# Patient Record
Sex: MALE | Race: WHITE | NOT HISPANIC OR LATINO | URBAN - METROPOLITAN AREA
[De-identification: names, ages, dates, MRNs, and addresses within clinical notes are randomized per-mention and may not be internally consistent; named-entity substitution may affect disease eponyms.]

---

## 2017-05-22 ENCOUNTER — OUTPATIENT (OUTPATIENT)
Dept: OUTPATIENT SERVICES | Facility: HOSPITAL | Age: 77
LOS: 1 days | Discharge: HOME | End: 2017-05-22

## 2017-05-22 ENCOUNTER — APPOINTMENT (OUTPATIENT)
Dept: HEMATOLOGY ONCOLOGY | Facility: CLINIC | Age: 77
End: 2017-05-22

## 2017-05-22 VITALS
RESPIRATION RATE: 14 BRPM | SYSTOLIC BLOOD PRESSURE: 187 MMHG | DIASTOLIC BLOOD PRESSURE: 78 MMHG | HEIGHT: 64 IN | BODY MASS INDEX: 29.19 KG/M2 | WEIGHT: 171 LBS | HEART RATE: 57 BPM | TEMPERATURE: 97.3 F

## 2017-05-28 LAB
ALBUMIN SERPL-MCNC: 4 G/DL
ALBUMIN/GLOB SERPL: 1.29
ALP SERPL-CCNC: 74 IU/L
ALT SERPL-CCNC: 27 IU/L
ANION GAP SERPL CALC-SCNC: 11 MEQ/L
AST SERPL-CCNC: 28 IU/L
BASOPHILS # BLD: 0.02 TH/MM3
BASOPHILS NFR BLD: 0.3 %
BILIRUB SERPL-MCNC: 0.9 MG/DL
BUN SERPL-MCNC: 11 MG/DL
BUN/CREAT SERPL: 10.4 %
CALCIUM SERPL-MCNC: 9.3 MG/DL
CHLORIDE SERPL-SCNC: 102 MEQ/L
CO2 SERPL-SCNC: 26 MEQ/L
CREAT SERPL-MCNC: 1.06 MG/DL
EOSINOPHIL # BLD: 0.09 TH/MM3
EOSINOPHIL NFR BLD: 1.3 %
ERYTHROCYTE [DISTWIDTH] IN BLOOD BY AUTOMATED COUNT: 13.1 %
GFR SERPL CREATININE-BSD FRML MDRD: 68
GLUCOSE SERPL-MCNC: 83 MG/DL
GRANULOCYTES # BLD: 5.05 TH/MM3
GRANULOCYTES NFR BLD: 70.5 %
HCT VFR BLD AUTO: 42.1 %
HGB BLD-MCNC: 14.4 G/DL
IMM GRANULOCYTES # BLD: 0.01 TH/MM3
IMM GRANULOCYTES NFR BLD: 0.1 %
LACTATE DEHYDROGENASE (NORTH): 214 IU/L
LYMPHOCYTES # BLD: 1.25 TH/MM3
LYMPHOCYTES NFR BLD: 17.5 %
MCH RBC QN AUTO: 31 PG
MCHC RBC AUTO-ENTMCNC: 34.2 G/DL
MCV RBC AUTO: 90.5 FL
MONOCYTES # BLD: 0.74 TH/MM3
MONOCYTES NFR BLD: 10.3 %
PLATELET # BLD: 140 TH/MM3
PMV BLD AUTO: 11.1 FL
POTASSIUM SERPL-SCNC: 4.6 MMOL/L
PROT SERPL-MCNC: 7.1 G/DL
RBC # BLD AUTO: 4.65 MIL/MM3
SODIUM SERPL-SCNC: 139 MEQ/L
WBC # BLD: 7.16 TH/MM3

## 2017-06-28 DIAGNOSIS — C82.10 FOLLICULAR LYMPHOMA GRADE II, UNSPECIFIED SITE: ICD-10-CM

## 2017-11-27 ENCOUNTER — OUTPATIENT (OUTPATIENT)
Dept: OUTPATIENT SERVICES | Facility: HOSPITAL | Age: 77
LOS: 1 days | Discharge: HOME | End: 2017-11-27

## 2017-11-27 ENCOUNTER — APPOINTMENT (OUTPATIENT)
Dept: HEMATOLOGY ONCOLOGY | Facility: CLINIC | Age: 77
End: 2017-11-27

## 2017-11-27 VITALS
DIASTOLIC BLOOD PRESSURE: 74 MMHG | BODY MASS INDEX: 29.37 KG/M2 | SYSTOLIC BLOOD PRESSURE: 126 MMHG | RESPIRATION RATE: 14 BRPM | TEMPERATURE: 97.5 F | HEIGHT: 64 IN | WEIGHT: 172 LBS | HEART RATE: 81 BPM

## 2017-11-27 LAB
BASOPHILS # BLD: 0.03 TH/MM3
BASOPHILS NFR BLD: 0.4 %
EOSINOPHIL # BLD: 0.03 TH/MM3
EOSINOPHIL NFR BLD: 0.4 %
ERYTHROCYTE [DISTWIDTH] IN BLOOD BY AUTOMATED COUNT: 13.3 %
GRANULOCYTES # BLD: 5.05 TH/MM3
GRANULOCYTES NFR BLD: 72.3 %
HCT VFR BLD AUTO: 43 %
HGB BLD-MCNC: 14.5 G/DL
IMM GRANULOCYTES # BLD: 0.05 TH/MM3
IMM GRANULOCYTES NFR BLD: 0.7 %
LYMPHOCYTES # BLD: 1.05 TH/MM3
LYMPHOCYTES NFR BLD: 15 %
MCH RBC QN AUTO: 30.5 PG
MCHC RBC AUTO-ENTMCNC: 33.7 G/DL
MCV RBC AUTO: 90.3 FL
MONOCYTES # BLD: 0.78 TH/MM3
MONOCYTES NFR BLD: 11.2 %
PLATELET # BLD: 133 TH/MM3
PMV BLD AUTO: 11.5 FL
RBC # BLD AUTO: 4.76 MIL/MM3
WBC # BLD: 6.99 TH/MM3

## 2017-11-28 DIAGNOSIS — R10.9 UNSPECIFIED ABDOMINAL PAIN: ICD-10-CM

## 2017-11-28 DIAGNOSIS — C82.10 FOLLICULAR LYMPHOMA GRADE II, UNSPECIFIED SITE: ICD-10-CM

## 2017-11-29 ENCOUNTER — OUTPATIENT (OUTPATIENT)
Dept: OUTPATIENT SERVICES | Facility: HOSPITAL | Age: 77
LOS: 1 days | Discharge: HOME | End: 2017-11-29

## 2017-11-29 DIAGNOSIS — R05 COUGH: ICD-10-CM

## 2017-12-11 ENCOUNTER — OUTPATIENT (OUTPATIENT)
Dept: OUTPATIENT SERVICES | Facility: HOSPITAL | Age: 77
LOS: 1 days | Discharge: HOME | End: 2017-12-11

## 2017-12-11 DIAGNOSIS — R10.31 RIGHT LOWER QUADRANT PAIN: ICD-10-CM

## 2017-12-31 LAB
ALBUMIN SERPL-MCNC: 3.9 G/DL
ALBUMIN/GLOB SERPL: 1.34
ALP SERPL-CCNC: 68 IU/L
ALT SERPL-CCNC: 29 IU/L
ANION GAP SERPL CALC-SCNC: 7 MEQ/L
AST SERPL-CCNC: 27 IU/L
BILIRUB SERPL-MCNC: 1.2 MG/DL
BUN SERPL-MCNC: 15 MG/DL
BUN/CREAT SERPL: 14.6 %
CALCIUM SERPL-MCNC: 9.4 MG/DL
CHLORIDE SERPL-SCNC: 104 MEQ/L
CO2 SERPL-SCNC: 26 MEQ/L
CREAT SERPL-MCNC: 1.03 MG/DL
GFR SERPL CREATININE-BSD FRML MDRD: 70
GLUCOSE SERPL-MCNC: 116 MG/DL
LACTATE DEHYDROGENASE (NORTH): 183 IU/L
POTASSIUM SERPL-SCNC: 4 MMOL/L
PROT SERPL-MCNC: 6.8 G/DL
SODIUM SERPL-SCNC: 137 MEQ/L

## 2018-03-05 ENCOUNTER — OUTPATIENT (OUTPATIENT)
Dept: OUTPATIENT SERVICES | Facility: HOSPITAL | Age: 78
LOS: 1 days | Discharge: HOME | End: 2018-03-05

## 2018-03-05 ENCOUNTER — APPOINTMENT (OUTPATIENT)
Dept: HEMATOLOGY ONCOLOGY | Facility: CLINIC | Age: 78
End: 2018-03-05

## 2018-03-05 ENCOUNTER — LABORATORY RESULT (OUTPATIENT)
Age: 78
End: 2018-03-05

## 2018-03-05 VITALS
HEART RATE: 64 BPM | BODY MASS INDEX: 28.34 KG/M2 | RESPIRATION RATE: 14 BRPM | SYSTOLIC BLOOD PRESSURE: 172 MMHG | WEIGHT: 166 LBS | TEMPERATURE: 96.8 F | HEIGHT: 64 IN | DIASTOLIC BLOOD PRESSURE: 87 MMHG

## 2018-03-07 DIAGNOSIS — C82.10 FOLLICULAR LYMPHOMA GRADE II, UNSPECIFIED SITE: ICD-10-CM

## 2018-06-23 LAB
ALBUMIN SERPL ELPH-MCNC: 4.5 G/DL
ALP BLD-CCNC: 82 U/L
ALT SERPL-CCNC: 23 U/L
ANION GAP SERPL CALC-SCNC: 18 MMOL/L
AST SERPL-CCNC: 23 U/L
BILIRUB SERPL-MCNC: 0.5 MG/DL
BUN SERPL-MCNC: 15 MG/DL
CALCIUM SERPL-MCNC: 9.2 MG/DL
CHLORIDE SERPL-SCNC: 101 MMOL/L
CO2 SERPL-SCNC: 21 MMOL/L
CREAT SERPL-MCNC: 1 MG/DL
GLUCOSE SERPL-MCNC: 96 MG/DL
HCT VFR BLD CALC: 42.9 %
HGB BLD-MCNC: 14.4 G/DL
LDH SERPL-CCNC: 223 U/L
MCHC RBC-ENTMCNC: 30.4 PG
MCHC RBC-ENTMCNC: 33.6 G/DL
MCV RBC AUTO: 90.7 FL
PLATELET # BLD AUTO: 144 K/UL
PMV BLD: 11.5 FL
POTASSIUM SERPL-SCNC: 4.4 MMOL/L
PROT SERPL-MCNC: 7.7 G/DL
RBC # BLD: 4.73 M/UL
RBC # FLD: 13 %
SODIUM SERPL-SCNC: 140 MMOL/L
WBC # FLD AUTO: 6.08 K/UL

## 2018-07-14 ENCOUNTER — OUTPATIENT (OUTPATIENT)
Dept: OUTPATIENT SERVICES | Facility: HOSPITAL | Age: 78
LOS: 1 days | Discharge: HOME | End: 2018-07-14

## 2018-07-14 DIAGNOSIS — M54.5 LOW BACK PAIN: ICD-10-CM

## 2018-09-17 ENCOUNTER — OUTPATIENT (OUTPATIENT)
Dept: OUTPATIENT SERVICES | Facility: HOSPITAL | Age: 78
LOS: 1 days | Discharge: HOME | End: 2018-09-17

## 2018-09-17 ENCOUNTER — APPOINTMENT (OUTPATIENT)
Dept: HEMATOLOGY ONCOLOGY | Facility: CLINIC | Age: 78
End: 2018-09-17

## 2018-09-17 ENCOUNTER — LABORATORY RESULT (OUTPATIENT)
Age: 78
End: 2018-09-17

## 2018-09-17 VITALS
WEIGHT: 166 LBS | DIASTOLIC BLOOD PRESSURE: 73 MMHG | RESPIRATION RATE: 14 BRPM | TEMPERATURE: 97.6 F | HEART RATE: 53 BPM | SYSTOLIC BLOOD PRESSURE: 145 MMHG | HEIGHT: 64 IN | BODY MASS INDEX: 28.34 KG/M2

## 2018-09-18 DIAGNOSIS — C82.10 FOLLICULAR LYMPHOMA GRADE II, UNSPECIFIED SITE: ICD-10-CM

## 2018-10-06 ENCOUNTER — INPATIENT (INPATIENT)
Facility: HOSPITAL | Age: 78
LOS: 2 days | Discharge: HOME HEALTH | End: 2018-10-09
Attending: HOSPITALIST | Admitting: HOSPITALIST

## 2018-10-06 VITALS
SYSTOLIC BLOOD PRESSURE: 156 MMHG | OXYGEN SATURATION: 95 % | HEART RATE: 93 BPM | DIASTOLIC BLOOD PRESSURE: 81 MMHG | TEMPERATURE: 100 F | RESPIRATION RATE: 20 BRPM

## 2018-10-06 DIAGNOSIS — Z98.890 OTHER SPECIFIED POSTPROCEDURAL STATES: Chronic | ICD-10-CM

## 2018-10-06 LAB
ALBUMIN SERPL ELPH-MCNC: 4.4 G/DL — SIGNIFICANT CHANGE UP (ref 3.5–5.2)
ALP SERPL-CCNC: 75 U/L — SIGNIFICANT CHANGE UP (ref 30–115)
ALT FLD-CCNC: 39 U/L — SIGNIFICANT CHANGE UP (ref 0–41)
ANION GAP SERPL CALC-SCNC: 12 MMOL/L — SIGNIFICANT CHANGE UP (ref 7–14)
ANION GAP SERPL CALC-SCNC: 15 MMOL/L — HIGH (ref 7–14)
APPEARANCE UR: CLEAR — SIGNIFICANT CHANGE UP
AST SERPL-CCNC: 87 U/L — HIGH (ref 0–41)
BACTERIA # UR AUTO: ABNORMAL
BASOPHILS # BLD AUTO: 0.02 K/UL — SIGNIFICANT CHANGE UP (ref 0–0.2)
BASOPHILS NFR BLD AUTO: 0.3 % — SIGNIFICANT CHANGE UP (ref 0–1)
BILIRUB SERPL-MCNC: 1.4 MG/DL — HIGH (ref 0.2–1.2)
BILIRUB UR-MCNC: NEGATIVE — SIGNIFICANT CHANGE UP
BUN SERPL-MCNC: 11 MG/DL — SIGNIFICANT CHANGE UP (ref 10–20)
BUN SERPL-MCNC: 13 MG/DL — SIGNIFICANT CHANGE UP (ref 10–20)
CALCIUM SERPL-MCNC: 7.9 MG/DL — LOW (ref 8.5–10.1)
CALCIUM SERPL-MCNC: 9.3 MG/DL — SIGNIFICANT CHANGE UP (ref 8.5–10.1)
CHLORIDE SERPL-SCNC: 106 MMOL/L — SIGNIFICANT CHANGE UP (ref 98–110)
CHLORIDE SERPL-SCNC: 98 MMOL/L — SIGNIFICANT CHANGE UP (ref 98–110)
CK SERPL-CCNC: 3564 U/L — HIGH (ref 0–225)
CK SERPL-CCNC: 3760 U/L — HIGH (ref 0–225)
CO2 SERPL-SCNC: 24 MMOL/L — SIGNIFICANT CHANGE UP (ref 17–32)
CO2 SERPL-SCNC: 24 MMOL/L — SIGNIFICANT CHANGE UP (ref 17–32)
COLOR SPEC: YELLOW — SIGNIFICANT CHANGE UP
CREAT SERPL-MCNC: 0.9 MG/DL — SIGNIFICANT CHANGE UP (ref 0.7–1.5)
CREAT SERPL-MCNC: 0.9 MG/DL — SIGNIFICANT CHANGE UP (ref 0.7–1.5)
DIFF PNL FLD: ABNORMAL
EOSINOPHIL # BLD AUTO: 0 K/UL — SIGNIFICANT CHANGE UP (ref 0–0.7)
EOSINOPHIL NFR BLD AUTO: 0 % — SIGNIFICANT CHANGE UP (ref 0–8)
GLUCOSE SERPL-MCNC: 108 MG/DL — HIGH (ref 70–99)
GLUCOSE SERPL-MCNC: 114 MG/DL — HIGH (ref 70–99)
GLUCOSE UR QL: NEGATIVE MG/DL — SIGNIFICANT CHANGE UP
HCT VFR BLD CALC: 40.9 % — LOW (ref 42–52)
HGB BLD-MCNC: 14.4 G/DL — SIGNIFICANT CHANGE UP (ref 14–18)
IMM GRANULOCYTES NFR BLD AUTO: 0.3 % — SIGNIFICANT CHANGE UP (ref 0.1–0.3)
KETONES UR-MCNC: NEGATIVE — SIGNIFICANT CHANGE UP
LACTATE SERPL-SCNC: 1.6 MMOL/L — SIGNIFICANT CHANGE UP (ref 0.5–2.2)
LEUKOCYTE ESTERASE UR-ACNC: NEGATIVE — SIGNIFICANT CHANGE UP
LYMPHOCYTES # BLD AUTO: 0.34 K/UL — LOW (ref 1.2–3.4)
LYMPHOCYTES # BLD AUTO: 5.1 % — LOW (ref 20.5–51.1)
MAGNESIUM SERPL-MCNC: 1.9 MG/DL — SIGNIFICANT CHANGE UP (ref 1.8–2.4)
MCHC RBC-ENTMCNC: 30.9 PG — SIGNIFICANT CHANGE UP (ref 27–31)
MCHC RBC-ENTMCNC: 35.2 G/DL — SIGNIFICANT CHANGE UP (ref 32–37)
MCV RBC AUTO: 87.8 FL — SIGNIFICANT CHANGE UP (ref 80–94)
MONOCYTES # BLD AUTO: 0.85 K/UL — HIGH (ref 0.1–0.6)
MONOCYTES NFR BLD AUTO: 12.7 % — HIGH (ref 1.7–9.3)
NEUTROPHILS # BLD AUTO: 5.44 K/UL — SIGNIFICANT CHANGE UP (ref 1.4–6.5)
NEUTROPHILS NFR BLD AUTO: 81.6 % — HIGH (ref 42.2–75.2)
NITRITE UR-MCNC: NEGATIVE — SIGNIFICANT CHANGE UP
NRBC # BLD: 0 /100 WBCS — SIGNIFICANT CHANGE UP (ref 0–0)
PH UR: 5.5 — SIGNIFICANT CHANGE UP (ref 5–8)
PLATELET # BLD AUTO: 118 K/UL — LOW (ref 130–400)
POTASSIUM SERPL-MCNC: 4.1 MMOL/L — SIGNIFICANT CHANGE UP (ref 3.5–5)
POTASSIUM SERPL-MCNC: 4.6 MMOL/L — SIGNIFICANT CHANGE UP (ref 3.5–5)
POTASSIUM SERPL-SCNC: 4.1 MMOL/L — SIGNIFICANT CHANGE UP (ref 3.5–5)
POTASSIUM SERPL-SCNC: 4.6 MMOL/L — SIGNIFICANT CHANGE UP (ref 3.5–5)
PROT SERPL-MCNC: 7.4 G/DL — SIGNIFICANT CHANGE UP (ref 6–8)
PROT UR-MCNC: 30 MG/DL
RBC # BLD: 4.66 M/UL — LOW (ref 4.7–6.1)
RBC # FLD: 12.7 % — SIGNIFICANT CHANGE UP (ref 11.5–14.5)
RBC CASTS # UR COMP ASSIST: ABNORMAL /HPF
SODIUM SERPL-SCNC: 137 MMOL/L — SIGNIFICANT CHANGE UP (ref 135–146)
SODIUM SERPL-SCNC: 142 MMOL/L — SIGNIFICANT CHANGE UP (ref 135–146)
SP GR SPEC: 1.02 — SIGNIFICANT CHANGE UP (ref 1.01–1.03)
TROPONIN T SERPL-MCNC: <0.01 NG/ML — SIGNIFICANT CHANGE UP
UROBILINOGEN FLD QL: 0.2 MG/DL — SIGNIFICANT CHANGE UP (ref 0.2–0.2)
WBC # BLD: 6.67 K/UL — SIGNIFICANT CHANGE UP (ref 4.8–10.8)
WBC # FLD AUTO: 6.67 K/UL — SIGNIFICANT CHANGE UP (ref 4.8–10.8)
WBC UR QL: SIGNIFICANT CHANGE UP /HPF

## 2018-10-06 RX ORDER — SODIUM CHLORIDE 9 MG/ML
1000 INJECTION INTRAMUSCULAR; INTRAVENOUS; SUBCUTANEOUS ONCE
Qty: 0 | Refills: 0 | Status: COMPLETED | OUTPATIENT
Start: 2018-10-06 | End: 2018-10-06

## 2018-10-06 RX ORDER — SODIUM CHLORIDE 9 MG/ML
1000 INJECTION INTRAMUSCULAR; INTRAVENOUS; SUBCUTANEOUS
Qty: 0 | Refills: 0 | Status: DISCONTINUED | OUTPATIENT
Start: 2018-10-06 | End: 2018-10-07

## 2018-10-06 RX ADMIN — SODIUM CHLORIDE 2000 MILLILITER(S): 9 INJECTION INTRAMUSCULAR; INTRAVENOUS; SUBCUTANEOUS at 16:00

## 2018-10-06 RX ADMIN — SODIUM CHLORIDE 1000 MILLILITER(S): 9 INJECTION INTRAMUSCULAR; INTRAVENOUS; SUBCUTANEOUS at 15:44

## 2018-10-06 RX ADMIN — SODIUM CHLORIDE 250 MILLILITER(S): 9 INJECTION INTRAMUSCULAR; INTRAVENOUS; SUBCUTANEOUS at 23:05

## 2018-10-06 RX ADMIN — SODIUM CHLORIDE 2000 MILLILITER(S): 9 INJECTION INTRAMUSCULAR; INTRAVENOUS; SUBCUTANEOUS at 11:32

## 2018-10-06 NOTE — ED PROVIDER NOTE - CONDUCTED A DETAILED DISCUSSION WITH PATIENT AND/OR GUARDIAN REGARDING, MDM
lab results/return to ED if symptoms worsen, persist or questions arise/radiology results/need for continued observation

## 2018-10-06 NOTE — ED PROVIDER NOTE - MEDICAL DECISION MAKING DETAILS
I was present for and supervised the key and critical aspects of the procedures performed during the care of the patient. Patient found on floor after an extended period of time unable to get up,  he was found to have elevated ck levels I will continue to monitor labs and administer iv fluids I will continue to monitor at this time.

## 2018-10-06 NOTE — ED ADULT TRIAGE NOTE - CHIEF COMPLAINT QUOTE
Pt brought in by ambulance from home. As per EMT "He was found on the floor next to his bed. He's been on the floor since 4 a.m."

## 2018-10-06 NOTE — ED PROVIDER NOTE - PHYSICAL EXAMINATION
Constitutional: Well developed, well nourished. NAD. Good general hygiene  Head: Atraumatic.  Eyes: PERRLA. EOMI without discomfort.   ENT: No nasal discharge. Mucous membranes moist.  Neck: Supple. Painless ROM.  Cardiovascular: Regular rhythm. Regular rate. Normal S1 and S2. No murmurs. 2+ pulses in all extremities.   Pulmonary: Normal respiratory rate and effort. Lungs clear to auscultation bilaterally. No wheezing, rales, or rhonchi. Bilateral, equal lung expansion.   Abdominal: Soft. Nondistended. Nontender. No rebound or guarding.   Extremities: Pelvis stable. No lower extremity edema. Symmetric calves.  Skin: No rashes.   Neuro: AAOx3. CN 2-12 intact.   Psych: Normal mood. Normal affect.

## 2018-10-06 NOTE — ED ADULT NURSE NOTE - NSIMPLEMENTINTERV_GEN_ALL_ED
Implemented All Universal Safety Interventions:  Dresser to call system. Call bell, personal items and telephone within reach. Instruct patient to call for assistance. Room bathroom lighting operational. Non-slip footwear when patient is off stretcher. Physically safe environment: no spills, clutter or unnecessary equipment. Stretcher in lowest position, wheels locked, appropriate side rails in place.

## 2018-10-06 NOTE — ED PROVIDER NOTE - PROGRESS NOTE DETAILS
labs and studies reviewed, patient with mild rhabdo but is ambulating at baseline, will hydrate, repeat labs; endorsed to Dr Leon endorsed to Dr. Malone. accepts admission/. improving CK

## 2018-10-06 NOTE — ED PROVIDER NOTE - OBJECTIVE STATEMENT
78y M w PMH CABGx4 25 years ago, GERD, HTN BIBEMS after being found on the floor this morning after he fell out of bed and was unable to reach anything to help him pull himself back up. No f/c/n/v/d. Pt complaining of increased urination and mild suprapubic pain but no other complaints. Pt states he was "just not strong enough to get back up." No CP or SOB. No n/v/d/f/c.

## 2018-10-06 NOTE — ED PROVIDER NOTE - NS ED ROS FT
Constitutional: No fever or chills. Normal appetite. No unintended weight loss.   Eyes: No vision changes.  ENT: No hearing changes. No ear pain. No sore throat.  Neck: No neck pain or stiffness.  Cardiovascular: No chest pain, palpitations, or edema.  Pulmonary: No cough or SOB. No hemoptysis.  Abdominal: No abdominal pain, nausea, vomiting, or diarrhea.   : No dysuria. No hematuria.   Neuro: No headache, syncope, or dizziness.  MS: No back pain. No calf pain/swelling.  Psych: No suicidal or homicidal ideations.

## 2018-10-06 NOTE — ED PROVIDER NOTE - ATTENDING CONTRIBUTION TO CARE
78y male above PMH (denies h/o CA listed in chart) BIBEMS after fall- pt fell OOB but unable to get back up, denies LOC or injury, has had urinary frequency since receiving his flu shot yesterday, no fever, no pain, on exam vital signs appreciated, elderly, nontoxic, no marks of tuarma, head nc/at, perrla, conj pink, dry mm, neck supple, cor rrr, lungs cta, abd +bs, snt, pelvis stable FROM x 4 neuro itnact, will cehk labs, urine, imaging, ekg, reassess

## 2018-10-07 RX ORDER — FAMOTIDINE 10 MG/ML
20 INJECTION INTRAVENOUS DAILY
Qty: 0 | Refills: 0 | Status: DISCONTINUED | OUTPATIENT
Start: 2018-10-07 | End: 2018-10-07

## 2018-10-07 RX ORDER — METOPROLOL TARTRATE 50 MG
25 TABLET ORAL DAILY
Qty: 0 | Refills: 0 | Status: DISCONTINUED | OUTPATIENT
Start: 2018-10-07 | End: 2018-10-07

## 2018-10-07 RX ORDER — FAMOTIDINE 10 MG/ML
40 INJECTION INTRAVENOUS DAILY
Qty: 0 | Refills: 0 | Status: DISCONTINUED | OUTPATIENT
Start: 2018-10-07 | End: 2018-10-09

## 2018-10-07 RX ORDER — ASPIRIN/CALCIUM CARB/MAGNESIUM 324 MG
81 TABLET ORAL DAILY
Qty: 0 | Refills: 0 | Status: DISCONTINUED | OUTPATIENT
Start: 2018-10-07 | End: 2018-10-09

## 2018-10-07 RX ORDER — METOPROLOL TARTRATE 50 MG
50 TABLET ORAL
Qty: 0 | Refills: 0 | Status: DISCONTINUED | OUTPATIENT
Start: 2018-10-07 | End: 2018-10-09

## 2018-10-07 RX ORDER — ATORVASTATIN CALCIUM 80 MG/1
40 TABLET, FILM COATED ORAL AT BEDTIME
Qty: 0 | Refills: 0 | Status: DISCONTINUED | OUTPATIENT
Start: 2018-10-07 | End: 2018-10-09

## 2018-10-07 RX ORDER — METOPROLOL TARTRATE 50 MG
50 TABLET ORAL DAILY
Qty: 0 | Refills: 0 | Status: DISCONTINUED | OUTPATIENT
Start: 2018-10-07 | End: 2018-10-07

## 2018-10-07 RX ORDER — SODIUM CHLORIDE 9 MG/ML
1000 INJECTION INTRAMUSCULAR; INTRAVENOUS; SUBCUTANEOUS
Qty: 0 | Refills: 0 | Status: DISCONTINUED | OUTPATIENT
Start: 2018-10-07 | End: 2018-10-08

## 2018-10-07 RX ORDER — HEPARIN SODIUM 5000 [USP'U]/ML
5000 INJECTION INTRAVENOUS; SUBCUTANEOUS EVERY 12 HOURS
Qty: 0 | Refills: 0 | Status: DISCONTINUED | OUTPATIENT
Start: 2018-10-07 | End: 2018-10-09

## 2018-10-07 RX ADMIN — Medication 81 MILLIGRAM(S): at 16:33

## 2018-10-07 RX ADMIN — SODIUM CHLORIDE 250 MILLILITER(S): 9 INJECTION INTRAMUSCULAR; INTRAVENOUS; SUBCUTANEOUS at 07:00

## 2018-10-07 RX ADMIN — SODIUM CHLORIDE 250 MILLILITER(S): 9 INJECTION INTRAMUSCULAR; INTRAVENOUS; SUBCUTANEOUS at 01:59

## 2018-10-07 RX ADMIN — FAMOTIDINE 40 MILLIGRAM(S): 10 INJECTION INTRAVENOUS at 16:33

## 2018-10-07 RX ADMIN — Medication 50 MILLIGRAM(S): at 16:31

## 2018-10-07 RX ADMIN — SODIUM CHLORIDE 150 MILLILITER(S): 9 INJECTION INTRAMUSCULAR; INTRAVENOUS; SUBCUTANEOUS at 16:00

## 2018-10-07 NOTE — H&P ADULT - NSHPLABSRESULTS_GEN_ALL_CORE
CBC Full  -  ( 06 Oct 2018 11:53 )  WBC Count : 6.67 K/uL  Hemoglobin : 14.4 g/dL  Hematocrit : 40.9 %  Platelet Count - Automated : 118 K/uL  Mean Cell Volume : 87.8 fL  Mean Cell Hemoglobin : 30.9 pg  Mean Cell Hemoglobin Concentration : 35.2 g/dL  Auto Neutrophil # : 5.44 K/uL  Auto Lymphocyte # : 0.34 K/uL  Auto Monocyte # : 0.85 K/uL  Auto Eosinophil # : 0.00 K/uL  Auto Basophil # : 0.02 K/uL  Auto Neutrophil % : 81.6 %  Auto Lymphocyte % : 5.1 %  Auto Monocyte % : 12.7 %  Auto Eosinophil % : 0.0 %  Auto Basophil % : 0.3 %    BMP:    10-06 @ 18:50    Blood Urea Nitrogen - 11  Calcium - 7.9  Carbon Dioxide - 24  Chloride - 106  Creatinine - 0.9  Glucose - 108  Potassium - 4.1  Sodium - 142

## 2018-10-07 NOTE — ED CDU PROVIDER INITIAL DAY NOTE - PHYSICAL EXAMINATION
PHYSICAL EXAM:    GENERAL: Alert, appears stated age, well appearing, non-toxic  SKIN: Warm, pink and dry. MMM   EYE: Normal lids/conjunctiva, PERRL, EOMI  ENT: Normal hearing, patent oropharynx   NECK: +supple. No meningismus  Pulm: Bilateral BS, normal resp effort, no wheezes, stridor, or retractions  CV: RRR, no M/R/G, 2+ pulses   Abd: soft, non-tender, non-distended  Mskel: no erythema, cyanosis, edema. no calf tenderness.  Neuro: AAOx3, no sensory/motor deficits No speech slurring, pronator drift, facial asymmetry. normal finger-to-nose b/l. 5/5 strength throughout. normal gait.

## 2018-10-07 NOTE — PATIENT PROFILE ADULT - PSYCHOSOCIAL CONCERNS - OTHER
dtr live in [place], son is around but most of the time not available, Patient is gets more often confused and uncomplying with  meds related to confusion, needs support system for safety

## 2018-10-07 NOTE — ED CDU PROVIDER INITIAL DAY NOTE - OBJECTIVE STATEMENT
79 y/o M with CABG x 4, HTN presents with slip from bed onto floor yesterday. no head injury. no LOC. on asa. not on other blood thinners. He was unable to get up for some time and found to be in rhabdomyalysis. agree with ED hx and PE. pt originally placed in obs to trend CK; which is downtrending; however, on discussion with pts daughter, she does not feel that this is a safe d/c home as pt needs extra help and lives alone. she says he is unsteady on his feet and is afraid that he will fall and not be able to get up again. pt denies all other symptosm inclduing CP, pain anywhere, SOB, fevers, n/v/d, abdomianl pain, leg pain.

## 2018-10-07 NOTE — H&P ADULT - NSHPPHYSICALEXAM_GEN_ALL_CORE
PHYSICAL EXAM:  GENERAL: NAD, elderly   HEAD:  Atraumatic, Normocephalic  EYES: EOMI, PERRLA, conjunctiva and sclera clear  NECK: Supple, No JVD  CHEST/LUNG: Clear to auscultation bilaterally; No wheeze  HEART: Regular rate and rhythm; No murmurs, rubs, or gallops  ABDOMEN: Soft, Nontender, Nondistended; Bowel sounds present  EXTREMITIES:  2+ Peripheral Pulses, No clubbing, cyanosis, or edema  PSYCH: AAOx3  NEUROLOGY: non-focal  SKIN: No rashes or lesions

## 2018-10-07 NOTE — H&P ADULT - ASSESSMENT
78y M w PMH CABGx4 25 years ago, GERD, HTN BIBEMS after being found on the floor this morning after he fell out of bed and was unable to reach anything to help him pull himself back up. No f/c/n/v/d. Pt complaining of increased urination and mild suprapubic pain but no other complaints. Pt states he was "just not strong enough to get back up." No CP or SOB. No n/v/d/f/c.     Problem List:  #Rhabdomyolysis   #Debility   #Fall   #CAD s/p CABG   #GERD  #HTN    Plan:  - c/w IVF   - PT/Rehab   - c/w ASA/Statin   - c/w BB  - c/w protonix 40mg daily   - dvt ppx

## 2018-10-07 NOTE — H&P ADULT - PMH
Coronary artery disease involving coronary bypass graft of native heart without angina pectoris    HTN (hypertension)

## 2018-10-08 LAB
CULTURE RESULTS: SIGNIFICANT CHANGE UP
SPECIMEN SOURCE: SIGNIFICANT CHANGE UP

## 2018-10-08 RX ORDER — SODIUM CHLORIDE 9 MG/ML
1000 INJECTION INTRAMUSCULAR; INTRAVENOUS; SUBCUTANEOUS
Qty: 0 | Refills: 0 | Status: DISCONTINUED | OUTPATIENT
Start: 2018-10-08 | End: 2018-10-09

## 2018-10-08 RX ORDER — ACETAMINOPHEN 500 MG
650 TABLET ORAL EVERY 6 HOURS
Qty: 0 | Refills: 0 | Status: DISCONTINUED | OUTPATIENT
Start: 2018-10-08 | End: 2018-10-09

## 2018-10-08 RX ORDER — TRAMADOL HYDROCHLORIDE 50 MG/1
50 TABLET ORAL EVERY 8 HOURS
Qty: 0 | Refills: 0 | Status: DISCONTINUED | OUTPATIENT
Start: 2018-10-08 | End: 2018-10-09

## 2018-10-08 RX ADMIN — HEPARIN SODIUM 5000 UNIT(S): 5000 INJECTION INTRAVENOUS; SUBCUTANEOUS at 18:24

## 2018-10-08 RX ADMIN — Medication 10 MILLIGRAM(S): at 18:23

## 2018-10-08 RX ADMIN — SODIUM CHLORIDE 75 MILLILITER(S): 9 INJECTION INTRAMUSCULAR; INTRAVENOUS; SUBCUTANEOUS at 17:10

## 2018-10-08 RX ADMIN — ATORVASTATIN CALCIUM 40 MILLIGRAM(S): 80 TABLET, FILM COATED ORAL at 21:25

## 2018-10-08 NOTE — PROGRESS NOTE ADULT - SUBJECTIVE AND OBJECTIVE BOX
Pt seen and examined at bedside. Report RLQ chronic abd pain.     VITAL SIGNS (Last 24 hrs):  T(C): 36.1 (10-08-18 @ 14:00), Max: 36.1 (10-08-18 @ 14:00)  HR: 62 (10-08-18 @ 14:00) (62 - 62)  BP: 146/75 (10-08-18 @ 14:00) (146/75 - 146/75)  RR: 16 (10-08-18 @ 14:00) (16 - 16)  SpO2: --  Wt(kg): --  Daily     Daily     I&O's Summary    08 Oct 2018 07:01  -  08 Oct 2018 17:25  --------------------------------------------------------  IN: 0 mL / OUT: 250 mL / NET: -250 mL        PHYSICAL EXAM:  GENERAL: NAD, well-developed  HEAD:  Atraumatic, Normocephalic  EYES: EOMI, PERRLA, conjunctiva and sclera clear  NECK: Supple, No JVD  CHEST/LUNG: Clear to auscultation bilaterally; No wheeze  HEART: Regular rate and rhythm; No murmurs, rubs, or gallops  ABDOMEN: Soft, Nontender, Nondistended; Bowel sounds present  EXTREMITIES:  2+ Peripheral Pulses, No clubbing, cyanosis, or edema  PSYCH: AAOx3  NEUROLOGY: non-focal  SKIN: No rashes or lesions    Labs Reviewed  Spoke to patient in regards to abnormal labs.    CBC Full  -  ( 06 Oct 2018 11:53 )  WBC Count : 6.67 K/uL  Hemoglobin : 14.4 g/dL  Hematocrit : 40.9 %  Platelet Count - Automated : 118 K/uL  Mean Cell Volume : 87.8 fL  Mean Cell Hemoglobin : 30.9 pg  Mean Cell Hemoglobin Concentration : 35.2 g/dL  Auto Neutrophil # : 5.44 K/uL  Auto Lymphocyte # : 0.34 K/uL  Auto Monocyte # : 0.85 K/uL  Auto Eosinophil # : 0.00 K/uL  Auto Basophil # : 0.02 K/uL  Auto Neutrophil % : 81.6 %  Auto Lymphocyte % : 5.1 %  Auto Monocyte % : 12.7 %  Auto Eosinophil % : 0.0 %  Auto Basophil % : 0.3 %    BMP:    10-06 @ 18:50    Blood Urea Nitrogen - 11  Calcium - 7.9  Carbon Dioxide - 24  Chloride - 106  Creatinine - 0.9  Glucose - 108  Potassium - 4.1  Sodium - 142         MEDICATIONS  (STANDING):  aspirin  chewable 81 milliGRAM(s) Oral daily  atorvastatin 40 milliGRAM(s) Oral at bedtime  bisacodyl Suppository 10 milliGRAM(s) Rectal once  famotidine    Tablet 40 milliGRAM(s) Oral daily  heparin  Injectable 5000 Unit(s) SubCutaneous every 12 hours  metoprolol tartrate 50 milliGRAM(s) Oral two times a day  sodium chloride 0.9%. 1000 milliLiter(s) (75 mL/Hr) IV Continuous <Continuous>    MEDICATIONS  (PRN):  acetaminophen   Tablet .. 650 milliGRAM(s) Oral every 6 hours PRN Mild Pain (1 - 3), Moderate Pain (4 - 6)  traMADol 50 milliGRAM(s) Oral every 8 hours PRN Severe Pain (7 - 10)

## 2018-10-09 ENCOUNTER — TRANSCRIPTION ENCOUNTER (OUTPATIENT)
Age: 78
End: 2018-10-09

## 2018-10-09 VITALS — SYSTOLIC BLOOD PRESSURE: 151 MMHG | DIASTOLIC BLOOD PRESSURE: 79 MMHG | HEART RATE: 66 BPM

## 2018-10-09 LAB
ALBUMIN SERPL ELPH-MCNC: 3.5 G/DL — SIGNIFICANT CHANGE UP (ref 3.5–5.2)
ALP SERPL-CCNC: 58 U/L — SIGNIFICANT CHANGE UP (ref 30–115)
ALT FLD-CCNC: 39 U/L — SIGNIFICANT CHANGE UP (ref 0–41)
ANION GAP SERPL CALC-SCNC: 14 MMOL/L — SIGNIFICANT CHANGE UP (ref 7–14)
AST SERPL-CCNC: 50 U/L — HIGH (ref 0–41)
BILIRUB SERPL-MCNC: 0.8 MG/DL — SIGNIFICANT CHANGE UP (ref 0.2–1.2)
BUN SERPL-MCNC: 8 MG/DL — LOW (ref 10–20)
CALCIUM SERPL-MCNC: 8.4 MG/DL — LOW (ref 8.5–10.1)
CHLORIDE SERPL-SCNC: 103 MMOL/L — SIGNIFICANT CHANGE UP (ref 98–110)
CK SERPL-CCNC: 772 U/L — HIGH (ref 0–225)
CO2 SERPL-SCNC: 24 MMOL/L — SIGNIFICANT CHANGE UP (ref 17–32)
CREAT SERPL-MCNC: 0.8 MG/DL — SIGNIFICANT CHANGE UP (ref 0.7–1.5)
GLUCOSE SERPL-MCNC: 98 MG/DL — SIGNIFICANT CHANGE UP (ref 70–99)
POTASSIUM SERPL-MCNC: 3.4 MMOL/L — LOW (ref 3.5–5)
POTASSIUM SERPL-SCNC: 3.4 MMOL/L — LOW (ref 3.5–5)
PROT SERPL-MCNC: 6.4 G/DL — SIGNIFICANT CHANGE UP (ref 6–8)
SODIUM SERPL-SCNC: 141 MMOL/L — SIGNIFICANT CHANGE UP (ref 135–146)

## 2018-10-09 RX ORDER — LINACLOTIDE 145 UG/1
1 CAPSULE, GELATIN COATED ORAL
Qty: 30 | Refills: 1
Start: 2018-10-09 | End: 2018-12-07

## 2018-10-09 RX ORDER — METOPROLOL TARTRATE 50 MG
0 TABLET ORAL
Qty: 0 | Refills: 0 | COMMUNITY

## 2018-10-09 RX ORDER — METOPROLOL TARTRATE 50 MG
1 TABLET ORAL
Qty: 30 | Refills: 0
Start: 2018-10-09 | End: 2018-11-07

## 2018-10-09 RX ORDER — POTASSIUM CHLORIDE 20 MEQ
40 PACKET (EA) ORAL ONCE
Qty: 0 | Refills: 0 | Status: COMPLETED | OUTPATIENT
Start: 2018-10-09 | End: 2018-10-09

## 2018-10-09 RX ORDER — ATORVASTATIN CALCIUM 80 MG/1
1 TABLET, FILM COATED ORAL
Qty: 30 | Refills: 0
Start: 2018-10-09 | End: 2018-11-07

## 2018-10-09 RX ORDER — SENNOSIDES/DOCUSATE SODIUM 8.6MG-50MG
2 TABLET ORAL
Qty: 60 | Refills: 2
Start: 2018-10-09 | End: 2019-01-06

## 2018-10-09 RX ORDER — FAMOTIDINE 10 MG/ML
1 INJECTION INTRAVENOUS
Qty: 0 | Refills: 0 | DISCHARGE
Start: 2018-10-09

## 2018-10-09 RX ADMIN — FAMOTIDINE 40 MILLIGRAM(S): 10 INJECTION INTRAVENOUS at 12:50

## 2018-10-09 RX ADMIN — Medication 81 MILLIGRAM(S): at 11:19

## 2018-10-09 RX ADMIN — Medication 50 MILLIGRAM(S): at 05:16

## 2018-10-09 RX ADMIN — HEPARIN SODIUM 5000 UNIT(S): 5000 INJECTION INTRAVENOUS; SUBCUTANEOUS at 05:17

## 2018-10-09 RX ADMIN — Medication 50 MILLIGRAM(S): at 17:10

## 2018-10-09 RX ADMIN — Medication 40 MILLIEQUIVALENT(S): at 11:20

## 2018-10-09 NOTE — DISCHARGE NOTE ADULT - CARE PLAN
Principal Discharge DX:	Non-traumatic rhabdomyolysis  Goal:	prevent recurrent fall  Assessment and plan of treatment:	treated with hydration  Secondary Diagnosis:	Fall at home, initial encounter  Assessment and plan of treatment:	home physical therapy with home care evaluation  Secondary Diagnosis:	Slow transit constipation  Assessment and plan of treatment:	take senna/colace 2 tabs at night   take Linzess once a day on empty stomach, at least 30 minutes before first meal of the day

## 2018-10-09 NOTE — DISCHARGE NOTE ADULT - HOSPITAL COURSE
Elderly male who lives alone presented after a fall. Admitted for physiatry evaluation and hydration for rhabdomyolysis. Patient was treated successfully and his CPK trended down. He was also treated for slow transit constipation.

## 2018-10-09 NOTE — DISCHARGE NOTE ADULT - MEDICATION SUMMARY - MEDICATIONS TO TAKE
I will START or STAY ON the medications listed below when I get home from the hospital:    aspirin  -- Indication: For CAD    atorvastatin 40 mg oral tablet  -- 1 tab(s) by mouth once a day (at bedtime)  -- Indication: For CAD    Toprol-XL 25 mg oral tablet, extended release  -- 1 tab(s) by mouth once a day   -- It is very important that you take or use this exactly as directed.  Do not skip doses or discontinue unless directed by your doctor.  May cause drowsiness.  Alcohol may intensify this effect.  Use care when operating dangerous machinery.  Some non-prescription drugs may aggravate your condition.  Read all labels carefully.  If a warning appears, check with your doctor before taking.  Swallow whole.  Do not crush.  Take with food or milk.  This drug may impair the ability to drive or operate machinery.  Use care until you become familiar with its effects.    -- Indication: For CAD    linaclotide 72 mcg oral capsule  -- 1 cap(s) by mouth once a day   -- Check with your doctor before becoming pregnant.  Do not chew, break, or crush.  Obtain medical advice before taking any non-prescription drugs as some may affect the action of this medication.  Swallow whole.  Do not crush.  Take medication on an empty stomach 1 hour before or 2 to 3 hours after a meal unless otherwise directed by your doctor.  Take this medicine 30 minutes before a meal. Read label carefully for how many times to take each day.      -- Indication: For Constipation    famotidine 40 mg oral tablet  -- 1 tab(s) by mouth once a day  -- Indication: For GERD    docusate-senna 50 mg-8.6 mg oral tablet  -- 2 tab(s) by mouth once a day (at bedtime)   -- Medication should be taken with plenty of water.    -- Indication: For Constipation

## 2018-10-09 NOTE — DISCHARGE NOTE ADULT - PLAN OF CARE
prevent recurrent fall treated with hydration home physical therapy with home care evaluation take senna/colace 2 tabs at night   take Linzess once a day on empty stomach, at least 30 minutes before first meal of the day

## 2018-10-19 DIAGNOSIS — Z95.1 PRESENCE OF AORTOCORONARY BYPASS GRAFT: ICD-10-CM

## 2018-10-19 DIAGNOSIS — T79.6XXA TRAUMATIC ISCHEMIA OF MUSCLE, INITIAL ENCOUNTER: ICD-10-CM

## 2018-10-19 DIAGNOSIS — I10 ESSENTIAL (PRIMARY) HYPERTENSION: ICD-10-CM

## 2018-10-19 DIAGNOSIS — K21.9 GASTRO-ESOPHAGEAL REFLUX DISEASE WITHOUT ESOPHAGITIS: ICD-10-CM

## 2018-10-19 DIAGNOSIS — R53.81 OTHER MALAISE: ICD-10-CM

## 2018-10-19 DIAGNOSIS — K59.01 SLOW TRANSIT CONSTIPATION: ICD-10-CM

## 2018-10-19 DIAGNOSIS — M62.82 RHABDOMYOLYSIS: ICD-10-CM

## 2018-10-19 DIAGNOSIS — I25.10 ATHEROSCLEROTIC HEART DISEASE OF NATIVE CORONARY ARTERY WITHOUT ANGINA PECTORIS: ICD-10-CM

## 2019-03-01 LAB
HCT VFR BLD CALC: 41.4 %
HGB BLD-MCNC: 14.3 G/DL
MCHC RBC-ENTMCNC: 30.8 PG
MCHC RBC-ENTMCNC: 34.5 G/DL
MCV RBC AUTO: 89.2 FL
PLATELET # BLD AUTO: 148 K/UL
PMV BLD: 11.8 FL
RBC # BLD: 4.64 M/UL
RBC # FLD: 12.7 %
WBC # FLD AUTO: 7.06 K/UL

## 2019-03-18 ENCOUNTER — LABORATORY RESULT (OUTPATIENT)
Age: 79
End: 2019-03-18

## 2019-03-18 ENCOUNTER — OUTPATIENT (OUTPATIENT)
Dept: OUTPATIENT SERVICES | Facility: HOSPITAL | Age: 79
LOS: 1 days | Discharge: HOME | End: 2019-03-18

## 2019-03-18 ENCOUNTER — APPOINTMENT (OUTPATIENT)
Dept: HEMATOLOGY ONCOLOGY | Facility: CLINIC | Age: 79
End: 2019-03-18

## 2019-03-18 VITALS
BODY MASS INDEX: 28 KG/M2 | WEIGHT: 164 LBS | SYSTOLIC BLOOD PRESSURE: 138 MMHG | RESPIRATION RATE: 14 BRPM | TEMPERATURE: 97.3 F | HEART RATE: 72 BPM | DIASTOLIC BLOOD PRESSURE: 78 MMHG | HEIGHT: 64 IN

## 2019-03-18 DIAGNOSIS — Z98.890 OTHER SPECIFIED POSTPROCEDURAL STATES: Chronic | ICD-10-CM

## 2019-03-18 PROBLEM — I25.810 ATHEROSCLEROSIS OF CORONARY ARTERY BYPASS GRAFT(S) WITHOUT ANGINA PECTORIS: Chronic | Status: ACTIVE | Noted: 2018-10-07

## 2019-03-18 PROBLEM — I10 ESSENTIAL (PRIMARY) HYPERTENSION: Chronic | Status: ACTIVE | Noted: 2018-10-06

## 2019-03-25 DIAGNOSIS — C82.10 FOLLICULAR LYMPHOMA GRADE II, UNSPECIFIED SITE: ICD-10-CM

## 2019-04-19 LAB
ALBUMIN SERPL ELPH-MCNC: 4.4 G/DL
ALP BLD-CCNC: 88 U/L
ALT SERPL-CCNC: 33 U/L
ANION GAP SERPL CALC-SCNC: 15 MMOL/L
AST SERPL-CCNC: 25 U/L
BILIRUB SERPL-MCNC: 0.5 MG/DL
BUN SERPL-MCNC: 17 MG/DL
CALCIUM SERPL-MCNC: 9.4 MG/DL
CHLORIDE SERPL-SCNC: 102 MMOL/L
CO2 SERPL-SCNC: 22 MMOL/L
CREAT SERPL-MCNC: 1 MG/DL
GLUCOSE SERPL-MCNC: 113 MG/DL
LDH SERPL-CCNC: 226 U/L
POTASSIUM SERPL-SCNC: 4.1 MMOL/L
PROT SERPL-MCNC: 7.5 G/DL
SODIUM SERPL-SCNC: 139 MMOL/L

## 2019-07-29 ENCOUNTER — APPOINTMENT (OUTPATIENT)
Dept: CARDIOLOGY | Facility: CLINIC | Age: 79
End: 2019-07-29
Payer: MEDICARE

## 2019-07-29 PROCEDURE — 93000 ELECTROCARDIOGRAM COMPLETE: CPT

## 2019-07-29 PROCEDURE — 99214 OFFICE O/P EST MOD 30 MIN: CPT

## 2019-09-16 ENCOUNTER — LABORATORY RESULT (OUTPATIENT)
Age: 79
End: 2019-09-16

## 2019-09-16 ENCOUNTER — OUTPATIENT (OUTPATIENT)
Dept: OUTPATIENT SERVICES | Facility: HOSPITAL | Age: 79
LOS: 1 days | Discharge: HOME | End: 2019-09-16

## 2019-09-16 ENCOUNTER — APPOINTMENT (OUTPATIENT)
Dept: HEMATOLOGY ONCOLOGY | Facility: CLINIC | Age: 79
End: 2019-09-16
Payer: MEDICARE

## 2019-09-16 VITALS
RESPIRATION RATE: 14 BRPM | TEMPERATURE: 98.9 F | SYSTOLIC BLOOD PRESSURE: 145 MMHG | WEIGHT: 172 LBS | HEART RATE: 87 BPM | BODY MASS INDEX: 29.37 KG/M2 | DIASTOLIC BLOOD PRESSURE: 80 MMHG | HEIGHT: 64 IN

## 2019-09-16 DIAGNOSIS — Z98.890 OTHER SPECIFIED POSTPROCEDURAL STATES: Chronic | ICD-10-CM

## 2019-09-16 PROCEDURE — 99213 OFFICE O/P EST LOW 20 MIN: CPT

## 2019-09-22 NOTE — CONSULT LETTER
[Dear  ___] : Dear  [unfilled], [Courtesy Letter:] : I had the pleasure of seeing your patient, [unfilled], in my office today. [Please see my note below.] : Please see my note below. [Consult Closing:] : Thank you very much for allowing me to participate in the care of this patient.  If you have any questions, please do not hesitate to contact me. [Sincerely,] : Sincerely, [FreeTextEntry3] : Lucy Field MD

## 2019-09-22 NOTE — HISTORY OF PRESENT ILLNESS
[de-identified] : 77-year-old male is here today for followup visit. 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 He received chemotherapy with Rituxan and Bendamustine for 6 cycles between August. 2011 to December 2011. After that, he received a maintenance rituximab once every 3 months for 8 times. He has being remained in remission since then.  On November 14, 2016, he had a repeat PET/CT which did not reveal evidence of abnormal FDG uptake. 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  He also had a CT of the chest without contrast on the same day, which showed stable small 5-6 mm pulmonary nodules since 2014. No further follow up imaging study is required.\par \par During his last visit,  Jairon complained RLQ abdominal pain for a few weeks. He had difficulty standing up from chair. He saw his PCP and was recommended to see a gastroenterologist. \par Review systems is otherwise negative. [de-identified] : \par 3/5/18\par To further evaluate RLQ abdominal pain, we referred him for CT abdomen and pelvis with contrast on 12/11/17, which did not reveal  acute  abdominopelvic  abnormality.  Unremarkable  appendix. No  lymphadenopathy  in  the  abdomen  or  pelvis.  Cholelithiasis  vs.  a  5  mm  gallbladder  wall  polyp.  This report was reviewed with Jairon.\par Today, he reports pain in RLQ resolved. He complains of constipation. He is taking stool Colace. He does not have N/V. His appetite has been normal. He has no chill for fever.\par \par 9/17/18\par Patient is here for a follow up visit.  He offers no new complaints.  He had his colonoscopy done on 8/1/18 by Dr. Beck.  Impression: second degree hemorrhoids, diverticulosis of large intestine without perforation or abscess without bleeding.\par \par 3/18/19\par Patient is here today for follow up visit.  He feels well.  He offers no new complaints. He states he was referred by his PCP to neurology for h/o abdominal pain and no findings were found.\par \par 9/16/19\par Patient is here today for follow up visit.  He denies any weight loss, fever, pain, vomiting, diarrhea. He states he was given a "pill" for memory loss but doesn’t notice improvement. He came back to the office later and showed us "pill"- Memantine HCL 10 mg PO twice daily. He is followed up by Neurology, Dr. Martines. He is still able to drive and not get lost.\par His previous scans and labs were reviewed.

## 2019-09-22 NOTE — ASSESSMENT
[FreeTextEntry1] : History of stage IV follicular lymphoma, status post 6 cycles of BR, followed by 2 years of maintenance rituximab, remains in remission.\par \par PLAN:\par -- History of Follicular lymphoma, LOLA, continue observation.\par -- We will order blood work today for CBC, CMP and LDH.\par -- Followup with PCP for health care maintenance and monitoring dilation of ascending thoracic aorta.\par --Follow up with Neurology, Dr. Martines for memory loss.\par -- He will come back for followup visit here in 6 months. \par \par Case was seen and discussed with Dr. Field who agreed with the assessment and plan.\par

## 2019-09-23 DIAGNOSIS — C82.90 FOLLICULAR LYMPHOMA, UNSPECIFIED, UNSPECIFIED SITE: ICD-10-CM

## 2019-12-26 ENCOUNTER — OUTPATIENT (OUTPATIENT)
Dept: OUTPATIENT SERVICES | Facility: HOSPITAL | Age: 79
LOS: 1 days | Discharge: HOME | End: 2019-12-26
Payer: MEDICARE

## 2019-12-26 DIAGNOSIS — R91.8 OTHER NONSPECIFIC ABNORMAL FINDING OF LUNG FIELD: ICD-10-CM

## 2019-12-26 DIAGNOSIS — Z98.890 OTHER SPECIFIED POSTPROCEDURAL STATES: Chronic | ICD-10-CM

## 2019-12-26 PROCEDURE — 71046 X-RAY EXAM CHEST 2 VIEWS: CPT | Mod: 26

## 2020-01-11 ENCOUNTER — OUTPATIENT (OUTPATIENT)
Dept: OUTPATIENT SERVICES | Facility: HOSPITAL | Age: 80
LOS: 1 days | Discharge: HOME | End: 2020-01-11
Payer: MEDICARE

## 2020-01-11 DIAGNOSIS — Z98.890 OTHER SPECIFIED POSTPROCEDURAL STATES: Chronic | ICD-10-CM

## 2020-01-11 DIAGNOSIS — M79.641 PAIN IN RIGHT HAND: ICD-10-CM

## 2020-01-11 PROCEDURE — 73130 X-RAY EXAM OF HAND: CPT | Mod: 26,RT

## 2020-01-15 ENCOUNTER — APPOINTMENT (OUTPATIENT)
Dept: CARDIOLOGY | Facility: CLINIC | Age: 80
End: 2020-01-15
Payer: MEDICARE

## 2020-01-15 PROCEDURE — 93000 ELECTROCARDIOGRAM COMPLETE: CPT

## 2020-01-15 PROCEDURE — 99214 OFFICE O/P EST MOD 30 MIN: CPT

## 2020-01-19 ENCOUNTER — OUTPATIENT (OUTPATIENT)
Dept: OUTPATIENT SERVICES | Facility: HOSPITAL | Age: 80
LOS: 1 days | Discharge: HOME | End: 2020-01-19
Payer: MEDICARE

## 2020-01-19 DIAGNOSIS — R10.9 UNSPECIFIED ABDOMINAL PAIN: ICD-10-CM

## 2020-01-19 DIAGNOSIS — Z98.890 OTHER SPECIFIED POSTPROCEDURAL STATES: Chronic | ICD-10-CM

## 2020-01-19 PROCEDURE — 76700 US EXAM ABDOM COMPLETE: CPT | Mod: 26

## 2020-03-09 ENCOUNTER — APPOINTMENT (OUTPATIENT)
Dept: HEMATOLOGY ONCOLOGY | Facility: CLINIC | Age: 80
End: 2020-03-09
Payer: MEDICARE

## 2020-03-09 ENCOUNTER — LABORATORY RESULT (OUTPATIENT)
Age: 80
End: 2020-03-09

## 2020-03-09 ENCOUNTER — OUTPATIENT (OUTPATIENT)
Dept: OUTPATIENT SERVICES | Facility: HOSPITAL | Age: 80
LOS: 1 days | Discharge: HOME | End: 2020-03-09

## 2020-03-09 VITALS
BODY MASS INDEX: 28.34 KG/M2 | TEMPERATURE: 97.3 F | HEIGHT: 64 IN | WEIGHT: 166 LBS | SYSTOLIC BLOOD PRESSURE: 169 MMHG | DIASTOLIC BLOOD PRESSURE: 77 MMHG | HEART RATE: 70 BPM

## 2020-03-09 DIAGNOSIS — Z98.890 OTHER SPECIFIED POSTPROCEDURAL STATES: Chronic | ICD-10-CM

## 2020-03-09 DIAGNOSIS — C82.90 FOLLICULAR LYMPHOMA, UNSPECIFIED, UNSPECIFIED SITE: ICD-10-CM

## 2020-03-09 LAB
ALBUMIN SERPL ELPH-MCNC: 4.4 G/DL
ALP BLD-CCNC: 93 U/L
ALT SERPL-CCNC: 30 U/L
ANION GAP SERPL CALC-SCNC: 13 MMOL/L
AST SERPL-CCNC: 26 U/L
BILIRUB SERPL-MCNC: 0.6 MG/DL
BUN SERPL-MCNC: 19 MG/DL
CALCIUM SERPL-MCNC: 9.3 MG/DL
CHLORIDE SERPL-SCNC: 104 MMOL/L
CO2 SERPL-SCNC: 23 MMOL/L
CREAT SERPL-MCNC: 1.2 MG/DL
GLUCOSE SERPL-MCNC: 125 MG/DL
HCT VFR BLD CALC: 42.6 %
HGB BLD-MCNC: 14.5 G/DL
LDH SERPL-CCNC: 220 U/L
MCHC RBC-ENTMCNC: 30.6 PG
MCHC RBC-ENTMCNC: 34 G/DL
MCV RBC AUTO: 89.9 FL
PLATELET # BLD AUTO: 151 K/UL
PMV BLD: 10.3 FL
POTASSIUM SERPL-SCNC: 4.3 MMOL/L
PROT SERPL-MCNC: 7.6 G/DL
RBC # BLD: 4.74 M/UL
RBC # FLD: 13.1 %
SODIUM SERPL-SCNC: 140 MMOL/L
WBC # FLD AUTO: 6.59 K/UL

## 2020-03-09 PROCEDURE — 99213 OFFICE O/P EST LOW 20 MIN: CPT

## 2020-03-09 NOTE — ASSESSMENT
[FreeTextEntry1] : History of stage IV follicular lymphoma, status post 6 cycles of BR, followed by 2 years of maintenance rituximab, remains in remission.\par \par PLAN:\par -- History of Follicular lymphoma, LOLA, continue observation.\par -- Will order blood work today for CBC, CMP and LDH.\par -- Followup with PCP for health care maintenance and monitoring dilation of ascending thoracic aorta.\par -- He will come back for followup visit here in 6 months. \par \par

## 2020-03-09 NOTE — HISTORY OF PRESENT ILLNESS
[de-identified] : 77-year-old male is here today for followup visit. 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 He received chemotherapy with Rituxan and Bendamustine for 6 cycles between August. 2011 to December 2011. After that, he received a maintenance rituximab once every 3 months for 8 times. He has being remained in remission since then.  On November 14, 2016, he had a repeat PET/CT which did not reveal evidence of abnormal FDG uptake. 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  He also had a CT of the chest without contrast on the same day, which showed stable small 5-6 mm pulmonary nodules since 2014. No further follow up imaging study is required.\par \par During his last visit,  Jairon complained RLQ abdominal pain for a few weeks. He had difficulty standing up from chair. He saw his PCP and was recommended to see a gastroenterologist. \par Review systems is otherwise negative. [de-identified] : \par 3/5/18\par To further evaluate RLQ abdominal pain, we referred him for CT abdomen and pelvis with contrast on 12/11/17, which did not reveal  acute  abdominopelvic  abnormality.  Unremarkable  appendix. No  lymphadenopathy  in  the  abdomen  or  pelvis.  Cholelithiasis  vs.  a  5  mm  gallbladder  wall  polyp.  This report was reviewed with Jairon.\par Today, he reports pain in RLQ resolved. He complains of constipation. He is taking stool Colace. He does not have N/V. His appetite has been normal. He has no chill for fever.\par \par 9/17/18\par Patient is here for a follow up visit.  He offers no new complaints.  He had his colonoscopy done on 8/1/18 by Dr. Beck.  Impression: second degree hemorrhoids, diverticulosis of large intestine without perforation or abscess without bleeding.\par \par 3/18/19\par Patient is here today for follow up visit.  He feels well.  He offers no new complaints. He states he was referred by his PCP to neurology for h/o abdominal pain and no findings were found.\par \par 9/16/19\par Patient is here today for follow up visit.  He denies any weight loss, fever, pain, vomiting, diarrhea. He states he was given a "pill" for memory loss but doesn’t notice improvement. He came back to the office later and showed us "pill"- Memantine HCL 10 mg PO twice daily. He is followed up by Neurology, Dr. Martines. He is still able to drive and not get lost.\par His previous scans and labs were reviewed.\par \par 3/9/2020:\par The patient is here for f/u visit. He has History of stage IV follicular lymphoma, status post 6 cycles of BR in 2011, followed by 2 years of maintenance rituximab. He remains in remission. He does not have any new complains. He takes omeprazole for acid reflex and feels better. Blood work showed normal CBC.\par

## 2020-03-09 NOTE — ASSESSMENT
[FreeTextEntry1] : History of stage IV auricular lymphoma, status post 6 cycles of BR, followed by 2 years of maintenance rituximab, remains in remission.\par \par PLAN:\par -- History of Follicular lymphoma, LOLA, continue observation.\par -- We will order blood work today for CBC, CMP and LDH.\par -- Followup with PCP for health care maintenance and monitoring dilation of ascending thoracic aorta.\par -- He will come back for followup visit here in 6 months. \par \par Case was seen and discussed with Dr. Field who agreed with the assessment and plan.\par

## 2020-03-09 NOTE — HISTORY OF PRESENT ILLNESS
[de-identified] : 77-year-old male is here today for followup visit. 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 He received chemotherapy with Rituxan and Bendamustine for 6 cycles between August. 2011 to December 2011. After that, he received a maintenance rituximab once every 3 months for 8 times. He has being remained in remission since then.  On November 14, 2016, he had a repeat PET/CT which did not reveal evidence of abnormal FDG uptake. 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  He also had a CT of the chest without contrast on the same day, which showed stable small 5-6 mm pulmonary nodules since 2014. No further follow up imaging study is required.\par \par During his last visit,  Jairon complained RLQ abdominal pain for a few weeks. He had difficulty standing up from chair. He saw his PCP and was recommended to see a gastroenterologist. \par Review systems is otherwise negative. [de-identified] : \par 3/5/18\par To further evaluate RLQ abdominal pain, we referred him for CT abdomen and pelvis with contrast on 12/11/17, which did not reveal  acute  abdominopelvic  abnormality.  Unremarkable  appendix. No  lymphadenopathy  in  the  abdomen  or  pelvis.  Cholelithiasis  vs.  a  5  mm  gallbladder  wall  polyp.  This report was reviewed with Jairon.\par Today, he reports pain in RLQ resolved. He complains of constipation. He is taking stool Colace. He does not have N/V. His appetite has been normal. He has no chill for fever.\par \par 9/17/18\par Patient is here for a follow up visit.  He offers no new complaints.  He had his colonoscopy done on 8/1/18 by Dr. Beck.  Impression: second degree hemorrhoids, diverticulosis of large intestine without perforation or abscess without bleeding.\par \par 3/18/19\par Patient is here today for follow up visit.  He feels well.  He offers no new complaints. He states he was referred by his PCP to neurology for h/o abdominal pain and no findings were found.

## 2020-03-20 DIAGNOSIS — C82.90 FOLLICULAR LYMPHOMA, UNSPECIFIED, UNSPECIFIED SITE: ICD-10-CM

## 2020-03-28 LAB
ALBUMIN SERPL ELPH-MCNC: 4.3 G/DL
ALP BLD-CCNC: 85 U/L
ALT SERPL-CCNC: 21 U/L
ANION GAP SERPL CALC-SCNC: 12 MMOL/L
AST SERPL-CCNC: 21 U/L
BILIRUB DIRECT SERPL-MCNC: <0.2 MG/DL
BILIRUB INDIRECT SERPL-MCNC: >0.4 MG/DL
BILIRUB SERPL-MCNC: 0.6 MG/DL
BUN SERPL-MCNC: 15 MG/DL
CALCIUM SERPL-MCNC: 9.2 MG/DL
CHLORIDE SERPL-SCNC: 102 MMOL/L
CO2 SERPL-SCNC: 23 MMOL/L
CREAT SERPL-MCNC: 1 MG/DL
GLUCOSE SERPL-MCNC: 115 MG/DL
HCT VFR BLD CALC: 41 %
HCT VFR BLD CALC: 43 %
HGB BLD-MCNC: 14 G/DL
HGB BLD-MCNC: 14.7 G/DL
LDH SERPL-CCNC: 201 U/L
MCHC RBC-ENTMCNC: 30.2 PG
MCHC RBC-ENTMCNC: 30.4 PG
MCHC RBC-ENTMCNC: 34.1 G/DL
MCHC RBC-ENTMCNC: 34.2 G/DL
MCV RBC AUTO: 88.6 FL
MCV RBC AUTO: 89 FL
PLATELET # BLD AUTO: 178 K/UL
PLATELET # BLD AUTO: 183 K/UL
PMV BLD: 10.7 FL
PMV BLD: 11.1 FL
POTASSIUM SERPL-SCNC: 4.1 MMOL/L
PROT SERPL-MCNC: 7.5 G/DL
RBC # BLD: 4.63 M/UL
RBC # BLD: 4.83 M/UL
RBC # FLD: 12.7 %
RBC # FLD: 13 %
SODIUM SERPL-SCNC: 137 MMOL/L
WBC # FLD AUTO: 7.51 K/UL
WBC # FLD AUTO: 8.31 K/UL

## 2020-07-20 ENCOUNTER — APPOINTMENT (OUTPATIENT)
Dept: CARDIOLOGY | Facility: CLINIC | Age: 80
End: 2020-07-20

## 2020-08-28 ENCOUNTER — INPATIENT (INPATIENT)
Facility: HOSPITAL | Age: 80
LOS: 5 days | Discharge: NURSING, REHAB OR LONG-TERM CARE | End: 2020-09-03
Attending: INTERNAL MEDICINE | Admitting: INTERNAL MEDICINE
Payer: MEDICARE

## 2020-08-28 VITALS
DIASTOLIC BLOOD PRESSURE: 80 MMHG | OXYGEN SATURATION: 95 % | RESPIRATION RATE: 18 BRPM | TEMPERATURE: 98 F | HEART RATE: 81 BPM | SYSTOLIC BLOOD PRESSURE: 149 MMHG | WEIGHT: 171.96 LBS

## 2020-08-28 DIAGNOSIS — Z95.1 PRESENCE OF AORTOCORONARY BYPASS GRAFT: Chronic | ICD-10-CM

## 2020-08-28 DIAGNOSIS — Z98.890 OTHER SPECIFIED POSTPROCEDURAL STATES: Chronic | ICD-10-CM

## 2020-08-28 LAB
ALBUMIN SERPL ELPH-MCNC: 4.7 G/DL — SIGNIFICANT CHANGE UP (ref 3.5–5.2)
ALP SERPL-CCNC: 88 U/L — SIGNIFICANT CHANGE UP (ref 30–115)
ALT FLD-CCNC: 41 U/L — SIGNIFICANT CHANGE UP (ref 0–41)
ANION GAP SERPL CALC-SCNC: 12 MMOL/L — SIGNIFICANT CHANGE UP (ref 7–14)
AST SERPL-CCNC: 31 U/L — SIGNIFICANT CHANGE UP (ref 0–41)
BASOPHILS # BLD AUTO: 0.02 K/UL — SIGNIFICANT CHANGE UP (ref 0–0.2)
BASOPHILS NFR BLD AUTO: 0.3 % — SIGNIFICANT CHANGE UP (ref 0–1)
BILIRUB SERPL-MCNC: 0.9 MG/DL — SIGNIFICANT CHANGE UP (ref 0.2–1.2)
BUN SERPL-MCNC: 17 MG/DL — SIGNIFICANT CHANGE UP (ref 10–20)
CALCIUM SERPL-MCNC: 9.4 MG/DL — SIGNIFICANT CHANGE UP (ref 8.5–10.1)
CHLORIDE SERPL-SCNC: 102 MMOL/L — SIGNIFICANT CHANGE UP (ref 98–110)
CO2 SERPL-SCNC: 22 MMOL/L — SIGNIFICANT CHANGE UP (ref 17–32)
CREAT SERPL-MCNC: 1.1 MG/DL — SIGNIFICANT CHANGE UP (ref 0.7–1.5)
EOSINOPHIL # BLD AUTO: 0.03 K/UL — SIGNIFICANT CHANGE UP (ref 0–0.7)
EOSINOPHIL NFR BLD AUTO: 0.4 % — SIGNIFICANT CHANGE UP (ref 0–8)
GLUCOSE SERPL-MCNC: 108 MG/DL — HIGH (ref 70–99)
HCT VFR BLD CALC: 43.5 % — SIGNIFICANT CHANGE UP (ref 42–52)
HGB BLD-MCNC: 14.9 G/DL — SIGNIFICANT CHANGE UP (ref 14–18)
IMM GRANULOCYTES NFR BLD AUTO: 0.1 % — SIGNIFICANT CHANGE UP (ref 0.1–0.3)
LYMPHOCYTES # BLD AUTO: 1.23 K/UL — SIGNIFICANT CHANGE UP (ref 1.2–3.4)
LYMPHOCYTES # BLD AUTO: 16.4 % — LOW (ref 20.5–51.1)
MAGNESIUM SERPL-MCNC: 2.1 MG/DL — SIGNIFICANT CHANGE UP (ref 1.8–2.4)
MCHC RBC-ENTMCNC: 30.1 PG — SIGNIFICANT CHANGE UP (ref 27–31)
MCHC RBC-ENTMCNC: 34.3 G/DL — SIGNIFICANT CHANGE UP (ref 32–37)
MCV RBC AUTO: 87.9 FL — SIGNIFICANT CHANGE UP (ref 80–94)
MONOCYTES # BLD AUTO: 0.69 K/UL — HIGH (ref 0.1–0.6)
MONOCYTES NFR BLD AUTO: 9.2 % — SIGNIFICANT CHANGE UP (ref 1.7–9.3)
NEUTROPHILS # BLD AUTO: 5.53 K/UL — SIGNIFICANT CHANGE UP (ref 1.4–6.5)
NEUTROPHILS NFR BLD AUTO: 73.6 % — SIGNIFICANT CHANGE UP (ref 42.2–75.2)
NRBC # BLD: 0 /100 WBCS — SIGNIFICANT CHANGE UP (ref 0–0)
NT-PROBNP SERPL-SCNC: 163 PG/ML — SIGNIFICANT CHANGE UP (ref 0–300)
PLATELET # BLD AUTO: 187 K/UL — SIGNIFICANT CHANGE UP (ref 130–400)
POTASSIUM SERPL-MCNC: 4.3 MMOL/L — SIGNIFICANT CHANGE UP (ref 3.5–5)
POTASSIUM SERPL-SCNC: 4.3 MMOL/L — SIGNIFICANT CHANGE UP (ref 3.5–5)
PROT SERPL-MCNC: 7.9 G/DL — SIGNIFICANT CHANGE UP (ref 6–8)
RBC # BLD: 4.95 M/UL — SIGNIFICANT CHANGE UP (ref 4.7–6.1)
RBC # FLD: 13.2 % — SIGNIFICANT CHANGE UP (ref 11.5–14.5)
SARS-COV-2 RNA SPEC QL NAA+PROBE: SIGNIFICANT CHANGE UP
SODIUM SERPL-SCNC: 136 MMOL/L — SIGNIFICANT CHANGE UP (ref 135–146)
TROPONIN T SERPL-MCNC: <0.01 NG/ML — SIGNIFICANT CHANGE UP
WBC # BLD: 7.51 K/UL — SIGNIFICANT CHANGE UP (ref 4.8–10.8)
WBC # FLD AUTO: 7.51 K/UL — SIGNIFICANT CHANGE UP (ref 4.8–10.8)

## 2020-08-28 PROCEDURE — 99285 EMERGENCY DEPT VISIT HI MDM: CPT | Mod: CS

## 2020-08-28 PROCEDURE — 99497 ADVNCD CARE PLAN 30 MIN: CPT | Mod: 25

## 2020-08-28 PROCEDURE — 99223 1ST HOSP IP/OBS HIGH 75: CPT

## 2020-08-28 PROCEDURE — 93010 ELECTROCARDIOGRAM REPORT: CPT

## 2020-08-28 PROCEDURE — 71045 X-RAY EXAM CHEST 1 VIEW: CPT | Mod: 26

## 2020-08-28 RX ORDER — NITROGLYCERIN 6.5 MG
0.4 CAPSULE, EXTENDED RELEASE ORAL
Refills: 0 | Status: DISCONTINUED | OUTPATIENT
Start: 2020-08-28 | End: 2020-09-03

## 2020-08-28 RX ORDER — PANTOPRAZOLE SODIUM 20 MG/1
40 TABLET, DELAYED RELEASE ORAL
Refills: 0 | Status: DISCONTINUED | OUTPATIENT
Start: 2020-08-28 | End: 2020-09-03

## 2020-08-28 RX ORDER — ASPIRIN/CALCIUM CARB/MAGNESIUM 324 MG
81 TABLET ORAL DAILY
Refills: 0 | Status: DISCONTINUED | OUTPATIENT
Start: 2020-08-28 | End: 2020-09-03

## 2020-08-28 RX ORDER — ASPIRIN/CALCIUM CARB/MAGNESIUM 324 MG
0 TABLET ORAL
Qty: 0 | Refills: 0 | DISCHARGE

## 2020-08-28 RX ORDER — ATORVASTATIN CALCIUM 80 MG/1
80 TABLET, FILM COATED ORAL AT BEDTIME
Refills: 0 | Status: DISCONTINUED | OUTPATIENT
Start: 2020-08-28 | End: 2020-09-01

## 2020-08-28 RX ORDER — ENOXAPARIN SODIUM 100 MG/ML
40 INJECTION SUBCUTANEOUS DAILY
Refills: 0 | Status: DISCONTINUED | OUTPATIENT
Start: 2020-08-28 | End: 2020-09-03

## 2020-08-28 RX ORDER — METOPROLOL TARTRATE 50 MG
12.5 TABLET ORAL
Refills: 0 | Status: DISCONTINUED | OUTPATIENT
Start: 2020-08-28 | End: 2020-09-01

## 2020-08-28 RX ORDER — DONEPEZIL HYDROCHLORIDE 10 MG/1
10 TABLET, FILM COATED ORAL AT BEDTIME
Refills: 0 | Status: DISCONTINUED | OUTPATIENT
Start: 2020-08-28 | End: 2020-09-03

## 2020-08-28 RX ORDER — FAMOTIDINE 10 MG/ML
40 INJECTION INTRAVENOUS
Refills: 0 | Status: DISCONTINUED | OUTPATIENT
Start: 2020-08-28 | End: 2020-09-03

## 2020-08-28 RX ORDER — MEMANTINE HYDROCHLORIDE 10 MG/1
10 TABLET ORAL DAILY
Refills: 0 | Status: DISCONTINUED | OUTPATIENT
Start: 2020-08-28 | End: 2020-09-03

## 2020-08-28 RX ADMIN — DONEPEZIL HYDROCHLORIDE 10 MILLIGRAM(S): 10 TABLET, FILM COATED ORAL at 23:12

## 2020-08-28 RX ADMIN — ATORVASTATIN CALCIUM 80 MILLIGRAM(S): 80 TABLET, FILM COATED ORAL at 23:12

## 2020-08-28 RX ADMIN — ENOXAPARIN SODIUM 40 MILLIGRAM(S): 100 INJECTION SUBCUTANEOUS at 18:22

## 2020-08-28 RX ADMIN — Medication 81 MILLIGRAM(S): at 18:21

## 2020-08-28 RX ADMIN — FAMOTIDINE 40 MILLIGRAM(S): 10 INJECTION INTRAVENOUS at 18:21

## 2020-08-28 RX ADMIN — Medication 12.5 MILLIGRAM(S): at 18:21

## 2020-08-28 RX ADMIN — MEMANTINE HYDROCHLORIDE 10 MILLIGRAM(S): 10 TABLET ORAL at 18:22

## 2020-08-28 NOTE — ED ADULT NURSE NOTE - OBJECTIVE STATEMENT
Presented to ED with c/o midsternal chest pain on ambulation. Pt. stated it worsens on ambulation and relieved with rest. Denies tx PTA.

## 2020-08-28 NOTE — H&P ADULT - ASSESSMENT
80  year old male patient with past medical history of CAD s/p CABG and stents (unsure when the last stent was placed), hypertension, dementia? (no reported diagnosis but patient on Donepezil and Memantine at home) presented to the ED for evaluation of chest pain    # Chest pain in a patient known to have coronary artery disease s/p CABG 33 years ago   - Troponin T negative on admission. ECG with no ischemic changes (RBBB ?)   - Admit to telemetry   - Trend troponin and ECG   - Cardiology consult Dr. Bernal.   - Keep NPO after midnight for possible stress test tomorrow   - Continue Atorvastatin 80 mg qhs. Start Aspirin 81 mg PO qd and Lopressor 25 mg PO BID (was on Toprol in the past, not present among his medication list) as well as Nitro PRN for chest pain   - Check Hemoglobin A1C and lipid panel  - Check echocardiogram   - Continue Famotidine 40 mg PO BID and start Protonix 40 mg PO qd for possible GERD    # History of dementia?  - Not reported by the patient or family member   - Continue Donepezil 10 mg PO qd and Memantine 10 mg PO qd     # Miscellaneous   - DVT prophylaxis : Lovenox 40 mg sq qd   - GI prophylaxis : Protonix 40 mg PO qd   - Activity : Increase as tolerated   - Diet : DASH TLC   - Code status : Full code 80  year old male patient with past medical history of CAD s/p CABG and stents (unsure when the last stent was placed), hypertension, dementia? (no reported diagnosis but patient on Donepezil and Memantine at home) presented to the ED for evaluation of chest pain    # Chest pain in a patient known to have coronary artery disease s/p CABG 33 years ago   - Troponin T negative on admission. ECG with no ischemic changes (RBBB ?)   - Admit to telemetry   - Trend troponin and ECG   - Cardiology consult Dr. Bernal.   - Keep NPO after midnight for possible stress test tomorrow   - Continue Atorvastatin 80 mg qhs. Start Aspirin 81 mg PO qd and Lopressor 12.5 mg PO BID (was on Toprol in the past, not present among his medication list) as well as Nitro PRN for chest pain   - Check Hemoglobin A1C and lipid panel  - Check echocardiogram   - Continue Famotidine 40 mg PO BID and start Protonix 40 mg PO qd for possible GERD    # History of dementia?  - Not reported by the patient or family member   - Continue Donepezil 10 mg PO qd and Memantine 10 mg PO qd     # Miscellaneous   - DVT prophylaxis : Lovenox 40 mg sq qd   - GI prophylaxis : Protonix 40 mg PO qd   - Activity : Increase as tolerated   - Diet : DASH TLC   - Code status : Full code 80  year old male patient with past medical history of CAD s/p CABG and stents (unsure when the last stent was placed), hypertension, dementia? (no reported diagnosis but patient on Donepezil and Memantine at home) presented to the ED for evaluation of chest pain    # Chest pain in a patient known to have coronary artery disease s/p CABG 33 years ago   - Troponin T negative on admission. ECG with no ischemic changes (RBBB ?)   - Admit to telemetry   - Trend troponin and ECG   - Cardiology consult Dr. Bernal.   - Keep NPO after midnight for possible stress test tomorrow   - Continue Atorvastatin 80 mg qhs. Start Aspirin 81 mg PO qd and Lopressor 12.5 mg PO BID (was on Toprol in the past, not present among his medication list) as well as Nitro PRN for chest pain   - Check Hemoglobin A1C and lipid panel  - Check echocardiogram   - Continue Famotidine 40 mg PO BID and start Protonix 40 mg PO qd for possible GERD    # History of dementia?  - Not reported by the patient or family member   - Continue Donepezil 10 mg PO qd and Memantine 10 mg PO qd     # Miscellaneous   - DVT prophylaxis : Lovenox 40 mg sq qd   - GI prophylaxis : Protonix 40 mg PO qd   - Activity : Increase as tolerated   - Diet : DASH TLC   - Code status : Full code     ** Please confirm medication list with daughter, called [facility] for [facility] () but confirm if patient is on additional medications **

## 2020-08-28 NOTE — CONSULT NOTE ADULT - ASSESSMENT
80 y.o male patient with PMH of CAD s/p CABG and stents, hypertension, dementia? (no reported diagnosis but patient on Donepezil and Memantine at home), stage IV follicular lymphoma in remission, presented to the ED for evaluation of chest pain    # Chest pain  - Atypical  - Currently asymptomatic  - No acute ischemic changes on EKG  - Trop negative x2  - Continue aspirin, statin and beta-blockers for now  - Continue telemetry and monitor VS  - Check 2D echo  - Pharmacologic nuclear stress test    - Will discuss with attending for final recommendations

## 2020-08-28 NOTE — ED ADULT NURSE NOTE - CHPI ED NUR SYMPTOMS NEG
no fever/no shortness of breath/no nausea/no back pain/no diaphoresis/no congestion/no syncope/no chills/no dizziness

## 2020-08-28 NOTE — H&P ADULT - CONVERSATION DETAILS
Goals of care discussed in light of patient's comorbidities and presenting symptoms, patient wishes to be full code. Declined need to contact family at this time, lost his wife 5 years ago, concern for lack of social support from his three children, states they are only after his money.

## 2020-08-28 NOTE — CONSULT NOTE ADULT - ATTENDING COMMENTS
Pt seen on 3C unit with cardiac fellow and I agree with his findings. Atypical chest to lower abdominal discomfort nonexertional of minutes duration. Stable exam and EKG and troponin negative. Adenosine nuclear stress test to be done and can discharge if no significant ischemia. Will disc uss with Dr. Bernal pts cardiologist later.

## 2020-08-28 NOTE — H&P ADULT - NSHPPHYSICALEXAM_GEN_ALL_CORE
ICU Vital Signs Last 24 Hrs  T(C): 36.7 (28 Aug 2020 15:30), Max: 36.7 (28 Aug 2020 12:10)  T(F): 98 (28 Aug 2020 15:30), Max: 98.1 (28 Aug 2020 12:10)  HR: 64 (28 Aug 2020 15:30) (64 - 81)  BP: 191/87 (28 Aug 2020 15:30) (149/80 - 191/87)  BP(mean): --  ABP: --  ABP(mean): --  RR: 18 (28 Aug 2020 15:30) (18 - 18)  SpO2: 97% (28 Aug 2020 15:30) (95% - 97%)

## 2020-08-28 NOTE — H&P ADULT - ATTENDING COMMENTS
Patient declined need to contact family at this time, patient is , has three children, states they are after his money, 5 years ago when his wife passed away patient states none of his children were present to help him with  issues, patient is tearful throughout interview.    PHYSICAL EXAM:    CONSTITUTIONAL: NAD, tearful throughout interview  ENMT: EOMI, PERRLA, No tonsillar erythema, exudates, or enlargement, neck supple, No JVD  PSYCH: Alert & Oriented X3, denies suicidal or homicidal ideation   RESPIRATORY: Clear to percussion bilaterally; No rales, rhonchi, wheezing, or rubs  CARDIOVASCULAR: Regular rate and rhythm; No murmurs, rubs, or gallops, negative edema  GASTROINTESTINAL: Soft, Nontender, Nondistended; Bowel sounds present  EXTREMITIES:  2+ Peripheral Pulses, No clubbing, cyanosis  SKIN: No rashes or lesions    79 yo M with PMHx of CAD s/p CABG, PCI, HTN, GERD presented with complaint of midsternal, sharp, radiating "straight down" to his lower abdomen chest pain on exertion for the past few weeks, pain lasts several seconds and resolves at rest, patient last felt pain shortly prior to my interview when he ambulated to use the restroom in the ED, currently lying on stretcher asymptomatic. Remaining ROS significant for depression, patient states he lives alone and lacks social support, states his three children are only after his money.     #Stable Angina: Awaiting Cardiology recommendations, troponins negative x2, EKG unrevealing of acute ischemic changes, f/u 2D-Echo, telemetry monitoring, nitro for pain control, continue ASA-81mg, high intensity statin, f/u HgbA1C%, lipid panel     #Lack of Social Support, Lives Alone, Depression: Awaiting  and case management assessment for safe disposition, advised patient to follow up with therapist vs. geriatric neuropsychiatrist as outpatient     Patient is full code.    Disposition: Acute

## 2020-08-28 NOTE — H&P ADULT - NSHPREVIEWOFSYSTEMS_GEN_ALL_CORE
Denies fever / chills, weight loss, nausea / vomiting, diarrhea / constipation, dysuria, urine changes, hematuria, dyspnea, cough, palpitations, headache   Reports very clear urine

## 2020-08-28 NOTE — CONSULT NOTE ADULT - SUBJECTIVE AND OBJECTIVE BOX
HPI:  80 y.o male patient with PMH of CAD s/p CABG and stents, hypertension, dementia? (no reported diagnosis but patient on Donepezil and Memantine at home), stage IV follicular lymphoma in remission, presented to the ED for evaluation of chest pain. The patient states that his chest pain started 2 weeks ago, the pain was initially present at rest, not severe (patient not able to rate the intensity), starts at the upper chest pain and radiates to the lower abdomen, described as sharp pain and burning, with no triggering factors, no exacerbating or relieving factors. The pain lasts a couple of minutes and subsides on its own. The patient states that today he attempted to walk around the mall when he started experiencing the pain after walking about 5 feet, he sat down and placed a pillow on his abdomen which improved the pain. The pain doesn't radiate to the upper extremities, neck or jaw area.     Cardiology  Patient has a history of CAD s/p CABG 33 years ago at [facility]. Cardiac cath done in  showed occluded LIMA to LAD, occluded SVG to RPDA, severe diffuse atherosclerosis in LAD, 100% stenosis in prox LCx, 100% prox RCA stenosis with collaterals to distal RCA. PCI with BMS done to [facility] (culprit for MI at that time).  Patient has been following up with Dr. Bernal with no cardiac symptoms until 2 weeks ago    PAST MEDICAL & SURGICAL HISTORY  Coronary artery disease involving coronary bypass graft of native heart without angina pectoris  HTN (hypertension)  Follicular lymphoma in remission  S/P CAB years ago      FAMILY HISTORY:  FAMILY HISTORY:  No pertinent family history in first degree relatives      SOCIAL HISTORY:  []smoker: none  []Alcohol: none  []Drug: none    ALLERGIES:  No Known Allergies      MEDICATIONS:  MEDICATIONS  (STANDING):  aspirin  chewable 81 milliGRAM(s) Oral daily  atorvastatin 80 milliGRAM(s) Oral at bedtime  donepezil 10 milliGRAM(s) Oral at bedtime  enoxaparin Injectable 40 milliGRAM(s) SubCutaneous daily  famotidine    Tablet 40 milliGRAM(s) Oral two times a day  memantine 10 milliGRAM(s) Oral daily  metoprolol tartrate 12.5 milliGRAM(s) Oral two times a day  pantoprazole    Tablet 40 milliGRAM(s) Oral before breakfast    MEDICATIONS  (PRN):  nitroglycerin     SubLingual 0.4 milliGRAM(s) SubLingual every 5 minutes PRN Chest Pain      HOME MEDICATIONS:  Home Medications:  atorvastatin 80 mg oral tablet: 1 tab(s) orally once a day (28 Aug 2020 15:43)  donepezil 10 mg oral tablet: 1 tab(s) orally once a day (at bedtime) (28 Aug 2020 15:43)  famotidine 40 mg oral tablet: 1 tab(s) orally once a day (28 Aug 2020 15:43)  memantine 10 mg oral tablet: 1 tab(s) orally once a day (28 Aug 2020 15:43)      VITALS:   T(F): 96.4 ( @ 21:08), Max: 98.1 ( @ 12:10)  HR: 77 ( @ 21:08) (64 - 81)  BP: 163/106 ( @ 21:08) (149/80 - 191/87)  BP(mean): --  RR: 18 ( @ 15:30) (18 - 18)  SpO2: 97% ( @ 15:30) (95% - 97%)    I&O's Summary      REVIEW OF SYSTEMS:  CONSTITUTIONAL: No weakness, fevers or chills  EYES: No visual changes  ENT: No vertigo or throat pain   NECK: No pain or stiffness  RESPIRATORY: No cough, wheezing, hemoptysis; No shortness of breath  CARDIOVASCULAR: Chest pain as described in HPI  GASTROINTESTINAL: Abdominal pain as described in HPI. No nausea, vomiting, or hematemesis; No diarrhea or constipation. No melena or hematochezia.  GENITOURINARY: No dysuria, frequency or hematuria  NEUROLOGICAL: No numbness or weakness  SKIN: No itching, no rashes  MSK: No pain    PHYSICAL EXAM:  NEURO: patient is awake , alert and oriented x2 (knows the year but not the month or the day)  GEN: Not in acute distress  NECK: no thyroid enlargement, no JVD  LUNGS: Clear to auscultation bilaterally   CARDIOVASCULAR: S1/S2 present, RRR  ABD: Soft, non-tender, non-distended  EXT: No MAR  SKIN: Intact    LABS:                        14.9   7.51  )-----------( 187      ( 28 Aug 2020 12:52 )             43.5         136  |  102  |  17  ----------------------------<  108<H>  4.3   |  22  |  1.1    Ca    9.4      28 Aug 2020 12:52  Mg     2.1         TPro  7.9  /  Alb  4.7  /  TBili  0.9  /  DBili  x   /  AST  31  /  ALT  41  /  AlkPhos  88        Troponin T, Serum: <0.01 ng/mL (20 @ 16:20)  Troponin T, Serum: <0.01 ng/mL (20 @ 12:52)    CARDIAC MARKERS ( 28 Aug 2020 16:20 )  x     / <0.01 ng/mL / x     / x     / x      CARDIAC MARKERS ( 28 Aug 2020 12:52 )  x     / <0.01 ng/mL / x     / x     / x        Troponin trend:    Serum Pro-Brain Natriuretic Peptide: 163 pg/mL (20 @ 12:52)      RADIOLOGY:  -CXR:  < from: Xray Chest 1 View- PORTABLE-Urgent (20 @ 13:06) >  Impression:    No radiographic evidence of acute cardiopulmonary disease.    < end of copied text >    -TTE:  -CCTA:  -STRESS TEST:  -CATHETERIZATION:    ECG: NSR with HR of 76bpm    TELEMETRY EVENTS:

## 2020-08-28 NOTE — H&P ADULT - RS GEN PE MLT RESP DETAILS PC
no rhonchi/no wheezes/good air movement/normal/airway patent/clear to auscultation bilaterally/respirations non-labored/breath sounds equal/no rales

## 2020-08-28 NOTE — ED PROVIDER NOTE - ATTENDING CONTRIBUTION TO CARE
80 year old male with PMH CAD with CABG, HTN presents to ED presents w/ chest pain. pt endorsing days of exertional sternal chest pain. pt states he feels the chest shannan when he walks. sx improve w/ rest. pain mild, pressure like w/ radiation to R arm. no cough/fever/chills/leg swelling. no pleuritic pain    vss  gen- NAD, aaox3  card-rrr  lungs-ctab, no wheezing or rhonchi  abd-sntnd, no guarding or rebound  neuro- full str/sensation, cn ii-xii grossly intact, normal coordination     high risk cp, c/f stable angina  basic labs, ekg/trop/xr

## 2020-08-28 NOTE — H&P ADULT - NSICDXPASTMEDICALHX_GEN_ALL_CORE_FT
PAST MEDICAL HISTORY:  Coronary artery disease involving coronary bypass graft of native heart without angina pectoris     HTN (hypertension)

## 2020-08-28 NOTE — ED PROVIDER NOTE - OBJECTIVE STATEMENT
Pt is a 80 year old male with PMH CAD with CABG, HTN presents to ED c/o chest pain. Pt states chest pain started x3 days, only associated with exertion. Pt states CP occurs with walking, lasts less then 10 min. Pt states pain is mild, pressure like, radiates to both arms L>R. Unknown last stress test or echo. Pt denies fever, chills, bodyaches, sob, abdominal pain, NVCD.

## 2020-08-28 NOTE — H&P ADULT - HISTORY OF PRESENT ILLNESS
80  year old male patient with past medical history of CAD s/p CABG and stents (unsure when the last stent was placed), hypertension, dementia? (no reported diagnosis but patient on Donepezil and Memantine at home) presented to the ED for evaluation of chest pain. The patient states that his chest pain started 2 weeks ago, the pain was initially present at rest, not severe (patient not able to rate the intensity), starts at the upper chest pain and radiates to the lower abdomen, described as sharp pain and burning, with no triggering factors, no exacerbating or relieving factors. The pain lasts a couple of minutes and subsides on its own. The patient states that today he attempted to walk around the mall when he started experiencing the pain after walking about 5 feet, he sat down and placed a pillow on his abdomen which improved the pain. He states that since started 2 weeks ago it hasn't increased in intensity. The pain doesn't radiate to the upper extremities, neck or jaw area. The patient denies any similar episodes in the past, and states that he hasn't had any echocardiogram or stress test recently   In the ED : /80, HR 81, RR 18, Temp 98.1, SpO2 95 % on room air. Troponin T negative x 1. The patient was admitted for further evaluation and for possible stable angina

## 2020-08-29 ENCOUNTER — TRANSCRIPTION ENCOUNTER (OUTPATIENT)
Age: 80
End: 2020-08-29

## 2020-08-29 LAB
A1C WITH ESTIMATED AVERAGE GLUCOSE RESULT: 6.2 % — HIGH (ref 4–5.6)
ALBUMIN SERPL ELPH-MCNC: 4.1 G/DL — SIGNIFICANT CHANGE UP (ref 3.5–5.2)
ALP SERPL-CCNC: 82 U/L — SIGNIFICANT CHANGE UP (ref 30–115)
ALT FLD-CCNC: 49 U/L — HIGH (ref 0–41)
ANION GAP SERPL CALC-SCNC: 12 MMOL/L — SIGNIFICANT CHANGE UP (ref 7–14)
APTT BLD: 34 SEC — SIGNIFICANT CHANGE UP (ref 27–39.2)
AST SERPL-CCNC: 42 U/L — HIGH (ref 0–41)
BASOPHILS # BLD AUTO: 0.02 K/UL — SIGNIFICANT CHANGE UP (ref 0–0.2)
BASOPHILS NFR BLD AUTO: 0.3 % — SIGNIFICANT CHANGE UP (ref 0–1)
BILIRUB SERPL-MCNC: 1.4 MG/DL — HIGH (ref 0.2–1.2)
BUN SERPL-MCNC: 17 MG/DL — SIGNIFICANT CHANGE UP (ref 10–20)
CALCIUM SERPL-MCNC: 9.3 MG/DL — SIGNIFICANT CHANGE UP (ref 8.5–10.1)
CHLORIDE SERPL-SCNC: 104 MMOL/L — SIGNIFICANT CHANGE UP (ref 98–110)
CHOLEST SERPL-MCNC: 129 MG/DL — SIGNIFICANT CHANGE UP (ref 100–200)
CO2 SERPL-SCNC: 22 MMOL/L — SIGNIFICANT CHANGE UP (ref 17–32)
CREAT SERPL-MCNC: 1 MG/DL — SIGNIFICANT CHANGE UP (ref 0.7–1.5)
EOSINOPHIL # BLD AUTO: 0.06 K/UL — SIGNIFICANT CHANGE UP (ref 0–0.7)
EOSINOPHIL NFR BLD AUTO: 0.9 % — SIGNIFICANT CHANGE UP (ref 0–8)
ESTIMATED AVERAGE GLUCOSE: 131 MG/DL — HIGH (ref 68–114)
GLUCOSE SERPL-MCNC: 103 MG/DL — HIGH (ref 70–99)
HCT VFR BLD CALC: 42.7 % — SIGNIFICANT CHANGE UP (ref 42–52)
HDLC SERPL-MCNC: 42 MG/DL — SIGNIFICANT CHANGE UP
HGB BLD-MCNC: 14.4 G/DL — SIGNIFICANT CHANGE UP (ref 14–18)
IMM GRANULOCYTES NFR BLD AUTO: 0.4 % — HIGH (ref 0.1–0.3)
INR BLD: 1.19 RATIO — SIGNIFICANT CHANGE UP (ref 0.65–1.3)
LIPID PNL WITH DIRECT LDL SERPL: 69 MG/DL — SIGNIFICANT CHANGE UP (ref 4–129)
LYMPHOCYTES # BLD AUTO: 1.24 K/UL — SIGNIFICANT CHANGE UP (ref 1.2–3.4)
LYMPHOCYTES # BLD AUTO: 18.6 % — LOW (ref 20.5–51.1)
MAGNESIUM SERPL-MCNC: 2.1 MG/DL — SIGNIFICANT CHANGE UP (ref 1.8–2.4)
MCHC RBC-ENTMCNC: 29.9 PG — SIGNIFICANT CHANGE UP (ref 27–31)
MCHC RBC-ENTMCNC: 33.7 G/DL — SIGNIFICANT CHANGE UP (ref 32–37)
MCV RBC AUTO: 88.6 FL — SIGNIFICANT CHANGE UP (ref 80–94)
MONOCYTES # BLD AUTO: 0.77 K/UL — HIGH (ref 0.1–0.6)
MONOCYTES NFR BLD AUTO: 11.5 % — HIGH (ref 1.7–9.3)
NEUTROPHILS # BLD AUTO: 4.55 K/UL — SIGNIFICANT CHANGE UP (ref 1.4–6.5)
NEUTROPHILS NFR BLD AUTO: 68.3 % — SIGNIFICANT CHANGE UP (ref 42.2–75.2)
NRBC # BLD: 0 /100 WBCS — SIGNIFICANT CHANGE UP (ref 0–0)
PLATELET # BLD AUTO: 164 K/UL — SIGNIFICANT CHANGE UP (ref 130–400)
POTASSIUM SERPL-MCNC: 4.1 MMOL/L — SIGNIFICANT CHANGE UP (ref 3.5–5)
POTASSIUM SERPL-SCNC: 4.1 MMOL/L — SIGNIFICANT CHANGE UP (ref 3.5–5)
PROT SERPL-MCNC: 7.1 G/DL — SIGNIFICANT CHANGE UP (ref 6–8)
PROTHROM AB SERPL-ACNC: 13.7 SEC — HIGH (ref 9.95–12.87)
RBC # BLD: 4.82 M/UL — SIGNIFICANT CHANGE UP (ref 4.7–6.1)
RBC # FLD: 13.2 % — SIGNIFICANT CHANGE UP (ref 11.5–14.5)
SODIUM SERPL-SCNC: 138 MMOL/L — SIGNIFICANT CHANGE UP (ref 135–146)
TOTAL CHOLESTEROL/HDL RATIO MEASUREMENT: 3.1 RATIO — LOW (ref 4–5.5)
TRIGL SERPL-MCNC: 102 MG/DL — SIGNIFICANT CHANGE UP (ref 10–149)
TROPONIN T SERPL-MCNC: <0.01 NG/ML — SIGNIFICANT CHANGE UP
WBC # BLD: 6.67 K/UL — SIGNIFICANT CHANGE UP (ref 4.8–10.8)
WBC # FLD AUTO: 6.67 K/UL — SIGNIFICANT CHANGE UP (ref 4.8–10.8)

## 2020-08-29 PROCEDURE — 99233 SBSQ HOSP IP/OBS HIGH 50: CPT

## 2020-08-29 PROCEDURE — 99222 1ST HOSP IP/OBS MODERATE 55: CPT

## 2020-08-29 PROCEDURE — 93010 ELECTROCARDIOGRAM REPORT: CPT

## 2020-08-29 PROCEDURE — 78452 HT MUSCLE IMAGE SPECT MULT: CPT | Mod: 26

## 2020-08-29 PROCEDURE — 93016 CV STRESS TEST SUPVJ ONLY: CPT

## 2020-08-29 PROCEDURE — 93018 CV STRESS TEST I&R ONLY: CPT

## 2020-08-29 RX ORDER — ADENOSINE 3 MG/ML
60 INJECTION INTRAVENOUS ONCE
Refills: 0 | Status: COMPLETED | OUTPATIENT
Start: 2020-08-29 | End: 2020-08-29

## 2020-08-29 RX ADMIN — DONEPEZIL HYDROCHLORIDE 10 MILLIGRAM(S): 10 TABLET, FILM COATED ORAL at 21:38

## 2020-08-29 RX ADMIN — Medication 12.5 MILLIGRAM(S): at 17:42

## 2020-08-29 RX ADMIN — Medication 12.5 MILLIGRAM(S): at 05:40

## 2020-08-29 RX ADMIN — ADENOSINE 600 MILLIGRAM(S): 3 INJECTION INTRAVENOUS at 13:31

## 2020-08-29 RX ADMIN — ENOXAPARIN SODIUM 40 MILLIGRAM(S): 100 INJECTION SUBCUTANEOUS at 12:02

## 2020-08-29 RX ADMIN — PANTOPRAZOLE SODIUM 40 MILLIGRAM(S): 20 TABLET, DELAYED RELEASE ORAL at 05:40

## 2020-08-29 RX ADMIN — FAMOTIDINE 40 MILLIGRAM(S): 10 INJECTION INTRAVENOUS at 17:42

## 2020-08-29 RX ADMIN — MEMANTINE HYDROCHLORIDE 10 MILLIGRAM(S): 10 TABLET ORAL at 12:02

## 2020-08-29 RX ADMIN — FAMOTIDINE 40 MILLIGRAM(S): 10 INJECTION INTRAVENOUS at 05:40

## 2020-08-29 RX ADMIN — Medication 81 MILLIGRAM(S): at 12:02

## 2020-08-29 RX ADMIN — ATORVASTATIN CALCIUM 80 MILLIGRAM(S): 80 TABLET, FILM COATED ORAL at 21:38

## 2020-08-29 NOTE — DISCHARGE NOTE PROVIDER - HOSPITAL COURSE
A 80 years old male with PMH of CAD S/P CABG and HTN presented to the ED with one episode of CP on exertion which was relieved by rest. No sob. No palpitations. Was seen by cardiologist Dr. Quiros who recommended ECHO and nuclear stress test. A 80 years old male with PMH of CAD S/P CABG and HTN presented to the ED with one episode of CP on exertion which was relieved by rest. No sob. No palpitations. Was seen by cardiologist Dr. Quiros who recommended ECHO and nuclear stress test.         80  year old male patient with past medical history of CAD s/p CABG and stents 33 years ago, hypertension, dementia presented to the ED for evaluation of chest pain. The patient states that on 8/28/20 [place] when he started experiencing the pain after walking about 5 feet, he sat down and placed a pillow on his abdomen which improved the pain. The patient stated that his chest pain started 2 weeks ago, was initially present at rest, not severe, started at the upper chest pain and radiated to the lower abdomen, described as sharp pain and burning, with no triggering factors, no exacerbating or relieving factors. The pain lasted a couple of minutes and subsided on its own.  The pain did not radiate to jaw, neck, or upper extremities. Pt had (-) Troponin T on admission to ED, with no ischemic changes on ECG. Pt was given sublingual nitroglycerin for symptomatic pain relief.         Pt had 4x (-) Troponin T with the final value obtained on (8/29/20); Repeat ECG (8/29/20) showed new T-Wave Inversions. As per cardiology recommendations, pt underwent cardiac adenosine stress test (8/30/2020) which showed large area of ischemia in inferior-lateral wall. A 80 years old male with PMH of CAD S/P CABG and HTN presented to the ED with one episode of CP on exertion which was relieved by rest. No sob. No palpitations. Was seen by cardiologist Dr. Quiros who recommended ECHO and nuclear stress test.         80  year old male patient with past medical history of CAD s/p CABG and stents 33 years ago, hypertension, dementia presented to the ED for evaluation of chest pain. The patient states that on 8/28/20 he [place] the [place] when he started experiencing the pain after walking about 5 feet, he sat down and placed a pillow on his abdomen which improved the pain. The patient stated that his chest pain started 2 weeks ago, was initially present at rest, not severe, started at the upper chest pain and radiated to the lower abdomen, described as sharp pain and burning, with no triggering factors, no exacerbating or relieving factors. The pain lasted a couple of minutes and subsided on its own.  The pain did not radiate to jaw, neck, or upper extremities. Pt had (-) Troponin T on admission to ED, with no ischemic changes on ECG. Pt was given sublingual nitroglycerin for symptomatic pain relief.         Pt had 4x (-) Troponin T with the final value obtained on (8/29/20); Repeat ECG (8/29/20) showed new T-Wave Inversions. As per cardiology recommendations, pt underwent cardiac adenosine stress test (8/30/2020) which showed large area of ischemia in inferior-lateral wall. Patient underwent left heart cardiac catheterization (8/31/20) via R Femoral Artery showed LVEF 40%, and significant occlusion of prox D1, Left Circumflex, and RCA. 3 Drug-eluting stents were placed in D1, proximal LAD, and mid LAD. Pt was placed on DAPT, B-Blocker and Statin.         Pt was instructed on how to care for R Femoral post-cath site. He was stable, reported no pain, and was agreeable to discharge on 9/1/2020. A 80 years old male with PMH of CAD S/P CABG and HTN presented to the ED with one episode of CP on exertion which was relieved by rest. No sob. No palpitations. Was seen by cardiologist Dr. Quiros who recommended ECHO and nuclear stress test.         80  year old male patient with past medical history of CAD s/p CABG and stents 33 years ago, hypertension, dementia presented to the ED for evaluation of chest pain. The patient states that on 8/28/20 he attempted to walk around the mall when he started experiencing the pain after walking about 5 feet, he sat down and placed a pillow on his abdomen which improved the pain. The patient stated that his chest pain started 2 weeks ago, was initially present at rest, not severe, started at the upper chest pain and radiated to the lower abdomen, described as sharp pain and burning, with no triggering factors, no exacerbating or relieving factors. The pain lasted a couple of minutes and subsided on its own.  The pain did not radiate to jaw, neck, or upper extremities. Pt had (-) Troponin T on admission to ED, with no ischemic changes on ECG. Pt was given sublingual nitroglycerin for symptomatic pain relief.         Pt had 4x (-) Troponin T with the final value obtained on (8/29/20); Repeat ECG (8/29/20) showed new T-Wave Inversions. As per cardiology recommendations, pt underwent cardiac adenosine stress test (8/30/2020) which showed large area of ischemia in inferior-lateral wall. Patient underwent left heart cardiac catheterization (8/31/20) via R Femoral Artery showed LVEF 40%, and significant occlusion of prox D1, Left Circumflex, and RCA. 3 Drug-eluting stents were placed in D1, proximal LAD, and mid LAD. Pt was placed on DAPT, B-Blocker and Statin.         Pt was instructed on how to care for R Femoral post-cath site. He was stable, reported no pain, and was agreeable to discharge on 9/1/2020.        # Chest pain; known CAD s/p CABG 33 years ago         Pt was admitted for exacerbation of chest pain that had been ongoing for 2 weeks. Initial EKG was unremarkable; repeat EKG showed new T-wave inversions. Nuclear stress test showed large reversible defect in inferolateral wall. As per cardio recommendations, pt underwent  Cardiac cath via R Femoral Artery. Significant occlusion of 3 vessels was observed and drug-eluting stents were placed in RCA, prox D1 and LCA. Pt was placed on DAPT, B-blocker, and statin.         # History of dementia    - c/w Donepezil 10 mg PO qd and Memantine 10 mg PO qd which patient was on at home        Pt was stable, and agreeable to discharge. 80  year old male patient with past medical history of CAD s/p CABG and stents 33 years ago, hypertension, dementia presented to the ED for evaluation of chest pain. The patient states that on 8/28/20 he attempted [place] walk [place] the [place] when he started experiencing the pain after walking about 5 feet, he sat down and placed a pillow on his abdomen which improved the pain. The patient stated that his chest pain started 2 weeks ago, was initially present at rest, not severe, started at the upper chest pain and radiated to the lower abdomen, described as sharp pain and burning, with no triggering factors, no exacerbating or relieving factors. The pain lasted a couple of minutes and subsided on its own.  The pain did not radiate to jaw, neck, or upper extremities. Pt had (-) Troponin T on admission to ED, with no ischemic changes on ECG. Pt was given sublingual nitroglycerin for symptomatic pain relief.         Pt had 4x (-) Troponin T with the final value obtained on (8/29/20); Repeat ECG (8/29/20) showed new T-Wave Inversions. As per cardiology recommendations, pt underwent cardiac adenosine stress test (8/30/2020) which showed large area of ischemia in inferior-lateral wall. Patient underwent left heart cardiac catheterization (8/31/20) via R Femoral Artery showed LVEF 40%, and significant occlusion of prox D1, Left Circumflex, and RCA. 3 Drug-eluting stents were placed in D1, proximal LAD, and mid LAD. Pt was placed on DAPT, B-Blocker and Statin.         Pt was instructed on how to care for R Femoral post-cath site. He was stable, reported no pain, and was agreeable to discharge on 9/1/2020.        # Chest pain; known CAD s/p CABG 33 years ago         Pt was admitted for exacerbation of chest pain that had been ongoing for 2 weeks. Initial EKG was unremarkable; repeat EKG showed new T-wave inversions. Nuclear stress test showed large reversible defect in inferolateral wall. As per cardio recommendations, pt underwent LH Cardiac cath via R Femoral Artery. Significant occlusion of 3 vessels was observed and drug-eluting stents were placed in RCA, prox D1 and LCA. Pt was placed on DAPT, B-blocker, and statin.         # History of dementia    - c/w Donepezil 10 mg PO qd and Memantine 10 mg PO qd which patient was on at home        Pt is medically stable, and agreeable to discharge.

## 2020-08-29 NOTE — PROGRESS NOTE ADULT - SUBJECTIVE AND OBJECTIVE BOX
NOEMY GRUBBS  80y Male    CHIEF COMPLAINT:    Patient is a 80y old  Male who presents with a chief complaint of Chest pain (28 Aug 2020 22:47)      INTERVAL HPI/OVERNIGHT EVENTS:    Patient seen and examined.    ROS: All other systems are negative.    Vital Signs:    T(F): 96.8 (20 @ 05:08), Max: 98.1 (20 @ 12:10)  HR: 61 (20 @ 05:08) (61 - 81)  BP: 115/62 (20 @ 05:08) (115/62 - 191/87)  RR: 18 (20 @ 05:08) (18 - 18)  SpO2: 96% (20 @ 08:00) (95% - 97%)  I&O's Summary    Daily Height in cm: 165.1 (28 Aug 2020 21:08)    Daily Weight in k.1 (29 Aug 2020 05:08)  CAPILLARY BLOOD GLUCOSE          PHYSICAL EXAM:    GENERAL:  NAD  SKIN: No rashes or lesions  HENT: Atrumatic. Normocephalic. PERRL. Moist membranes.  NECK: Supple, No JVD. No lymphadenopathy.  PULMONARY: CTA B/L. No wheezing. No rales  CVS: Normal S1, S2. Rate and Rythm are regular. No murmurs.  ABDOMEN/GI: Soft, Nontender, Nondistended; BS present  EXTREMITIES: Peripheral pulses intact. No edema B/L LE.  NEUROLOGIC:  No motor or sensory deficit.  PSYCH: Alert & oriented x 3    Consultant(s) Notes Reviewed:  [x ] YES  [ ] NO  Care Discussed with Consultants/Other Providers [ x] YES  [ ] NO    EKG reviewed  Telemetry reviewed    LABS:                        14.4   6.67  )-----------( 164      ( 29 Aug 2020 06:34 )             42.7     08    138  |  104  |  17  ----------------------------<  103<H>  4.1   |  22  |  1.0    Ca    9.3      29 Aug 2020 06:34  Mg     2.1     -    TPro  7.1  /  Alb  4.1  /  TBili  1.4<H>  /  DBili  x   /  AST  42<H>  /  ALT  49<H>  /  AlkPhos  82      PT/INR - ( 29 Aug 2020 06:34 )   PT: 13.70 sec;   INR: 1.19 ratio         PTT - ( 29 Aug 2020 06:34 )  PTT:34.0 sec  Serum Pro-Brain Natriuretic Peptide: 163 pg/mL (20 @ 12:52)    Trop <0.01, CKMB --, CK --, 20 @ 06:34  Trop <0.01, CKMB --, CK --, 20 @ 22:00  Trop <0.01, CKMB --, CK --, 20 @ 16:20  Trop <0.01, CKMB --, CK --, 20 @ 12:52        RADIOLOGY & ADDITIONAL TESTS:      Imaging or report Personally Reviewed:  [ ] YES  [ ] NO    Medications:  Standing  aDENosine Injectable (ADENOSCAN) 60 milliGRAM(s) IV Bolus once  aspirin  chewable 81 milliGRAM(s) Oral daily  atorvastatin 80 milliGRAM(s) Oral at bedtime  donepezil 10 milliGRAM(s) Oral at bedtime  enoxaparin Injectable 40 milliGRAM(s) SubCutaneous daily  famotidine    Tablet 40 milliGRAM(s) Oral two times a day  memantine 10 milliGRAM(s) Oral daily  metoprolol tartrate 12.5 milliGRAM(s) Oral two times a day  pantoprazole    Tablet 40 milliGRAM(s) Oral before breakfast    PRN Meds  nitroglycerin     SubLingual 0.4 milliGRAM(s) SubLingual every 5 minutes PRN      Case discussed with resident    Care discussed with pt/family HUMERA NOEMY  80y Male    CHIEF COMPLAINT:    Patient is a 80y old  Male who presents with a chief complaint of Chest pain (28 Aug 2020 22:47)      INTERVAL HPI/OVERNIGHT EVENTS:    Patient seen and examined. Complains that he had cp on exertion. Pain free now. No sob. No palpitations.     ROS: All other systems are negative.    Vital Signs:    T(F): 96.8 (20 @ 05:08), Max: 98.1 (20 @ 12:10)  HR: 61 (20 @ 05:08) (61 - 81)  BP: 115/62 (20 @ 05:08) (115/62 - 191/87)  RR: 18 (20 @ 05:08) (18 - 18)  SpO2: 96% (20 @ 08:00) (95% - 97%)  I&O's Summary    Daily Height in cm: 165.1 (28 Aug 2020 21:08)    Daily Weight in k.1 (29 Aug 2020 05:08)  CAPILLARY BLOOD GLUCOSE          PHYSICAL EXAM:    GENERAL:  NAD  SKIN: No rashes or lesions  HENT: Atraumatic Normocephalic. PERRL. Moist membranes.  NECK: Supple, No JVD. No lymphadenopathy.  PULMONARY: CTA B/L. No wheezing. No rales  CVS: Normal S1, S2. Rate and Rhythm are regular. No murmurs.  ABDOMEN/GI: Soft, Nontender, Nondistended; BS present  EXTREMITIES: Peripheral pulses intact. No edema B/L LE.  NEUROLOGIC:  No motor or sensory deficit.  PSYCH: Alert & oriented x 3    Consultant(s) Notes Reviewed:  [x ] YES  [ ] NO  Care Discussed with Consultants/Other Providers [ x] YES  [ ] NO    EKG reviewed  Telemetry reviewed    LABS:                        14.4   6.67  )-----------( 164      ( 29 Aug 2020 06:34 )             42.7     08-    138  |  104  |  17  ----------------------------<  103<H>  4.1   |  22  |  1.0    Ca    9.3      29 Aug 2020 06:34  Mg     2.1     -    TPro  7.1  /  Alb  4.1  /  TBili  1.4<H>  /  DBili  x   /  AST  42<H>  /  ALT  49<H>  /  AlkPhos  82      PT/INR - ( 29 Aug 2020 06:34 )   PT: 13.70 sec;   INR: 1.19 ratio         PTT - ( 29 Aug 2020 06:34 )  PTT:34.0 sec  Serum Pro-Brain Natriuretic Peptide: 163 pg/mL (20 @ 12:52)    Trop <0.01, CKMB --, CK --, 20 @ 06:34  Trop <0.01, CKMB --, CK --, 20 @ 22:00  Trop <0.01, CKMB --, CK --, 20 @ 16:20  Trop <0.01, CKMB --, CK --, 20 @ 12:52        RADIOLOGY & ADDITIONAL TESTS:      Imaging or report Personally Reviewed:  [ ] YES  [ ] NO    Medications:  Standing  aDENosine Injectable (ADENOSCAN) 60 milliGRAM(s) IV Bolus once  aspirin  chewable 81 milliGRAM(s) Oral daily  atorvastatin 80 milliGRAM(s) Oral at bedtime  donepezil 10 milliGRAM(s) Oral at bedtime  enoxaparin Injectable 40 milliGRAM(s) SubCutaneous daily  famotidine    Tablet 40 milliGRAM(s) Oral two times a day  memantine 10 milliGRAM(s) Oral daily  metoprolol tartrate 12.5 milliGRAM(s) Oral two times a day  pantoprazole    Tablet 40 milliGRAM(s) Oral before breakfast    PRN Meds  nitroglycerin     SubLingual 0.4 milliGRAM(s) SubLingual every 5 minutes PRN      Case discussed with resident    Care discussed with pt/family

## 2020-08-29 NOTE — DISCHARGE NOTE PROVIDER - PROVIDER TOKENS
PROVIDER:[TOKEN:[[id number]],FOLLOWUP:[1 week]] PROVIDER:[TOKEN:[[id number]],FOLLOWUP:[1 week]],PROVIDER:[TOKEN:[[id number]],FOLLOWUP:[1 week]]

## 2020-08-29 NOTE — PROGRESS NOTE ADULT - ASSESSMENT
80  year old male patient with past medical history of CAD s/p CABG and stents (unsure when the last stent was placed), hypertension, dementia? (no reported diagnosis but patient on Donepezil and Memantine at home) presented to the ED for evaluation of chest pain. 80  year old male patient with past medical history of CAD s/p CABG and stents (unsure when the last stent was placed), hypertension, dementia? (no reported diagnosis but patient on Donepezil and Memantine at home) presented to the ED for evaluation of chest pain.     1.	CP  2.	CAD S/P CABG  3.	HTN          PLAN:    ·	Cont tele. No events so far  ·	Care D/W the cardiology. Nuclear stress test  ·	ECHO  ·	Cont ASA, Lipitor and his other meds.

## 2020-08-29 NOTE — DISCHARGE NOTE PROVIDER - NSDCMRMEDTOKEN_GEN_ALL_CORE_FT
atorvastatin 80 mg oral tablet: 1 tab(s) orally once a day  donepezil 10 mg oral tablet: 1 tab(s) orally once a day (at bedtime)  famotidine 40 mg oral tablet: 1 tab(s) orally once a day  memantine 10 mg oral tablet: 1 tab(s) orally once a day atorvastatin 40 mg oral tablet: 1 tab(s) orally once a day  donepezil 10 mg oral tablet: 1 tab(s) orally once a day (at bedtime)  famotidine 40 mg oral tablet: 1 tab(s) orally once a day  lisinopril 10 mg oral tablet: 1 tab(s) orally once a day   memantine 10 mg oral tablet: 1 tab(s) orally once a day  Metoprolol Succinate ER 50 mg oral tablet, extended release: 1 tab(s) orally once a day  Plavix 75 mg oral tablet: 1 tab(s) orally once a day

## 2020-08-29 NOTE — DISCHARGE NOTE PROVIDER - CARE PROVIDERS DIRECT ADDRESSES
,[email or web address]

## 2020-08-29 NOTE — DISCHARGE NOTE PROVIDER - NSDCCPCAREPLAN_GEN_ALL_CORE_FT
PRINCIPAL DISCHARGE DIAGNOSIS  Diagnosis: Chest pain  Assessment and Plan of Treatment: You were seen for ongoing chest pain (angina). An ECG done on this visit showed new changes in your heart. A stress test was performed, which showed some reversible damage in your heart. A cardiac catheterization was performed, which showed 3 vessels in your heart that were blocked. 3 stents were placed in order to restore blood flow to the damaged parts of your heart.   You were placed on medications to ensure that your new stents won't clot. Please ensure that you take thse medications as instructed. Please take care of your post-catheterization site on your right thigh as instructed. Be sure to follow up with your outpatient PCP and cardiologist within 1 week.

## 2020-08-29 NOTE — PHYSICAL THERAPY INITIAL EVALUATION ADULT - PERTINENT HX OF CURRENT PROBLEM, REHAB EVAL
80  year old male patient with past medical history of CAD s/p CABG and stents (unsure when the last stent was placed), hypertension, dementia? (no reported diagnosis but patient on Donepezil and Memantine at home) presented to the ED for evaluation of chest pain

## 2020-08-30 PROCEDURE — 99233 SBSQ HOSP IP/OBS HIGH 50: CPT

## 2020-08-30 PROCEDURE — 99232 SBSQ HOSP IP/OBS MODERATE 35: CPT

## 2020-08-30 RX ADMIN — FAMOTIDINE 40 MILLIGRAM(S): 10 INJECTION INTRAVENOUS at 17:48

## 2020-08-30 RX ADMIN — Medication 12.5 MILLIGRAM(S): at 05:22

## 2020-08-30 RX ADMIN — ATORVASTATIN CALCIUM 80 MILLIGRAM(S): 80 TABLET, FILM COATED ORAL at 21:33

## 2020-08-30 RX ADMIN — MEMANTINE HYDROCHLORIDE 10 MILLIGRAM(S): 10 TABLET ORAL at 11:31

## 2020-08-30 RX ADMIN — Medication 81 MILLIGRAM(S): at 11:31

## 2020-08-30 RX ADMIN — DONEPEZIL HYDROCHLORIDE 10 MILLIGRAM(S): 10 TABLET, FILM COATED ORAL at 21:33

## 2020-08-30 RX ADMIN — ENOXAPARIN SODIUM 40 MILLIGRAM(S): 100 INJECTION SUBCUTANEOUS at 11:31

## 2020-08-30 RX ADMIN — FAMOTIDINE 40 MILLIGRAM(S): 10 INJECTION INTRAVENOUS at 05:22

## 2020-08-30 RX ADMIN — Medication 12.5 MILLIGRAM(S): at 17:48

## 2020-08-30 RX ADMIN — PANTOPRAZOLE SODIUM 40 MILLIGRAM(S): 20 TABLET, DELAYED RELEASE ORAL at 05:23

## 2020-08-30 NOTE — PROGRESS NOTE ADULT - ASSESSMENT
80  year old male patient with past medical history of CAD s/p CABG and stents (unsure when the last stent was placed), hypertension, dementia? (no reported diagnosis but patient on Donepezil and Memantine at home) presented to the ED for evaluation of chest pain.     1.	CP  2.	CAD S/P CABG  3.	HTN          PLAN:    ·	Cont tele  ·	Nuclear stress test showed large reversible defect in the inferolateral wall. Cont tele.  ·	Abnormal EKG. New inverted T waves in leads V4-V6  ·	ECHO  ·	Cont ASA, Lipitor, Metoprolo and his other meds.   ·	Care D/W the cardiology. Cath in AM. NPO post MN

## 2020-08-30 NOTE — PROGRESS NOTE ADULT - SUBJECTIVE AND OBJECTIVE BOX
Hospital Day:  2d    Chief Complaint: Patient is a 80y old  Male who presents with a chief complaint of Chest pain (30 Aug 2020 12:47)    24 hour events: No acute overnight events. Patient seen this AM he does not endorse any new complaints.     Past Medical Hx:   Coronary artery disease involving coronary bypass graft of native heart without angina pectoris  HTN (hypertension)    Past Sx:  S/P CABG x 4  H/O heart surgery    Allergies:  No Known Allergies    Current Meds:   Standing Meds:  aspirin  chewable 81 milliGRAM(s) Oral daily  atorvastatin 80 milliGRAM(s) Oral at bedtime  donepezil 10 milliGRAM(s) Oral at bedtime  enoxaparin Injectable 40 milliGRAM(s) SubCutaneous daily  famotidine    Tablet 40 milliGRAM(s) Oral two times a day  memantine 10 milliGRAM(s) Oral daily  metoprolol tartrate 12.5 milliGRAM(s) Oral two times a day  pantoprazole    Tablet 40 milliGRAM(s) Oral before breakfast    PRN Meds:  nitroglycerin     SubLingual 0.4 milliGRAM(s) SubLingual every 5 minutes PRN Chest Pain    HOME MEDICATIONS:  atorvastatin 80 mg oral tablet: 1 tab(s) orally once a day  donepezil 10 mg oral tablet: 1 tab(s) orally once a day (at bedtime)  famotidine 40 mg oral tablet: 1 tab(s) orally once a day  memantine 10 mg oral tablet: 1 tab(s) orally once a day      Vital Signs:   T(F): 97.4 (08-30-20 @ 20:55), Max: 97.7 (08-30-20 @ 05:33)  HR: 65 (08-30-20 @ 20:55) (61 - 67)  BP: 136/81 (08-30-20 @ 20:55) (117/58 - 136/81)  RR: 18 (08-30-20 @ 20:55) (18 - 18)  SpO2: 94% (08-30-20 @ 19:35) (94% - 95%)      08-30-20 @ 07:01  -  08-30-20 @ 21:37  --------------------------------------------------------  IN: 570 mL / OUT: 0 mL / NET: 570 mL    Physical Exam:   GENERAL: well nourished well developed male in NAD  HEENT: NCAT  CHEST/LUNG: audible breath sounds bilaterally  HEART: Regular rate and rhythm; s1 s2 appreciated  ABDOMEN: Soft, Nontender, Nondistended abdomen  EXTREMITIES: No LE edema b/l  NERVOUS SYSTEM:  Alert & Oriented X3    Labs:                         14.4   6.67  )-----------( 164      ( 29 Aug 2020 06:34 )             42.7       29 Aug 2020 06:34    138    |  104    |  17     ----------------------------<  103    4.1     |  22     |  1.0      Ca    9.3        29 Aug 2020 06:34  Mg     2.1       29 Aug 2020 06:34    TPro  7.1    /  Alb  4.1    /  TBili  1.4    /  DBili  x      /  AST  42     /  ALT  49     /  AlkPhos  82     29 Aug 2020 06:34    Serum Pro-Brain Natriuretic Peptide: 163 pg/mL (08-28-20 @ 12:52)    Troponin <0.01, CKMB --, CK -- 08-29-20 @ 06:34  Troponin <0.01, CKMB --, CK -- 08-28-20 @ 22:00  Troponin <0.01, CKMB --, CK -- 08-28-20 @ 16:20  Troponin <0.01, CKMB --, CK -- 08-28-20 @ 12:52    Radiology:     Assessment and Plan:  80  year old male patient with past medical history of CAD s/p CABG and stents (unsure when the last stent was placed), hypertension, dementia? (no reported diagnosis but patient on Donepezil and Memantine at home) presented to the ED for evaluation of chest pain    # Chest pain in a patient known to have coronary artery disease s/p CABG 33 years ago   - Troponin T negative on admission   - Admit to telemetry   - Troponin negative x3   - Cardiology consult Dr. Bernal.   - Continue Atorvastatin 80 mg qhs, Aspirin 81 mg PO qd and Lopressor 12.5 mg PO BID (was on Toprol in the past, not present among his medication list) as well as Nitro PRN for chest pain   - A1C 6.2HDL   - HDL 42, LDL 63  - nuclear stress test showed large reversible defect in the inferolateral wall  - NPO after midnight for possible cath tomorrow   - Continue Famotidine 40 mg PO BID and start Protonix 40 mg PO qd for possible GERD, keep HOB>45 degrees after eating    # History of dementia?  - Not reported by the patient or family member   - Continue Donepezil 10 mg PO qd and Memantine 10 mg PO qd     # Miscellaneous   - DVT prophylaxis : Lovenox 40 mg sq qd   - GI prophylaxis : Protonix 40 mg PO qd   - Activity : Increase as tolerated   - Diet : DASH TLC   - Code status : Full code

## 2020-08-30 NOTE — PROGRESS NOTE ADULT - SUBJECTIVE AND OBJECTIVE BOX
Pt seen this am on 3C unit. He has no symptoms with stable exam and VS. EKG from yesterday showed new T wave inversions lateral leads and stress test showed large area of ischemia inf-lateral wall. Will schedule cardiac cath for tomorrow. Pt aware.

## 2020-08-30 NOTE — PROGRESS NOTE ADULT - SUBJECTIVE AND OBJECTIVE BOX
HUMERANOEMY  80y Male    CHIEF COMPLAINT:    Patient is a 80y old  Male who presents with a chief complaint of Chest pain (29 Aug 2020 13:42)      INTERVAL HPI/OVERNIGHT EVENTS:    Patient seen and examined. No cp today. No sob. Abnormal stress test. Possible cath tomorrow.     ROS: All other systems are negative.    Vital Signs:    T(F): 97.7 (20 @ 05:33), Max: 97.7 (20 @ 05:33)  HR: 66 (20 @ 05:33) (57 - 71)  BP: 117/58 (20 @ 05:33) (117/58 - 138/70)  RR: 18 (20 @ 05:33) (18 - 18)  SpO2: 95% (20 @ 08:17) (94% - 95%)  I&O's Summary    30 Aug 2020 07:01  -  30 Aug 2020 11:48  --------------------------------------------------------  IN: 210 mL / OUT: 0 mL / NET: 210 mL      Daily     Daily Weight in k.3 (30 Aug 2020 05:33)  CAPILLARY BLOOD GLUCOSE          PHYSICAL EXAM:    GENERAL:  NAD  SKIN: No rashes or lesions  HENT: Atraumatic Normocephalic. PERRL. Moist membranes.  NECK: Supple, No JVD. No lymphadenopathy.  PULMONARY: CTA B/L. No wheezing. No rales  CVS: Normal S1, S2. Rate and Rhythm are regular. No murmurs.  ABDOMEN/GI: Soft, Nontender, Nondistended; BS present  EXTREMITIES: Peripheral pulses intact. No edema B/L LE.  NEUROLOGIC:  No motor or sensory deficit.  PSYCH: Alert & oriented x 2    Consultant(s) Notes Reviewed:  [x ] YES  [ ] NO  Care Discussed with Consultants/Other Providers [ x] YES  [ ] NO    EKG reviewed  Telemetry reviewed    LABS:                        14.4   6.67  )-----------( 164      ( 29 Aug 2020 06:34 )             42.7         138  |  104  |  17  ----------------------------<  103<H>  4.1   |  22  |  1.0    Ca    9.3      29 Aug 2020 06:34  Mg     2.1         TPro  7.1  /  Alb  4.1  /  TBili  1.4<H>  /  DBili  x   /  AST  42<H>  /  ALT  49<H>  /  AlkPhos  82      PT/INR - ( 29 Aug 2020 06:34 )   PT: 13.70 sec;   INR: 1.19 ratio         PTT - ( 29 Aug 2020 06:34 )  PTT:34.0 sec  Serum Pro-Brain Natriuretic Peptide: 163 pg/mL (20 @ 12:52)    Trop <0.01, CKMB --, CK --, 20 @ 06:34  Trop <0.01, CKMB --, CK --, 20 @ 22:00  Trop <0.01, CKMB --, CK --, 20 @ 16:20  Trop <0.01, CKMB --, CK --, 20 @ 12:52        RADIOLOGY & ADDITIONAL TESTS:    < from: NM Nuclear Stress Pharmacologic Multiple (20 @ 14:30) >    Impression:  1. Large reversible defect in the inferolateral wall the left ventricle consistent with ischemia superimposed on smaller inferolateral basal wall scar.    2. Fixed defect in the inferolateral basal wall of the left ventricle which does not completely thicken with prior injury (scar).    3. Left ventricular ejection fraction of >80 % which is within range of normal or in the hyperdynamic range.    Spoke with Dr. CALLAHAN on 2020 4:03 PM with readback        < end of copied text >    Imaging or report Personally Reviewed:  [ ] YES  [ ] NO    Medications:  Standing  aspirin  chewable 81 milliGRAM(s) Oral daily  atorvastatin 80 milliGRAM(s) Oral at bedtime  donepezil 10 milliGRAM(s) Oral at bedtime  enoxaparin Injectable 40 milliGRAM(s) SubCutaneous daily  famotidine    Tablet 40 milliGRAM(s) Oral two times a day  memantine 10 milliGRAM(s) Oral daily  metoprolol tartrate 12.5 milliGRAM(s) Oral two times a day  pantoprazole    Tablet 40 milliGRAM(s) Oral before breakfast    PRN Meds  nitroglycerin     SubLingual 0.4 milliGRAM(s) SubLingual every 5 minutes PRN      Case discussed with resident    Care discussed with pt/family

## 2020-08-31 LAB
ANION GAP SERPL CALC-SCNC: 15 MMOL/L — HIGH (ref 7–14)
APTT BLD: 33.1 SEC — SIGNIFICANT CHANGE UP (ref 27–39.2)
BUN SERPL-MCNC: 15 MG/DL — SIGNIFICANT CHANGE UP (ref 10–20)
CALCIUM SERPL-MCNC: 9.3 MG/DL — SIGNIFICANT CHANGE UP (ref 8.5–10.1)
CHLORIDE SERPL-SCNC: 103 MMOL/L — SIGNIFICANT CHANGE UP (ref 98–110)
CO2 SERPL-SCNC: 23 MMOL/L — SIGNIFICANT CHANGE UP (ref 17–32)
CREAT SERPL-MCNC: 1.1 MG/DL — SIGNIFICANT CHANGE UP (ref 0.7–1.5)
GLUCOSE SERPL-MCNC: 101 MG/DL — HIGH (ref 70–99)
HCT VFR BLD CALC: 45.6 % — SIGNIFICANT CHANGE UP (ref 42–52)
HGB BLD-MCNC: 15 G/DL — SIGNIFICANT CHANGE UP (ref 14–18)
INR BLD: 1.18 RATIO — SIGNIFICANT CHANGE UP (ref 0.65–1.3)
MAGNESIUM SERPL-MCNC: 2.1 MG/DL — SIGNIFICANT CHANGE UP (ref 1.8–2.4)
MCHC RBC-ENTMCNC: 29.4 PG — SIGNIFICANT CHANGE UP (ref 27–31)
MCHC RBC-ENTMCNC: 32.9 G/DL — SIGNIFICANT CHANGE UP (ref 32–37)
MCV RBC AUTO: 89.4 FL — SIGNIFICANT CHANGE UP (ref 80–94)
NRBC # BLD: 0 /100 WBCS — SIGNIFICANT CHANGE UP (ref 0–0)
PLATELET # BLD AUTO: 144 K/UL — SIGNIFICANT CHANGE UP (ref 130–400)
POTASSIUM SERPL-MCNC: 4.5 MMOL/L — SIGNIFICANT CHANGE UP (ref 3.5–5)
POTASSIUM SERPL-SCNC: 4.5 MMOL/L — SIGNIFICANT CHANGE UP (ref 3.5–5)
PROTHROM AB SERPL-ACNC: 13.6 SEC — HIGH (ref 9.95–12.87)
RBC # BLD: 5.1 M/UL — SIGNIFICANT CHANGE UP (ref 4.7–6.1)
RBC # FLD: 13.2 % — SIGNIFICANT CHANGE UP (ref 11.5–14.5)
SODIUM SERPL-SCNC: 141 MMOL/L — SIGNIFICANT CHANGE UP (ref 135–146)
WBC # BLD: 8.55 K/UL — SIGNIFICANT CHANGE UP (ref 4.8–10.8)
WBC # FLD AUTO: 8.55 K/UL — SIGNIFICANT CHANGE UP (ref 4.8–10.8)

## 2020-08-31 PROCEDURE — 93306 TTE W/DOPPLER COMPLETE: CPT | Mod: 26

## 2020-08-31 PROCEDURE — 99233 SBSQ HOSP IP/OBS HIGH 50: CPT

## 2020-08-31 RX ORDER — AMLODIPINE BESYLATE 2.5 MG/1
5 TABLET ORAL ONCE
Refills: 0 | Status: COMPLETED | OUTPATIENT
Start: 2020-08-31 | End: 2020-08-31

## 2020-08-31 RX ORDER — TICAGRELOR 90 MG/1
90 TABLET ORAL
Refills: 0 | Status: DISCONTINUED | OUTPATIENT
Start: 2020-09-01 | End: 2020-09-01

## 2020-08-31 RX ORDER — SODIUM CHLORIDE 9 MG/ML
1000 INJECTION INTRAMUSCULAR; INTRAVENOUS; SUBCUTANEOUS
Refills: 0 | Status: DISCONTINUED | OUTPATIENT
Start: 2020-08-31 | End: 2020-09-03

## 2020-08-31 RX ADMIN — MEMANTINE HYDROCHLORIDE 10 MILLIGRAM(S): 10 TABLET ORAL at 11:15

## 2020-08-31 RX ADMIN — FAMOTIDINE 40 MILLIGRAM(S): 10 INJECTION INTRAVENOUS at 05:42

## 2020-08-31 RX ADMIN — Medication 12.5 MILLIGRAM(S): at 05:42

## 2020-08-31 RX ADMIN — AMLODIPINE BESYLATE 5 MILLIGRAM(S): 2.5 TABLET ORAL at 06:41

## 2020-08-31 RX ADMIN — DONEPEZIL HYDROCHLORIDE 10 MILLIGRAM(S): 10 TABLET, FILM COATED ORAL at 21:26

## 2020-08-31 RX ADMIN — ATORVASTATIN CALCIUM 80 MILLIGRAM(S): 80 TABLET, FILM COATED ORAL at 21:26

## 2020-08-31 RX ADMIN — Medication 81 MILLIGRAM(S): at 11:15

## 2020-08-31 RX ADMIN — PANTOPRAZOLE SODIUM 40 MILLIGRAM(S): 20 TABLET, DELAYED RELEASE ORAL at 05:42

## 2020-08-31 NOTE — CHART NOTE - NSCHARTNOTEFT_GEN_A_CORE
PRE-OP DIAGNOSIS:   Unstable Angina    PROCEDURE:  [x] Coronary angiography  [x] C  [] [facility]    Attending Physician: Dr. Spivey  Fellow: Dr. Tran  Fellow: Dr. 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 Prox LAD 70%, Mid LAD 99%  Dia% prox D1  Left Circumflex: Total occlusion 100% ()  OM: Can't be visualized  Ramus Intermedius: Mild disease, mild in-stent restenosis  Right Coronary Artery: Total occlusion 100% () prox RCA  RPDA: Can't be visualized  PLS: Can't be visualized  Graft to RPDA: total occlusion  LIMA to LAD: total occlusion    INTERVENTION  SPECIMEN REMOVED: Not applicable  IMPLANTS: 3xDES stents to D1, prox LAD and mid LAD    POST-OP DIAGNOSIS:  Significant 3V disease    PLAN OF CARE:  [] D/C Home today  [] D/C in AM  [x] Return to In-patient bed  [] Admit for observation  [] Return for staged procedure:  [] CT Surgery consult called  [x] Continue DAPT, B-blocker & Statin therapy PRE-OP DIAGNOSIS:   Unstable Angina    PROCEDURE:  [x] Coronary angiography  [x] C  [] [facility]    Attending Physician: Dr. Spivey  Fellow: Dr. Tran  Fellow: Dr. 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 Prox LAD 70%, Mid LAD 90%  Dia% prox D1  Left Circumflex: Total occlusion 100% ()  OM: Can't be visualized  Ramus Intermedius: Mild disease, mild in-stent restenosis  Right Coronary Artery: Total occlusion 100% () prox RCA  RPDA: Can't be visualized  PLS: Can't be visualized  Graft to RPDA: total occlusion  LIMA to LAD: total occlusion    INTERVENTION  SPECIMEN REMOVED: Not applicable  IMPLANTS: 3xDES stents to D1, prox LAD and mid LAD    POST-OP DIAGNOSIS:  Significant 3V disease    PLAN OF CARE:  [] D/C Home today  [] D/C in AM  [x] Return to In-patient bed  [] Admit for observation  [] Return for staged procedure:  [] CT Surgery consult called  [x] Continue DAPT, B-blocker & Statin therapy PRE-OP DIAGNOSIS:   Unstable Angina    PROCEDURE:  [x] Coronary angiography  [x] C  [] [facility]    Attending Physician: Dr. Spivey  Fellow: Dr. Tran  Fellow: Dr. 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 Prox LAD 70%, Mid LAD 90%  Dia% prox D1  Left Circumflex: Total occlusion 100% ()  OM: Can't be visualized  Ramus Intermedius: Mild disease, mild in-stent restenosis  Right Coronary Artery: Total occlusion 100% () prox RCA  RPDA: Can't be visualized  PLS: Can't be visualized  Graft to RPDA: total occlusion  LIMA to LAD: total occlusion    INTERVENTION  SPECIMEN REMOVED: Not applicable  IMPLANTS: 3xDES stents to D1, prox LAD and mid LAD    POST-OP DIAGNOSIS:  Significant 3V disease    PLAN OF CARE:  [] D/C Home today  [] D/C in AM  [x] Return to In-patient bed  [] Admit for observation  [] Return for staged procedure:  [] CT Surgery consult called  [x] Continue DAPT, B-blocker & Statin therapy

## 2020-08-31 NOTE — PROGRESS NOTE ADULT - SUBJECTIVE AND OBJECTIVE BOX
HPI  80  year old male patient with past medical history of CAD s/p CABG and stents (unsure when the last stent was placed), hypertension, dementia? (no reported diagnosis but patient on Donepezil and Memantine at home) presented to the ED for evaluation of chest pain. The patient states that his chest pain started 2 weeks ago, the pain was initially present at rest, not severe (patient not able to rate the intensity), starts at the upper chest pain and radiates to the lower abdomen, described as sharp pain and burning, with no triggering factors, no exacerbating or relieving factors. The pain lasts a couple of minutes and subsides on its own. The patient states that on day of admission he [place] when he started experiencing the pain after walking about 5 feet, he sat down and placed a pillow on his abdomen which improved the pain. He states that since started 2 weeks ago it hasn't increased in intensity. The pain doesn't radiate to the upper extremities, neck or jaw area. The patient denies any similar episodes in the past, and states that he hasn't had any echocardiogram or stress test recently     In the ED : /80, HR 81, RR 18, Temp 98.1, SpO2 95 % on room air. Troponin T negative x 1. The patient was admitted for further evaluation and for possible stable angina (28 Aug 2020 15:32)    Currently admitted to medicine with the primary diagnosis of Chest pain. Today is hospital day 3.    INTERVAL HPI / OVERNIGHT EVENTS:  Patient was examined and seen at bedside. This morning he is resting comfortably in bed and reports no new issues or overnight events.     ROS: Otherwise unremarkable     PAST MEDICAL & SURGICAL HISTORY  Coronary artery disease involving coronary bypass graft of native heart without angina pectoris  HTN (hypertension)  S/P CABG x 4: 33 years ago    ALLERGIES  No Known Allergies    MEDICATIONS  STANDING MEDICATIONS  aspirin  chewable 81 milliGRAM(s) Oral daily  atorvastatin 80 milliGRAM(s) Oral at bedtime  donepezil 10 milliGRAM(s) Oral at bedtime  enoxaparin Injectable 40 milliGRAM(s) SubCutaneous daily  famotidine    Tablet 40 milliGRAM(s) Oral two times a day  memantine 10 milliGRAM(s) Oral daily  metoprolol tartrate 12.5 milliGRAM(s) Oral two times a day  pantoprazole    Tablet 40 milliGRAM(s) Oral before breakfast    PRN MEDICATIONS  nitroglycerin     SubLingual 0.4 milliGRAM(s) SubLingual every 5 minutes PRN    VITALS:  T(F): 97.1  HR: 71  BP: 131/71  RR: 18  SpO2: 94%    PHYSICAL EXAM  GEN: NAD, Resting comfortably in bed  PULM: Clear to auscultation bilaterally, No wheezes  CVS: RRR, S1-S2, no murmurs  ABD: Soft, non-tender, non-distended, no guarding  EXT: No edema  NEURO: A&Ox3, no focal deficits    LABS                        15.0   8.55  )-----------( 144      ( 31 Aug 2020 06:28 )             45.6     08-31    141  |  103  |  15  ----------------------------<  101<H>  4.5   |  23  |  1.1    Ca    9.3      31 Aug 2020 06:28  Mg     2.1     08-31      PT/INR - ( 31 Aug 2020 06:28 )   PT: 13.60 sec;   INR: 1.18 ratio    PTT - ( 31 Aug 2020 06:28 )  PTT:33.1 sec      RADIOLOGY  Nuclear Stress Test 8/29, Impression:  1. Large reversible defect in the inferolateral wall the left ventricle consistent with ischemia superimposed on smaller inferolateral basal wall scar.  2. Fixed defect in the inferolateral basal wall of the left ventricle which does not completely thicken with prior injury (scar).  3. Left ventricular ejection fraction of >80 % which is within range of normal or in the hyperdynamic range.

## 2020-08-31 NOTE — PROGRESS NOTE ADULT - ASSESSMENT
ASSESSMENT  80  year old male patient with past medical history of CAD s/p CABG and stents (unsure when the last stent was placed), hypertension, dementia? (no reported diagnosis but patient on Donepezil and Memantine at home) presented to the ED for evaluation of chest pain.      PLAN    # Chest pain; known CAD s/p CABG 33 years ago   - Troponin T negative on admission   - Troponin negative x3   - ; not suggestive of HF   - c/w Atorvastatin 80 mg qhs, Aspirin 81 mg PO qd and Lopressor 12.5 mg PO BID (was on Toprol in the past, not present among his medication list) as well as Nitro PRN for chest pain   - A1C 6.2/HDL 42/LDL 63  - EKG on 8/29: new T wave inversions lateral leads  - Nuclear stress test showed large reversible defect in the inferolateral wall  - f/u Cath today  - c/w Famotidine 40 mg PO BID and start Protonix 40 mg PO qd for possible GERD, keep HOB>45 degrees after eating      # History of dementia?  - Not reported by the patient or family member   - c/w Donepezil 10 mg PO qd and Memantine 10 mg PO qd     # Miscellaneous   - DVT prophylaxis : Lovenox 40 mg sq qd   - GI prophylaxis : Protonix 40 mg PO qd   - Activity : Increase as tolerated   - Diet : DASH TLC   - Code status : Full code 80  year old male patient with past medical history of CAD s/p CABG and stents (unsure when the last stent was placed), hypertension, dementia (no reported diagnosis but patient on Donepezil and Memantine at home) presented to the ED for evaluation of chest pain.      # Chest pain; known CAD s/p CABG 33 years ago   - Troponin T negative on admission   - Troponin negative x3   - ; not suggestive of HF   - c/w Atorvastatin 80 mg qhs, Aspirin 81 mg PO qd and Lopressor 12.5 mg PO BID (was on Toprol in the past, not present among his medication list) as well as Nitro PRN for chest pain   - A1C 6.2/HDL 42/LDL 63  - EKG on 8/29: new T wave inversions lateral leads  - Nuclear stress test showed large reversible defect in the inferolateral wall  - f/u Cath today  - c/w Famotidine 40 mg PO BID and start Protonix 40 mg PO qd for possible GERD, keep HOB>45 degrees after eating    # History of dementia  - Not reported by the patient or family member   - c/w Donepezil 10 mg PO qd and Memantine 10 mg PO qd     # Miscellaneous   - DVT prophylaxis : Lovenox 40 mg sq qd   - GI prophylaxis : Protonix 40 mg PO qd   - Activity : Increase as tolerated   - Diet : DASH TLC   - Code status : Full code

## 2020-09-01 LAB
ALBUMIN SERPL ELPH-MCNC: 4.5 G/DL — SIGNIFICANT CHANGE UP (ref 3.5–5.2)
ALP SERPL-CCNC: 92 U/L — SIGNIFICANT CHANGE UP (ref 30–115)
ALT FLD-CCNC: 93 U/L — HIGH (ref 0–41)
ANION GAP SERPL CALC-SCNC: 16 MMOL/L — HIGH (ref 7–14)
AST SERPL-CCNC: 102 U/L — HIGH (ref 0–41)
BASOPHILS # BLD AUTO: 0.02 K/UL — SIGNIFICANT CHANGE UP (ref 0–0.2)
BASOPHILS NFR BLD AUTO: 0.2 % — SIGNIFICANT CHANGE UP (ref 0–1)
BILIRUB DIRECT SERPL-MCNC: 0.3 MG/DL — HIGH (ref 0–0.2)
BILIRUB INDIRECT FLD-MCNC: 1.8 MG/DL — HIGH (ref 0.2–1.2)
BILIRUB SERPL-MCNC: 2.1 MG/DL — HIGH (ref 0.2–1.2)
BUN SERPL-MCNC: 14 MG/DL — SIGNIFICANT CHANGE UP (ref 10–20)
CALCIUM SERPL-MCNC: 9.1 MG/DL — SIGNIFICANT CHANGE UP (ref 8.5–10.1)
CHLORIDE SERPL-SCNC: 100 MMOL/L — SIGNIFICANT CHANGE UP (ref 98–110)
CO2 SERPL-SCNC: 20 MMOL/L — SIGNIFICANT CHANGE UP (ref 17–32)
CREAT SERPL-MCNC: 1 MG/DL — SIGNIFICANT CHANGE UP (ref 0.7–1.5)
EOSINOPHIL # BLD AUTO: 0.01 K/UL — SIGNIFICANT CHANGE UP (ref 0–0.7)
EOSINOPHIL NFR BLD AUTO: 0.1 % — SIGNIFICANT CHANGE UP (ref 0–8)
GLUCOSE SERPL-MCNC: 113 MG/DL — HIGH (ref 70–99)
HCT VFR BLD CALC: 45.4 % — SIGNIFICANT CHANGE UP (ref 42–52)
HGB BLD-MCNC: 15.5 G/DL — SIGNIFICANT CHANGE UP (ref 14–18)
IMM GRANULOCYTES NFR BLD AUTO: 0.2 % — SIGNIFICANT CHANGE UP (ref 0.1–0.3)
LYMPHOCYTES # BLD AUTO: 0.76 K/UL — LOW (ref 1.2–3.4)
LYMPHOCYTES # BLD AUTO: 7.6 % — LOW (ref 20.5–51.1)
MAGNESIUM SERPL-MCNC: 2 MG/DL — SIGNIFICANT CHANGE UP (ref 1.8–2.4)
MCHC RBC-ENTMCNC: 29.8 PG — SIGNIFICANT CHANGE UP (ref 27–31)
MCHC RBC-ENTMCNC: 34.1 G/DL — SIGNIFICANT CHANGE UP (ref 32–37)
MCV RBC AUTO: 87.1 FL — SIGNIFICANT CHANGE UP (ref 80–94)
MONOCYTES # BLD AUTO: 1.2 K/UL — HIGH (ref 0.1–0.6)
MONOCYTES NFR BLD AUTO: 11.9 % — HIGH (ref 1.7–9.3)
NEUTROPHILS # BLD AUTO: 8.04 K/UL — HIGH (ref 1.4–6.5)
NEUTROPHILS NFR BLD AUTO: 80 % — HIGH (ref 42.2–75.2)
NRBC # BLD: 0 /100 WBCS — SIGNIFICANT CHANGE UP (ref 0–0)
PLATELET # BLD AUTO: 181 K/UL — SIGNIFICANT CHANGE UP (ref 130–400)
POTASSIUM SERPL-MCNC: 4 MMOL/L — SIGNIFICANT CHANGE UP (ref 3.5–5)
POTASSIUM SERPL-SCNC: 4 MMOL/L — SIGNIFICANT CHANGE UP (ref 3.5–5)
PROT SERPL-MCNC: 7.4 G/DL — SIGNIFICANT CHANGE UP (ref 6–8)
RBC # BLD: 5.21 M/UL — SIGNIFICANT CHANGE UP (ref 4.7–6.1)
RBC # FLD: 13.2 % — SIGNIFICANT CHANGE UP (ref 11.5–14.5)
SODIUM SERPL-SCNC: 136 MMOL/L — SIGNIFICANT CHANGE UP (ref 135–146)
WBC # BLD: 10.05 K/UL — SIGNIFICANT CHANGE UP (ref 4.8–10.8)
WBC # FLD AUTO: 10.05 K/UL — SIGNIFICANT CHANGE UP (ref 4.8–10.8)

## 2020-09-01 PROCEDURE — 93010 ELECTROCARDIOGRAM REPORT: CPT

## 2020-09-01 PROCEDURE — 99232 SBSQ HOSP IP/OBS MODERATE 35: CPT

## 2020-09-01 RX ORDER — ATORVASTATIN CALCIUM 80 MG/1
40 TABLET, FILM COATED ORAL AT BEDTIME
Refills: 0 | Status: DISCONTINUED | OUTPATIENT
Start: 2020-09-01 | End: 2020-09-03

## 2020-09-01 RX ORDER — METOPROLOL TARTRATE 50 MG
1 TABLET ORAL
Qty: 0 | Refills: 0 | DISCHARGE
Start: 2020-09-01

## 2020-09-01 RX ORDER — CLOPIDOGREL BISULFATE 75 MG/1
1 TABLET, FILM COATED ORAL
Qty: 0 | Refills: 0 | DISCHARGE
Start: 2020-09-01

## 2020-09-01 RX ORDER — ATORVASTATIN CALCIUM 80 MG/1
1 TABLET, FILM COATED ORAL
Qty: 0 | Refills: 0 | DISCHARGE

## 2020-09-01 RX ORDER — LISINOPRIL 2.5 MG/1
1 TABLET ORAL
Qty: 30 | Refills: 0
Start: 2020-09-01 | End: 2020-09-30

## 2020-09-01 RX ORDER — CLOPIDOGREL BISULFATE 75 MG/1
75 TABLET, FILM COATED ORAL DAILY
Refills: 0 | Status: DISCONTINUED | OUTPATIENT
Start: 2020-09-02 | End: 2020-09-03

## 2020-09-01 RX ORDER — CLOPIDOGREL BISULFATE 75 MG/1
300 TABLET, FILM COATED ORAL ONCE
Refills: 0 | Status: COMPLETED | OUTPATIENT
Start: 2020-09-01 | End: 2020-09-01

## 2020-09-01 RX ORDER — TICAGRELOR 90 MG/1
1 TABLET ORAL
Qty: 60 | Refills: 0
Start: 2020-09-01 | End: 2020-09-30

## 2020-09-01 RX ORDER — METOPROLOL TARTRATE 50 MG
50 TABLET ORAL DAILY
Refills: 0 | Status: DISCONTINUED | OUTPATIENT
Start: 2020-09-01 | End: 2020-09-03

## 2020-09-01 RX ADMIN — TICAGRELOR 90 MILLIGRAM(S): 90 TABLET ORAL at 05:57

## 2020-09-01 RX ADMIN — FAMOTIDINE 40 MILLIGRAM(S): 10 INJECTION INTRAVENOUS at 05:57

## 2020-09-01 RX ADMIN — MEMANTINE HYDROCHLORIDE 10 MILLIGRAM(S): 10 TABLET ORAL at 11:45

## 2020-09-01 RX ADMIN — Medication 12.5 MILLIGRAM(S): at 05:57

## 2020-09-01 RX ADMIN — Medication 81 MILLIGRAM(S): at 11:45

## 2020-09-01 RX ADMIN — CLOPIDOGREL BISULFATE 300 MILLIGRAM(S): 75 TABLET, FILM COATED ORAL at 12:48

## 2020-09-01 RX ADMIN — ATORVASTATIN CALCIUM 40 MILLIGRAM(S): 80 TABLET, FILM COATED ORAL at 22:27

## 2020-09-01 RX ADMIN — FAMOTIDINE 40 MILLIGRAM(S): 10 INJECTION INTRAVENOUS at 17:26

## 2020-09-01 RX ADMIN — DONEPEZIL HYDROCHLORIDE 10 MILLIGRAM(S): 10 TABLET, FILM COATED ORAL at 22:27

## 2020-09-01 RX ADMIN — ENOXAPARIN SODIUM 40 MILLIGRAM(S): 100 INJECTION SUBCUTANEOUS at 11:45

## 2020-09-01 RX ADMIN — PANTOPRAZOLE SODIUM 40 MILLIGRAM(S): 20 TABLET, DELAYED RELEASE ORAL at 05:59

## 2020-09-01 NOTE — PROGRESS NOTE ADULT - ASSESSMENT
80  year old male patient with past medical history of CAD s/p CABG and stents (unsure when the last stent was placed), hypertension, dementia (no reported diagnosis but patient on Donepezil and Memantine at home) presented to the ED for evaluation of chest pain.    # Chest pain; known CAD s/p CABG 33 years ago   - EKG on 8/29: new T wave inversions lateral leads  - Nuclear stress test showed large reversible defect in the inferolateral wall  - s/p LHC - 3xDES stents to D1, prox LAD and mid LAD  - started on Brilinta then switched to Plavix today due to intolerance   - c/w aspirin, statin 40 mg qhs, BB     # Transaminitis   - likely due to high intensity statin, lowered to 40mg as per cardio     # History of dementia  - c/w Donepezil 10 mg PO qd and Memantine 10 mg PO qd     - DVT prophylaxis : Lovenox 40 mg sq qd   - GI prophylaxis : Protonix 40 mg PO qd     #Progress Note Handoff  Pending (specify):  monitor LFTs, PT eval   Family discussion: dw pt plan of care as above   Disposition: Home

## 2020-09-01 NOTE — PROGRESS NOTE ADULT - SUBJECTIVE AND OBJECTIVE BOX
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 (no reported diagnosis but patient on Donepezil and Memantine at home) presented to the ED for evaluation of chest pain. The patient states that his chest pain started 2 weeks ago, the pain was initially present at rest, not severe (patient not able to rate the intensity), starts at the upper chest pain and radiates to the lower abdomen, described as sharp pain and burning, with no triggering factors, no exacerbating or relieving factors. The pain lasts a couple of minutes and subsides on its own. The patient states that today he attempted to walk around the mall when he started experiencing the pain after walking about 5 feet, he sat down and placed a pillow on his abdomen which improved the pain. He states that since started 2 weeks ago it hasn't increased in intensity. The pain doesn't radiate to the upper extremities, neck or jaw area. The patient denies any similar episodes in the past, and states that he hasn't had any echocardiogram or stress test recently   In the ED : /80, HR 81, RR 18, Temp 98.1, SpO2 95 % on room air. Troponin T negative x 1. The patient was admitted for further evaluation and for possible stable angina (28 Aug 2020 15:32)    Allergies    No Known Allergies    Intolerances    Patient examined at bedside.   Patient with c/o dyspnea at rest, resolving on its own.   Denies CP, palpitations, or dizziness  3 beat NSVT on tele overnight    Vital Signs Last 24 Hrs  T(C): 36.1 (01 Sep 2020 05:03), Max: 36.2 (31 Aug 2020 11:30)  T(F): 97 (01 Sep 2020 05:03), Max: 97.1 (31 Aug 2020 11:30)  HR: 79 (01 Sep 2020 05:03) (71 - 79)  BP: 168/72 (01 Sep 2020 05:03) (131/71 - 168/72)  RR: 20 (01 Sep 2020 05:03) (18 - 20)      MEDICATIONS  (STANDING):  aspirin  chewable 81 milliGRAM(s) Oral daily  atorvastatin 40 milliGRAM(s) Oral at bedtime  donepezil 10 milliGRAM(s) Oral at bedtime  enoxaparin Injectable 40 milliGRAM(s) SubCutaneous daily  famotidine    Tablet 40 milliGRAM(s) Oral two times a day  memantine 10 milliGRAM(s) Oral daily  metoprolol tartrate 12.5 milliGRAM(s) Oral two times a day  pantoprazole    Tablet 40 milliGRAM(s) Oral before breakfast  sodium chloride 0.9%. 1000 milliLiter(s) (100 mL/Hr) IV Continuous <Continuous>  ticagrelor 90 milliGRAM(s) Oral two times a day    MEDICATIONS  (PRN):  nitroglycerin     SubLingual 0.4 milliGRAM(s) SubLingual every 5 minutes PRN Chest Pain    REVIEW OF SYSTEMS:          All negative except as mentioned in HPI    PHYSICAL EXAM:           CONSTITUTIONAL: Well-developed; well-nourished; in no acute distress  	SKIN: warm, dry  	HEAD: Normocephalic; atraumatic  	EYES: PERRL.  	ENT: No nasal discharge, airway clear, mucous membranes moist  	NECK: Supple; non tender.  	CARD: +S1, +S2, no murmurs, gallops, or rubs. Regular rate and rhythm    	RESP: No wheezes, rales or rhonchi. CTA B/L  	ABD: soft ntnd, + BS x 4 quadrants  	EXT: moves all extremities,  no clubbing, cyanosis or edema  	NEURO: Alert and oriented x3, no focal deficits          PSYCH: Cooperative, appropriate          VASCULAR:  + Rad / + PTs / + DPs          EXTREMITY:             Right Groin:  pressure dressing removed, access site soft, no hematoma, no pain, + pulses, no sign of infection, no numbness  	            ECG:  pending                                                                                                                2D ECHO: < from: Transthoracic Echocardiogram (08.31.20 @ 07:00) >  Summary:   1. Left ventricular ejection fraction, by visual estimation, is 55 to 60%.   2. Normal global left ventricular systolic function.   3. Entire anterior septum, basal and mid inferolateral wall, and apex are abnormal as described above.   4. Mild left ventricular hypertrophy.   5. Spectral Doppler shows impaired relaxation pattern of left ventricular myocardial filling (Grade I diastolic dysfunction).   6. Sclerotic aortic valve with normal opening.   7. Mild mitral regurgitation.    LABS:                        15.5   10.05 )-----------( 181      ( 01 Sep 2020 05:01 )             45.4     09-01    136  |  100  |  14  ----------------------------<  113<H>  4.0   |  20  |  1.0    Ca    9.1      01 Sep 2020 05:01  Mg     2.0     09-01    TPro  7.4  /  Alb  4.5  /  TBili  2.1<H>  /  DBili  0.3<H>  /  AST  102<H>  /  ALT  93<H>  /  AlkPhos  92  09-01    A1C with Estimated Average Glucose Result: 6.2: Method: Immunoassay       Reference Range                4.0-5.6%       High risk (prediabetic)        5.7-6.4%       Diabetic, diagnostic             >=6.5%       ADA diabetic treatment goal       <7.0%  The Hemoglobin A1c testing is NGSP-certified.Reference ranges are based  upon the 2010 recommendations of  the American Diabetes Association.  Interpretation may vary for children  and adolescents. % (08.29.20 @ 06:34)    Magnesium, Serum: 2.0 mg/dL [1.8 - 2.4] (09-01-20 @ 05:01)  LIVER FUNCTIONS - ( 01 Sep 2020 05:01 )  Alb: 4.5 g/dL / Pro: 7.4 g/dL / ALK PHOS: 92 U/L / ALT: 93 U/L / AST: 102 U/L / GGT: x             A/P:  I discussed the case with Cardiologist Dr. Bernal and recommend the following:    S/P PCI 8/31:    INTERVENTION:  IMPLANTS: 3xDES stents to D1, prox LAD and mid LAD    POST-OP DIAGNOSIS:  Significant 3V disease    	     Continue DAPT ( Aspirin 81 mg PO Daily and Brilinta 90 mg po twice a day ),  B-Blocker, Statin Therapy                   Patient pharmacy called in for Brilinta prescription and it is $45 per month, patient aware and agreeable, Rx available for pickup                   INCREASE Metoprolol to 25 mg po q12hr                   DECREASE Atorvastatin 80 mg to 40 mg po at bedtime                   OOB Ambulate                    ACE to be added as outpatient                   Monitor access site                   Patient agreeing to take DAPT for at least one year or as directed by cardiologist                    Pt given instructions on importance of taking antiplatelet medication or risk acute stent thrombosis/death                   Post cath instructions, access site care and activity restrictions reviewed with patient                     Discussed with patient to return to hospital if experience chest pain, shortness breath, dizziness and site bleeding                   Aggressive risk factor modification, diet counseling, smoking cessation discussed with patient                       Can discharge patient from cardiac standpoint after ambulating without symptoms                   Follow up with Cardiology Dr. Bernal in two weeks.  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 (no reported diagnosis but patient on Donepezil and Memantine at home) presented to the ED for evaluation of chest pain. The patient states that his chest pain started 2 weeks ago, the pain was initially present at rest, not severe (patient not able to rate the intensity), starts at the upper chest pain and radiates to the lower abdomen, described as sharp pain and burning, with no triggering factors, no exacerbating or relieving factors. The pain lasts a couple of minutes and subsides on its own. The patient states that today he attempted to walk around the mall when he started experiencing the pain after walking about 5 feet, he sat down and placed a pillow on his abdomen which improved the pain. He states that since started 2 weeks ago it hasn't increased in intensity. The pain doesn't radiate to the upper extremities, neck or jaw area. The patient denies any similar episodes in the past, and states that he hasn't had any echocardiogram or stress test recently   In the ED : /80, HR 81, RR 18, Temp 98.1, SpO2 95 % on room air. Troponin T negative x 1. The patient was admitted for further evaluation and for possible stable angina (28 Aug 2020 15:32)    Allergies    No Known Allergies    Intolerances    Patient examined at bedside.   Patient with c/o dyspnea at rest, resolving on its own.   Denies CP, palpitations, or dizziness  3 beat NSVT on tele overnight    Vital Signs Last 24 Hrs  T(C): 36.1 (01 Sep 2020 05:03), Max: 36.2 (31 Aug 2020 11:30)  T(F): 97 (01 Sep 2020 05:03), Max: 97.1 (31 Aug 2020 11:30)  HR: 79 (01 Sep 2020 05:03) (71 - 79)  BP: 168/72 (01 Sep 2020 05:03) (131/71 - 168/72)  RR: 20 (01 Sep 2020 05:03) (18 - 20)      MEDICATIONS  (STANDING):  aspirin  chewable 81 milliGRAM(s) Oral daily  atorvastatin 40 milliGRAM(s) Oral at bedtime  donepezil 10 milliGRAM(s) Oral at bedtime  enoxaparin Injectable 40 milliGRAM(s) SubCutaneous daily  famotidine    Tablet 40 milliGRAM(s) Oral two times a day  memantine 10 milliGRAM(s) Oral daily  metoprolol tartrate 12.5 milliGRAM(s) Oral two times a day  pantoprazole    Tablet 40 milliGRAM(s) Oral before breakfast  sodium chloride 0.9%. 1000 milliLiter(s) (100 mL/Hr) IV Continuous <Continuous>  ticagrelor 90 milliGRAM(s) Oral two times a day    MEDICATIONS  (PRN):  nitroglycerin     SubLingual 0.4 milliGRAM(s) SubLingual every 5 minutes PRN Chest Pain    REVIEW OF SYSTEMS:          All negative except as mentioned in HPI    PHYSICAL EXAM:           CONSTITUTIONAL: Well-developed; well-nourished; in no acute distress  	SKIN: warm, dry  	HEAD: Normocephalic; atraumatic  	EYES: PERRL.  	ENT: No nasal discharge, airway clear, mucous membranes moist  	NECK: Supple; non tender.  	CARD: +S1, +S2, no murmurs, gallops, or rubs. Regular rate and rhythm    	RESP: No wheezes, rales or rhonchi. CTA B/L  	ABD: soft ntnd, + BS x 4 quadrants  	EXT: moves all extremities,  no clubbing, cyanosis or edema  	NEURO: Alert and oriented x3, no focal deficits          PSYCH: Cooperative, appropriate          VASCULAR:  + Rad / + PTs / + DPs          EXTREMITY:             Right Groin:  pressure dressing removed, access site soft, no hematoma, no pain, + pulses, no sign of infection, no numbness  	            EC/1/20  NSR 73 bpm  incomplete RBBB, ST and T wave abnormality  prolonged QT : QT/QTc 490/539                                                                                                                2D ECHO: < from: Transthoracic Echocardiogram (20 @ 07:00) >  Summary:   1. Left ventricular ejection fraction, by visual estimation, is 55 to 60%.   2. Normal global left ventricular systolic function.   3. Entire anterior septum, basal and mid inferolateral wall, and apex are abnormal as described above.   4. Mild left ventricular hypertrophy.   5. Spectral Doppler shows impaired relaxation pattern of left ventricular myocardial filling (Grade I diastolic dysfunction).   6. Sclerotic aortic valve with normal opening.   7. Mild mitral regurgitation.    LABS:                        15.5   10.05 )-----------( 181      ( 01 Sep 2020 05:01 )             45.4         136  |  100  |  14  ----------------------------<  113<H>  4.0   |  20  |  1.0    Ca    9.1      01 Sep 2020 05:01  Mg     2.0         TPro  7.4  /  Alb  4.5  /  TBili  2.1<H>  /  DBili  0.3<H>  /  AST  102<H>  /  ALT  93<H>  /  AlkPhos  92      A1C with Estimated Average Glucose Result: 6.2: Method: Immunoassay       Reference Range                4.0-5.6%       High risk (prediabetic)        5.7-6.4%       Diabetic, diagnostic             >=6.5%       ADA diabetic treatment goal       <7.0%  The Hemoglobin A1c testing is NGSP-certified.Reference ranges are based  upon the 2010 recommendations of  the American Diabetes Association.  Interpretation may vary for children  and adolescents. % (20 @ 06:34)    Magnesium, Serum: 2.0 mg/dL [1.8 - 2.4] (20 @ 05:01)  LIVER FUNCTIONS - ( 01 Sep 2020 05:01 )  Alb: 4.5 g/dL / Pro: 7.4 g/dL / ALK PHOS: 92 U/L / ALT: 93 U/L / AST: 102 U/L / GGT: x             A/P:  I discussed the case with Cardiologist Dr. Bernal and recommend the following:    S/P PCI :    INTERVENTION:  IMPLANTS: 3xDES stents to D1, prox LAD and mid LAD    POST-OP DIAGNOSIS:  Significant 3V disease    	     Continue DAPT ( Aspirin 81 mg PO Daily and Brilinta 90 mg po twice a day ),  B-Blocker, Statin Therapy                   Patient pharmacy called in for Brilinta prescription and it is $45 per month, patient aware and agreeable, Rx available for pickup                   INCREASE Metoprolol to 25 mg po q12hr                   DECREASE Atorvastatin 80 mg to 40 mg po at bedtime                   OOB Ambulate                    ACE to be added as outpatient                   Monitor access site                   Patient agreeing to take DAPT for at least one year or as directed by cardiologist                    Pt given instructions on importance of taking antiplatelet medication or risk acute stent thrombosis/death                   Post cath instructions, access site care and activity restrictions reviewed with patient                     Discussed with patient to return to hospital if experience chest pain, shortness breath, dizziness and site bleeding                   Aggressive risk factor modification, diet counseling, smoking cessation discussed with patient                       Can discharge patient from cardiac standpoint after ambulating without symptoms                   Follow up with Cardiology Dr. Bernal in two weeks.  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 (no reported diagnosis but patient on Donepezil and Memantine at home) presented to the ED for evaluation of chest pain. The patient states that his chest pain started 2 weeks ago, the pain was initially present at rest, not severe (patient not able to rate the intensity), starts at the upper chest pain and radiates to the lower abdomen, described as sharp pain and burning, with no triggering factors, no exacerbating or relieving factors. The pain lasts a couple of minutes and subsides on its own. The patient states that today he attempted to walk around the mall when he started experiencing the pain after walking about 5 feet, he sat down and placed a pillow on his abdomen which improved the pain. He states that since started 2 weeks ago it hasn't increased in intensity. The pain doesn't radiate to the upper extremities, neck or jaw area. The patient denies any similar episodes in the past, and states that he hasn't had any echocardiogram or stress test recently   In the ED : /80, HR 81, RR 18, Temp 98.1, SpO2 95 % on room air. Troponin T negative x 1. The patient was admitted for further evaluation and for possible stable angina (28 Aug 2020 15:32)    Allergies    No Known Allergies    Intolerances    Patient examined at bedside.   Patient with spells of dyspnea, not related to exertion, resolves spontaneously   Unstable on feet when walking   Denies CP, palpitations, or dizziness  3 beat NSVT on tele overnight    Vital Signs Last 24 Hrs  T(C): 36.1 (01 Sep 2020 05:03), Max: 36.2 (31 Aug 2020 11:30)  T(F): 97 (01 Sep 2020 05:03), Max: 97.1 (31 Aug 2020 11:30)  HR: 79 (01 Sep 2020 05:03) (71 - 79)  BP: 168/72 (01 Sep 2020 05:03) (131/71 - 168/72)  RR: 20 (01 Sep 2020 05:03) (18 - 20)      MEDICATIONS  (STANDING):  aspirin  chewable 81 milliGRAM(s) Oral daily  atorvastatin 40 milliGRAM(s) Oral at bedtime  donepezil 10 milliGRAM(s) Oral at bedtime  enoxaparin Injectable 40 milliGRAM(s) SubCutaneous daily  famotidine    Tablet 40 milliGRAM(s) Oral two times a day  memantine 10 milliGRAM(s) Oral daily  metoprolol tartrate 12.5 milliGRAM(s) Oral two times a day  pantoprazole    Tablet 40 milliGRAM(s) Oral before breakfast  sodium chloride 0.9%. 1000 milliLiter(s) (100 mL/Hr) IV Continuous <Continuous>  ticagrelor 90 milliGRAM(s) Oral two times a day    MEDICATIONS  (PRN):  nitroglycerin     SubLingual 0.4 milliGRAM(s) SubLingual every 5 minutes PRN Chest Pain    REVIEW OF SYSTEMS:          All negative except as mentioned in HPI    PHYSICAL EXAM:           CONSTITUTIONAL: Well-developed; well-nourished; in no acute distress  	SKIN: warm, dry  	HEAD: Normocephalic; atraumatic  	EYES: PERRL.  	ENT: No nasal discharge, airway clear, mucous membranes moist  	NECK: Supple; non tender.  	CARD: +S1, +S2, no murmurs, gallops, or rubs. Regular rate and rhythm    	RESP: No wheezes, rales or rhonchi. CTA B/L  	ABD: soft ntnd, + BS x 4 quadrants  	EXT: moves all extremities,  no clubbing, cyanosis or edema  	NEURO: Alert and oriented x2, periods of forgetfulness          PSYCH: Cooperative, appropriate          VASCULAR:  + Rad / + PTs / + DPs          EXTREMITY:             Right Groin:  pressure dressing removed, access site soft, no hematoma, no pain, + pulses, no sign of infection, no numbness  	            EC/1/20  NSR 73 bpm  incomplete RBBB, ST and T wave abnormality  prolonged QT : QT/QTc 490/539                                                                                                                2D ECHO: < from: Transthoracic Echocardiogram (20 @ 07:00) >  Summary:   1. Left ventricular ejection fraction, by visual estimation, is 55 to 60%.   2. Normal global left ventricular systolic function.   3. Entire anterior septum, basal and mid inferolateral wall, and apex are abnormal as described above.   4. Mild left ventricular hypertrophy.   5. Spectral Doppler shows impaired relaxation pattern of left ventricular myocardial filling (Grade I diastolic dysfunction).   6. Sclerotic aortic valve with normal opening.   7. Mild mitral regurgitation.    LABS:                        15.5   10.05 )-----------( 181      ( 01 Sep 2020 05:01 )             45.4         136  |  100  |  14  ----------------------------<  113<H>  4.0   |  20  |  1.0    Ca    9.1      01 Sep 2020 05:01  Mg     2.0         TPro  7.4  /  Alb  4.5  /  TBili  2.1<H>  /  DBili  0.3<H>  /  AST  102<H>  /  ALT  93<H>  /  AlkPhos  92      A1C with Estimated Average Glucose Result: 6.2: Method: Immunoassay       Reference Range                4.0-5.6%       High risk (prediabetic)        5.7-6.4%       Diabetic, diagnostic             >=6.5%       ADA diabetic treatment goal       <7.0%  The Hemoglobin A1c testing is NGSP-certified.Reference ranges are based  upon the 2010 recommendations of  the American Diabetes Association.  Interpretation may vary for children  and adolescents. % (20 @ 06:34)    Magnesium, Serum: 2.0 mg/dL [1.8 - 2.4] (20 @ 05:01)  LIVER FUNCTIONS - ( 01 Sep 2020 05:01 )  Alb: 4.5 g/dL / Pro: 7.4 g/dL / ALK PHOS: 92 U/L / ALT: 93 U/L / AST: 102 U/L / GGT: x             A/P:  I discussed the case with Cardiologist Dr. Bernal and recommend the following:    S/P PCI :    INTERVENTION:  IMPLANTS: 3xDES stents to D1, prox LAD and mid LAD    POST-OP DIAGNOSIS:  Significant 3V disease    	     D/C Brilinta                   Give Plavix 300mg po loading dose x1 now                   START Plavix 75 mg po starting tomorrow                                   INCREASE Metoprolol to 25 mg po q12hr                   DECREASE Atorvastatin 80 mg to 40 mg po at bedtime                   OOB Ambulate with assistance                   ACE to be added as outpatient                   PT assess for ambulation                   Keep K = 4, Mg = 2                   Monitor access site                   Continue DAPT ( Aspirin 81 mg PO Daily and Plavix 75 mg po daily ),  B-Blocker, Statin Therapy                   Patient given 30 day supply Aspirin 81 mg PO Daily and Plavix 75 mg po daily for home use                   Patient agreeing to take DAPT for at least one year or as directed by cardiologist                    Pt given instructions on importance of taking antiplatelet medication or risk acute stent thrombosis/death                   Post cath instructions, access site care and activity restrictions reviewed with patient                     Discussed with patient to return to hospital if experience chest pain, shortness breath, dizziness and site bleeding                   Aggressive risk factor modification, diet counseling, smoking cessation discussed with patient                       Follow up with Cardiology Dr. Bernal regarding patient disposition

## 2020-09-01 NOTE — PROGRESS NOTE ADULT - ATTENDING COMMENTS
See above note.
Patient seen and examined. No complaints. Plan for LHC today, discussed with daughter. No events on tele.   Agree with resident's note above. Exceptions:  #Transaminitis - mild, c/w statin for now, repeat LFTs in AM   #CKD II - stable   #Prediabetes - lifestyle mods     #Progress Note Handoff  Pending (specify): LHC today   Family discussion: dw daughter plan for C today   Disposition: Home
Patient seen and examined. No complaints. Plan for LHC today, discussed with daughter. No events on tele.   Agree with resident's note above. Exceptions:  #Transaminitis - mild, c/w statin for now, repeat LFTs in AM   #CKD II - stable   #Prediabetes - lifestyle mods     #Progress Note Handoff  Pending (specify): LHC today   Family discussion: dw daughter plan for C today   Disposition: Home

## 2020-09-01 NOTE — PROGRESS NOTE ADULT - SUBJECTIVE AND OBJECTIVE BOX
SUBJ:  s/p cp MI r/o, s/p stent of LAD  MEDICATIONS  (STANDING):  aspirin  chewable 81 milliGRAM(s) Oral daily  atorvastatin 80 milliGRAM(s) Oral at bedtime  donepezil 10 milliGRAM(s) Oral at bedtime  enoxaparin Injectable 40 milliGRAM(s) SubCutaneous daily  famotidine    Tablet 40 milliGRAM(s) Oral two times a day  memantine 10 milliGRAM(s) Oral daily  metoprolol tartrate 12.5 milliGRAM(s) Oral two times a day  pantoprazole    Tablet 40 milliGRAM(s) Oral before breakfast  sodium chloride 0.9%. 1000 milliLiter(s) (100 mL/Hr) IV Continuous <Continuous>  ticagrelor 90 milliGRAM(s) Oral two times a day    MEDICATIONS  (PRN):  nitroglycerin     SubLingual 0.4 milliGRAM(s) SubLingual every 5 minutes PRN Chest Pain            Vital Signs Last 24 Hrs  T(C): 36.1 (01 Sep 2020 05:03), Max: 36.2 (31 Aug 2020 11:30)  T(F): 97 (01 Sep 2020 05:03), Max: 97.1 (31 Aug 2020 11:30)  HR: 79 (01 Sep 2020 05:03) (62 - 79)  BP: 168/72 (01 Sep 2020 05:03) (131/71 - 168/72)  BP(mean): --  RR: 20 (01 Sep 2020 05:03) (18 - 20)  SpO2: --          PHYSICAL EXAM:  · CONSTITUTIONAL:	Well-developed, well nourished    BMI-  ·RESPIRATORY:   airway patent; breath sounds equal; good air movement; respirations non-labored; clear to auscultation bilaterally; no chest wall tenderness; no intercostal retractions; no rales,rhonchi or wheeze, ant scar   · CARDIOVASCULAR	regular rate and rhythm  no rub  no murmur    · EXTREMITIES: No cyanosis, clubbing or edema  · VASCULAR: 	Equal and normal pulses (carotid, femoral, dorsalis pedis) groin intact  	  TELEMETRY:sinus nsvt    ECG:    TTE:    LABS:                        15.5   10.05 )-----------( 181      ( 01 Sep 2020 05:01 )             45.4     09-01    136  |  100  |  14  ----------------------------<  113<H>  4.0   |  20  |  1.0    Ca    9.1      01 Sep 2020 05:01  Mg     2.0     09-01    TPro  7.4  /  Alb  4.5  /  TBili  2.1<H>  /  DBili  0.3<H>  /  AST  102<H>  /  ALT  93<H>  /  AlkPhos  92  09-01        PT/INR - ( 31 Aug 2020 06:28 )   PT: 13.60 sec;   INR: 1.18 ratio         PTT - ( 31 Aug 2020 06:28 )  PTT:33.1 sec    I&O's Summary    30 Aug 2020 07:01  -  31 Aug 2020 07:00  --------------------------------------------------------  IN: 570 mL / OUT: 0 mL / NET: 570 mL    31 Aug 2020 07:01  -  01 Sep 2020 06:57  --------------------------------------------------------  IN: 920 mL / OUT: 0 mL / NET: 920 mL      BNP  RADIOLOGY & ADDITIONAL STUDIES:    IMPRESSION AND PLAN:  s/p cp   s/p + stress (-) TN  EF 40-60 based on cath and nuclear  s/p stent in LAD and Diagnol    Rec  D/c Tele   can d/c to home   will add ace as out pt   f/u office 1-2 weeks

## 2020-09-01 NOTE — PROGRESS NOTE ADULT - SUBJECTIVE AND OBJECTIVE BOX
Pt seen and examined at bedside. Patient reported SOB and unsteady gait while ambulating with cardio.     VITAL SIGNS (Last 24 hrs):  T(C): 36.1 (09-01-20 @ 14:14), Max: 36.1 (09-01-20 @ 05:03)  HR: 84 (09-01-20 @ 14:14) (73 - 84)  BP: 144/65 (09-01-20 @ 14:14) (144/65 - 168/72)  RR: 19 (09-01-20 @ 14:14) (19 - 20)  SpO2: 97% (09-01-20 @ 08:10) (97% - 97%)  Wt(kg): --  Daily     Daily     I&O's Summary    31 Aug 2020 07:01  -  01 Sep 2020 07:00  --------------------------------------------------------  IN: 920 mL / OUT: 0 mL / NET: 920 mL    01 Sep 2020 07:01  -  01 Sep 2020 19:00  --------------------------------------------------------  IN: 1380 mL / OUT: 750 mL / NET: 630 mL        PHYSICAL EXAM:  GENERAL: NAD   HEAD:  Atraumatic, Normocephalic  EYES: EOMI, PERRLA, conjunctiva and sclera clear  NECK: Supple, No JVD  CHEST/LUNG: Clear to auscultation bilaterally; No wheeze  HEART: Regular rate and rhythm; No murmurs, rubs, or gallops  ABDOMEN: Soft, Nontender, Nondistended; Bowel sounds present  EXTREMITIES:  2+ Peripheral Pulses, No clubbing, cyanosis, or edema  NEUROLOGY: non-focal  SKIN: No rashes or lesions    Labs Reviewed  Spoke to patient in regards to abnormal labs.    CBC Full  -  ( 01 Sep 2020 05:01 )  WBC Count : 10.05 K/uL  Hemoglobin : 15.5 g/dL  Hematocrit : 45.4 %  Platelet Count - Automated : 181 K/uL  Mean Cell Volume : 87.1 fL  Mean Cell Hemoglobin : 29.8 pg  Mean Cell Hemoglobin Concentration : 34.1 g/dL  Auto Neutrophil # : 8.04 K/uL  Auto Lymphocyte # : 0.76 K/uL  Auto Monocyte # : 1.20 K/uL  Auto Eosinophil # : 0.01 K/uL  Auto Basophil # : 0.02 K/uL  Auto Neutrophil % : 80.0 %  Auto Lymphocyte % : 7.6 %  Auto Monocyte % : 11.9 %  Auto Eosinophil % : 0.1 %  Auto Basophil % : 0.2 %    BMP:    09-01 @ 05:01    Blood Urea Nitrogen - 14  Calcium - 9.1  Carbond Dioxide - 20  Chloride - 100  Creatinine - 1.0  Glucose - 113  Potassium - 4.0  Sodium - 136      Hemoglobin A1c -   PT/INR - ( 31 Aug 2020 06:28 )   PT: 13.60 sec;   INR: 1.18 ratio         PTT - ( 31 Aug 2020 06:28 )  PTT:33.1 sec       MEDICATIONS  (STANDING):  aspirin  chewable 81 milliGRAM(s) Oral daily  atorvastatin 40 milliGRAM(s) Oral at bedtime  donepezil 10 milliGRAM(s) Oral at bedtime  enoxaparin Injectable 40 milliGRAM(s) SubCutaneous daily  famotidine    Tablet 40 milliGRAM(s) Oral two times a day  memantine 10 milliGRAM(s) Oral daily  metoprolol succinate ER 50 milliGRAM(s) Oral daily  pantoprazole    Tablet 40 milliGRAM(s) Oral before breakfast  sodium chloride 0.9%. 1000 milliLiter(s) (100 mL/Hr) IV Continuous <Continuous>    MEDICATIONS  (PRN):  nitroglycerin     SubLingual 0.4 milliGRAM(s) SubLingual every 5 minutes PRN Chest Pain

## 2020-09-02 ENCOUNTER — TRANSCRIPTION ENCOUNTER (OUTPATIENT)
Age: 80
End: 2020-09-02

## 2020-09-02 LAB
ALBUMIN SERPL ELPH-MCNC: 4.1 G/DL — SIGNIFICANT CHANGE UP (ref 3.5–5.2)
ALP SERPL-CCNC: 87 U/L — SIGNIFICANT CHANGE UP (ref 30–115)
ALT FLD-CCNC: 94 U/L — HIGH (ref 0–41)
ANION GAP SERPL CALC-SCNC: 16 MMOL/L — HIGH (ref 7–14)
AST SERPL-CCNC: 103 U/L — HIGH (ref 0–41)
BILIRUB SERPL-MCNC: 2.1 MG/DL — HIGH (ref 0.2–1.2)
BUN SERPL-MCNC: 17 MG/DL — SIGNIFICANT CHANGE UP (ref 10–20)
CALCIUM SERPL-MCNC: 9.3 MG/DL — SIGNIFICANT CHANGE UP (ref 8.5–10.1)
CHLORIDE SERPL-SCNC: 106 MMOL/L — SIGNIFICANT CHANGE UP (ref 98–110)
CO2 SERPL-SCNC: 19 MMOL/L — SIGNIFICANT CHANGE UP (ref 17–32)
CREAT SERPL-MCNC: 1.2 MG/DL — SIGNIFICANT CHANGE UP (ref 0.7–1.5)
GLUCOSE SERPL-MCNC: 108 MG/DL — HIGH (ref 70–99)
POTASSIUM SERPL-MCNC: 3.8 MMOL/L — SIGNIFICANT CHANGE UP (ref 3.5–5)
POTASSIUM SERPL-SCNC: 3.8 MMOL/L — SIGNIFICANT CHANGE UP (ref 3.5–5)
PROT SERPL-MCNC: 7.2 G/DL — SIGNIFICANT CHANGE UP (ref 6–8)
SODIUM SERPL-SCNC: 141 MMOL/L — SIGNIFICANT CHANGE UP (ref 135–146)

## 2020-09-02 PROCEDURE — 93010 ELECTROCARDIOGRAM REPORT: CPT

## 2020-09-02 PROCEDURE — 99233 SBSQ HOSP IP/OBS HIGH 50: CPT

## 2020-09-02 RX ADMIN — FAMOTIDINE 40 MILLIGRAM(S): 10 INJECTION INTRAVENOUS at 17:04

## 2020-09-02 RX ADMIN — Medication 50 MILLIGRAM(S): at 05:24

## 2020-09-02 RX ADMIN — MEMANTINE HYDROCHLORIDE 10 MILLIGRAM(S): 10 TABLET ORAL at 11:01

## 2020-09-02 RX ADMIN — FAMOTIDINE 40 MILLIGRAM(S): 10 INJECTION INTRAVENOUS at 05:24

## 2020-09-02 RX ADMIN — DONEPEZIL HYDROCHLORIDE 10 MILLIGRAM(S): 10 TABLET, FILM COATED ORAL at 21:44

## 2020-09-02 RX ADMIN — ATORVASTATIN CALCIUM 40 MILLIGRAM(S): 80 TABLET, FILM COATED ORAL at 21:44

## 2020-09-02 RX ADMIN — Medication 81 MILLIGRAM(S): at 11:01

## 2020-09-02 RX ADMIN — CLOPIDOGREL BISULFATE 75 MILLIGRAM(S): 75 TABLET, FILM COATED ORAL at 11:01

## 2020-09-02 RX ADMIN — PANTOPRAZOLE SODIUM 40 MILLIGRAM(S): 20 TABLET, DELAYED RELEASE ORAL at 05:24

## 2020-09-02 RX ADMIN — ENOXAPARIN SODIUM 40 MILLIGRAM(S): 100 INJECTION SUBCUTANEOUS at 11:01

## 2020-09-02 NOTE — PROGRESS NOTE ADULT - ASSESSMENT
80  year old male with PMH of CAD s/p CABG and stents (unsure when the last stent was placed), hypertension, dementia (no reported diagnosis but patient on Donepezil and Memantine at home) presented to the ED for evaluation of chest pain.    A/P:   Chest pain due to CAD; history CABG .  EKG on : new T wave inversions lateral leads  Nuclear stress test showed large reversible defect in the inferolateral wall  Cardiac cath showed LAD  Severe diffuse disease. Prox LAD 70%, Mid LAD 90% , Dia% prox D1, Left Circumflex: Total occlusion 100%, Right Coronary Artery: Total occlusion 100%  prox RCA, Graft to RPDA: total occlusion, LIMA to LAD: total occlusion, 3xDES stents to D1, prox LAD and mid LAD  Started on Brilinta then switched to Plavix today due to intolerance   Continue ASA, Lipitor and Metoprolol.      Transaminitis   likely due to high intensity statin, lowered to 40mg as per cardio     History of dementia  Continue Donepezil 10 mg PO qd and Memantine 10 mg PO qd     - DVT prophylaxis : Lovenox 40 mg sq qd   - GI prophylaxis : Protonix 40 mg PO qd     #Progress Note Handoff  Pending (specify):  monitor LFTs, PT eval   Family discussion:   Disposition: STR.

## 2020-09-02 NOTE — CONSULT NOTE ADULT - ASSESSMENT
IMPRESSION: Rehab of    debility, s/p 2 stents for CAD, mild dementia    PRECAUTIONS: [ x ] Cardiac  [  ] Respiratory  [  ] Seizures [  ] Contact Isolation  [  ] Droplet Isolation  [  ] Other    Weight Bearing Status:     RECOMMENDATION:    Out of Bed to Chair     DVT/Decubiti Prophylaxis    REHAB PLAN:     [ x  ] Bedside P/T 3-5 times a week   [   ]   Bedside O/T  2-3 times a week             [   ] No Rehab Therapy Indicated                   [   ]  Speech Therapy   Conditioning/ROM                                    ADL  Bed Mobility                                               Conditioning/ROM  Transfers                                                     Bed Mobility  Sitting /Standing Balance                         Transfers                                        Gait Training                                               Sitting/Standing Balance  Stair Training [   ]Applicable                    Home equipment Eval                                                                        Splinting  [   ] Only      GOALS:   ADL   [ x  ]   Independent                    Transfers  [ x  ] Independent                          Ambulation  [ x  ] Independent     [ x   ] With device                            [  x ]  CG                                                         [   ]  CG                                                                  [   ] CG                            [    ] Min A                                                   [   ] Min A                                                              [   ] Min  A          DISCHARGE PLAN:   [   ]  Good candidate for Intensive Rehabilitation/Hospital based-[facility]A [facility]                                             Will tolerate 3hrs Intensive Rehab Daily                                       [ xx   ]  Short Term Rehab in Skilled Nursing Facility                                       [    ]  Home with Outpatient or VN services                                         [    ]  Possible Candidate for Intensive Hospital based Rehab

## 2020-09-02 NOTE — PROGRESS NOTE ADULT - SUBJECTIVE AND OBJECTIVE BOX
SUBJ:  admitted cp MI R/o; abl stress and subsequent stents of LAD and D   no cp or sob today   had some difficultly ambulating yesterday     MEDICATIONS  (STANDING):  aspirin  chewable 81 milliGRAM(s) Oral daily  atorvastatin 40 milliGRAM(s) Oral at bedtime  clopidogrel Tablet 75 milliGRAM(s) Oral daily  donepezil 10 milliGRAM(s) Oral at bedtime  enoxaparin Injectable 40 milliGRAM(s) SubCutaneous daily  famotidine    Tablet 40 milliGRAM(s) Oral two times a day  memantine 10 milliGRAM(s) Oral daily  metoprolol succinate ER 50 milliGRAM(s) Oral daily  pantoprazole    Tablet 40 milliGRAM(s) Oral before breakfast  sodium chloride 0.9%. 1000 milliLiter(s) (100 mL/Hr) IV Continuous <Continuous>    MEDICATIONS  (PRN):  nitroglycerin     SubLingual 0.4 milliGRAM(s) SubLingual every 5 minutes PRN Chest Pain            Vital Signs Last 24 Hrs  T(C): 36.4 (02 Sep 2020 05:00), Max: 36.4 (02 Sep 2020 05:00)  T(F): 97.5 (02 Sep 2020 05:00), Max: 97.5 (02 Sep 2020 05:00)  HR: 77 (02 Sep 2020 05:00) (77 - 84)  BP: 139/63 (02 Sep 2020 05:00) (139/63 - 181/87)  BP(mean): --  RR: 18 (02 Sep 2020 05:00) (18 - 19)  SpO2: 97% (01 Sep 2020 08:10) (97% - 97%)          PHYSICAL EXAM:  · CONSTITUTIONAL:	Well-developed, well nourished    BMI-  ·RESPIRATORY:   airway patent; breath sounds equal; good air movement; respirations non-labored; clear to auscultation bilaterally; no chest wall tenderness; no intercostal retractions; no rales,rhonchi or wheeze, ant scar   · CARDIOVASCULAR	regular rate and rhythm  no rub  no murmur  normal PMI  · EXTREMITIES: No cyanosis, clubbing or edema  · VASCULAR: 	Equal and normal pulses (carotid, femoral, dorsalis pedis)  	  TELEMETRY: sinus occas 3-4 beats NSVT     ECG:    TTE:    LABS:                        15.5   10.05 )-----------( 181      ( 01 Sep 2020 05:01 )             45.4     09-01    136  |  100  |  14  ----------------------------<  113<H>  4.0   |  20  |  1.0    Ca    9.1      01 Sep 2020 05:01  Mg     2.0     09-01    TPro  7.4  /  Alb  4.5  /  TBili  2.1<H>  /  DBili  0.3<H>  /  AST  102<H>  /  ALT  93<H>  /  AlkPhos  92  09-01            I&O's Summary    01 Sep 2020 07:01  -  02 Sep 2020 07:00  --------------------------------------------------------  IN: 1500 mL / OUT: 1000 mL / NET: 500 mL      BNP  RADIOLOGY & ADDITIONAL STUDIES:    IMPRESSION AND PLAN:    S/P Cabg past  s/p stent of LAD and D  EF >40( nuclear, echo and cath)  NSVT Good EF continue beta blocker   Elevated LFT statin decreased would f/u  sob resolved off Brilinta     Re can d/c tele  OOB   ? Rehab

## 2020-09-02 NOTE — PROGRESS NOTE ADULT - SUBJECTIVE AND OBJECTIVE BOX
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 (no reported diagnosis but patient on Donepezil and Memantine at home) presented to the ED for evaluation of chest pain. The patient states that his chest pain started 2 weeks ago, the pain was initially present at rest, not severe (patient not able to rate the intensity), starts at the upper chest pain and radiates to the lower abdomen, described as sharp pain and burning, with no triggering factors, no exacerbating or relieving factors. The pain lasts a couple of minutes and subsides on its own. The patient states that today he attempted to walk around the mall when he started experiencing the pain after walking about 5 feet, he sat down and placed a pillow on his abdomen which improved the pain. He states that since started 2 weeks ago it hasn't increased in intensity. The pain doesn't radiate to the upper extremities, neck or jaw area. The patient denies any similar episodes in the past, and states that he hasn't had any echocardiogram or stress test recently   In the ED : /80, HR 81, RR 18, Temp 98.1, SpO2 95 % on room air. Troponin T negative x 1. The patient was admitted for further evaluation and for possible stable angina (28 Aug 2020 15:32)    Allergies    No Known Allergies    Intolerances    Patient examined in bed.   Patient without complaints.   Denies CP, SOB, palpitations, or dizziness  4 beat NSVT overnight    Vital Signs Last 24 Hrs  T(C): 36.4 (02 Sep 2020 05:00), Max: 36.4 (02 Sep 2020 05:00)  T(F): 97.5 (02 Sep 2020 05:00), Max: 97.5 (02 Sep 2020 05:00)  HR: 77 (02 Sep 2020 05:00) (77 - 84)  BP: 139/63 (02 Sep 2020 05:00) (139/63 - 181/87)  BP(mean): --  RR: 18 (02 Sep 2020 05:00) (18 - 19)  SpO2: --    MEDICATIONS  (STANDING):  aspirin  chewable 81 milliGRAM(s) Oral daily  atorvastatin 40 milliGRAM(s) Oral at bedtime  clopidogrel Tablet 75 milliGRAM(s) Oral daily  donepezil 10 milliGRAM(s) Oral at bedtime  enoxaparin Injectable 40 milliGRAM(s) SubCutaneous daily  famotidine    Tablet 40 milliGRAM(s) Oral two times a day  memantine 10 milliGRAM(s) Oral daily  metoprolol succinate ER 50 milliGRAM(s) Oral daily  pantoprazole    Tablet 40 milliGRAM(s) Oral before breakfast  sodium chloride 0.9%. 1000 milliLiter(s) (100 mL/Hr) IV Continuous <Continuous>    MEDICATIONS  (PRN):  nitroglycerin     SubLingual 0.4 milliGRAM(s) SubLingual every 5 minutes PRN Chest Pain    REVIEW OF SYSTEMS:          All negative except as mentioned in HPI    PHYSICAL EXAM:           CONSTITUTIONAL: Well-developed; well-nourished; in no acute distress  	SKIN: warm, dry  	HEAD: Normocephalic; atraumatic  	EYES: PERRL.  	ENT: No nasal discharge, airway clear, mucous membranes moist  	NECK: Supple; non tender.  	CARD: +S1, +S2, no murmurs, gallops, or rubs. Regular rate and rhythm    	RESP: No wheezes, rales or rhonchi. CTA B/L  	ABD: soft ntnd, + BS x 4 quadrants  	EXT: moves all extremities,  no clubbing, cyanosis or edema  	NEURO: Alert and oriented x3, no focal deficits          PSYCH: Cooperative, appropriate          VASCULAR:  + Rad / + PTs / + DPs          EXTREMITY:             Right Groin:  access site soft, no hematoma, no pain, + pulses, no sign of infection, no numbness  	          ECG: pending                                                                                                                2D ECHO: < from: Transthoracic Echocardiogram (08.31.20 @ 07:00) >  Summary:   1. Left ventricular ejection fraction, by visual estimation, is 55 to 60%.   2. Normal global left ventricular systolic function.   3. Entire anterior septum, basal and mid inferolateral wall, and apex are abnormal as described above.   4. Mild left ventricular hypertrophy.   5. Spectral Doppler shows impaired relaxation pattern of left ventricular myocardial filling (Grade I diastolic dysfunction).   6. Sclerotic aortic valve with normal opening.   7. Mild mitral regurgitation.    LABS:                        15.5   10.05 )-----------( 181      ( 01 Sep 2020 05:01 )             45.4     09-02    141  |  106  |  17  ----------------------------<  108<H>  3.8   |  19  |  1.2    Ca    9.3      02 Sep 2020 05:46  Mg     2.0     09-01    TPro  7.2  /  Alb  4.1  /  TBili  2.1<H>  /  DBili  x   /  AST  103<H>  /  ALT  94<H>  /  AlkPhos  87  09-02    LIVER FUNCTIONS - ( 02 Sep 2020 05:46 )  Alb: 4.1 g/dL / Pro: 7.2 g/dL / ALK PHOS: 87 U/L / ALT: 94 U/L / AST: 103 U/L / GGT: x             A/P:  I discussed the case with Cardiologist Dr. Bernal and recommend the following:    S/P PCI 8/31:    INTERVENTION:  IMPLANTS: 3xDES stents to D1, prox LAD and mid LAD                     Repeat LFTs                   PT assess for ambulation  	     Continue DAPT ( Aspirin 81 mg PO Daily and Plavix 75 mg po daily),  B-Blocker, Statin Therapy                   Patient given 30 day supply of ( Aspirin 81 mg daily and Plavix 75 mg daily ) to take at home                   OOB ambulate with assistance                   Monitor access site                   Patient agreeing to take DAPT for at least one year or as directed by cardiologist                    Pt given instructions on importance of taking antiplatelet medication or risk acute stent thrombosis/death                   Post cath instructions, access site care and activity restrictions reviewed with patient                     Discussed with patient to return to hospital if experience chest pain, shortness breath, dizziness and site bleeding                   Aggressive risk factor modification, diet counseling, smoking cessation discussed with patient                       Can discharge patient from cardiac standpoint                     Follow up with Cardiology Dr. Bernal in two weeks.  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 (no reported diagnosis but patient on Donepezil and Memantine at home) presented to the ED for evaluation of chest pain. The patient states that his chest pain started 2 weeks ago, the pain was initially present at rest, not severe (patient not able to rate the intensity), starts at the upper chest pain and radiates to the lower abdomen, described as sharp pain and burning, with no triggering factors, no exacerbating or relieving factors. The pain lasts a couple of minutes and subsides on its own. The patient states that today he attempted to walk around the mall when he started experiencing the pain after walking about 5 feet, he sat down and placed a pillow on his abdomen which improved the pain. He states that since started 2 weeks ago it hasn't increased in intensity. The pain doesn't radiate to the upper extremities, neck or jaw area. The patient denies any similar episodes in the past, and states that he hasn't had any echocardiogram or stress test recently   In the ED : /80, HR 81, RR 18, Temp 98.1, SpO2 95 % on room air. Troponin T negative x 1. The patient was admitted for further evaluation and for possible stable angina (28 Aug 2020 15:32)    Allergies    No Known Allergies    Intolerances    Patient examined in bed.   Patient without complaints.   Denies CP, SOB, palpitations, or dizziness  4 beat NSVT overnight    Vital Signs Last 24 Hrs  T(C): 36.4 (02 Sep 2020 05:00), Max: 36.4 (02 Sep 2020 05:00)  T(F): 97.5 (02 Sep 2020 05:00), Max: 97.5 (02 Sep 2020 05:00)  HR: 77 (02 Sep 2020 05:00) (77 - 84)  BP: 139/63 (02 Sep 2020 05:00) (139/63 - 181/87)  BP(mean): --  RR: 18 (02 Sep 2020 05:00) (18 - 19)  SpO2: --    MEDICATIONS  (STANDING):  aspirin  chewable 81 milliGRAM(s) Oral daily  atorvastatin 40 milliGRAM(s) Oral at bedtime  clopidogrel Tablet 75 milliGRAM(s) Oral daily  donepezil 10 milliGRAM(s) Oral at bedtime  enoxaparin Injectable 40 milliGRAM(s) SubCutaneous daily  famotidine    Tablet 40 milliGRAM(s) Oral two times a day  memantine 10 milliGRAM(s) Oral daily  metoprolol succinate ER 50 milliGRAM(s) Oral daily  pantoprazole    Tablet 40 milliGRAM(s) Oral before breakfast  sodium chloride 0.9%. 1000 milliLiter(s) (100 mL/Hr) IV Continuous <Continuous>    MEDICATIONS  (PRN):  nitroglycerin     SubLingual 0.4 milliGRAM(s) SubLingual every 5 minutes PRN Chest Pain    REVIEW OF SYSTEMS:          All negative except as mentioned in HPI    PHYSICAL EXAM:           CONSTITUTIONAL: Well-developed; well-nourished; in no acute distress  	SKIN: warm, dry  	HEAD: Normocephalic; atraumatic  	EYES: PERRL.  	ENT: No nasal discharge, airway clear, mucous membranes moist  	NECK: Supple; non tender.  	CARD: +S1, +S2, no murmurs, gallops, or rubs. Regular rate and rhythm    	RESP: No wheezes, rales or rhonchi. CTA B/L  	ABD: soft ntnd, + BS x 4 quadrants  	EXT: moves all extremities,  no clubbing, cyanosis or edema  	NEURO: Alert and oriented x3, no focal deficits          PSYCH: Cooperative, appropriate          VASCULAR:  + Rad / + PTs / + DPs          EXTREMITY:             Right Groin:  access site soft, no hematoma, no pain, + pulses, no sign of infection, no numbness  	          EC/2/20  NSR 86 bpm  incomplete RBBB  ST & T wave abnormality  QT//526                                                                                                                2D ECHO: < from: Transthoracic Echocardiogram (20 @ 07:00) >  Summary:   1. Left ventricular ejection fraction, by visual estimation, is 55 to 60%.   2. Normal global left ventricular systolic function.   3. Entire anterior septum, basal and mid inferolateral wall, and apex are abnormal as described above.   4. Mild left ventricular hypertrophy.   5. Spectral Doppler shows impaired relaxation pattern of left ventricular myocardial filling (Grade I diastolic dysfunction).   6. Sclerotic aortic valve with normal opening.   7. Mild mitral regurgitation.    LABS:                        15.5   10.05 )-----------( 181      ( 01 Sep 2020 05:01 )             45.4     09-    141  |  106  |  17  ----------------------------<  108<H>  3.8   |  19  |  1.2    Ca    9.3      02 Sep 2020 05:46  Mg     2.0         TPro  7.2  /  Alb  4.1  /  TBili  2.1<H>  /  DBili  x   /  AST  103<H>  /  ALT  94<H>  /  AlkPhos  87  -    LIVER FUNCTIONS - ( 02 Sep 2020 05:46 )  Alb: 4.1 g/dL / Pro: 7.2 g/dL / ALK PHOS: 87 U/L / ALT: 94 U/L / AST: 103 U/L / GGT: x             A/P:  I discussed the case with Cardiologist Dr. Bernal and recommend the following:    S/P PCI :    INTERVENTION:  IMPLANTS: 3xDES stents to D1, prox LAD and mid LAD                     Repeat LFTs                   PT assess for ambulation                   ACE to be added as outpatient  	     Continue DAPT ( Aspirin 81 mg PO Daily and Plavix 75 mg po daily),  B-Blocker, Statin Therapy                   Patient given 30 day supply of ( Aspirin 81 mg daily and Plavix 75 mg daily ) to take at home                   OOB ambulate with assistance                   Monitor access site                   Patient agreeing to take DAPT for at least one year or as directed by cardiologist                    Pt given instructions on importance of taking antiplatelet medication or risk acute stent thrombosis/death                   Post cath instructions, access site care and activity restrictions reviewed with patient                     Discussed with patient to return to hospital if experience chest pain, shortness breath, dizziness and site bleeding                   Aggressive risk factor modification, diet counseling, smoking cessation discussed with patient                       Can discharge patient from cardiac standpoint                     Follow up with Cardiology Dr. Bernal in two weeks.  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 (no reported diagnosis but patient on Donepezil and Memantine at home) presented to the ED for evaluation of chest pain. The patient states that his chest pain started 2 weeks ago, the pain was initially present at rest, not severe (patient not able to rate the intensity), starts at the upper chest pain and radiates to the lower abdomen, described as sharp pain and burning, with no triggering factors, no exacerbating or relieving factors. The pain lasts a couple of minutes and subsides on its own. The patient states that today he attempted to walk around the mall when he started experiencing the pain after walking about 5 feet, he sat down and placed a pillow on his abdomen which improved the pain. He states that since started 2 weeks ago it hasn't increased in intensity. The pain doesn't radiate to the upper extremities, neck or jaw area. The patient denies any similar episodes in the past, and states that he hasn't had any echocardiogram or stress test recently   In the ED : /80, HR 81, RR 18, Temp 98.1, SpO2 95 % on room air. Troponin T negative x 1. The patient was admitted for further evaluation and for possible stable angina (28 Aug 2020 15:32)    Allergies    No Known Allergies    Intolerances    Patient examined in bed.   Patient without complaints.   Denies CP, SOB, palpitations, or dizziness  4 beat NSVT overnight    Vital Signs Last 24 Hrs  T(C): 36.4 (02 Sep 2020 05:00), Max: 36.4 (02 Sep 2020 05:00)  T(F): 97.5 (02 Sep 2020 05:00), Max: 97.5 (02 Sep 2020 05:00)  HR: 77 (02 Sep 2020 05:00) (77 - 84)  BP: 139/63 (02 Sep 2020 05:00) (139/63 - 181/87)  BP(mean): --  RR: 18 (02 Sep 2020 05:00) (18 - 19)  SpO2: --    MEDICATIONS  (STANDING):  aspirin  chewable 81 milliGRAM(s) Oral daily  atorvastatin 40 milliGRAM(s) Oral at bedtime  clopidogrel Tablet 75 milliGRAM(s) Oral daily  donepezil 10 milliGRAM(s) Oral at bedtime  enoxaparin Injectable 40 milliGRAM(s) SubCutaneous daily  famotidine    Tablet 40 milliGRAM(s) Oral two times a day  memantine 10 milliGRAM(s) Oral daily  metoprolol succinate ER 50 milliGRAM(s) Oral daily  pantoprazole    Tablet 40 milliGRAM(s) Oral before breakfast  sodium chloride 0.9%. 1000 milliLiter(s) (100 mL/Hr) IV Continuous <Continuous>    MEDICATIONS  (PRN):  nitroglycerin     SubLingual 0.4 milliGRAM(s) SubLingual every 5 minutes PRN Chest Pain    REVIEW OF SYSTEMS:          All negative except as mentioned in HPI    PHYSICAL EXAM:           CONSTITUTIONAL: Well-developed; well-nourished; in no acute distress  	SKIN: warm, dry  	HEAD: Normocephalic; atraumatic  	EYES: PERRL.  	ENT: No nasal discharge, airway clear, mucous membranes moist  	NECK: Supple; non tender.  	CARD: +S1, +S2, no murmurs, gallops, or rubs. Regular rate and rhythm    	RESP: No wheezes, rales or rhonchi. CTA B/L  	ABD: soft ntnd, + BS x 4 quadrants  	EXT: moves all extremities,  no clubbing, cyanosis or edema  	NEURO: Alert and oriented x3, no focal deficits          PSYCH: Cooperative, appropriate          VASCULAR:  + Rad / + PTs / + DPs          EXTREMITY:             Right Groin:  access site soft, no hematoma, no pain, + pulses, no sign of infection, no numbness  	          EC/2/20  NSR 86 bpm  incomplete RBBB  ST & T wave abnormality  QT//526                                                                                                                2D ECHO: < from: Transthoracic Echocardiogram (20 @ 07:00) >  Summary:   1. Left ventricular ejection fraction, by visual estimation, is 55 to 60%.   2. Normal global left ventricular systolic function.   3. Entire anterior septum, basal and mid inferolateral wall, and apex are abnormal as described above.   4. Mild left ventricular hypertrophy.   5. Spectral Doppler shows impaired relaxation pattern of left ventricular myocardial filling (Grade I diastolic dysfunction).   6. Sclerotic aortic valve with normal opening.   7. Mild mitral regurgitation.    LABS:                        15.5   10.05 )-----------( 181      ( 01 Sep 2020 05:01 )             45.4     09-    141  |  106  |  17  ----------------------------<  108<H>  3.8   |  19  |  1.2    Ca    9.3      02 Sep 2020 05:46  Mg     2.0         TPro  7.2  /  Alb  4.1  /  TBili  2.1<H>  /  DBili  x   /  AST  103<H>  /  ALT  94<H>  /  AlkPhos  87  -    LIVER FUNCTIONS - ( 02 Sep 2020 05:46 )  Alb: 4.1 g/dL / Pro: 7.2 g/dL / ALK PHOS: 87 U/L / ALT: 94 U/L / AST: 103 U/L / GGT: x             A/P:  I discussed the case with Cardiologist Dr. Bernal and recommend the following:    S/P PCI :    INTERVENTION:  IMPLANTS: 3xDES stents to D1, prox LAD and mid LAD                     Trend LFTs                   PT assess for ambulation                   home care concern with medication compliance d/t mental status                   ACE to be added as outpatient  	     Continue DAPT ( Aspirin 81 mg PO Daily and Plavix 75 mg po daily),  B-Blocker, Statin Therapy                   Patient given 30 day supply of ( Aspirin 81 mg daily and Plavix 75 mg daily ) to take at home                   OOB ambulate with assistance                   Monitor access site                   Patient agreeing to take DAPT for at least one year or as directed by cardiologist                    Pt given instructions on importance of taking antiplatelet medication or risk acute stent thrombosis/death                   Post cath instructions, access site care and activity restrictions reviewed with patient                     Discussed with patient to return to hospital if experience chest pain, shortness breath, dizziness and site bleeding                   Aggressive risk factor modification, diet counseling, smoking cessation discussed with patient                       Can discharge patient from cardiac standpoint                     Follow up with Cardiology Dr. Bernal in two weeks.  Instructed to call and make an appointment

## 2020-09-02 NOTE — CONSULT NOTE ADULT - SUBJECTIVE AND OBJECTIVE BOX
HPI:  80  year old male patient with past medical history of CAD s/p CABG and stents (unsure when the last stent was placed), hypertension, dementia? (no reported diagnosis but patient on Donepezil and Memantine at home) presented to the ED for evaluation of chest pain. The patient states that his chest pain started 2 weeks ago, the pain was initially present at rest, not severe (patient not able to rate the intensity), starts at the upper chest pain and radiates to the lower abdomen, described as sharp pain and burning, with no triggering factors, no exacerbating or relieving factors. The pain lasts a couple of minutes and subsides on its own. The patient states that today he attempted to walk around the mall when he started experiencing the pain after walking about 5 feet, he sat down and placed a pillow on his abdomen which improved the pain. He states that since started 2 weeks ago it hasn't increased in intensity. The pain doesn't radiate to the upper extremities, neck or jaw area. The patient denies any similar episodes in the past, and states that he hasn't had any echocardiogram or stress test recently   In the ED : /80, HR 81, RR 18, Temp 98.1, SpO2 95 % on room air. Troponin T negative x 1. 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 he is walking with the aids in an unsteady matter. the patient feels not steady enough to go home. He likely has been in bed. He was admitted 8/28. No chest pain when amb.  TODAY'S SUBJECTIVE & REVIEW OF SYMPTOMS:     Constitutional WNL   Cardio WNL   Resp WNL   GI WNL  Heme WNL  Endo WNL  Skin WNL  MSK WNL  Neuro WNL  Cognitive WNL  Psych WNL      MEDICATIONS  (STANDING):  aspirin  chewable 81 milliGRAM(s) Oral daily  atorvastatin 40 milliGRAM(s) Oral at bedtime  clopidogrel Tablet 75 milliGRAM(s) Oral daily  donepezil 10 milliGRAM(s) Oral at bedtime  enoxaparin Injectable 40 milliGRAM(s) SubCutaneous daily  famotidine    Tablet 40 milliGRAM(s) Oral two times a day  memantine 10 milliGRAM(s) Oral daily  metoprolol succinate ER 50 milliGRAM(s) Oral daily  pantoprazole    Tablet 40 milliGRAM(s) Oral before breakfast  sodium chloride 0.9%. 1000 milliLiter(s) (100 mL/Hr) IV Continuous <Continuous>    MEDICATIONS  (PRN):  nitroglycerin     SubLingual 0.4 milliGRAM(s) SubLingual every 5 minutes PRN Chest Pain      FAMILY HISTORY:  No pertinent family history in first degree relatives      Allergies    No Known Allergies    Intolerances        SOCIAL HISTORY:    [  ] Etoh  [  ] Smoking  [  ] Substance abuse     Home Environment:  [x  ] Home Alone  [  ] Lives with Family  [  ] Home Health Aid    Dwelling:  [  ] Apartment  [x  ] Private House  [  ] Adult Home  [  ] Skilled Nursing Facility      [  ] Short Term  [  ] Long Term  [ x ] Stairs-denies       Elevator [  ]    FUNCTIONAL STATUS PTA: (Check all that apply)  Ambulation: [   ]Independent    [  ] Dependent     [  ] Non-Ambulatory  Assistive Device: [  ] SA Cane  [  ]  Q Cane  [  ] Walker  [  ]  Wheelchair  ADL : [  ] Independent  [  ]  Dependent       Vital Signs Last 24 Hrs  T(C): 36.1 (02 Sep 2020 13:29), Max: 36.4 (02 Sep 2020 05:00)  T(F): 97 (02 Sep 2020 13:29), Max: 97.5 (02 Sep 2020 05:00)  HR: 69 (02 Sep 2020 13:29) (69 - 81)  BP: 124/70 (02 Sep 2020 13:29) (124/70 - 181/87)  BP(mean): --  RR: 18 (02 Sep 2020 13:29) (18 - 18)  SpO2: --      PHYSICAL EXAM: Alert & Oriented X3  GENERAL: NAD, well-groomed, well-developed  HEAD:  Atraumatic, Normocephalic  EYES: EOMI, PERRLA, conjunctiva and sclera clear  NECK: Supple, No JVD, Normal thyroid  CHEST/LUNG: Clear to percussion bilaterally; No rales, rhonchi, wheezing, or rubs  HEART: Regular rate and rhythm; No murmurs, rubs, or gallops  ABDOMEN: Soft, Nontender, Nondistended; Bowel sounds present  EXTREMITIES:  2+ Peripheral Pulses, No clubbing, cyanosis, or edema    NERVOUS SYSTEM:  Cranial Nerves 2-12 intact [  ] Abnormal  [  ]  ROM: WFL all extremities [  ]  Abnormal [  ]  Motor Strength: WFL all extremities  [  ]  Abnormal [x  ]  5-/5 LE's  Sensation: intact to light touch [  ] Abnormal [  ]  Reflexes: Symmetric [  ]  Abnormal [  ]    FUNCTIONAL STATUS:  Bed Mobility: Independent [  ]  Supervision [  ]  Needs Assistance [  ]  N/A [  ]  Transfers: Independent [  ]  Supervision [  ]  Needs Assistance [  ]  N/A [  ]   Ambulation: Independent [  ]  Supervision [  ]  Needs Assistance [  ]  N/A [  ]  ADL: Independent [  ] Requires Assistance [  ] N/A [  ]      LABS:                        15.5   10.05 )-----------( 181      ( 01 Sep 2020 05:01 )             45.4     09-02    141  |  106  |  17  ----------------------------<  108<H>  3.8   |  19  |  1.2    Ca    9.3      02 Sep 2020 05:46  Mg     2.0     09-01    TPro  7.2  /  Alb  4.1  /  TBili  2.1<H>  /  DBili  x   /  AST  103<H>  /  ALT  94<H>  /  AlkPhos  87  09-02          RADIOLOGY & ADDITIONAL STUDIES:    Assesment:

## 2020-09-02 NOTE — DISCHARGE NOTE NURSING/CASE MANAGEMENT/SOCIAL WORK - PATIENT PORTAL LINK FT
You can access the FollowMyHealth Patient Portal offered by [facility] by registering at the following website: [email or web address]. By joining [employer]’s FollowMyHealth portal, you will also be able to view your health information using other applications (apps) compatible with our system.

## 2020-09-02 NOTE — MEDICAL STUDENT PROGRESS NOTE(EDUCATION) - SUBJECTIVE AND OBJECTIVE BOX
HPI:   80  year old male patient with past medical history of CAD s/p CABG and stents 33 years ago, hypertension, dementia presented to the ED for evaluation of chest pain. The patient states that on 8/28/20 he [place] when he started experiencing the pain after walking about 5 feet, he sat down and placed a pillow on his abdomen which improved the pain. The patient stated that his chest pain started 2 weeks ago, was initially present at rest, not severe, started at the upper chest pain and radiated to the lower abdomen, described as sharp pain and burning, with no triggering factors, no exacerbating or relieving factors. The pain lasted a couple of minutes and subsided on its own.  The pain did not radiate to jaw, neck, or upper extremities. Pt had (-) Troponin T on admission to ED, with no ischemic changes on ECG. Pt was given sublingual nitroglycerin for symptomatic pain relief.     Pt had 4x (-) Troponin T with the final value obtained on (8/29/20); Repeat ECG (8/29/20) showed new T-Wave Inversions. As per cardiology recommendations, pt underwent cardiac adenosine stress test (8/30/2020) which showed large area of ischemia in inferior-lateral wall. Patient underwent left heart cardiac catheterization (8/31/20) via R Femoral Artery showed LVEF 40%, and significant occlusion of prox D1, Left Circumflex, and RCA. 3 Drug-eluting stents were placed in D1, proximal LAD, and mid LAD. Pt was placed on DAPT, B-Blocker and Statin.     Pt was cleared by cardiology for discharge (9/1/2020), however patient was unable to walk due to shortness of breath likely 2/2 to Brilinta; Brilinta was discontinued and pt was put on Plavix.     Interval History:     Daughter was contacted to establish patient's baseline ambulatory status (Emilee Painting, daughter, [phone]). She stated that the patient lives alone following his wife's death, and completes activities of daily living alone. She checks on Mr. Escamilla weekly. She reports that the pt has a cane and walker available for ambulation, but he barely uses it. She expects that he will return home and continue to live alone upon discharge.     Pt had no acute events overnight.     Subjective:   Pt reports that he feels well and has no pain. He has no complaints.   ROS: Pt denies any other symptoms     Objective:   Vital Signs Last 24 Hrs  T(C): 36.4 (02 Sep 2020 05:00), Max: 36.4 (02 Sep 2020 05:00)  T(F): 97.5 (02 Sep 2020 05:00), Max: 97.5 (02 Sep 2020 05:00)  HR: 77 (02 Sep 2020 05:00) (77 - 84)  BP: 139/63 (02 Sep 2020 05:00) (139/63 - 181/87)  BP(mean): --  RR: 18 (02 Sep 2020 05:00) (18 - 19)  SpO2: --                       15.5   10.05 )-----------( 181      ( 01 Sep 2020 05:01 )             45.4   09-02    141  |  106  |  17  ----------------------------<  108<H>  3.8   |  19  |  1.2    Ca    9.3      02 Sep 2020 05:46  Mg     2.0     09-01    TPro  7.2  /  Alb  4.1  /  TBili  2.1<H>  /  DBili  x   /  AST  103<H>  /  ALT  94<H>  /  AlkPhos  87  09-02      Physical Exam:   General: AOx3   Cardiac: S1 S2 appreciated with no rubs, gallops, murmurs  Respiratory: clear breath sounds in all lung fields   GI: No tenderness to deep or superficial palpation; normoactive bowel sounds present   Vascular: 3/4 DP/PT pulses present bilaterally; extremities are warm and well perfused  Extremities: no edema noted in bilateral extremities

## 2020-09-02 NOTE — MEDICAL STUDENT PROGRESS NOTE(EDUCATION) - NS MD HP STUD ASPLAN ASSES FT
Pt is a 80y M with PMHx of CAD s/p CABG (33 y ago), HTN, dementia, presenting for evaluation of chest pain. He underwent cardiac stress test, followed by  catheterization + 3 stents (8/31/20); He is cleared by cardiology for discharge.

## 2020-09-02 NOTE — PROGRESS NOTE ADULT - SUBJECTIVE AND OBJECTIVE BOX
NOEMY GRUBBS  80y  Male      Patient is a 80y old  Male who presents with a chief complaint of Chest pain (02 Sep 2020 14:35)      INTERVAL HPI/OVERNIGHT EVENTS:  He feels ok, no chest pain.   Vital Signs Last 24 Hrs  T(C): 36.1 (02 Sep 2020 13:29), Max: 36.4 (02 Sep 2020 05:00)  T(F): 97 (02 Sep 2020 13:29), Max: 97.5 (02 Sep 2020 05:00)  HR: 69 (02 Sep 2020 13:29) (69 - 81)  BP: 124/70 (02 Sep 2020 13:29) (124/70 - 181/87)  BP(mean): --  RR: 18 (02 Sep 2020 13:29) (18 - 18)  SpO2: --      09-01-20 @ 07:01  -  09-02-20 @ 07:00  --------------------------------------------------------  IN: 1500 mL / OUT: 1000 mL / NET: 500 mL    09-02-20 @ 07:01  -  09-02-20 @ 17:14  --------------------------------------------------------  IN: 880 mL / OUT: 480 mL / NET: 400 mL            Consultant(s) Notes Reviewed:  [x ] YES  [ ] NO          MEDICATIONS  (STANDING):  aspirin  chewable 81 milliGRAM(s) Oral daily  atorvastatin 40 milliGRAM(s) Oral at bedtime  clopidogrel Tablet 75 milliGRAM(s) Oral daily  donepezil 10 milliGRAM(s) Oral at bedtime  enoxaparin Injectable 40 milliGRAM(s) SubCutaneous daily  famotidine    Tablet 40 milliGRAM(s) Oral two times a day  memantine 10 milliGRAM(s) Oral daily  metoprolol succinate ER 50 milliGRAM(s) Oral daily  pantoprazole    Tablet 40 milliGRAM(s) Oral before breakfast  sodium chloride 0.9%. 1000 milliLiter(s) (100 mL/Hr) IV Continuous <Continuous>    MEDICATIONS  (PRN):  nitroglycerin     SubLingual 0.4 milliGRAM(s) SubLingual every 5 minutes PRN Chest Pain      LABS                          15.5   10.05 )-----------( 181      ( 01 Sep 2020 05:01 )             45.4     09-02    141  |  106  |  17  ----------------------------<  108<H>  3.8   |  19  |  1.2    Ca    9.3      02 Sep 2020 05:46  Mg     2.0     09-01    TPro  7.2  /  Alb  4.1  /  TBili  2.1<H>  /  DBili  x   /  AST  103<H>  /  ALT  94<H>  /  AlkPhos  87  09-02          Lactate Trend        CAPILLARY BLOOD GLUCOSE            RADIOLOGY & ADDITIONAL TESTS:    Imaging Personally Reviewed:  [ ] YES  [ ] NO    HEALTH ISSUES - PROBLEM Dx:          PHYSICAL EXAM:  GENERAL: NAD, well-developed.  HEAD:  Atraumatic, Normocephalic.  EYES: EOMI, PERRLA, conjunctiva and sclera clear.  NECK: Supple, No JVD.  CHEST/LUNG: Clear to auscultation bilaterally; No wheeze.  HEART: Regular rate and rhythm; S1 S2.   ABDOMEN: Soft, Nontender, Nondistended; Bowel sounds present.  EXTREMITIES:  2+ Peripheral Pulses, No clubbing, cyanosis, or edema.  PSYCH: AAOx2.  NEUROLOGY: non-focal.  SKIN: No rashes or lesions.

## 2020-09-02 NOTE — MEDICAL STUDENT PROGRESS NOTE(EDUCATION) - NS MD HP STUD ASPLAN PLAN FT
# Chest pain; known CAD s/p CABG 33 years ago   - EKG on 8/29: new T wave inversions lateral leads  - Nuclear stress test showed large reversible defect in the inferolateral wall  - s/p LHC - 3xDES stents to D1, prox LAD and mid LAD  - c/w aspirin, statin 40 mg qhs, BB  - started on Brilinta then switched to Plavix today due to intolerance; will follow ambulation status today and follow up with PT; Pt can be discharged if he can safely ambulate.        # Transaminitis   - likely due to high intensity statin, lowered to 40mg as per cardio     # History of dementia  - c/w Donepezil 10 mg PO qd and Memantine 10 mg PO qd

## 2020-09-03 VITALS
TEMPERATURE: 98 F | DIASTOLIC BLOOD PRESSURE: 59 MMHG | HEART RATE: 70 BPM | SYSTOLIC BLOOD PRESSURE: 105 MMHG | RESPIRATION RATE: 18 BRPM

## 2020-09-03 LAB
ANION GAP SERPL CALC-SCNC: 12 MMOL/L — SIGNIFICANT CHANGE UP (ref 7–14)
BASOPHILS # BLD AUTO: 0.03 K/UL — SIGNIFICANT CHANGE UP (ref 0–0.2)
BASOPHILS NFR BLD AUTO: 0.3 % — SIGNIFICANT CHANGE UP (ref 0–1)
BUN SERPL-MCNC: 18 MG/DL — SIGNIFICANT CHANGE UP (ref 10–20)
CALCIUM SERPL-MCNC: 9.1 MG/DL — SIGNIFICANT CHANGE UP (ref 8.5–10.1)
CHLORIDE SERPL-SCNC: 105 MMOL/L — SIGNIFICANT CHANGE UP (ref 98–110)
CO2 SERPL-SCNC: 23 MMOL/L — SIGNIFICANT CHANGE UP (ref 17–32)
CREAT SERPL-MCNC: 1.2 MG/DL — SIGNIFICANT CHANGE UP (ref 0.7–1.5)
EOSINOPHIL # BLD AUTO: 0.09 K/UL — SIGNIFICANT CHANGE UP (ref 0–0.7)
EOSINOPHIL NFR BLD AUTO: 1 % — SIGNIFICANT CHANGE UP (ref 0–8)
GLUCOSE SERPL-MCNC: 106 MG/DL — HIGH (ref 70–99)
HCT VFR BLD CALC: 42.8 % — SIGNIFICANT CHANGE UP (ref 42–52)
HGB BLD-MCNC: 14.6 G/DL — SIGNIFICANT CHANGE UP (ref 14–18)
IMM GRANULOCYTES NFR BLD AUTO: 0.2 % — SIGNIFICANT CHANGE UP (ref 0.1–0.3)
LYMPHOCYTES # BLD AUTO: 1.16 K/UL — LOW (ref 1.2–3.4)
LYMPHOCYTES # BLD AUTO: 12.9 % — LOW (ref 20.5–51.1)
MAGNESIUM SERPL-MCNC: 2 MG/DL — SIGNIFICANT CHANGE UP (ref 1.8–2.4)
MCHC RBC-ENTMCNC: 30 PG — SIGNIFICANT CHANGE UP (ref 27–31)
MCHC RBC-ENTMCNC: 34.1 G/DL — SIGNIFICANT CHANGE UP (ref 32–37)
MCV RBC AUTO: 88.1 FL — SIGNIFICANT CHANGE UP (ref 80–94)
MONOCYTES # BLD AUTO: 0.99 K/UL — HIGH (ref 0.1–0.6)
MONOCYTES NFR BLD AUTO: 11 % — HIGH (ref 1.7–9.3)
NEUTROPHILS # BLD AUTO: 6.73 K/UL — HIGH (ref 1.4–6.5)
NEUTROPHILS NFR BLD AUTO: 74.6 % — SIGNIFICANT CHANGE UP (ref 42.2–75.2)
NRBC # BLD: 0 /100 WBCS — SIGNIFICANT CHANGE UP (ref 0–0)
PHOSPHATE SERPL-MCNC: 3.4 MG/DL — SIGNIFICANT CHANGE UP (ref 2.1–4.9)
PLATELET # BLD AUTO: 154 K/UL — SIGNIFICANT CHANGE UP (ref 130–400)
POTASSIUM SERPL-MCNC: 3.6 MMOL/L — SIGNIFICANT CHANGE UP (ref 3.5–5)
POTASSIUM SERPL-SCNC: 3.6 MMOL/L — SIGNIFICANT CHANGE UP (ref 3.5–5)
RBC # BLD: 4.86 M/UL — SIGNIFICANT CHANGE UP (ref 4.7–6.1)
RBC # FLD: 13.3 % — SIGNIFICANT CHANGE UP (ref 11.5–14.5)
SARS-COV-2 RNA SPEC QL NAA+PROBE: SIGNIFICANT CHANGE UP
SODIUM SERPL-SCNC: 140 MMOL/L — SIGNIFICANT CHANGE UP (ref 135–146)
WBC # BLD: 9.02 K/UL — SIGNIFICANT CHANGE UP (ref 4.8–10.8)
WBC # FLD AUTO: 9.02 K/UL — SIGNIFICANT CHANGE UP (ref 4.8–10.8)

## 2020-09-03 PROCEDURE — 99239 HOSP IP/OBS DSCHRG MGMT >30: CPT

## 2020-09-03 PROCEDURE — 99232 SBSQ HOSP IP/OBS MODERATE 35: CPT

## 2020-09-03 RX ADMIN — Medication 50 MILLIGRAM(S): at 05:32

## 2020-09-03 RX ADMIN — ENOXAPARIN SODIUM 40 MILLIGRAM(S): 100 INJECTION SUBCUTANEOUS at 12:09

## 2020-09-03 RX ADMIN — CLOPIDOGREL BISULFATE 75 MILLIGRAM(S): 75 TABLET, FILM COATED ORAL at 12:08

## 2020-09-03 RX ADMIN — MEMANTINE HYDROCHLORIDE 10 MILLIGRAM(S): 10 TABLET ORAL at 12:08

## 2020-09-03 RX ADMIN — Medication 81 MILLIGRAM(S): at 12:08

## 2020-09-03 RX ADMIN — FAMOTIDINE 40 MILLIGRAM(S): 10 INJECTION INTRAVENOUS at 05:31

## 2020-09-03 RX ADMIN — PANTOPRAZOLE SODIUM 40 MILLIGRAM(S): 20 TABLET, DELAYED RELEASE ORAL at 05:57

## 2020-09-03 NOTE — PROGRESS NOTE ADULT - SUBJECTIVE AND OBJECTIVE BOX
NOEMY GRUBBS  80y  Male      Patient is a 80y old  Male who presents with a chief complaint of Chest pain (03 Sep 2020 14:33)      INTERVAL HPI/OVERNIGHT EVENTS:  He feels ok, no chest pain, no overnight events.   Vital Signs Last 24 Hrs  T(C): 36.5 (03 Sep 2020 13:03), Max: 36.5 (03 Sep 2020 13:03)  T(F): 97.7 (03 Sep 2020 13:03), Max: 97.7 (03 Sep 2020 13:03)  HR: 70 (03 Sep 2020 13:03) (62 - 72)  BP: 105/59 (03 Sep 2020 13:03) (105/59 - 128/70)  BP(mean): --  RR: 18 (03 Sep 2020 13:03) (18 - 18)  SpO2: 95% (03 Sep 2020 08:10) (95% - 95%)      09-02-20 @ 07:01  -  09-03-20 @ 07:00  --------------------------------------------------------  IN: 1120 mL / OUT: 780 mL / NET: 340 mL    09-03-20 @ 07:01  -  09-03-20 @ 16:33  --------------------------------------------------------  IN: 300 mL / OUT: 200 mL / NET: 100 mL            Consultant(s) Notes Reviewed:  [x ] YES  [ ] NO          MEDICATIONS  (STANDING):  aspirin  chewable 81 milliGRAM(s) Oral daily  atorvastatin 40 milliGRAM(s) Oral at bedtime  clopidogrel Tablet 75 milliGRAM(s) Oral daily  donepezil 10 milliGRAM(s) Oral at bedtime  enoxaparin Injectable 40 milliGRAM(s) SubCutaneous daily  famotidine    Tablet 40 milliGRAM(s) Oral two times a day  memantine 10 milliGRAM(s) Oral daily  metoprolol succinate ER 50 milliGRAM(s) Oral daily  pantoprazole    Tablet 40 milliGRAM(s) Oral before breakfast  sodium chloride 0.9%. 1000 milliLiter(s) (100 mL/Hr) IV Continuous <Continuous>    MEDICATIONS  (PRN):  nitroglycerin     SubLingual 0.4 milliGRAM(s) SubLingual every 5 minutes PRN Chest Pain      LABS                          14.6   9.02  )-----------( 154      ( 03 Sep 2020 06:33 )             42.8     09-03    140  |  105  |  18  ----------------------------<  106<H>  3.6   |  23  |  1.2    Ca    9.1      03 Sep 2020 06:33  Phos  3.4     09-03  Mg     2.0     09-03    TPro  7.2  /  Alb  4.1  /  TBili  2.1<H>  /  DBili  x   /  AST  103<H>  /  ALT  94<H>  /  AlkPhos  87  09-02          Lactate Trend        CAPILLARY BLOOD GLUCOSE            RADIOLOGY & ADDITIONAL TESTS:    Imaging Personally Reviewed:  [ ] YES  [ ] NO    HEALTH ISSUES - PROBLEM Dx:          PHYSICAL EXAM:  GENERAL: NAD, well-developed.  HEAD:  Atraumatic, Normocephalic.  EYES: EOMI, PERRLA, conjunctiva and sclera clear.  NECK: Supple, No JVD.  CHEST/LUNG: Clear to auscultation bilaterally; No wheeze.  HEART: Regular rate and rhythm; S1 S2.   ABDOMEN: Soft, Nontender, Nondistended; Bowel sounds present.  EXTREMITIES:  2+ Peripheral Pulses, No clubbing, cyanosis, or edema.  PSYCH: AAOx2.  NEUROLOGY: non-focal.  SKIN: No rashes or lesions.

## 2020-09-03 NOTE — PROGRESS NOTE ADULT - REASON FOR ADMISSION
Chest pain

## 2020-09-03 NOTE — PROGRESS NOTE ADULT - NUTRITIONAL ASSESSMENT
80  year old male with PMH of CAD s/p CABG and stents (unsure when the last stent was placed), hypertension, dementia (no reported diagnosis but patient on Donepezil and Memantine at home) presented to the ED for evaluation of chest pain.    A/P:   Chest pain due to CAD; history CABG .  EKG on : new T wave inversions lateral leads  Nuclear stress test showed large reversible defect in the inferolateral wall  Cardiac cath showed LAD  Severe diffuse disease. Prox LAD 70%, Mid LAD 90% , Dia% prox D1, Left Circumflex: Total occlusion 100%, Right Coronary Artery: Total occlusion 100%  prox RCA, Graft to RPDA: total occlusion, LIMA to LAD: total occlusion, 3xDES stents to D1, prox LAD and mid LAD  Started on Brilinta then switched to Plavix today due to intolerance   Continue ASA, Lipitor and Metoprolol.      Transaminitis   likely due to high intensity statin, lowered to 40mg as per cardio     History of dementia  Continue Donepezil 10 mg PO qd and Memantine 10 mg PO qd       #Progress Note Handoff  Pending (specify):    Family discussion:   Disposition: STR today.

## 2020-09-03 NOTE — PROGRESS NOTE ADULT - SUBJECTIVE AND OBJECTIVE BOX
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 (no reported diagnosis but patient on Donepezil and Memantine at home) presented to the ED for evaluation of chest pain. The patient states that his chest pain started 2 weeks ago, the pain was initially present at rest, not severe (patient not able to rate the intensity), starts at the upper chest pain and radiates to the lower abdomen, described as sharp pain and burning, with no triggering factors, no exacerbating or relieving factors. The pain lasts a couple of minutes and subsides on its own. The patient states that today he attempted to walk around the mall when he started experiencing the pain after walking about 5 feet, he sat down and placed a pillow on his abdomen which improved the pain. He states that since started 2 weeks ago it hasn't increased in intensity. The pain doesn't radiate to the upper extremities, neck or jaw area. The patient denies any similar episodes in the past, and states that he hasn't had any echocardiogram or stress test recently   In the ED : /80, HR 81, RR 18, Temp 98.1, SpO2 95 % on room air. Troponin T negative x 1. The patient was admitted for further evaluation and for possible stable angina (28 Aug 2020 15:32)    Allergies    No Known Allergies    Intolerances      Patient without complaints.   Denies CP, SOB, palpitations, or dizziness  No events on telemetry overnight    Vital Signs Last 24 Hrs  T(C): 36.5 (03 Sep 2020 13:03), Max: 36.5 (03 Sep 2020 13:03)  T(F): 97.7 (03 Sep 2020 13:03), Max: 97.7 (03 Sep 2020 13:03)  HR: 70 (03 Sep 2020 13:03) (62 - 72)  BP: 105/59 (03 Sep 2020 13:03) (105/59 - 128/70)  BP(mean): --  RR: 18 (03 Sep 2020 13:03) (18 - 18)  SpO2: 95% (03 Sep 2020 08:10) (95% - 95%)    MEDICATIONS  (STANDING):  aspirin  chewable 81 milliGRAM(s) Oral daily  atorvastatin 40 milliGRAM(s) Oral at bedtime  clopidogrel Tablet 75 milliGRAM(s) Oral daily  donepezil 10 milliGRAM(s) Oral at bedtime  enoxaparin Injectable 40 milliGRAM(s) SubCutaneous daily  famotidine    Tablet 40 milliGRAM(s) Oral two times a day  memantine 10 milliGRAM(s) Oral daily  metoprolol succinate ER 50 milliGRAM(s) Oral daily  pantoprazole    Tablet 40 milliGRAM(s) Oral before breakfast  sodium chloride 0.9%. 1000 milliLiter(s) (100 mL/Hr) IV Continuous <Continuous>    MEDICATIONS  (PRN):  nitroglycerin     SubLingual 0.4 milliGRAM(s) SubLingual every 5 minutes PRN Chest Pain      REVIEW OF SYSTEMS:          All negative except as mentioned in HPI    PHYSICAL EXAM:           CONSTITUTIONAL: Well-developed; well-nourished; in no acute distress  	SKIN: warm, dry  	HEAD: Normocephalic; atraumatic  	EYES: PERRL.  	ENT: No nasal discharge, airway clear, mucous membranes moist  	NECK: Supple; non tender.  	CARD: +S1, +S2, no murmurs, gallops, or rubs. Regular rate and rhythm    	RESP: No wheezes, rales or rhonchi. CTA B/L  	ABD: soft ntnd, + BS x 4 quadrants  	EXT: moves all extremities,  no clubbing, cyanosis or edema  	NEURO:  no focal deficits          PSYCH: Cooperative, appropriate          VASCULAR:  +2 Rad / +2 PTs / + 2 DPs          EXTREMITY:             Right Groin:  Dressing  removed, access site soft, no hematoma, no pain, + pulses, no sign of infection, no numbness  	       EC/2/20  NSR 86 bpm  incomplete RBBB  ST & T wave abnormality  QT//526                                                                                                                    2D ECHO: < from: Transthoracic Echocardiogram (20 @ 07:00) >  Summary:   1. Left ventricular ejection fraction, by visual estimation, is 55 to 60%.   2. Normal global left ventricular systolic function.   3. Entire anterior septum, basal and mid inferolateral wall, and apex are abnormal as described above.   4. Mild left ventricular hypertrophy.   5. Spectral Doppler shows impaired relaxation pattern of left ventricular myocardial filling (Grade I diastolic dysfunction).   6. Sclerotic aortic valve with normal opening.   7. Mild mitral regurgitation.          LABS:                        14.6   9.02  )-----------( 154      ( 03 Sep 2020 06:33 )             42.8         140  |  105  |  18  ----------------------------<  106<H>  3.6   |  23  |  1.2    Ca    9.1      03 Sep 2020 06:33  Phos  3.4       Mg     2.0         TPro  7.2  /  Alb  4.1  /  TBili  2.1<H>  /  DBili  x   /  AST  103<H>  /  ALT  94<H>  /  AlkPhos  87          Magnesium, Serum: 2.0 mg/dL [1.8 - 2.4] (20 @ 06:33)  LIVER FUNCTIONS - ( 02 Sep 2020 05:46 )  Alb: 4.1 g/dL / Pro: 7.2 g/dL / ALK PHOS: 87 U/L / ALT: 94 U/L / AST: 103 U/L / GGT: x                     A/P:  I discussed the case with Cardiologist Dr. Bernal and recommend the following:    S/P PCI :    INTERVENTION:  IMPLANTS: 3xDES stents to D1, prox LAD and mid LAD                       ACE to be added as outpatient  	     Continue DAPT ( Aspirin 81 mg PO Daily and Plavix 75 mg po daily),  B-Blocker, Statin Therapy                   Patient given 30 day supply of ( Aspirin 81 mg daily and Plavix 75 mg daily ) to take at home                   OOB ambulate with assistance                   Monitor access site                   Patient agreeing to take DAPT for at least one year or as directed by cardiologist                    Pt given instructions on importance of taking antiplatelet medication or risk acute stent thrombosis/death                   Post cath instructions, access site care and activity restrictions reviewed with patient                     Discussed with patient to return to hospital if experience chest pain, shortness breath, dizziness and site bleeding                   Aggressive risk factor modification, diet counseling, smoking cessation discussed with patient                       Can discharge patient from cardiac standpoint                     Follow up with Cardiology Dr. Bernal in two weeks.  Instructed to call and make an appointment

## 2020-09-03 NOTE — PROGRESS NOTE ADULT - PROVIDER SPECIALTY LIST ADULT
Cardiology
Hospitalist
Internal Medicine
Hospitalist
Cardiology

## 2020-09-14 ENCOUNTER — APPOINTMENT (OUTPATIENT)
Dept: HEMATOLOGY ONCOLOGY | Facility: CLINIC | Age: 80
End: 2020-09-14

## 2020-09-14 DIAGNOSIS — C82.90 FOLLICULAR LYMPHOMA, UNSPECIFIED, UNSPECIFIED SITE: ICD-10-CM

## 2020-09-14 DIAGNOSIS — I25.110 ATHEROSCLEROTIC HEART DISEASE OF NATIVE CORONARY ARTERY WITH UNSTABLE ANGINA PECTORIS: ICD-10-CM

## 2020-09-14 DIAGNOSIS — I12.9 HYPERTENSIVE CHRONIC KIDNEY DISEASE WITH STAGE 1 THROUGH STAGE 4 CHRONIC KIDNEY DISEASE, OR UNSPECIFIED CHRONIC KIDNEY DISEASE: ICD-10-CM

## 2020-09-14 DIAGNOSIS — Z95.1 PRESENCE OF AORTOCORONARY BYPASS GRAFT: ICD-10-CM

## 2020-09-14 DIAGNOSIS — Z79.82 LONG TERM (CURRENT) USE OF ASPIRIN: ICD-10-CM

## 2020-09-14 DIAGNOSIS — F03.90 UNSPECIFIED DEMENTIA WITHOUT BEHAVIORAL DISTURBANCE: ICD-10-CM

## 2020-09-14 DIAGNOSIS — K21.9 GASTRO-ESOPHAGEAL REFLUX DISEASE WITHOUT ESOPHAGITIS: ICD-10-CM

## 2020-09-14 DIAGNOSIS — I25.82 CHRONIC TOTAL OCCLUSION OF CORONARY ARTERY: ICD-10-CM

## 2020-09-14 DIAGNOSIS — I45.10 UNSPECIFIED RIGHT BUNDLE-BRANCH BLOCK: ICD-10-CM

## 2020-09-14 DIAGNOSIS — F32.9 MAJOR DEPRESSIVE DISORDER, SINGLE EPISODE, UNSPECIFIED: ICD-10-CM

## 2020-09-14 DIAGNOSIS — N18.2 CHRONIC KIDNEY DISEASE, STAGE 2 (MILD): ICD-10-CM

## 2020-09-14 DIAGNOSIS — I25.710 ATHEROSCLEROSIS OF AUTOLOGOUS VEIN CORONARY ARTERY BYPASS GRAFT(S) WITH UNSTABLE ANGINA PECTORIS: ICD-10-CM

## 2020-09-14 DIAGNOSIS — R73.03 PREDIABETES: ICD-10-CM

## 2020-10-26 ENCOUNTER — INPATIENT (INPATIENT)
Facility: HOSPITAL | Age: 80
LOS: 3 days | Discharge: NURSING, REHAB OR LONG-TERM CARE | End: 2020-10-30
Attending: HOSPITALIST | Admitting: HOSPITALIST
Payer: MEDICARE

## 2020-10-26 VITALS
TEMPERATURE: 96 F | WEIGHT: 171.96 LBS | RESPIRATION RATE: 18 BRPM | HEART RATE: 77 BPM | SYSTOLIC BLOOD PRESSURE: 137 MMHG | DIASTOLIC BLOOD PRESSURE: 87 MMHG | OXYGEN SATURATION: 97 % | HEIGHT: 65 IN

## 2020-10-26 DIAGNOSIS — F03.90 UNSPECIFIED DEMENTIA, UNSPECIFIED SEVERITY, WITHOUT BEHAVIORAL DISTURBANCE, PSYCHOTIC DISTURBANCE, MOOD DISTURBANCE, AND ANXIETY: ICD-10-CM

## 2020-10-26 DIAGNOSIS — K04.7 PERIAPICAL ABSCESS WITHOUT SINUS: ICD-10-CM

## 2020-10-26 DIAGNOSIS — Z95.5 PRESENCE OF CORONARY ANGIOPLASTY IMPLANT AND GRAFT: ICD-10-CM

## 2020-10-26 DIAGNOSIS — S06.0X9A CONCUSSION WITH LOSS OF CONSCIOUSNESS OF UNSPECIFIED DURATION, INITIAL ENCOUNTER: ICD-10-CM

## 2020-10-26 DIAGNOSIS — S02.2XXB FRACTURE OF NASAL BONES, INITIAL ENCOUNTER FOR OPEN FRACTURE: ICD-10-CM

## 2020-10-26 DIAGNOSIS — R26.9 UNSPECIFIED ABNORMALITIES OF GAIT AND MOBILITY: ICD-10-CM

## 2020-10-26 DIAGNOSIS — S46.121A LACERATION OF MUSCLE, FASCIA AND TENDON OF LONG HEAD OF BICEPS, RIGHT ARM, INITIAL ENCOUNTER: ICD-10-CM
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
## 2020-10-26 DIAGNOSIS — C82.80 OTHER TYPES OF FOLLICULAR LYMPHOMA, UNSPECIFIED SITE: ICD-10-CM

## 2020-10-26 DIAGNOSIS — Z95.1 PRESENCE OF AORTOCORONARY BYPASS GRAFT: Chronic | ICD-10-CM
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
## 2020-10-26 DIAGNOSIS — I25.10 ATHEROSCLEROTIC HEART DISEASE OF NATIVE CORONARY ARTERY WITHOUT ANGINA PECTORIS: ICD-10-CM

## 2020-10-26 DIAGNOSIS — I10 ESSENTIAL (PRIMARY) HYPERTENSION: ICD-10-CM

## 2020-10-26 DIAGNOSIS — R55 SYNCOPE AND COLLAPSE: ICD-10-CM

## 2020-10-26 DIAGNOSIS — K21.9 GASTRO-ESOPHAGEAL REFLUX DISEASE WITHOUT ESOPHAGITIS: ICD-10-CM
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
## 2020-10-26 LAB
ALBUMIN SERPL ELPH-MCNC: 4.2 G/DL — SIGNIFICANT CHANGE UP (ref 3.5–5.2)
ALP SERPL-CCNC: 82 U/L — SIGNIFICANT CHANGE UP (ref 30–115)
ALT FLD-CCNC: 29 U/L — SIGNIFICANT CHANGE UP (ref 0–41)
ANION GAP SERPL CALC-SCNC: 10 MMOL/L — SIGNIFICANT CHANGE UP (ref 7–14)
AST SERPL-CCNC: 21 U/L — SIGNIFICANT CHANGE UP (ref 0–41)
BASOPHILS # BLD AUTO: 0.03 K/UL — SIGNIFICANT CHANGE UP (ref 0–0.2)
BASOPHILS NFR BLD AUTO: 0.5 % — SIGNIFICANT CHANGE UP (ref 0–1)
BILIRUB SERPL-MCNC: 0.4 MG/DL — SIGNIFICANT CHANGE UP (ref 0.2–1.2)
BUN SERPL-MCNC: 17 MG/DL — SIGNIFICANT CHANGE UP (ref 10–20)
CALCIUM SERPL-MCNC: 8.8 MG/DL — SIGNIFICANT CHANGE UP (ref 8.5–10.1)
CHLORIDE SERPL-SCNC: 105 MMOL/L — SIGNIFICANT CHANGE UP (ref 98–110)
CK MB CFR SERPL CALC: 6.8 NG/ML — HIGH (ref 0.6–6.3)
CK SERPL-CCNC: 148 U/L — SIGNIFICANT CHANGE UP (ref 0–225)
CO2 SERPL-SCNC: 23 MMOL/L — SIGNIFICANT CHANGE UP (ref 17–32)
CREAT SERPL-MCNC: 1 MG/DL — SIGNIFICANT CHANGE UP (ref 0.7–1.5)
EOSINOPHIL # BLD AUTO: 0.06 K/UL — SIGNIFICANT CHANGE UP (ref 0–0.7)
EOSINOPHIL NFR BLD AUTO: 1 % — SIGNIFICANT CHANGE UP (ref 0–8)
GLUCOSE SERPL-MCNC: 119 MG/DL — HIGH (ref 70–99)
HCT VFR BLD CALC: 36.8 % — LOW (ref 42–52)
HGB BLD-MCNC: 12.5 G/DL — LOW (ref 14–18)
IMM GRANULOCYTES NFR BLD AUTO: 0.3 % — SIGNIFICANT CHANGE UP (ref 0.1–0.3)
LYMPHOCYTES # BLD AUTO: 1.09 K/UL — LOW (ref 1.2–3.4)
LYMPHOCYTES # BLD AUTO: 18.3 % — LOW (ref 20.5–51.1)
MCHC RBC-ENTMCNC: 30.4 PG — SIGNIFICANT CHANGE UP (ref 27–31)
MCHC RBC-ENTMCNC: 34 G/DL — SIGNIFICANT CHANGE UP (ref 32–37)
MCV RBC AUTO: 89.5 FL — SIGNIFICANT CHANGE UP (ref 80–94)
MONOCYTES # BLD AUTO: 0.72 K/UL — HIGH (ref 0.1–0.6)
MONOCYTES NFR BLD AUTO: 12.1 % — HIGH (ref 1.7–9.3)
NEUTROPHILS # BLD AUTO: 4.05 K/UL — SIGNIFICANT CHANGE UP (ref 1.4–6.5)
NEUTROPHILS NFR BLD AUTO: 67.8 % — SIGNIFICANT CHANGE UP (ref 42.2–75.2)
NRBC # BLD: 0 /100 WBCS — SIGNIFICANT CHANGE UP (ref 0–0)
PLATELET # BLD AUTO: 187 K/UL — SIGNIFICANT CHANGE UP (ref 130–400)
POTASSIUM SERPL-MCNC: 4.5 MMOL/L — SIGNIFICANT CHANGE UP (ref 3.5–5)
POTASSIUM SERPL-SCNC: 4.5 MMOL/L — SIGNIFICANT CHANGE UP (ref 3.5–5)
PROT SERPL-MCNC: 7 G/DL — SIGNIFICANT CHANGE UP (ref 6–8)
RAPID RVP RESULT: SIGNIFICANT CHANGE UP
RBC # BLD: 4.11 M/UL — LOW (ref 4.7–6.1)
RBC # FLD: 13.1 % — SIGNIFICANT CHANGE UP (ref 11.5–14.5)
SARS-COV-2 RNA SPEC QL NAA+PROBE: SIGNIFICANT CHANGE UP
SODIUM SERPL-SCNC: 138 MMOL/L — SIGNIFICANT CHANGE UP (ref 135–146)
WBC # BLD: 5.97 K/UL — SIGNIFICANT CHANGE UP (ref 4.8–10.8)
WBC # FLD AUTO: 5.97 K/UL — SIGNIFICANT CHANGE UP (ref 4.8–10.8)

## 2020-10-26 PROCEDURE — 99285 EMERGENCY DEPT VISIT HI MDM: CPT | Mod: CS,25,GC

## 2020-10-26 PROCEDURE — 73030 X-RAY EXAM OF SHOULDER: CPT | Mod: 26,RT

## 2020-10-26 PROCEDURE — 70450 CT HEAD/BRAIN W/O DYE: CPT | Mod: 26

## 2020-10-26 PROCEDURE — 72125 CT NECK SPINE W/O DYE: CPT | Mod: 26

## 2020-10-26 PROCEDURE — 99223 1ST HOSP IP/OBS HIGH 75: CPT

## 2020-10-26 PROCEDURE — 12052 INTMD RPR FACE/MM 2.6-5.0 CM: CPT | Mod: GC

## 2020-10-26 PROCEDURE — 73562 X-RAY EXAM OF KNEE 3: CPT | Mod: 26,50

## 2020-10-26 PROCEDURE — 70486 CT MAXILLOFACIAL W/O DYE: CPT | Mod: 26

## 2020-10-26 PROCEDURE — 99497 ADVNCD CARE PLAN 30 MIN: CPT | Mod: 25

## 2020-10-26 RX ORDER — ATORVASTATIN CALCIUM 80 MG/1
40 TABLET, FILM COATED ORAL AT BEDTIME
Refills: 0 | Status: DISCONTINUED | OUTPATIENT
Start: 2020-10-26 | End: 2020-10-30

## 2020-10-26 RX ORDER — ACETAMINOPHEN 500 MG
650 TABLET ORAL EVERY 6 HOURS
Refills: 0 | Status: DISCONTINUED | OUTPATIENT
Start: 2020-10-26 | End: 2020-10-30

## 2020-10-26 RX ORDER — ENOXAPARIN SODIUM 100 MG/ML
40 INJECTION SUBCUTANEOUS AT BEDTIME
Refills: 0 | Status: DISCONTINUED | OUTPATIENT
Start: 2020-10-26 | End: 2020-10-30

## 2020-10-26 RX ORDER — CLOPIDOGREL BISULFATE 75 MG/1
75 TABLET, FILM COATED ORAL DAILY
Refills: 0 | Status: DISCONTINUED | OUTPATIENT
Start: 2020-10-27 | End: 2020-10-30

## 2020-10-26 RX ORDER — DONEPEZIL HYDROCHLORIDE 10 MG/1
10 TABLET, FILM COATED ORAL AT BEDTIME
Refills: 0 | Status: DISCONTINUED | OUTPATIENT
Start: 2020-10-26 | End: 2020-10-30

## 2020-10-26 RX ORDER — METOPROLOL TARTRATE 50 MG
50 TABLET ORAL DAILY
Refills: 0 | Status: DISCONTINUED | OUTPATIENT
Start: 2020-10-26 | End: 2020-10-30

## 2020-10-26 RX ORDER — FAMOTIDINE 10 MG/ML
40 INJECTION INTRAVENOUS DAILY
Refills: 0 | Status: DISCONTINUED | OUTPATIENT
Start: 2020-10-26 | End: 2020-10-30

## 2020-10-26 RX ORDER — LISINOPRIL 2.5 MG/1
10 TABLET ORAL DAILY
Refills: 0 | Status: DISCONTINUED | OUTPATIENT
Start: 2020-10-26 | End: 2020-10-30

## 2020-10-26 RX ORDER — MEMANTINE HYDROCHLORIDE 10 MG/1
10 TABLET ORAL DAILY
Refills: 0 | Status: DISCONTINUED | OUTPATIENT
Start: 2020-10-26 | End: 2020-10-30

## 2020-10-26 RX ORDER — TETANUS TOXOID, REDUCED DIPHTHERIA TOXOID AND ACELLULAR PERTUSSIS VACCINE, ADSORBED 5; 2.5; 8; 8; 2.5 [IU]/.5ML; [IU]/.5ML; UG/.5ML; UG/.5ML; UG/.5ML
0.5 SUSPENSION INTRAMUSCULAR ONCE
Refills: 0 | Status: COMPLETED | OUTPATIENT
Start: 2020-10-26 | End: 2020-10-26

## 2020-10-26 RX ORDER — ASPIRIN/CALCIUM CARB/MAGNESIUM 324 MG
81 TABLET ORAL DAILY
Refills: 0 | Status: DISCONTINUED | OUTPATIENT
Start: 2020-10-26 | End: 2020-10-30

## 2020-10-26 RX ADMIN — Medication 81 MILLIGRAM(S): at 21:51

## 2020-10-26 RX ADMIN — Medication 1 TABLET(S): at 16:31

## 2020-10-26 RX ADMIN — ATORVASTATIN CALCIUM 40 MILLIGRAM(S): 80 TABLET, FILM COATED ORAL at 21:51

## 2020-10-26 RX ADMIN — DONEPEZIL HYDROCHLORIDE 10 MILLIGRAM(S): 10 TABLET, FILM COATED ORAL at 21:51

## 2020-10-26 RX ADMIN — ENOXAPARIN SODIUM 40 MILLIGRAM(S): 100 INJECTION SUBCUTANEOUS at 21:50

## 2020-10-26 RX ADMIN — TETANUS TOXOID, REDUCED DIPHTHERIA TOXOID AND ACELLULAR PERTUSSIS VACCINE, ADSORBED 0.5 MILLILITER(S): 5; 2.5; 8; 8; 2.5 SUSPENSION INTRAMUSCULAR at 14:23

## 2020-10-26 NOTE — H&P ADULT - NSHPSOCIALHISTORY_GEN_ALL_CORE
Marital Status:  (   )    (   ) Single    (   )    (  )   Lives with: (  ) alone  (  ) children   (  ) spouse   (  ) parents  (  ) other  Recent Travel: No recent travel  Occupation:    Substance Use (street drugs): ( x ) never used  (  ) other:  Tobacco Usage:  ( x  ) never smoked   (   ) former smoker   (   ) current smoker  (     ) pack year  Alcohol Usage: None Marital Status:  (   )    (   ) Single    (   )    ( x )   Lives with: ( x ) alone  (  ) children   (  ) spouse   (  ) parents  (  ) other  Recent Travel: No recent travel  Occupation: retired    Substance Use (street drugs): ( x ) never used  (  ) other:  Tobacco Usage:  ( x  ) never smoked   (   ) former smoker   (   ) current smoker  (     ) pack year  Alcohol Usage: None

## 2020-10-26 NOTE — ED PROVIDER NOTE - CLINICAL SUMMARY MEDICAL DECISION MAKING FREE TEXT BOX
pt is at high risk to fall and suffer reinjury.  In my opinion, in patient treatment is medically justifiable and appropriate.

## 2020-10-26 NOTE — H&P ADULT - NSHPLABSRESULTS_GEN_ALL_CORE
< from: CT Head No Cont (10.26.20 @ 15:22) >    In comparison with the prior noncontrast CT scan of the brain dated October 6, 2018:    No acute intracranial hemorrhage.    Acute bilateral nasal bone fractures and soft tissue laceration anterior to the nasal bones, findings better demonstrated on the CT scan of the facial bones performed the same day.    Otherwise stable examination.      < end of copied text >

## 2020-10-26 NOTE — H&P ADULT - HISTORY OF PRESENT ILLNESS
80 year old male with pmhx of CAD s/p CABG and stent (last stent placed 08/31/20), HTN, dementia, stage IV follicular lymphoma in remission, presented to the ED s/p fall 80 year old male with pmhx of CAD s/p CABG and stent (last stent placed 08/31/20), HTN, dementia, stage IV follicular lymphoma in remission, presented to the ED s/p fall. Patient was walking with cane to doctors office and suddenly felt dizzy with unsteady gait for few sec and lost consciousness and fell forwards, hitting his face on the pavement. Patient does not remember falling. He woke up on the EMS stretcher and was immediately aware of his surrounding and no confusion. Patient denies similar events in the past. He also states 3 days ago he was told by his pmd about having UTI, although he had no urinary symptoms and was prescribed abx. He denies any fever, chills, sob, palpitation chest pain, abdominal pain, no urinary or bowel issues. He lives alone, ambulates with cane; recently discharged from SNF for PT; social negx3.

## 2020-10-26 NOTE — ED PROVIDER NOTE - ATTENDING CONTRIBUTION TO CARE
s/p fall.  hit face.  also c/o right shoulder, bilateral knee pain.    VITAL SIGNS: I have reviewed nursing notes and confirm.  CONSTITUTIONAL: Well-developed; well-nourished; in no acute distress.  SKIN: facial lac over nose.  HEAD: nasal bone injury.  EYES: PERRL, EOM intact; conjunctiva and sclera clear.  ENT: No nasal discharge; airway clear. Throat clear.  ear no hemotympanum.  NECK: Supple; non tender.  No lymphadenopathy.  CARD: S1, S2 normal; no murmurs, gallops, or rubs. Regular rate and rhythm.  RESP: No wheezes, rales or rhonchi.  No CWT  ABD: Normal bowel sounds; soft; non-distended; non-tender; no hepatosplenomegaly.  No pelvic bone ttp.  EXT: right shoulder with painful and limited ROM.  Knee FROM.  both knees with overlying abrasion.  No laxity.  NEURO: Alert, oriented. Grossly unremarkable. No focal deficits.  GCS 15.  PSYCH: Cooperative, appropriate.

## 2020-10-26 NOTE — ED PROVIDER NOTE - PHYSICAL EXAMINATION
CONSTITUTIONAL: Well-developed; well-nourished; in no acute distress. gcs 15, speaking in full sentences, moving all extremities  SKIN: warm, dry. 1 cm laceration to nasal bridge. 1.3 cm laceration in between eyebrows. Abrasion to patellar surface of b/l knees.   HEAD: Normocephalic; see above  EYES: PERRL, EOMI, no conjunctival erythema  ENT: No nasal discharge; airway clear, mucous membranes moist  NECK: supple; nontender.  CARD: +S1, S2 no murmurs, gallops, or rubs. Regular rate and rhythm. radial 2+ b/l, no chest wall tenderness or crepitus  RESP: No wheezes, rales or rhonchi. CTABL  ABD: soft ntnd, no rebound, no guarding, no rigidity  EXT: moves all extremities,  No clubbing, cyanosis or edema.   NEURO: Alert, oriented, grossly unremarkable, cn ii-xii grossly intact, follows commands  PSYCH: Cooperative, appropriate.

## 2020-10-26 NOTE — H&P ADULT - NSHPPHYSICALEXAM_GEN_ALL_CORE
T(C): 36.4 (10-26-20 @ 19:29), Max: 36.4 (10-26-20 @ 18:55)  HR: 71 (10-26-20 @ 19:29) (64 - 77)  BP: 133/67 (10-26-20 @ 19:29) (133/67 - 138/76)  RR: 18 (10-26-20 @ 19:29) (18 - 18)  SpO2: 99% (10-26-20 @ 18:55) (97% - 99%)        GENERAL: NAD, well-developed  HEAD:  Atraumatic, Normocephalic  EYES: EOMI, PERRLA, conjunctiva and sclera clear  ENT: Normal tympanic membrane. No nasal obstruction or discharge. No tonsillar exudate, swelling or erythema.  NECK: Supple, No JVD  CHEST/LUNG: Clear to auscultation bilaterally; No wheeze  HEART: Regular rate and rhythm; No murmurs, rubs, or gallops  ABDOMEN: Soft, Nontender, Nondistended; Bowel sounds present  EXTREMITIES:  2+ Peripheral Pulses, No clubbing, cyanosis, or edema  PSYCH: AAOx3  NEUROLOGY: non-focal  SKIN: No rashes or lesions T(C): 36.4 (10-26-20 @ 19:29), Max: 36.4 (10-26-20 @ 18:55)  HR: 71 (10-26-20 @ 19:29) (64 - 77)  BP: 133/67 (10-26-20 @ 19:29) (133/67 - 138/76)  RR: 18 (10-26-20 @ 19:29) (18 - 18)  SpO2: 99% (10-26-20 @ 18:55) (97% - 99%)    GENERAL: NAD, well-developed  HEAD:  Bandage over nose; Normocephalic  EYES: EOMI, PERRLA, conjunctiva and sclera clear  ENT: Normal tympanic membrane. No nasal obstruction or discharge. No tonsillar exudate, swelling or erythema.  NECK: Supple, No JVD  CHEST/LUNG: Clear to auscultation bilaterally; No wheeze  HEART: Regular rate and rhythm; No murmurs, rubs, or gallops  ABDOMEN: Soft, Nontender, Nondistended; Bowel sounds present  EXTREMITIES:  2+ Peripheral Pulses, No clubbing, cyanosis, or edema  PSYCH: AAOx3  NEUROLOGY: non-focal  SKIN: No rashes or lesions  MSK: right shoulder pain from active or passive movement

## 2020-10-26 NOTE — ED PROVIDER NOTE - OBJECTIVE STATEMENT
80y M w/ PMH of CAD/CABGx4 25 years ago, GERD, and HTN presents with mechanical trip and fall from standing earlier today. Fell face forward onto concrete. No LOC. Has laceration to mid forehead and nose as well as pain to R. shoulder and abrasion to b/l knees. Unknown last tetanus. Denies headache, visual changes, fever, chills, CP, SOB, abd pain, n/v/d, urinary/bowel incontinence, or numbness/tingling.

## 2020-10-26 NOTE — H&P ADULT - ATTENDING COMMENTS
80 year old male with pmhx of CAD s/p CABG and stent (last stentx3 placed 08/31/20), HTN, Ch GERD, dementia, stage IV follicular lymphoma in remission, presented to the ED s/p fall.    # Mechanical fall     - Trauma work significant for Acute bilateral nasal bone fractures   - check orthostatics  - PT consult    # CAD s/p PCI (last stentx3 placed 08/31/20)  # CABG  - denies chest pain / palpitation  - check trop   - c/w ASA, Metoprolol, Atorvastatin   - ECHO (08/31/20):  EF 55 to 60%; Normal global left ventricular systolic function, Entire anterior septum, basal and mid inferolateral wall, and apex are abnormal.    # HTN  - bp controlled  - c/w     # Dementia  - c/w     # Ch GERD  - c/ w Famotidine     # Constipation   - c/w senna    # Ambulate as tolerated  - fall precaution    # DASH diet    # DVT ppx  - c/w Lovenox     # Full code 80 year old male with pmhx of CAD s/p CABG and stent (last stentx3 placed 08/31/20), HTN, Ch GERD, dementia, stage IV follicular lymphoma in remission, presented to the ED s/p fall.    # s/p Fall  - pt got dizzy for few seconds and synopsized falling forward  - arrhythmia vs TIA/CVA vs Orthostatic hypotension  vs vasovagal vs infectious  - Trauma work significant for Acute bilateral nasal bone fractures   - CT Head No Cont (10.26.20): No acute intracranial hemorrhage.  - hemodynamically stable, AAOx3, non focal   - pt has positive UA for bacteria 3 days back as per pmd and on abx (pt denies any urinary symptoms) --> check UA  - Tylenol for pain control  - check orthostatics  - check 10pm trop and CKMB  - check EKG  - check CTA of head and neck   - Tele monitoring   - PT consult    # CAD s/p PCI (last stentx3 placed 08/31/20)  # CABG  - denies chest pain / palpitation  - check trop   - c/w Plavix, Metoprolol, Atorvastatin   - Not sure why pt not on DAPT --> will start ASA 81  - ECHO (08/31/20):  EF 55 to 60%; Normal global left ventricular systolic function, Entire anterior septum, basal and mid inferolateral wall, and apex are abnormal.    # HTN  - bp controlled  - c/w Lisinopril and metoprolol     # Dementia  - c/w donepezil and memantine     # Ch GERD  - c/w Famotidine     # Ambulate as tolerated  - fall precaution    # DASH diet    # DVT ppx  - c/w Lovenox     # Full code 80 year old male with pmhx of CAD s/p CABG and stent (last stentx3 placed 08/31/20), HTN, Ch GERD, dementia, stage IV follicular lymphoma in remission, presented to the ED s/p fall.    # s/p Fall  - pt got dizzy for few seconds and synopsized falling forward  - arrhythmia vs TIA/CVA vs Orthostatic hypotension  vs vasovagal vs infectious  - Trauma work significant for Acute bilateral nasal bone fractures   - CT Head No Cont (10.26.20): No acute intracranial hemorrhage.  - hemodynamically stable, AAOx3, non focal   - pt has positive UA for bacteria 3 days back as per pmd (pt denies any urinary symptoms) --> check UA  - Tylenol for pain control  - check orthostatics  - check 10pm trop and CKMB  - check EKG  - check CTA of head and neck   - Tele monitoring   - Neuro consult   - PT consult    # CAD s/p PCI (last stentx3 placed 08/31/20)  # CABG  - denies chest pain / palpitation  - check trop   - c/w Plavix, Metoprolol, Atorvastatin   - Not sure why pt not on DAPT --> will start ASA 81  - ECHO (08/31/20):  EF 55 to 60%; Normal global left ventricular systolic function, Entire anterior septum, basal and mid inferolateral wall, and apex are abnormal.    # HTN  - bp controlled  - c/w Lisinopril and metoprolol     # Dementia  - c/w donepezil and memantine     # Ch GERD  - c/w Famotidine     # Ambulate as tolerated  - fall precaution    # DASH diet    # DVT ppx  - c/w Lovenox     # Full code

## 2020-10-26 NOTE — GOALS OF CARE CONVERSATION - ADVANCED CARE PLANNING - CONVERSATION DETAILS
Updated patient  regarding  current condition and overall prognosis. Patient able to express a reasonable understanding of his current medical conditions.     Remains Full Code.

## 2020-10-26 NOTE — ED PROVIDER NOTE - NS ED ROS FT
Eyes:  No visual changes, eye pain or discharge.  ENMT:  No hearing changes, pain, no sore throat or runny nose, no difficulty swallowing  Cardiac:  No chest pain, SOB or edema. No chest pain with exertion.  Respiratory:  No cough or respiratory distress. No hemoptysis. No history of asthma or RAD.  GI:  No nausea, vomiting, diarrhea or abdominal pain.  :  No dysuria, frequency or burning.  MS:  No myalgia, muscle weakness, or back pain. + joint pain  Neuro:  No headache or weakness.  No LOC.  Skin:  No skin rash. + lacerations  Endocrine: No history of thyroid disease or diabetes.

## 2020-10-26 NOTE — ED ADULT TRIAGE NOTE - CHIEF COMPLAINT QUOTE
BIBA, as per EMS, pt tripped and fell on concrete on his way to the doctor's. pt hit face, with an abrasion to the forehead and laceration to bridge of nose; EMS dressed the wound. NO LOC; pt on aspirin; hx of CABG 40 years ago

## 2020-10-27 LAB
ANION GAP SERPL CALC-SCNC: 9 MMOL/L — SIGNIFICANT CHANGE UP (ref 7–14)
APPEARANCE UR: CLEAR — SIGNIFICANT CHANGE UP
BASOPHILS # BLD AUTO: 0.01 K/UL — SIGNIFICANT CHANGE UP (ref 0–0.2)
BASOPHILS NFR BLD AUTO: 0.1 % — SIGNIFICANT CHANGE UP (ref 0–1)
BILIRUB UR-MCNC: NEGATIVE — SIGNIFICANT CHANGE UP
BUN SERPL-MCNC: 13 MG/DL — SIGNIFICANT CHANGE UP (ref 10–20)
CALCIUM SERPL-MCNC: 9.1 MG/DL — SIGNIFICANT CHANGE UP (ref 8.5–10.1)
CHLORIDE SERPL-SCNC: 104 MMOL/L — SIGNIFICANT CHANGE UP (ref 98–110)
CO2 SERPL-SCNC: 24 MMOL/L — SIGNIFICANT CHANGE UP (ref 17–32)
COLOR SPEC: YELLOW — SIGNIFICANT CHANGE UP
CREAT SERPL-MCNC: 0.9 MG/DL — SIGNIFICANT CHANGE UP (ref 0.7–1.5)
DIFF PNL FLD: NEGATIVE — SIGNIFICANT CHANGE UP
EOSINOPHIL # BLD AUTO: 0.03 K/UL — SIGNIFICANT CHANGE UP (ref 0–0.7)
EOSINOPHIL NFR BLD AUTO: 0.4 % — SIGNIFICANT CHANGE UP (ref 0–8)
GLUCOSE SERPL-MCNC: 104 MG/DL — HIGH (ref 70–99)
GLUCOSE UR QL: NEGATIVE MG/DL — SIGNIFICANT CHANGE UP
HCT VFR BLD CALC: 35.8 % — LOW (ref 42–52)
HGB BLD-MCNC: 12.2 G/DL — LOW (ref 14–18)
IMM GRANULOCYTES NFR BLD AUTO: 0.4 % — HIGH (ref 0.1–0.3)
KETONES UR-MCNC: NEGATIVE — SIGNIFICANT CHANGE UP
LEUKOCYTE ESTERASE UR-ACNC: NEGATIVE — SIGNIFICANT CHANGE UP
LYMPHOCYTES # BLD AUTO: 0.97 K/UL — LOW (ref 1.2–3.4)
LYMPHOCYTES # BLD AUTO: 13.4 % — LOW (ref 20.5–51.1)
MAGNESIUM SERPL-MCNC: 2.1 MG/DL — SIGNIFICANT CHANGE UP (ref 1.8–2.4)
MCHC RBC-ENTMCNC: 30.3 PG — SIGNIFICANT CHANGE UP (ref 27–31)
MCHC RBC-ENTMCNC: 34.1 G/DL — SIGNIFICANT CHANGE UP (ref 32–37)
MCV RBC AUTO: 88.8 FL — SIGNIFICANT CHANGE UP (ref 80–94)
MONOCYTES # BLD AUTO: 0.86 K/UL — HIGH (ref 0.1–0.6)
MONOCYTES NFR BLD AUTO: 11.9 % — HIGH (ref 1.7–9.3)
NEUTROPHILS # BLD AUTO: 5.34 K/UL — SIGNIFICANT CHANGE UP (ref 1.4–6.5)
NEUTROPHILS NFR BLD AUTO: 73.8 % — SIGNIFICANT CHANGE UP (ref 42.2–75.2)
NITRITE UR-MCNC: NEGATIVE — SIGNIFICANT CHANGE UP
NRBC # BLD: 0 /100 WBCS — SIGNIFICANT CHANGE UP (ref 0–0)
PH UR: 6 — SIGNIFICANT CHANGE UP (ref 5–8)
PLATELET # BLD AUTO: 178 K/UL — SIGNIFICANT CHANGE UP (ref 130–400)
POTASSIUM SERPL-MCNC: 3.9 MMOL/L — SIGNIFICANT CHANGE UP (ref 3.5–5)
POTASSIUM SERPL-SCNC: 3.9 MMOL/L — SIGNIFICANT CHANGE UP (ref 3.5–5)
PROT UR-MCNC: NEGATIVE MG/DL — SIGNIFICANT CHANGE UP
RBC # BLD: 4.03 M/UL — LOW (ref 4.7–6.1)
RBC # FLD: 13.1 % — SIGNIFICANT CHANGE UP (ref 11.5–14.5)
SODIUM SERPL-SCNC: 137 MMOL/L — SIGNIFICANT CHANGE UP (ref 135–146)
SP GR SPEC: 1.02 — SIGNIFICANT CHANGE UP (ref 1.01–1.03)
TROPONIN T SERPL-MCNC: <0.01 NG/ML — SIGNIFICANT CHANGE UP
UROBILINOGEN FLD QL: 0.2 MG/DL — SIGNIFICANT CHANGE UP (ref 0.2–0.2)
WBC # BLD: 7.24 K/UL — SIGNIFICANT CHANGE UP (ref 4.8–10.8)
WBC # FLD AUTO: 7.24 K/UL — SIGNIFICANT CHANGE UP (ref 4.8–10.8)

## 2020-10-27 PROCEDURE — 70496 CT ANGIOGRAPHY HEAD: CPT | Mod: 26

## 2020-10-27 PROCEDURE — 70498 CT ANGIOGRAPHY NECK: CPT | Mod: 26

## 2020-10-27 PROCEDURE — 99233 SBSQ HOSP IP/OBS HIGH 50: CPT

## 2020-10-27 PROCEDURE — 99221 1ST HOSP IP/OBS SF/LOW 40: CPT

## 2020-10-27 RX ORDER — OXYCODONE AND ACETAMINOPHEN 5; 325 MG/1; MG/1
1 TABLET ORAL EVERY 8 HOURS
Refills: 0 | Status: DISCONTINUED | OUTPATIENT
Start: 2020-10-27 | End: 2020-10-30

## 2020-10-27 RX ADMIN — CLOPIDOGREL BISULFATE 75 MILLIGRAM(S): 75 TABLET, FILM COATED ORAL at 11:36

## 2020-10-27 RX ADMIN — DONEPEZIL HYDROCHLORIDE 10 MILLIGRAM(S): 10 TABLET, FILM COATED ORAL at 21:49

## 2020-10-27 RX ADMIN — ATORVASTATIN CALCIUM 40 MILLIGRAM(S): 80 TABLET, FILM COATED ORAL at 21:49

## 2020-10-27 RX ADMIN — ENOXAPARIN SODIUM 40 MILLIGRAM(S): 100 INJECTION SUBCUTANEOUS at 21:49

## 2020-10-27 RX ADMIN — Medication 50 MILLIGRAM(S): at 10:36

## 2020-10-27 RX ADMIN — Medication 81 MILLIGRAM(S): at 11:35

## 2020-10-27 RX ADMIN — OXYCODONE AND ACETAMINOPHEN 1 TABLET(S): 5; 325 TABLET ORAL at 22:24

## 2020-10-27 RX ADMIN — MEMANTINE HYDROCHLORIDE 10 MILLIGRAM(S): 10 TABLET ORAL at 11:36

## 2020-10-27 RX ADMIN — FAMOTIDINE 40 MILLIGRAM(S): 10 INJECTION INTRAVENOUS at 11:36

## 2020-10-27 RX ADMIN — LISINOPRIL 10 MILLIGRAM(S): 2.5 TABLET ORAL at 10:36

## 2020-10-27 NOTE — PHYSICAL THERAPY INITIAL EVALUATION ADULT - GENERAL OBSERVATIONS, REHAB EVAL
10:26-10:47 Chart reviewed. Pt encountered semireclined in bed, may be seen by Physical Therapist as confirmed with Nurse. Patient denied pain at rest but he "can't walk"; +dressing forehead/nose

## 2020-10-27 NOTE — PHYSICAL THERAPY INITIAL EVALUATION ADULT - GAIT DEVIATIONS NOTED, PT EVAL
decreased weight-shifting ability/decreased michael/decreased step length/stooped posture, dec heel strike/pushoff , dec SULAIMAN

## 2020-10-27 NOTE — CONSULT NOTE ADULT - ASSESSMENT
Patient walking he felt light headed. No chest pain. No sob. No palpitations No complaints now. Not clear etiology of fall. Need monitor echo. Check ortho bp. ? neuro. Consider if no etiology out patient monitor for 2 weeks

## 2020-10-27 NOTE — PROGRESS NOTE ADULT - SUBJECTIVE AND OBJECTIVE BOX
HUMERANOEMY  80y, Male  Allergy: No Known Allergies    Hospital Day: 1d    Patient seen and examined earlier today. Patient endorses that he was walking to his doctors office when he suddenly fell. He does not re    PMH/PSH:  PAST MEDICAL & SURGICAL HISTORY:  Coronary artery disease involving coronary bypass graft of native heart without angina pectoris    HTN (hypertension)    S/P CABG x 4  33 years ago        LAST 24-Hr EVENTS:    VITALS:  T(F): 96 (10-27-20 @ 05:00), Max: 97.6 (10-26-20 @ 18:55)  HR: 62 (10-27-20 @ 10:34)  BP: 142/73 (10-27-20 @ 10:34) (119/57 - 142/73)  RR: 18 (10-27-20 @ 10:34)  SpO2: 99% (10-26-20 @ 18:55)        TESTS & MEASUREMENTS:  Weight (Kg): 76 (10-26-20 @ 19:29)  BMI (kg/m2): 27.1 (10-26)                          12.2   7.24  )-----------( 178      ( 27 Oct 2020 06:18 )             35.8       10-27    137  |  104  |  13  ----------------------------<  104<H>  3.9   |  24  |  0.9    Ca    9.1      27 Oct 2020 06:18  Mg     2.1     10-27    TPro  7.0  /  Alb  4.2  /  TBili  0.4  /  DBili  x   /  AST  21  /  ALT  29  /  AlkPhos  82  10-26    LIVER FUNCTIONS - ( 26 Oct 2020 14:29 )  Alb: 4.2 g/dL / Pro: 7.0 g/dL / ALK PHOS: 82 U/L / ALT: 29 U/L / AST: 21 U/L / GGT: x           CARDIAC MARKERS ( 26 Oct 2020 21:28 )  x     / <0.01 ng/mL / 148 U/L / x     / 6.8 ng/mL        Urinalysis Basic - ( 27 Oct 2020 03:20 )    Color: Yellow / Appearance: Clear / S.025 / pH: x  Gluc: x / Ketone: Negative  / Bili: Negative / Urobili: 0.2 mg/dL   Blood: x / Protein: Negative mg/dL / Nitrite: Negative   Leuk Esterase: Negative / RBC: x / WBC x   Sq Epi: x / Non Sq Epi: x / Bacteria: x                  RADIOLOGY, ECG, & ADDITIONAL TESTS:      RECENT DIAGNOSTIC ORDERS:  Transthoracic Echocardiogram:   Transport: Wheelchair  Monitor: w/o Monitor  Provider's Contact #: [phone] (10-27-20 @ 10:07)  12 Lead ECG:   Provider's Contact #: [phone] (10-27-20 @ 10:07)  12 Lead ECG: Repeat From: 27-Oct-2020 10:06 To: 29-Oct-2020 00:00, Every 1 day(s)  Provider's Contact #: [phone] (10-27-20 @ 10:07)  Troponin T, Serum: 11:00 (10-27-20 @ 09:54)  CT Angio Neck w/ IV Cont: 08:44 (10-27-20 @ 08:46)  CT Angio Head w/ IV Cont: Routine   Indication: r/o stenosis  Transport: Stretcher-Crib  Provider's Contact #: [phone] (10-26-20 @ 21:25)  Diet, DASH/TLC:   Sodium & Cholesterol Restricted (10-26-20 @ 21:25)  12 Lead ECG:   Provider's Contact #: [phone] (10-26-20 @ 20:25)  COVID-19  Antibody - for prior infection screening: Routine (10-26-20 @ 20:06)      MEDICATIONS:  MEDICATIONS  (STANDING):  aspirin  chewable 81 milliGRAM(s) Oral daily  atorvastatin 40 milliGRAM(s) Oral at bedtime  clopidogrel Tablet 75 milliGRAM(s) Oral daily  donepezil 10 milliGRAM(s) Oral at bedtime  enoxaparin Injectable 40 milliGRAM(s) SubCutaneous at bedtime  famotidine    Tablet 40 milliGRAM(s) Oral daily  lisinopril 10 milliGRAM(s) Oral daily  memantine 10 milliGRAM(s) Oral daily  metoprolol succinate ER 50 milliGRAM(s) Oral daily    MEDICATIONS  (PRN):  acetaminophen   Tablet .. 650 milliGRAM(s) Oral every 6 hours PRN Mild Pain (1 - 3)      HOME MEDICATIONS:  atorvastatin 40 mg oral tablet ()  donepezil 10 mg oral tablet ()  famotidine 40 mg oral tablet ()  memantine 10 mg oral tablet ()  Metoprolol Succinate ER 50 mg oral tablet, extended release ()  Plavix 75 mg oral tablet ()      PHYSICAL EXAM:  GENERAL: A&O x3,  in NAD  HNENT: EOMI, PERRLA    NECK: No LAD/swelling  CHEST/LUNG:  CTAB no wheezes/rales/ronchi  HEART: RRR, No murmurs  ABDOMEN: Soft, NT, ND,  Bowel sounds present  EXTREMITIES:  Warm well perfused, no edema          HUMERANOEMY  80y, Male  Allergy: No Known Allergies    Hospital Day: 1d    Patient seen and examined earlier today. Patient endorses that he was walking to his doctors office when he suddenly fell. He does not remember the circumstances around his fall, denies mechanical fall. Denies associated chest pain or dyspnea. Patient had been walking at least a few blocks prior to the incident. States he woke up when EMS arrived. Patient appears tearful, but denies any pain at this time. NAEON.     PMH/PSH:  PAST MEDICAL & SURGICAL HISTORY:  Coronary artery disease involving coronary bypass graft of native heart without angina pectoris    HTN (hypertension)    S/P CABG x 4  33 years ago        LAST 24-Hr EVENTS:    VITALS:  T(F): 96 (10-27-20 @ 05:00), Max: 97.6 (10-26-20 @ 18:55)  HR: 62 (10-27-20 @ 10:34)  BP: 142/73 (10-27-20 @ 10:34) (119/57 - 142/73)  RR: 18 (10-27-20 @ 10:34)  SpO2: 99% (10-26-20 @ 18:55)        TESTS & MEASUREMENTS:  Weight (Kg): 76 (10-26-20 @ 19:29)  BMI (kg/m2): 27.1 (10-26)                          12.2   7.24  )-----------( 178      ( 27 Oct 2020 06:18 )             35.8       10-27    137  |  104  |  13  ----------------------------<  104<H>  3.9   |  24  |  0.9    Ca    9.1      27 Oct 2020 06:18  Mg     2.1     10-27    TPro  7.0  /  Alb  4.2  /  TBili  0.4  /  DBili  x   /  AST  21  /  ALT  29  /  AlkPhos  82  10-26    LIVER FUNCTIONS - ( 26 Oct 2020 14:29 )  Alb: 4.2 g/dL / Pro: 7.0 g/dL / ALK PHOS: 82 U/L / ALT: 29 U/L / AST: 21 U/L / GGT: x           CARDIAC MARKERS ( 26 Oct 2020 21:28 )  x     / <0.01 ng/mL / 148 U/L / x     / 6.8 ng/mL        Urinalysis Basic - ( 27 Oct 2020 03:20 )    Color: Yellow / Appearance: Clear / S.025 / pH: x  Gluc: x / Ketone: Negative  / Bili: Negative / Urobili: 0.2 mg/dL   Blood: x / Protein: Negative mg/dL / Nitrite: Negative   Leuk Esterase: Negative / RBC: x / WBC x   Sq Epi: x / Non Sq Epi: x / Bacteria: x                  RADIOLOGY, ECG, & ADDITIONAL TESTS:      RECENT DIAGNOSTIC ORDERS:  Transthoracic Echocardiogram:   Transport: Wheelchair  Monitor: w/o Monitor  Provider's Contact #: [phone] (10-27-20 @ 10:07)  12 Lead ECG:   Provider's Contact #: [phone] (10-27-20 @ 10:07)  12 Lead ECG: Repeat From: 27-Oct-2020 10:06 To: 29-Oct-2020 00:00, Every 1 day(s)  Provider's Contact #: [phone] (10-27-20 @ 10:07)  Troponin T, Serum: 11:00 (10-27-20 @ 09:54)  CT Angio Neck w/ IV Cont: 08:44 (10-27-20 @ 08:46)  CT Angio Head w/ IV Cont: Routine   Indication: r/o stenosis  Transport: Stretcher-Crib  Provider's Contact #: [phone] (10-26-20 @ 21:25)  Diet, DASH/TLC:   Sodium & Cholesterol Restricted (10-26-20 @ 21:25)  12 Lead ECG:   Provider's Contact #: [phone] (10-26-20 @ 20:25)  COVID-19  Antibody - for prior infection screening: Routine (10-26-20 @ 20:06)      MEDICATIONS:  MEDICATIONS  (STANDING):  aspirin  chewable 81 milliGRAM(s) Oral daily  atorvastatin 40 milliGRAM(s) Oral at bedtime  clopidogrel Tablet 75 milliGRAM(s) Oral daily  donepezil 10 milliGRAM(s) Oral at bedtime  enoxaparin Injectable 40 milliGRAM(s) SubCutaneous at bedtime  famotidine    Tablet 40 milliGRAM(s) Oral daily  lisinopril 10 milliGRAM(s) Oral daily  memantine 10 milliGRAM(s) Oral daily  metoprolol succinate ER 50 milliGRAM(s) Oral daily    MEDICATIONS  (PRN):  acetaminophen   Tablet .. 650 milliGRAM(s) Oral every 6 hours PRN Mild Pain (1 - 3)      HOME MEDICATIONS:  atorvastatin 40 mg oral tablet ()  donepezil 10 mg oral tablet ()  famotidine 40 mg oral tablet ()  memantine 10 mg oral tablet ()  Metoprolol Succinate ER 50 mg oral tablet, extended release ()  Plavix 75 mg oral tablet ()      PHYSICAL EXAM:  GENERAL: NAD, well-developed  HEAD:  Bandage over nose; Normocephalic, eyes puffy bilaterally   EYES: EOMI, PERRLA  NECK: Supple, No JVD  CHEST/LUNG: Clear to auscultation bilaterally; No wheeze  HEART: Regular rate and rhythm; No murmurs, rubs, or gallops  ABDOMEN: Soft, Nontender, Nondistended; Bowel sounds present  EXTREMITIES:  2+ Peripheral Pulses, minimal active ROM RUE, extreme pain with light palpation of R shoulder   PSYCH: AAOx3  NEUROLOGY: non-focal  SKIN: No rashes or lesions

## 2020-10-27 NOTE — CONSULT NOTE ADULT - SUBJECTIVE AND OBJECTIVE BOX
HPI: 80 year old male with pmhx of CAD s/p CABG and stent (last stent placed 20), HTN, dementia, stage IV follicular lymphoma in remission, presented to the ED s/p fall. Patient was walking with cane to doctors office and suddenly felt dizzy with unsteady gait for few sec and lost consciousness and fell forwards, hitting his face on the pavement. Patient does not remember falling. He woke up on the EMS stretcher and was immediately aware of his surrounding and no confusion. Patient denies similar events in the past. He also states 3 days ago he was told by his pmd about having UTI, although he had no urinary symptoms and was prescribed abx. He denies any fever, chills, sob, palpitation chest pain, abdominal pain, no urinary or bowel issues. He lives alone, ambulates with cane; recently discharged from SNF for PT;  ptn  seen and exam  at  bed  side  nad  aox2     PTN  REFERRED TO ACUTE  REHAB  FOR  EVAL AND  TX   PAST MEDICAL & SURGICAL HISTORY:  Coronary artery disease involving coronary bypass graft of native heart without angina pectoris  HTN (hypertension)  S/P CABG x 4  33 years ago  Hospital Course:    TODAY'S SUBJECTIVE & REVIEW OF SYMPTOMS:     Constitutional WNL   Cardio WNL   Resp WNL   GI WNL  Heme WNL  Endo WNL  Skin WNL  MSK WNL  Neuro WNL  Cognitive WNL  Psych WNL      MEDICATIONS  (STANDING):  aspirin  chewable 81 milliGRAM(s) Oral daily  atorvastatin 40 milliGRAM(s) Oral at bedtime  clopidogrel Tablet 75 milliGRAM(s) Oral daily  donepezil 10 milliGRAM(s) Oral at bedtime  enoxaparin Injectable 40 milliGRAM(s) SubCutaneous at bedtime  famotidine    Tablet 40 milliGRAM(s) Oral daily  lisinopril 10 milliGRAM(s) Oral daily  memantine 10 milliGRAM(s) Oral daily  metoprolol succinate ER 50 milliGRAM(s) Oral daily    MEDICATIONS  (PRN):  acetaminophen   Tablet .. 650 milliGRAM(s) Oral every 6 hours PRN Mild Pain (1 - 3)      FAMILY HISTORY:  No pertinent family history in first degree relatives        Allergies    No Known Allergies    Intolerances        SOCIAL HISTORY:    [  ] Etoh  [  ] Smoking  [  ] Substance abuse     Home Environment:  [x  ] Home Alone  [  ] Lives with Family  [  ] Home Health Aid    Dwelling:  [  ] Apartment  [ x ] Private House  [  ] Adult Home  [  ] Skilled Nursing Facility      [  ] Short Term  [  ] Long Term  [  ] Stairs       Elevator [  ]    FUNCTIONAL STATUS PTA: (Check all that apply)  Ambulation: [ x  ]Independent    [  ] Dependent     [  ] Non-Ambulatory  Assistive Device: [  x] SA Cane  [  ]  Q Cane  [  ] Walker  [  ]  Wheelchair  ADL : [ x ] Independent  [  ]  Dependent       Vital Signs Last 24 Hrs  T(C): 35.6 (27 Oct 2020 05:00), Max: 36.4 (26 Oct 2020 18:55)  T(F): 96 (27 Oct 2020 05:00), Max: 97.6 (26 Oct 2020 18:55)  HR: 61 (27 Oct 2020 05:00) (61 - 77)  BP: 119/57 (27 Oct 2020 05:00) (119/57 - 138/76)  BP(mean): --  RR: 18 (27 Oct 2020 05:00) (18 - 18)  SpO2: 99% (26 Oct 2020 18:55) (97% - 99%)      PHYSICAL EXAM: Alert & Oriented X 2  GENERAL: NAD, well-groomed, well-developed  HEAD:  Atraumatic, Normocephalic  EYES: EOMI, PERRLA, conjunctiva and sclera clear  NECK: Supple, No JVD, Normal thyroid  CHEST/LUNG: Clear to percussion bilaterally; No rales, rhonchi, wheezing, or rubs  HEART: Regular rate and rhythm; No murmurs, rubs, or gallops  ABDOMEN: Soft, Nontender, Nondistended; Bowel sounds present  EXTREMITIES:  2+ Peripheral Pulses, No clubbing, cyanosis, or edema    NERVOUS SYSTEM:  Cranial Nerves 2-12 intact [ x ] Abnormal  [  ]  ROM: WFL all extremities [passive  ]  Abnormal [  ]  Motor Strength: WFL all extremities  [  ]  Abnormal [ 4/5 all  ext]  Sensation: intact to light touch [  x] Abnormal [  ]  Reflexes: Symmetric [  ]  Abnormal [x  ]    FUNCTIONAL STATUS:  Bed Mobility: Independent [  ]  Supervision [  ]  Needs Assistance [ x ]  N/A [  ]  Transfers: Independent [  ]  Supervision [  ]  Needs Assistance [x  ]  N/A [  ]   Ambulation: Independent [  ]  Supervision [  ]  Needs Assistance [x  ]  N/A [  ]  ADL: Independent [  ] Requires Assistance [  ] N/A [ x ]  SEE PT/OT IE NOTES    LABS:                        12.2   7.24  )-----------( 178      ( 27 Oct 2020 06:18 )             35.8     10    137  |  104  |  13  ----------------------------<  104<H>  3.9   |  24  |  0.9    Ca    9.1      27 Oct 2020 06:18  Mg     2.1     10-27    TPro  7.0  /  Alb  4.2  /  TBili  0.4  /  DBili  x   /  AST  21  /  ALT  29  /  AlkPhos  82  10      Urinalysis Basic - ( 27 Oct 2020 03:20 )    Color: Yellow / Appearance: Clear / S.025 / pH: x  Gluc: x / Ketone: Negative  / Bili: Negative / Urobili: 0.2 mg/dL   Blood: x / Protein: Negative mg/dL / Nitrite: Negative   Leuk Esterase: Negative / RBC: x / WBC x   Sq Epi: x / Non Sq Epi: x / Bacteria: x        RADIOLOGY & ADDITIONAL STUDIES:    Assesment:

## 2020-10-27 NOTE — PROGRESS NOTE ADULT - ASSESSMENT
80 year old male with pmhx of CAD s/p CABG and stent (last stentx3 placed 08/31/20), HTN, Ch GERD, dementia, stage IV follicular lymphoma in remission, presented to the ED s/p fall.    # s/p Fall  - pt got dizzy for few seconds and synopsized falling forward  - arrhythmia vs TIA/CVA vs Orthostatic hypotension  vs vasovagal vs infectious  - Trauma work significant for Acute bilateral nasal bone fractures   - CT Head No Cont (10.26.20): No acute intracranial hemorrhage.  - hemodynamically stable, AAOx3, non focal   - pt has positive UA for bacteria 3 days back as per pmd (pt denies any urinary symptoms) --> check UA  - Tylenol for pain control  - check orthostatics  - check 10pm trop and CKMB  - check EKG  - check CTA of head and neck   - Tele monitoring   - Neuro consult   - PT consult    # CAD s/p PCI (last stentx3 placed 08/31/20)  # CABG  - denies chest pain / palpitation  - check trop   - c/w Plavix, Metoprolol, Atorvastatin   - Not sure why pt not on DAPT --> will start ASA 81  - ECHO (08/31/20):  EF 55 to 60%; Normal global left ventricular systolic function, Entire anterior septum, basal and mid inferolateral wall, and apex are abnormal.    # HTN  - bp controlled  - c/w Lisinopril and metoprolol     # Dementia  - c/w donepezil and memantine     # Ch GERD  - c/w Famotidine     # Ambulate as tolerated  - fall precaution    # DASH diet    # DVT ppx  - c/w Lovenox     # Full code.    80 year old male with pmhx of CAD s/p CABG and stent (last stentx3 placed 08/31/20), HTN, Ch GERD, dementia, stage IV follicular lymphoma in remission, presented to the ED s/p fall.    # Syncope   - pt got dizzy for few seconds and synopsized falling forward  - arrhythmia vs TIA/CVA vs Orthostatic hypotension  vs vasovagal vs infectious  - Trauma work significant for Acute bilateral nasal bone fractures   - CT Head No Cont (10.26.20): No acute intracranial hemorrhage.  - hemodynamically stable, AAOx3, non focal   - UA unremarkable   - Tylenol for pain control  - check orthostatics  - check EKG and repeat troponin   - check CTA of head and neck   - Tele monitoring for at least 24 hrs   - Neuro consult / Cards Consult   - PT consult    # CAD s/p PCI (last stentx3 placed 08/31/20)  # CABG  - denies chest pain / palpitation  - initial trop negative   - c/w Plavix, Metoprolol, Atorvastatin   -  will start ASA 81  - ECHO (08/31/20):  EF 55 to 60%; Normal global left ventricular systolic function, Entire anterior septum, basal and mid inferolateral wall, and apex are abnormal.  - Cardiology on consult     #RUE Pain/ Weakness  - c/f RC tear after recent fall   - consider MRI shoulder inpatient vs outpatient after syncope workup is completed   - Occupational Therapy ordered     # HTN  - bp controlled  - c/w Lisinopril and metoprolol     # Dementia  - c/w donepezil and memantine     # Ch GERD  - c/w Famotidine     # Ambulate as tolerated  - fall precaution    # DASH diet    # DVT ppx  - c/w Lovenox     # Full code.     #Progress Note Handoff:  Pending (specify):  Cardiology/Neuro/OT consults   Family discussion: d/w patient at bedside   Disposition: Home___/SNF___/Other________/Unknown at this time___x_____      Mell Leon DO

## 2020-10-27 NOTE — PHYSICAL THERAPY INITIAL EVALUATION ADULT - IMPAIRMENTS CONTRIBUTING IMPAIRED BED MOBILITY, REHAB EVAL
impaired postural control/decreased endurance/decreased strength/impaired balance
(4) walks frequently

## 2020-10-27 NOTE — CONSULT NOTE ADULT - ASSESSMENT
80 year old male with pmhx of CAD s/p CABG and stent (last stent placed 08/31/20), HTN, dementia, stage IV follicular lymphoma presented after a syncopal episode and fall. Exam shows weakness of right arm and right shoulder pain.    Syncopal episode, less likely seizure, CVA needs to be ruled out    - Johnny MRI w/o contrast  - REEG   - Neurology will follow

## 2020-10-27 NOTE — CONSULT NOTE ADULT - ASSESSMENT
IMPRESSION: Rehab of 81 y/o m  rehab  for  gd  sp  fall nasal  fx      PRECAUTIONS: [  ] Cardiac  [  ] Respiratory  [  ] Seizures [  ] Contact Isolation  [  ] Droplet Isolation  [ FALL ] Other    Weight Bearing Status:     RECOMMENDATION:    Out of Bed to Chair     DVT/Decubiti Prophylaxis    REHAB PLAN:     [  xx ] Bedside P/T 3-5 times a week   [   ]   Bedside O/T  2-3 times a week             [   ] No Rehab Therapy Indicated                   [   ]  Speech Therapy   Conditioning/ROM                                    ADL  Bed Mobility                                               Conditioning/ROM  Transfers                                                     Bed Mobility  Sitting /Standing Balance                         Transfers                                        Gait Training                                               Sitting/Standing Balance  Stair Training [   ]Applicable                    Home equipment Eval                                                                        Splinting  [   ] Only      GOALS:   ADL   [ x  ]   Independent                    Transfers  [ x  ] Independent                          Ambulation  [ x ] Independent     [  x  ] With device                            [x   ]  CG                                                         [  x ]  CG                                                                  [  x ] CG                            [    ] Min A                                                   [   ] Min A                                                              [   ] Min  A          DISCHARGE PLAN:   [   ]  Good candidate for Intensive Rehabilitation/Hospital based-4A [facility]                                             Will tolerate 3hrs Intensive Rehab Daily                                       [  x  ]  Short Term Rehab in Skilled Nursing Facility ptn  refused SNF wish  to  dc  home  with  home  PT                                       [  xx  ]  Home with Outpatient or VN services                                         [    ]  Possible Candidate for Intensive Hospital based Rehab

## 2020-10-27 NOTE — CONSULT NOTE ADULT - SUBJECTIVE AND OBJECTIVE BOX
CARDIOLOGY CONSULT NOTE     CHIEF COMPLAINT/REASON FOR CONSULT:    HPI:  80 year old male with pmhx of CAD s/p CABG and stent (last stent placed 08/31/20), HTN, dementia, stage IV follicular lymphoma in remission, presented to the ED s/p fall. Patient was walking with cane to doctors office and suddenly felt dizzy with unsteady gait for few sec and lost consciousness and fell forwards, hitting his face on the pavement. Patient does not remember falling. He woke up on the EMS stretcher and was immediately aware of his surrounding and no confusion. Patient denies similar events in the past. He also states 3 days ago he was told by his pmd about having UTI, although he had no urinary symptoms and was prescribed abx. He denies any fever, chills, sob, palpitation chest pain, abdominal pain, no urinary or bowel issues. He lives alone, ambulates with cane; recently discharged from SNF for PT; social negx3. (26 Oct 2020 20:03)      PAST MEDICAL & SURGICAL HISTORY:  Coronary artery disease involving coronary bypass graft of native heart without angina pectoris    HTN (hypertension)    S/P CABG x 4  33 years ago        Cardiac Risks:   [x ]HTN, [ ] DM, [ ] Smoking, [ ] FH,  [ ] Lipids        MEDICATIONS:  MEDICATIONS  (STANDING):  aspirin  chewable 81 milliGRAM(s) Oral daily  atorvastatin 40 milliGRAM(s) Oral at bedtime  clopidogrel Tablet 75 milliGRAM(s) Oral daily  donepezil 10 milliGRAM(s) Oral at bedtime  enoxaparin Injectable 40 milliGRAM(s) SubCutaneous at bedtime  famotidine    Tablet 40 milliGRAM(s) Oral daily  lisinopril 10 milliGRAM(s) Oral daily  memantine 10 milliGRAM(s) Oral daily  metoprolol succinate ER 50 milliGRAM(s) Oral daily      FAMILY HISTORY:  No pertinent family history in first degree relatives        SOCIAL HISTORY:      [ ] Marital status   Allergies    No Known Allergies        	    REVIEW OF SYSTEMS:  CONSTITUTIONAL: No fever, weight loss, or fatigue  EYES: No eye pain, visual disturbances, or discharge  ENMT:  No difficulty hearing, tinnitus, vertigo; No sinus or throat pain  NECK: No pain or stiffness  RESPIRATORY: No cough, wheezing, chills or hemoptysis; No Shortness of Breath  CARDIOVASCULAR: No chest pain, palpitations, passing out, dizziness, or leg swelling  GASTROINTESTINAL: No abdominal or epigastric pain. No nausea, vomiting, or hematemesis; No diarrhea or constipation. No melena or hematochezia.  GENITOURINARY: No dysuria, frequency, hematuria, or incontinence  NEUROLOGICAL: No headaches, memory loss, loss of strength, numbness, or tremors  SKIN: No itching, burning, rashes, or lesions   	      PHYSICAL EXAM:  T(C): 35.6 (10-27-20 @ 05:00), Max: 36.4 (10-26-20 @ 18:55)  HR: 61 (10-27-20 @ 05:00) (61 - 77)  BP: 119/57 (10-27-20 @ 05:00) (119/57 - 138/76)  RR: 18 (10-27-20 @ 05:00) (18 - 18)  SpO2: 99% (10-26-20 @ 18:55) (97% - 99%)  Wt(kg): --  I&O's Summary      Appearance: Normal	  Psychiatry: A & O x 3, Mood & affect appropriate  HEENT:   Normal oral mucosa, PERRL, EOMI	  Lymphatic: No lymphadenopathy  Cardiovascular: Normal S1 S2,RRR, No JVD, No murmurs  Respiratory: Lungs clear to auscultation	  Gastrointestinal:  Soft, Non-tender, + BS	  Skin: No rashes, No ecchymoses, No cyanosis	  Neurologic: Non-focal  Extremities: Normal range of motion, No clubbing, cyanosis or edema  Vascular: Peripheral pulses palpable 2+ bilaterally      ECG:  	nsr ns st    	  LABS:	 	    CARDIAC MARKERS:                                    12.2   7.24  )-----------( 178      ( 27 Oct 2020 06:18 )             35.8     10-27    137  |  104  |  13  ----------------------------<  104<H>  3.9   |  24  |  0.9    Ca    9.1      27 Oct 2020 06:18  Mg     2.1     10-27    TPro  7.0  /  Alb  4.2  /  TBili  0.4  /  DBili  x   /  AST  21  /  ALT  29  /  AlkPhos  82  10-26

## 2020-10-27 NOTE — PHYSICAL THERAPY INITIAL EVALUATION ADULT - CRITERIA FOR SKILLED THERAPEUTIC INTERVENTIONS
rehab potential/impairments found/predicted duration of therapy intervention/therapy frequency/functional limitations in following categories

## 2020-10-27 NOTE — CONSULT NOTE ADULT - SUBJECTIVE AND OBJECTIVE BOX
Neurology  Consult Note  10-27-20    Name:  NOEMY GRUBBS; 80y (1940)    Reason for Admission: NASAL FRACTURE UNSTEADY GAIT        Chief Complaint:  HPI:  80 year old male with pmhx of CAD s/p CABG and stent (last stent placed 08/31/20), HTN, dementia, stage IV follicular lymphoma in remission, presented to the ED s/p fall. Patient was walking with cane to doctors office and suddenly felt dizzy with unsteady gait for few sec and lost consciousness and fell forwards, hitting his face on the pavement. Patient does not remember falling. He woke up on the EMS stretcher and was immediately aware of his surrounding and no confusion. Patient denies similar events in the past. He also states 3 days ago he was told by his pmd about having UTI, although he had no urinary symptoms and was prescribed abx. He denies any fever, chills, sob, palpitation chest pain, abdominal pain, no urinary or bowel issues. He lives alone, ambulates with cane; recently discharged from SNF for PT; social negx3. (26 Oct 2020 20:03)    This morning patient repots right shoulder pain. He has reported that he was walking on the street to get to his doctor appointment and suddenly fell down.   Review of Systems:  as per HPI.         PMHx:   Coronary artery disease involving coronary bypass graft of native heart without angina pectoris    HTN (hypertension)      PFHx:   No pertinent family history in first degree relatives      PSuHx:   S/P CABG x 4    H/O heart surgery      PSoHx:   Denies illicit drug    Medications:  MEDICATIONS  (STANDING):  aspirin  chewable 81 milliGRAM(s) Oral daily  atorvastatin 40 milliGRAM(s) Oral at bedtime  clopidogrel Tablet 75 milliGRAM(s) Oral daily  donepezil 10 milliGRAM(s) Oral at bedtime  enoxaparin Injectable 40 milliGRAM(s) SubCutaneous at bedtime  famotidine    Tablet 40 milliGRAM(s) Oral daily  lisinopril 10 milliGRAM(s) Oral daily  memantine 10 milliGRAM(s) Oral daily  metoprolol succinate ER 50 milliGRAM(s) Oral daily    MEDICATIONS  (PRN):  acetaminophen   Tablet .. 650 milliGRAM(s) Oral every 6 hours PRN Mild Pain (1 - 3)  oxycodone    5 mG/acetaminophen 325 mG 1 Tablet(s) Oral every 8 hours PRN Severe Pain (7 - 10)    Vitals:  T(C): 36.2 (10-27-20 @ 14:26), Max: 36.4 (10-26-20 @ 18:55)  HR: 63 (10-27-20 @ 14:26) (61 - 71)  BP: 112/57 (10-27-20 @ 14:26) (112/57 - 142/73)  RR: 16 (10-27-20 @ 14:26) (16 - 18)  SpO2: 99% (10-26-20 @ 18:55) (99% - 99%)    Labs:                        12.2   7.24  )-----------( 178      ( 27 Oct 2020 06:18 )             35.8     10-27    137  |  104  |  13  ----------------------------<  104<H>  3.9   |  24  |  0.9    Ca    9.1      27 Oct 2020 06:18  Mg     2.1     10-27    TPro  7.0  /  Alb  4.2  /  TBili  0.4  /  DBili  x   /  AST  21  /  ALT  29  /  AlkPhos  82  10-26    CAPILLARY BLOOD GLUCOSE      POCT Blood Glucose.: 119 mg/dL (26 Oct 2020 14:19)    LIVER FUNCTIONS - ( 26 Oct 2020 14:29 )  Alb: 4.2 g/dL / Pro: 7.0 g/dL / ALK PHOS: 82 U/L / ALT: 29 U/L / AST: 21 U/L / GGT: x             PHYSICAL EXAMINATION:  General: Well-developed, evidence of bruises and ecchymosis on his head.     Neurologic:  - Mental Status:  Alert, awake, oriented to person, place, and time; Speech is fluent with intact naming, repetition, and comprehension  - Cranial Nerves II-XII:    II:  Pupils are equal, round, and reactive to light  III, IV, VI:  Extraocular movements are intact without nystagmus.  V:  Facial sensation is intact in the V1-V3 distribution bilaterally.  VII:  Face is symmetric with normal eye closure and smile    XII:  Tongue protudes in the midline.  - Motor:  Right arm weakness proximally due to pain. otherwise 5/5 strength in the left arm and bilateral leg   - Reflexes:  3+ and symmetric at the biceps, triceps, brachioradialis, knees, and ankles.  Plantar responses flexor.  - Sensory:  Intact throughout light touch  - Coordination:  Finger-nose-finger   - Gait:   deferred     Radiology:  CT Head No Cont:  (26 Oct 2020 15:22)    IMPRESSION:    In comparison with the prior noncontrast CT scan of the brain dated October 6, 2018:    No acute intracranial hemorrhage.    Acute bilateral nasal bone fractures and soft tissue laceration anterior to the nasal bones, findings better demonstrated on the CT scan of the facial bones performed the same day.    Otherwise stable examination.    CTA H.N:   IMPRESSION:    No large vessel occlusion, aneurysm, or vascular malformation.    Mild stenosis in the origin of the right internal carotid artery due to atherosclerotic disease.    Redemonstrated acute bilateral nasal bone fractures.    Prominent lucency surrounding the right second maxillary molar tooth with dehiscence along the buccal cortex. Questionable subcentimeter peripherally enhancing fluid collection surrounding the right maxillary molar tooth, nonspecific, could reflect early small abscess formation. Please correlate with dental exam.

## 2020-10-28 LAB
SARS-COV-2 IGG SERPL QL IA: NEGATIVE — SIGNIFICANT CHANGE UP
SARS-COV-2 IGM SERPL IA-ACNC: 0.02 INDEX — SIGNIFICANT CHANGE UP

## 2020-10-28 PROCEDURE — 71045 X-RAY EXAM CHEST 1 VIEW: CPT | Mod: 26

## 2020-10-28 PROCEDURE — 99233 SBSQ HOSP IP/OBS HIGH 50: CPT

## 2020-10-28 RX ORDER — ONDANSETRON 8 MG/1
4 TABLET, FILM COATED ORAL ONCE
Refills: 0 | Status: COMPLETED | OUTPATIENT
Start: 2020-10-28 | End: 2020-10-28

## 2020-10-28 RX ORDER — PROCHLORPERAZINE MALEATE 5 MG
10 TABLET ORAL ONCE
Refills: 0 | Status: COMPLETED | OUTPATIENT
Start: 2020-10-28 | End: 2020-10-28

## 2020-10-28 RX ADMIN — ENOXAPARIN SODIUM 40 MILLIGRAM(S): 100 INJECTION SUBCUTANEOUS at 21:28

## 2020-10-28 RX ADMIN — MEMANTINE HYDROCHLORIDE 10 MILLIGRAM(S): 10 TABLET ORAL at 11:13

## 2020-10-28 RX ADMIN — ONDANSETRON 4 MILLIGRAM(S): 8 TABLET, FILM COATED ORAL at 05:30

## 2020-10-28 RX ADMIN — LISINOPRIL 10 MILLIGRAM(S): 2.5 TABLET ORAL at 05:33

## 2020-10-28 RX ADMIN — DONEPEZIL HYDROCHLORIDE 10 MILLIGRAM(S): 10 TABLET, FILM COATED ORAL at 21:28

## 2020-10-28 RX ADMIN — CLOPIDOGREL BISULFATE 75 MILLIGRAM(S): 75 TABLET, FILM COATED ORAL at 11:13

## 2020-10-28 RX ADMIN — Medication 81 MILLIGRAM(S): at 11:12

## 2020-10-28 RX ADMIN — FAMOTIDINE 40 MILLIGRAM(S): 10 INJECTION INTRAVENOUS at 11:13

## 2020-10-28 RX ADMIN — OXYCODONE AND ACETAMINOPHEN 1 TABLET(S): 5; 325 TABLET ORAL at 00:00

## 2020-10-28 RX ADMIN — Medication 50 MILLIGRAM(S): at 05:33

## 2020-10-28 RX ADMIN — ATORVASTATIN CALCIUM 40 MILLIGRAM(S): 80 TABLET, FILM COATED ORAL at 21:28

## 2020-10-28 NOTE — OCCUPATIONAL THERAPY INITIAL EVALUATION ADULT - RANGE OF MOTION EXAMINATION
LUE WFL; Right UE shoulder 0 degrees of flexion/abduction; Elbow 3/4 range; wrist and hand WFLs/deficits as listed below

## 2020-10-28 NOTE — PROGRESS NOTE ADULT - SUBJECTIVE AND OBJECTIVE BOX
INTERVAL HPI/OVERNIGHT EVENTS:    SUBJECTIVE: Patient seen and examined at bedside.     no cp, sob, abd pain, fever  no syncope, ha, lightheadedness, dizziness    OBJECTIVE:    VITAL SIGNS:  Vital Signs Last 24 Hrs  T(C): 35.9 (28 Oct 2020 05:00), Max: 36.4 (27 Oct 2020 21:00)  T(F): 96.7 (28 Oct 2020 05:00), Max: 97.5 (27 Oct 2020 21:00)  HR: 54 (28 Oct 2020 05:00) (54 - 63)  BP: 121/56 (27 Oct 2020 21:00) (112/57 - 121/56)  BP(mean): --  RR: 16 (28 Oct 2020 05:00) (16 - 16)  SpO2: 95% (28 Oct 2020 09:07) (95% - 95%)      PHYSICAL EXAM:    General: NAD  HEENT: NC/AT; PERRL, clear conjunctiva  Neck: supple  Respiratory: CTA b/l  Cardiovascular: +S1/S2; RRR  Abdomen: soft, NT/ND; +BS x4  Extremities: WWP, 2+ peripheral pulses b/l; no LE edema  Skin: diffuse ecchymosis face, bandaged nose  Neurological:    MEDICATIONS:  MEDICATIONS  (STANDING):  aspirin  chewable 81 milliGRAM(s) Oral daily  atorvastatin 40 milliGRAM(s) Oral at bedtime  clopidogrel Tablet 75 milliGRAM(s) Oral daily  donepezil 10 milliGRAM(s) Oral at bedtime  enoxaparin Injectable 40 milliGRAM(s) SubCutaneous at bedtime  famotidine    Tablet 40 milliGRAM(s) Oral daily  lisinopril 10 milliGRAM(s) Oral daily  memantine 10 milliGRAM(s) Oral daily  metoprolol succinate ER 50 milliGRAM(s) Oral daily    MEDICATIONS  (PRN):  acetaminophen   Tablet .. 650 milliGRAM(s) Oral every 6 hours PRN Mild Pain (1 - 3)  oxycodone    5 mG/acetaminophen 325 mG 1 Tablet(s) Oral every 8 hours PRN Severe Pain (7 - 10)      ALLERGIES:  Allergies    No Known Allergies    Intolerances        LABS:                        12.2   7.24  )-----------( 178      ( 27 Oct 2020 06:18 )             35.8     Hemoglobin: 12.2 g/dL (10- @ 06:18)  Hemoglobin: 12.5 g/dL (10-26 @ 14:29)    CBC Full  -  ( 27 Oct 2020 06:18 )  WBC Count : 7.24 K/uL  RBC Count : 4.03 M/uL  Hemoglobin : 12.2 g/dL  Hematocrit : 35.8 %  Platelet Count - Automated : 178 K/uL  Mean Cell Volume : 88.8 fL  Mean Cell Hemoglobin : 30.3 pg  Mean Cell Hemoglobin Concentration : 34.1 g/dL  Auto Neutrophil # : 5.34 K/uL  Auto Lymphocyte # : 0.97 K/uL  Auto Monocyte # : 0.86 K/uL  Auto Eosinophil # : 0.03 K/uL  Auto Basophil # : 0.01 K/uL  Auto Neutrophil % : 73.8 %  Auto Lymphocyte % : 13.4 %  Auto Monocyte % : 11.9 %  Auto Eosinophil % : 0.4 %  Auto Basophil % : 0.1 %    10-27    137  |  104  |  13  ----------------------------<  104<H>  3.9   |  24  |  0.9    Ca    9.1      27 Oct 2020 06:18  Mg     2.1     10-27    TPro  7.0  /  Alb  4.2  /  TBili  0.4  /  DBili  x   /  AST  21  /  ALT  29  /  AlkPhos  82  10-26    Creatinine Trend: 0.9<--, 1.0<--  LIVER FUNCTIONS - ( 26 Oct 2020 14:29 )  Alb: 4.2 g/dL / Pro: 7.0 g/dL / ALK PHOS: 82 U/L / ALT: 29 U/L / AST: 21 U/L / GGT: x               hs Troponin:            Urinalysis Basic - ( 27 Oct 2020 03:20 )    Color: Yellow / Appearance: Clear / S.025 / pH: x  Gluc: x / Ketone: Negative  / Bili: Negative / Urobili: 0.2 mg/dL   Blood: x / Protein: Negative mg/dL / Nitrite: Negative   Leuk Esterase: Negative / RBC: x / WBC x   Sq Epi: x / Non Sq Epi: x / Bacteria: x      CSF:                      EKG:   MICROBIOLOGY:    IMAGING:      Labs, imaging, EKG personally reviewed    RADIOLOGY & ADDITIONAL TESTS: Reviewed.

## 2020-10-28 NOTE — PROGRESS NOTE ADULT - ASSESSMENT
80M PMHx CAD s/p CABG 4v 1995, dementia, GERD, HTN here with syncope.    #Syncope, unclear etiology  difficult to obtain history; appears +prodrome  orthostatics positive today, neg prior  cta noted  monitor on tele  mri per neuro  #Dental abscess  incidentally noted on ct  f/u dental clinic  #CAD  asa, plavix  lipitor 40  #Dementia  donepezil 10  namenda 10  #HTN  lisinopril 10  toprol 50  #DVT ppx  lovenox    #Progress Note Handoff:  Pending (specify):  Consults_________, Tests________, Test Results_______, Other_____mri____  Family discussion:d/w pt at bedside re: treatment plan, primary dx  Disposition: Home___/SNF___/Other________/Unknown at this time____x____       80M PMHx CAD s/p CABG 4v 1995, dementia, GERD, HTN here with syncope.    #Syncope, unclear etiology  difficult to obtain history; appears +prodrome  orthostatics positive today, neg prior  c/b nasal fx, dressing in place  cta noted  monitor on tele  mri per neuro  #Dental abscess  incidentally noted on ct  f/u dental clinic  #CAD  asa, plavix  lipitor 40  #Dementia  donepezil 10  namenda 10  #HTN  lisinopril 10  toprol 50  #DVT ppx  lovenox    #Progress Note Handoff:  Pending (specify):  Consults_________, Tests________, Test Results_______, Other_____mri____  Family discussion:d/w pt at bedside re: treatment plan, primary dx  Disposition: Home___/SNF___/Other________/Unknown at this time____x____

## 2020-10-29 LAB
ANION GAP SERPL CALC-SCNC: 8 MMOL/L — SIGNIFICANT CHANGE UP (ref 7–14)
BUN SERPL-MCNC: 17 MG/DL — SIGNIFICANT CHANGE UP (ref 10–20)
CALCIUM SERPL-MCNC: 9.4 MG/DL — SIGNIFICANT CHANGE UP (ref 8.5–10.1)
CHLORIDE SERPL-SCNC: 104 MMOL/L — SIGNIFICANT CHANGE UP (ref 98–110)
CO2 SERPL-SCNC: 25 MMOL/L — SIGNIFICANT CHANGE UP (ref 17–32)
CREAT SERPL-MCNC: 1 MG/DL — SIGNIFICANT CHANGE UP (ref 0.7–1.5)
GLUCOSE SERPL-MCNC: 125 MG/DL — HIGH (ref 70–99)
HCT VFR BLD CALC: 38.6 % — LOW (ref 42–52)
HGB BLD-MCNC: 13.2 G/DL — LOW (ref 14–18)
MAGNESIUM SERPL-MCNC: 2.2 MG/DL — SIGNIFICANT CHANGE UP (ref 1.8–2.4)
MCHC RBC-ENTMCNC: 30.4 PG — SIGNIFICANT CHANGE UP (ref 27–31)
MCHC RBC-ENTMCNC: 34.2 G/DL — SIGNIFICANT CHANGE UP (ref 32–37)
MCV RBC AUTO: 88.9 FL — SIGNIFICANT CHANGE UP (ref 80–94)
NRBC # BLD: 0 /100 WBCS — SIGNIFICANT CHANGE UP (ref 0–0)
PHOSPHATE SERPL-MCNC: 3.3 MG/DL — SIGNIFICANT CHANGE UP (ref 2.1–4.9)
PLATELET # BLD AUTO: 183 K/UL — SIGNIFICANT CHANGE UP (ref 130–400)
POTASSIUM SERPL-MCNC: 3.9 MMOL/L — SIGNIFICANT CHANGE UP (ref 3.5–5)
POTASSIUM SERPL-SCNC: 3.9 MMOL/L — SIGNIFICANT CHANGE UP (ref 3.5–5)
RBC # BLD: 4.34 M/UL — LOW (ref 4.7–6.1)
RBC # FLD: 13.3 % — SIGNIFICANT CHANGE UP (ref 11.5–14.5)
SODIUM SERPL-SCNC: 137 MMOL/L — SIGNIFICANT CHANGE UP (ref 135–146)
WBC # BLD: 7.65 K/UL — SIGNIFICANT CHANGE UP (ref 4.8–10.8)
WBC # FLD AUTO: 7.65 K/UL — SIGNIFICANT CHANGE UP (ref 4.8–10.8)

## 2020-10-29 PROCEDURE — 99233 SBSQ HOSP IP/OBS HIGH 50: CPT

## 2020-10-29 PROCEDURE — 70551 MRI BRAIN STEM W/O DYE: CPT | Mod: 26

## 2020-10-29 PROCEDURE — 73221 MRI JOINT UPR EXTREM W/O DYE: CPT | Mod: 26,RT

## 2020-10-29 RX ADMIN — ENOXAPARIN SODIUM 40 MILLIGRAM(S): 100 INJECTION SUBCUTANEOUS at 21:23

## 2020-10-29 RX ADMIN — Medication 50 MILLIGRAM(S): at 05:05

## 2020-10-29 RX ADMIN — DONEPEZIL HYDROCHLORIDE 10 MILLIGRAM(S): 10 TABLET, FILM COATED ORAL at 21:23

## 2020-10-29 RX ADMIN — CLOPIDOGREL BISULFATE 75 MILLIGRAM(S): 75 TABLET, FILM COATED ORAL at 11:16

## 2020-10-29 RX ADMIN — ATORVASTATIN CALCIUM 40 MILLIGRAM(S): 80 TABLET, FILM COATED ORAL at 21:23

## 2020-10-29 RX ADMIN — LISINOPRIL 10 MILLIGRAM(S): 2.5 TABLET ORAL at 05:05

## 2020-10-29 RX ADMIN — MEMANTINE HYDROCHLORIDE 10 MILLIGRAM(S): 10 TABLET ORAL at 11:16

## 2020-10-29 RX ADMIN — Medication 81 MILLIGRAM(S): at 11:15

## 2020-10-29 RX ADMIN — FAMOTIDINE 40 MILLIGRAM(S): 10 INJECTION INTRAVENOUS at 11:15

## 2020-10-29 NOTE — PROGRESS NOTE ADULT - ASSESSMENT
80M PMHx CAD s/p CABG 4v 1995, dementia, GERD, HTN here with syncope.    #Syncope, unclear etiology  +prodrome, suspect vasovagal/ reflex syncope  orthostatics positive, repeat  c/b nasal fx, dressing in place  cta noted  monitor on tele  mri per neuro  PT  #Dental abscess  incidentally noted on ct  f/u dental clinic outpt  #CAD  asa, plavix  lipitor 40  #Dementia  donepezil 10  namenda 10  #HTN  lisinopril 10  toprol 50  #DVT ppx  lovenox    #Progress Note Handoff:  Pending (specify):  Consults_________, Tests________, Test Results_______, Other_____mri____  Family discussion:d/w pt at bedside re: treatment plan, primary dx  Disposition: Home___/SNF___/Other________/Unknown at this time____x____

## 2020-10-29 NOTE — CHART NOTE - NSCHARTNOTEFT_GEN_A_CORE
MRI BRAIN IS UNREMARKABLE AND REEG IS NORMAL  There is no further w/u needed from neuro standpoint at this time  please call with any questions

## 2020-10-29 NOTE — PROGRESS NOTE ADULT - SUBJECTIVE AND OBJECTIVE BOX
INTERVAL HPI/OVERNIGHT EVENTS:    SUBJECTIVE: Patient seen and examined at bedside.     no cp, sob, abd pain, fever  no ha, syncope, lightheadedness, dizziness    OBJECTIVE:    VITAL SIGNS:  Vital Signs Last 24 Hrs  T(C): 35.8 (29 Oct 2020 05:38), Max: 36.4 (28 Oct 2020 21:00)  T(F): 96.4 (29 Oct 2020 05:38), Max: 97.6 (28 Oct 2020 21:00)  HR: 60 (29 Oct 2020 05:38) (60 - 60)  BP: 177/80 (29 Oct 2020 05:38) (144/68 - 177/80)  BP(mean): --  RR: 18 (29 Oct 2020 05:38) (16 - 18)  SpO2: --      PHYSICAL EXAM:    General: NAD  HEENT: NC/AT; PERRL, clear conjunctiva  Neck: supple  Respiratory: CTA b/l  Cardiovascular: +S1/S2; RRR  Abdomen: soft, NT/ND; +BS x4  Extremities: WWP, 2+ peripheral pulses b/l; no LE edema  Skin: diffuse ecchymosis face  Neurological:    MEDICATIONS:  MEDICATIONS  (STANDING):  aspirin  chewable 81 milliGRAM(s) Oral daily  atorvastatin 40 milliGRAM(s) Oral at bedtime  clopidogrel Tablet 75 milliGRAM(s) Oral daily  donepezil 10 milliGRAM(s) Oral at bedtime  enoxaparin Injectable 40 milliGRAM(s) SubCutaneous at bedtime  famotidine    Tablet 40 milliGRAM(s) Oral daily  lisinopril 10 milliGRAM(s) Oral daily  memantine 10 milliGRAM(s) Oral daily  metoprolol succinate ER 50 milliGRAM(s) Oral daily    MEDICATIONS  (PRN):  acetaminophen   Tablet .. 650 milliGRAM(s) Oral every 6 hours PRN Mild Pain (1 - 3)  oxycodone    5 mG/acetaminophen 325 mG 1 Tablet(s) Oral every 8 hours PRN Severe Pain (7 - 10)      ALLERGIES:  Allergies    No Known Allergies    Intolerances        LABS:                        13.2   7.65  )-----------( 183      ( 29 Oct 2020 06:23 )             38.6     Hemoglobin: 13.2 g/dL (10-29 @ 06:23)  Hemoglobin: 12.2 g/dL (10-27 @ 06:18)  Hemoglobin: 12.5 g/dL (10-26 @ 14:29)    CBC Full  -  ( 29 Oct 2020 06:23 )  WBC Count : 7.65 K/uL  RBC Count : 4.34 M/uL  Hemoglobin : 13.2 g/dL  Hematocrit : 38.6 %  Platelet Count - Automated : 183 K/uL  Mean Cell Volume : 88.9 fL  Mean Cell Hemoglobin : 30.4 pg  Mean Cell Hemoglobin Concentration : 34.2 g/dL  Auto Neutrophil # : x  Auto Lymphocyte # : x  Auto Monocyte # : x  Auto Eosinophil # : x  Auto Basophil # : x  Auto Neutrophil % : x  Auto Lymphocyte % : x  Auto Monocyte % : x  Auto Eosinophil % : x  Auto Basophil % : x    10-29    137  |  104  |  17  ----------------------------<  125<H>  3.9   |  25  |  1.0    Ca    9.4      29 Oct 2020 06:23  Phos  3.3     10-29  Mg     2.2     10-29      Creatinine Trend: 1.0<--, 0.9<--, 1.0<--        hs Troponin:              CSF:                      EKG:   MICROBIOLOGY:    IMAGING:      Labs, imaging, EKG personally reviewed    RADIOLOGY & ADDITIONAL TESTS: Reviewed.

## 2020-10-30 ENCOUNTER — TRANSCRIPTION ENCOUNTER (OUTPATIENT)
Age: 80
End: 2020-10-30

## 2020-10-30 VITALS — HEART RATE: 55 BPM | TEMPERATURE: 97 F | SYSTOLIC BLOOD PRESSURE: 102 MMHG | DIASTOLIC BLOOD PRESSURE: 55 MMHG

## 2020-10-30 PROCEDURE — 99239 HOSP IP/OBS DSCHRG MGMT >30: CPT

## 2020-10-30 RX ORDER — MEMANTINE HYDROCHLORIDE 10 MG/1
1 TABLET ORAL
Qty: 0 | Refills: 0 | DISCHARGE

## 2020-10-30 RX ORDER — DONEPEZIL HYDROCHLORIDE 10 MG/1
1 TABLET, FILM COATED ORAL
Qty: 0 | Refills: 0 | DISCHARGE
Start: 2020-10-30

## 2020-10-30 RX ORDER — BACITRACIN ZINC 500 UNIT/G
1 OINTMENT IN PACKET (EA) TOPICAL
Refills: 0 | Status: DISCONTINUED | OUTPATIENT
Start: 2020-10-30 | End: 2020-10-30

## 2020-10-30 RX ORDER — ASPIRIN/CALCIUM CARB/MAGNESIUM 324 MG
1 TABLET ORAL
Qty: 0 | Refills: 0 | DISCHARGE
Start: 2020-10-30

## 2020-10-30 RX ORDER — ATORVASTATIN CALCIUM 80 MG/1
1 TABLET, FILM COATED ORAL
Qty: 0 | Refills: 0 | DISCHARGE
Start: 2020-10-30

## 2020-10-30 RX ORDER — LISINOPRIL 2.5 MG/1
1 TABLET ORAL
Qty: 0 | Refills: 0 | DISCHARGE
Start: 2020-10-30

## 2020-10-30 RX ORDER — DONEPEZIL HYDROCHLORIDE 10 MG/1
1 TABLET, FILM COATED ORAL
Qty: 0 | Refills: 0 | DISCHARGE

## 2020-10-30 RX ORDER — METOPROLOL TARTRATE 50 MG
1 TABLET ORAL
Qty: 0 | Refills: 0 | DISCHARGE
Start: 2020-10-30

## 2020-10-30 RX ORDER — CLOPIDOGREL BISULFATE 75 MG/1
1 TABLET, FILM COATED ORAL
Qty: 0 | Refills: 0 | DISCHARGE
Start: 2020-10-30

## 2020-10-30 RX ORDER — MEMANTINE HYDROCHLORIDE 10 MG/1
1 TABLET ORAL
Qty: 0 | Refills: 0 | DISCHARGE
Start: 2020-10-30

## 2020-10-30 RX ORDER — FAMOTIDINE 10 MG/ML
1 INJECTION INTRAVENOUS
Qty: 0 | Refills: 0 | DISCHARGE
Start: 2020-10-30

## 2020-10-30 RX ORDER — ATORVASTATIN CALCIUM 80 MG/1
1 TABLET, FILM COATED ORAL
Qty: 0 | Refills: 0 | DISCHARGE

## 2020-10-30 RX ADMIN — Medication 1 APPLICATION(S): at 12:29

## 2020-10-30 RX ADMIN — Medication 81 MILLIGRAM(S): at 12:14

## 2020-10-30 RX ADMIN — CLOPIDOGREL BISULFATE 75 MILLIGRAM(S): 75 TABLET, FILM COATED ORAL at 12:13

## 2020-10-30 RX ADMIN — Medication 50 MILLIGRAM(S): at 06:32

## 2020-10-30 RX ADMIN — Medication 1 APPLICATION(S): at 17:31

## 2020-10-30 RX ADMIN — LISINOPRIL 10 MILLIGRAM(S): 2.5 TABLET ORAL at 06:32

## 2020-10-30 RX ADMIN — MEMANTINE HYDROCHLORIDE 10 MILLIGRAM(S): 10 TABLET ORAL at 12:14

## 2020-10-30 RX ADMIN — FAMOTIDINE 40 MILLIGRAM(S): 10 INJECTION INTRAVENOUS at 12:16

## 2020-10-30 NOTE — CONSULT NOTE ADULT - ASSESSMENT
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 Ribera or Kavin.  [phone]

## 2020-10-30 NOTE — DIETITIAN INITIAL EVALUATION ADULT. - RD TO REMAIN AVAILABLE
yes/INTERVENTION: meals and snacks, coordination of care. ME: RD to monitor diet order, energy intake,  body composition, NFPF, glucose profile

## 2020-10-30 NOTE — CHART NOTE - NSCHARTNOTEFT_GEN_A_CORE
sutures removed from left forehead and nasal lacerations. Pt tolerated procedure well. There were no complications. Wound cleaned and instructed rn to apply bacitracin q12h

## 2020-10-30 NOTE — DISCHARGE NOTE NURSING/CASE MANAGEMENT/SOCIAL WORK - PATIENT PORTAL LINK FT
You can access the FollowMyHealth Patient Portal offered by [facility] by registering at the following website: [email or web address]. By joining [email or web address]’s FollowMyHealth portal, you will also be able to view your health information using other applications (apps) compatible with our system.

## 2020-10-30 NOTE — DISCHARGE NOTE PROVIDER - HOSPITAL COURSE
80M PMHx CAD s/p CABG 4v 1995, dementia, GERD, HTN here with syncope. Syncope precipitated by prodrome of dizziness, otherwise pt unable to provide further history. Seen in ED, trauma w/u neg except for nasal fx, repaired in ed. CTH neg, noted to have transient confusion, vomiting which improved, suspected mild tbi/ concussion. He was monitored on tele without events, ekg unremarkable, tte unremarkable. Suspect syncope due to reflex mediated. He was seen by neuro, underwent mri which was neg for acute findings. Notably he was found to have biceps tendon tear, seen by ortho will need outpt f/u. He was stable for d/c on 10/30, needs outpt pmd, ortho, neuro, cards f/u one week.    41 minutes spent on discharge planning.

## 2020-10-30 NOTE — PROGRESS NOTE ADULT - ASSESSMENT
80M PMHx CAD s/p CABG 4v 1995, dementia, GERD, HTN here with syncope.    #Syncope, unclear etiology  +prodrome, suspect vasovagal/ reflex syncope  orthostatics neg  c/b nasal fx, dressing in place  cta noted  monitor on tele  mri per neuro  PT  #Dental abscess  incidentally noted on ct  f/u dental clinic outpt  #CAD  asa, plavix  lipitor 40  #Dementia  donepezil 10  namenda 10  #HTN  lisinopril 10  toprol 50  #DVT ppx  lovenox    #Progress Note Handoff:  Pending (specify):  Consults_________, Tests________, Test Results_______, Other_____mri____  Family discussion:d/w pt at bedside re: treatment plan, primary dx  Disposition: Home___/SNF___/Other________/Unknown at this time____x____       80M PMHx CAD s/p CABG 4v 1995, dementia, GERD, HTN here with syncope.    #Syncope, unclear etiology  +prodrome, suspect vasovagal/ reflex syncope  +/- component mild tbi/ concussion, now resolving  orthostatics neg  no events on tele  c/b nasal fx, to remove stitches today  mri neg, chronic infarcts  PT  #Biceps tendon tear  noted on mri  ortho consult  pain control  #Dental abscess  incidentally noted on ct  f/u dental clinic outpt  #CAD  asa, plavix  lipitor 40  #Dementia  donepezil 10  namenda 10  #HTN  lisinopril 10  toprol 50  #DVT ppx  lovenox    #Progress Note Handoff:  Pending (specify):  Consults_________, Tests________, Test Results_______, Other_____ortho____  Family discussion:d/w pt at bedside re: treatment plan, primary dx  Disposition: Home___/SNF___/Other________/Unknown at this time____x____

## 2020-10-30 NOTE — DISCHARGE NOTE PROVIDER - CARE PROVIDER_API CALL
Brian Bernal  CARDIOLOGY  [address] [place] [address], [address] [address]  Phone: [phone]  Fax: [phone]  Follow Up Time:     Perry Villalpando  ORTHOPAEDIC SURGERY  [address] [id number]  Phone: [phone]  Fax: [phone]  Follow Up Time:     Zana Sher  INTERNAL MEDICINE  [address] [place] [address]  Phone: [phone]  Fax: [phone]  Follow Up Time:

## 2020-10-30 NOTE — DISCHARGE NOTE PROVIDER - NSDCMRMEDTOKEN_GEN_ALL_CORE_FT
aspirin 81 mg oral tablet, chewable: 1 tab(s) orally once a day  atorvastatin 40 mg oral tablet: 1 tab(s) orally once a day (at bedtime)  clopidogrel 75 mg oral tablet: 1 tab(s) orally once a day  donepezil 10 mg oral tablet: 1 tab(s) orally once a day (at bedtime)  famotidine 40 mg oral tablet: 1 tab(s) orally once a day  lisinopril 10 mg oral tablet: 1 tab(s) orally once a day  memantine 10 mg oral tablet: 1 tab(s) orally once a day  metoprolol succinate 50 mg oral tablet, extended release: 1 tab(s) orally once a day

## 2020-10-30 NOTE — PROGRESS NOTE ADULT - SUBJECTIVE AND OBJECTIVE BOX
INTERVAL HPI/OVERNIGHT EVENTS:    SUBJECTIVE: Patient seen and examined at bedside.     no cp, sob, abd pain, fever  no syncope, ha, lightheadedness, dizziness    OBJECTIVE:    VITAL SIGNS:  Vital Signs Last 24 Hrs  T(C): 35.8 (30 Oct 2020 05:00), Max: 35.9 (29 Oct 2020 20:10)  T(F): 96.5 (30 Oct 2020 05:00), Max: 96.7 (29 Oct 2020 20:10)  HR: 57 (30 Oct 2020 05:00) (57 - 61)  BP: 157/74 (30 Oct 2020 05:00) (113/53 - 161/76)  BP(mean): --  RR: 16 (30 Oct 2020 05:00) (16 - 18)  SpO2: --      PHYSICAL EXAM:    General: NAD  HEENT: NC/AT; PERRL, clear conjunctiva  Neck: supple  Respiratory: CTA b/l  Cardiovascular: +S1/S2; RRR  Abdomen: soft, NT/ND; +BS x4  Extremities: WWP, 2+ peripheral pulses b/l; no LE edema  Skin: diffuse facial ecchymosis  Neurological:    MEDICATIONS:  MEDICATIONS  (STANDING):  aspirin  chewable 81 milliGRAM(s) Oral daily  atorvastatin 40 milliGRAM(s) Oral at bedtime  clopidogrel Tablet 75 milliGRAM(s) Oral daily  donepezil 10 milliGRAM(s) Oral at bedtime  enoxaparin Injectable 40 milliGRAM(s) SubCutaneous at bedtime  famotidine    Tablet 40 milliGRAM(s) Oral daily  lisinopril 10 milliGRAM(s) Oral daily  memantine 10 milliGRAM(s) Oral daily  metoprolol succinate ER 50 milliGRAM(s) Oral daily    MEDICATIONS  (PRN):  acetaminophen   Tablet .. 650 milliGRAM(s) Oral every 6 hours PRN Mild Pain (1 - 3)  oxycodone    5 mG/acetaminophen 325 mG 1 Tablet(s) Oral every 8 hours PRN Severe Pain (7 - 10)      ALLERGIES:  Allergies    No Known Allergies    Intolerances        LABS:                        13.2   7.65  )-----------( 183      ( 29 Oct 2020 06:23 )             38.6     Hemoglobin: 13.2 g/dL (10-29 @ 06:23)  Hemoglobin: 12.2 g/dL (10-27 @ 06:18)  Hemoglobin: 12.5 g/dL (10-26 @ 14:29)    CBC Full  -  ( 29 Oct 2020 06:23 )  WBC Count : 7.65 K/uL  RBC Count : 4.34 M/uL  Hemoglobin : 13.2 g/dL  Hematocrit : 38.6 %  Platelet Count - Automated : 183 K/uL  Mean Cell Volume : 88.9 fL  Mean Cell Hemoglobin : 30.4 pg  Mean Cell Hemoglobin Concentration : 34.2 g/dL  Auto Neutrophil # : x  Auto Lymphocyte # : x  Auto Monocyte # : x  Auto Eosinophil # : x  Auto Basophil # : x  Auto Neutrophil % : x  Auto Lymphocyte % : x  Auto Monocyte % : x  Auto Eosinophil % : x  Auto Basophil % : x    10-29    137  |  104  |  17  ----------------------------<  125<H>  3.9   |  25  |  1.0    Ca    9.4      29 Oct 2020 06:23  Phos  3.3     10-29  Mg     2.2     10-29      Creatinine Trend: 1.0<--, 0.9<--, 1.0<--        hs Troponin:              CSF:                      EKG:   MICROBIOLOGY:    IMAGING:      Labs, imaging, EKG personally reviewed    RADIOLOGY & ADDITIONAL TESTS: Reviewed.

## 2020-10-30 NOTE — DIETITIAN INITIAL EVALUATION ADULT. - OTHER INFO
Pt admitted d/t nasal fracture + unsteady gait. Dental abscess on CT-- pt to have outpatient f/u with dental clinic.

## 2020-10-30 NOTE — DIETITIAN INITIAL EVALUATION ADULT. - PHYSCIAL ASSESSMENT
overweight UBW is ~172 lbs per pt with no reports of unintentional wt loss. No edema noted; skin assessment on 10/29 shows ecchymosis.

## 2020-10-30 NOTE — DIETITIAN INITIAL EVALUATION ADULT. - ORAL INTAKE PTA/DIET HISTORY
Pt with good appetite and po intake pta. He eats 3-4 meals/day at baseline. He reports that he gets food from [employer] daily + daughter also cooks for him. No dietary restrictions noted. NKFA/intolerances. Daily MVI. No food preferences r/t culture/Hindu.

## 2020-10-30 NOTE — DIETITIAN INITIAL EVALUATION ADULT. - FACTORS AFF FOOD INTAKE
none/Pt states that he has been doing ok with meals throughout LOS and denies any decline in appetite since admission. Per EMR, po intake ranges from 0-50% with meal refusals noted throughout LOS. Pt declines the need for oral nutrition supplements. No nausea/abdominal pain with meals. No chewing/swallowing difficulties notes. No diarrhea/constipation noted, pt is unsure of his last BM. No BMs recorded per EMR.

## 2020-10-30 NOTE — DISCHARGE NOTE PROVIDER - NSDCCPCAREPLAN_GEN_ALL_CORE_FT
PRINCIPAL DISCHARGE DIAGNOSIS  Diagnosis: Nasal fracture  Assessment and Plan of Treatment: PLEASE FOLLOW UP WITH YOUR PRIMARY CARE DOCTOR, CARDIOLOGIST, ORTHOPEDIST IN ONE WEEK.      SECONDARY DISCHARGE DIAGNOSES  Diagnosis: Unsteady gait  Assessment and Plan of Treatment:

## 2020-10-30 NOTE — CONSULT NOTE ADULT - SUBJECTIVE AND OBJECTIVE BOX
81 yo M s/p fall from standing height.  Admitted to the hospital.  Pt c/o upper extremity pain.  MRI was obtained and orthopedics consulted.  Denies N/T.  No h/o previous injury or previous pain.      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 650 milliGRAM(s) Oral every 6 hours PRN Mild Pain (1 - 3)  oxycodone    5 mG/acetaminophen 325 mG 1 Tablet(s) Oral every 8 hours PRN Severe Pain (7 - 10)    Allergies    No Known Allergies    Intolerances    FAMILY HISTORY:  No pertinent family history in first degree relatives    Social History:  Marital Status:  (   )    (   ) Single    (   )    ( x )   Lives with: ( x ) alone  (  ) children   (  ) spouse   (  ) parents  (  ) other  Recent Travel: No recent travel  Occupation: retired    Substance Use (street drugs): ( x ) never used  (  ) other:  Tobacco Usage:  ( x  ) never smoked   (   ) former smoker   (   ) current smoker  (     ) pack year  Alcohol Usage: None (26 Oct 2020 20:03)    REVIEW OF SYSTEMS  pt drowsy and unable to answer questions appropriately but does complain of shoulder pain    Vital Signs Last 24 Hrs  T(C): 35.8 (30 Oct 2020 05:00), Max: 35.9 (29 Oct 2020 20:10)  T(F): 96.5 (30 Oct 2020 05:00), Max: 96.7 (29 Oct 2020 20:10)  HR: 57 (30 Oct 2020 05:00) (57 - 61)  BP: 157/74 (30 Oct 2020 05:00) (113/53 - 161/76)  BP(mean): --  RR: 16 (30 Oct 2020 05:00) (16 - 18)  SpO2: --                          13.2   7.65  )-----------( 183      ( 29 Oct 2020 06:23 )             38.6     10-29    137  |  104  |  17  ----------------------------<  125<H>  3.9   |  25  |  1.0    Ca    9.4      29 Oct 2020 06:23  Phos  3.3     10-29  Mg     2.2     10-29    MRI: Right SLAP tear and biceps tear    Physical Exam:  drowsy  skin intact  minimal swelling right shoulder  pain with ROM   SILT  5/5 in fingers, wrist, forearm musculature  wwp  TTP anterior shoulder joint

## 2020-10-30 NOTE — DISCHARGE NOTE PROVIDER - CARE PROVIDERS DIRECT ADDRESSES
[email or web address]

## 2020-10-30 NOTE — DISCHARGE NOTE PROVIDER - PROVIDER TOKENS
PROVIDER:[TOKEN:[[id number]]],PROVIDER:[TOKEN:[[id number]]],PROVIDER:[TOKEN:[[id number]]]

## 2020-11-10 ENCOUNTER — INPATIENT (INPATIENT)
Facility: HOSPITAL | Age: 80
LOS: 1 days | Discharge: NURSING, REHAB OR LONG-TERM CARE | End: 2020-11-12
Attending: STUDENT IN AN ORGANIZED HEALTH CARE EDUCATION/TRAINING PROGRAM | Admitting: STUDENT IN AN ORGANIZED HEALTH CARE EDUCATION/TRAINING PROGRAM
Payer: MEDICARE

## 2020-11-10 VITALS
RESPIRATION RATE: 18 BRPM | HEART RATE: 58 BPM | OXYGEN SATURATION: 99 % | TEMPERATURE: 97 F | HEIGHT: 66 IN | SYSTOLIC BLOOD PRESSURE: 116 MMHG | DIASTOLIC BLOOD PRESSURE: 60 MMHG

## 2020-11-10 DIAGNOSIS — C82.80 OTHER TYPES OF FOLLICULAR LYMPHOMA, UNSPECIFIED SITE: ICD-10-CM

## 2020-11-10 DIAGNOSIS — I25.10 ATHEROSCLEROTIC HEART DISEASE OF NATIVE CORONARY ARTERY WITHOUT ANGINA PECTORIS: ICD-10-CM

## 2020-11-10 DIAGNOSIS — I95.9 HYPOTENSION, UNSPECIFIED: ICD-10-CM

## 2020-11-10 DIAGNOSIS — Z95.1 PRESENCE OF AORTOCORONARY BYPASS GRAFT: Chronic | ICD-10-CM

## 2020-11-10 DIAGNOSIS — K21.9 GASTRO-ESOPHAGEAL REFLUX DISEASE WITHOUT ESOPHAGITIS: ICD-10-CM

## 2020-11-10 DIAGNOSIS — R53.1 WEAKNESS: ICD-10-CM

## 2020-11-10 DIAGNOSIS — R07.89 OTHER CHEST PAIN: ICD-10-CM

## 2020-11-10 DIAGNOSIS — Z95.1 PRESENCE OF AORTOCORONARY BYPASS GRAFT: ICD-10-CM

## 2020-11-10 DIAGNOSIS — F03.90 UNSPECIFIED DEMENTIA WITHOUT BEHAVIORAL DISTURBANCE: ICD-10-CM

## 2020-11-10 DIAGNOSIS — Z95.5 PRESENCE OF CORONARY ANGIOPLASTY IMPLANT AND GRAFT: ICD-10-CM

## 2020-11-10 DIAGNOSIS — I10 ESSENTIAL (PRIMARY) HYPERTENSION: ICD-10-CM

## 2020-11-10 LAB
ALBUMIN SERPL ELPH-MCNC: 4 G/DL — SIGNIFICANT CHANGE UP (ref 3.5–5.2)
ALP SERPL-CCNC: 91 U/L — SIGNIFICANT CHANGE UP (ref 30–115)
ALT FLD-CCNC: 20 U/L — SIGNIFICANT CHANGE UP (ref 0–41)
ANION GAP SERPL CALC-SCNC: 11 MMOL/L — SIGNIFICANT CHANGE UP (ref 7–14)
AST SERPL-CCNC: 19 U/L — SIGNIFICANT CHANGE UP (ref 0–41)
BASOPHILS # BLD AUTO: 0.03 K/UL — SIGNIFICANT CHANGE UP (ref 0–0.2)
BASOPHILS NFR BLD AUTO: 0.4 % — SIGNIFICANT CHANGE UP (ref 0–1)
BILIRUB SERPL-MCNC: 0.6 MG/DL — SIGNIFICANT CHANGE UP (ref 0.2–1.2)
BUN SERPL-MCNC: 16 MG/DL — SIGNIFICANT CHANGE UP (ref 10–20)
CALCIUM SERPL-MCNC: 9.4 MG/DL — SIGNIFICANT CHANGE UP (ref 8.5–10.1)
CHLORIDE SERPL-SCNC: 102 MMOL/L — SIGNIFICANT CHANGE UP (ref 98–110)
CO2 SERPL-SCNC: 22 MMOL/L — SIGNIFICANT CHANGE UP (ref 17–32)
CREAT SERPL-MCNC: 1.1 MG/DL — SIGNIFICANT CHANGE UP (ref 0.7–1.5)
EOSINOPHIL # BLD AUTO: 0.04 K/UL — SIGNIFICANT CHANGE UP (ref 0–0.7)
EOSINOPHIL NFR BLD AUTO: 0.5 % — SIGNIFICANT CHANGE UP (ref 0–8)
GLUCOSE SERPL-MCNC: 101 MG/DL — HIGH (ref 70–99)
HCT VFR BLD CALC: 43.3 % — SIGNIFICANT CHANGE UP (ref 42–52)
HGB BLD-MCNC: 14.3 G/DL — SIGNIFICANT CHANGE UP (ref 14–18)
IMM GRANULOCYTES NFR BLD AUTO: 0.2 % — SIGNIFICANT CHANGE UP (ref 0.1–0.3)
LIDOCAIN IGE QN: 36 U/L — SIGNIFICANT CHANGE UP (ref 7–60)
LYMPHOCYTES # BLD AUTO: 1.1 K/UL — LOW (ref 1.2–3.4)
LYMPHOCYTES # BLD AUTO: 13.3 % — LOW (ref 20.5–51.1)
MAGNESIUM SERPL-MCNC: 2.2 MG/DL — SIGNIFICANT CHANGE UP (ref 1.8–2.4)
MCHC RBC-ENTMCNC: 30.2 PG — SIGNIFICANT CHANGE UP (ref 27–31)
MCHC RBC-ENTMCNC: 33 G/DL — SIGNIFICANT CHANGE UP (ref 32–37)
MCV RBC AUTO: 91.5 FL — SIGNIFICANT CHANGE UP (ref 80–94)
MONOCYTES # BLD AUTO: 1 K/UL — HIGH (ref 0.1–0.6)
MONOCYTES NFR BLD AUTO: 12.1 % — HIGH (ref 1.7–9.3)
NEUTROPHILS # BLD AUTO: 6.07 K/UL — SIGNIFICANT CHANGE UP (ref 1.4–6.5)
NEUTROPHILS NFR BLD AUTO: 73.5 % — SIGNIFICANT CHANGE UP (ref 42.2–75.2)
NRBC # BLD: 0 /100 WBCS — SIGNIFICANT CHANGE UP (ref 0–0)
PLATELET # BLD AUTO: 182 K/UL — SIGNIFICANT CHANGE UP (ref 130–400)
POTASSIUM SERPL-MCNC: 4.7 MMOL/L — SIGNIFICANT CHANGE UP (ref 3.5–5)
POTASSIUM SERPL-SCNC: 4.7 MMOL/L — SIGNIFICANT CHANGE UP (ref 3.5–5)
PROT SERPL-MCNC: 7.3 G/DL — SIGNIFICANT CHANGE UP (ref 6–8)
RBC # BLD: 4.73 M/UL — SIGNIFICANT CHANGE UP (ref 4.7–6.1)
RBC # FLD: 13.4 % — SIGNIFICANT CHANGE UP (ref 11.5–14.5)
SODIUM SERPL-SCNC: 135 MMOL/L — SIGNIFICANT CHANGE UP (ref 135–146)
TROPONIN T SERPL-MCNC: <0.01 NG/ML — SIGNIFICANT CHANGE UP
WBC # BLD: 8.26 K/UL — SIGNIFICANT CHANGE UP (ref 4.8–10.8)
WBC # FLD AUTO: 8.26 K/UL — SIGNIFICANT CHANGE UP (ref 4.8–10.8)

## 2020-11-10 PROCEDURE — 71045 X-RAY EXAM CHEST 1 VIEW: CPT | Mod: 26

## 2020-11-10 PROCEDURE — 70450 CT HEAD/BRAIN W/O DYE: CPT | Mod: 26

## 2020-11-10 PROCEDURE — 99285 EMERGENCY DEPT VISIT HI MDM: CPT | Mod: CS

## 2020-11-10 PROCEDURE — 99223 1ST HOSP IP/OBS HIGH 75: CPT

## 2020-11-10 PROCEDURE — 93010 ELECTROCARDIOGRAM REPORT: CPT

## 2020-11-10 RX ORDER — LISINOPRIL 2.5 MG/1
10 TABLET ORAL DAILY
Refills: 0 | Status: DISCONTINUED | OUTPATIENT
Start: 2020-11-10 | End: 2020-11-12

## 2020-11-10 RX ORDER — MEMANTINE HYDROCHLORIDE 10 MG/1
10 TABLET ORAL DAILY
Refills: 0 | Status: DISCONTINUED | OUTPATIENT
Start: 2020-11-10 | End: 2020-11-12

## 2020-11-10 RX ORDER — FAMOTIDINE 10 MG/ML
40 INJECTION INTRAVENOUS DAILY
Refills: 0 | Status: DISCONTINUED | OUTPATIENT
Start: 2020-11-10 | End: 2020-11-12

## 2020-11-10 RX ORDER — CLOPIDOGREL BISULFATE 75 MG/1
75 TABLET, FILM COATED ORAL DAILY
Refills: 0 | Status: DISCONTINUED | OUTPATIENT
Start: 2020-11-10 | End: 2020-11-12

## 2020-11-10 RX ORDER — ATORVASTATIN CALCIUM 80 MG/1
40 TABLET, FILM COATED ORAL AT BEDTIME
Refills: 0 | Status: DISCONTINUED | OUTPATIENT
Start: 2020-11-10 | End: 2020-11-12

## 2020-11-10 RX ORDER — ASPIRIN/CALCIUM CARB/MAGNESIUM 324 MG
162 TABLET ORAL ONCE
Refills: 0 | Status: COMPLETED | OUTPATIENT
Start: 2020-11-10 | End: 2020-11-10

## 2020-11-10 RX ORDER — ASPIRIN/CALCIUM CARB/MAGNESIUM 324 MG
81 TABLET ORAL DAILY
Refills: 0 | Status: DISCONTINUED | OUTPATIENT
Start: 2020-11-10 | End: 2020-11-12

## 2020-11-10 RX ORDER — ENOXAPARIN SODIUM 100 MG/ML
30 INJECTION SUBCUTANEOUS AT BEDTIME
Refills: 0 | Status: DISCONTINUED | OUTPATIENT
Start: 2020-11-10 | End: 2020-11-12

## 2020-11-10 RX ORDER — DONEPEZIL HYDROCHLORIDE 10 MG/1
10 TABLET, FILM COATED ORAL AT BEDTIME
Refills: 0 | Status: DISCONTINUED | OUTPATIENT
Start: 2020-11-10 | End: 2020-11-12

## 2020-11-10 RX ORDER — METOPROLOL TARTRATE 50 MG
50 TABLET ORAL DAILY
Refills: 0 | Status: DISCONTINUED | OUTPATIENT
Start: 2020-11-10 | End: 2020-11-12

## 2020-11-10 RX ADMIN — Medication 162 MILLIGRAM(S): at 16:19

## 2020-11-10 NOTE — H&P ADULT - HISTORY OF PRESENT ILLNESS
80 M PMH CAD s/p CABG and stent (last stent 08/31/20), HTN, Dementia, Stage IV follicular lymphoma, recent admission fall 2 weeks ago presented to ED for weakness and chest pain.  History from [facility] staff (from ED) noted that patient was lethargic around 1pm day of admssion and had BP 90/50. Patient also endorsed chest pain at that time, although denied any chest pain on interview. Patient is a poor historian. In ED vital signs were stable, heart rate was 58, EKG NSR, troponin negative. Patient heart rate in previous admissions tend to be on lower side. Patient had extensive syncope workup last admission 2 weeks ago. CT head was negative on this admission. Labs grossly unremarkable.    80 M PMH CAD s/p CABG and stent (last stent 08/31/20), HTN, Dementia, Stage IV follicular lymphoma, recent admission fall 2 weeks ago presented to ED for weakness and chest pain.  History from [facility] staff (from ED) noted that patient was lethargic around 1pm day of admission and had BP 90/50. Patient also endorsed chest pain at that time, although denied any chest pain on interview. When asked, patient stated he did not have any chest pain on day of admission. Patient is a poor historian. In ED vital signs were stable, heart rate was 58, EKG NSR, troponin negative. Patient heart rate in previous admissions tend to be on lower side. Patient had extensive syncope workup last admission 2 weeks ago. CT head was negative on this admission. Labs grossly unremarkable.

## 2020-11-10 NOTE — H&P ADULT - NSICDXPASTMEDICALHX_GEN_ALL_CORE_FT
PAST MEDICAL HISTORY:  Chronic GERD     Coronary artery disease involving coronary bypass graft of native heart without angina pectoris     Dementia     HTN (hypertension)

## 2020-11-10 NOTE — H&P ADULT - ATTENDING COMMENTS
HPI:  80 M PMH CAD s/p CABG and stent (last stent 08/31/20), HTN, Dementia, Stage IV follicular lymphoma, recent admission fall 2 weeks ago presented to ED for weakness and chest pain.  History from [facility] staff (from ED) noted that patient was lethargic around 1pm day of admssion and had BP 90/50. Patient also endorsed chest pain at that time, although denied any chest pain on interview. Patient is a poor historian. In ED vital signs were stable, heart rate was 58, EKG NSR, troponin negative. Patient heart rate in previous admissions tend to be on lower side. Patient had extensive syncope workup last admission 2 weeks ago. CT head was negative on this admission. Labs grossly unremarkable.    (10 Nov 2020 22:04)    REVIEW OF SYSTEMS:    CONSTITUTIONAL: No weakness, fevers or chills  EYES/ENT: No visual changes;  No vertigo or throat pain   NECK: No pain or stiffness  RESPIRATORY: No cough, wheezing, hemoptysis; No shortness of breath  CARDIOVASCULAR: No chest pain or palpitations  GASTROINTESTINAL: No abdominal or epigastric pain. No nausea, vomiting, or hematemesis; No diarrhea or constipation. No melena or hematochezia.  GENITOURINARY: No dysuria, frequency or hematuria  NEUROLOGICAL: No numbness or weakness  SKIN: No itching, rashes    Physical Exam:    General: WN/WD NAD  Neurology: A&Ox3, nonfocal, follows commands  Eyes: PERRLA/ EOMI  ENT/Neck: Neck supple, trachea midline, No JVD  Respiratory: CTA B/L, No wheezing, rales, rhonchi  CV: Normal rate regular rhythm, S1S2, no murmurs, rubs or gallops  Abdominal: Soft, NT, ND +BS,   Extremities: No edema, + peripheral pulses  Skin: No Rashes, Hematoma, Ecchymosis  Incisions:   Tubes: HPI:  80 M PMH CAD s/p CABG and stent (last stent 08/31/20), HTN, Dementia, Stage IV follicular lymphoma, recent admission fall 2 weeks ago presented to ED for weakness and chest pain.  History from [facility] staff (from ED) noted that patient was lethargic around 1pm day of admssion and had BP 90/50. Patient also endorsed chest pain at that time, although denied any chest pain on interview. Patient is a poor historian. In ED vital signs were stable, heart rate was 58, EKG NSR, troponin negative. Patient heart rate in previous admissions tend to be on lower side. Patient had extensive syncope workup last admission 2 weeks ago. CT head was negative on this admission. Labs grossly unremarkable.    (10 Nov 2020 22:04)    REVIEW OF SYSTEMS:    CONSTITUTIONAL: No weakness, fevers or chills  EYES/ENT: No visual changes;  No vertigo or throat pain   NECK: No pain or stiffness  RESPIRATORY: No cough, wheezing, hemoptysis; No shortness of breath  CARDIOVASCULAR: No chest pain or palpitations  GASTROINTESTINAL: No abdominal or epigastric pain. No nausea, vomiting, or hematemesis; No diarrhea or constipation. No melena or hematochezia.  GENITOURINARY: No dysuria, frequency or hematuria  NEUROLOGICAL: No numbness or weakness  SKIN: No itching, rashes    Physical Exam:    General: WN/WD NAD  Neurology: A&Ox3, nonfocal, follows commands  Eyes: PERRLA/ EOMI  ENT/Neck: Neck supple, trachea midline, No JVD  Respiratory: CTA B/L, No wheezing, rales, rhonchi  CV: Normal rate regular rhythm, S1S2, no murmurs, rubs or gallops  Abdominal: Soft, NT, ND +BS,   Extremities: No edema, + peripheral pulses  Skin: No Rashes, Hematoma, Ecchymosis  Incisions:   Tubes:    A/p  Atypical CP r/o MI  Weakness HPI:  80 M PMH CAD s/p CABG and stent (last stent 08/31/20), HTN, Dementia, Stage IV follicular lymphoma, recent admission fall 2 weeks ago presented to ED for weakness and chest pain.  History from [facility] staff (from ED) noted that patient was lethargic around 1pm day of admssion and had BP 90/50. Patient also endorsed chest pain at that time, although denied any chest pain on interview. Patient is a poor historian. In ED vital signs were stable, heart rate was 58, EKG NSR, troponin negative. Patient heart rate in previous admissions tend to be on lower side. Patient had extensive syncope workup last admission 2 weeks ago. CT head was negative on this admission. Labs grossly unremarkable.    (10 Nov 2020 22:04)    REVIEW OF SYSTEMS: see cc/HPI   CONSTITUTIONAL: No weakness, fevers or chills  EYES/ENT: No visual changes;  No vertigo or throat pain   NECK: No pain or stiffness  RESPIRATORY: No cough, wheezing, hemoptysis; No shortness of breath  CARDIOVASCULAR: No chest pain or palpitations  GASTROINTESTINAL: No abdominal or epigastric pain. No nausea, vomiting, or hematemesis; No diarrhea or constipation. No melena or hematochezia.  GENITOURINARY: No dysuria, frequency or hematuria  NEUROLOGICAL: No numbness or weakness  SKIN: No itching, rashes    Physical Exam:  General: WN/WD NAD  Neurology: A&Ox3, nonfocal, follows commands  Eyes: PERRLA/ EOMI  ENT/Neck: Neck supple, trachea midline, No JVD  Respiratory: CTA B/L, No wheezing, rales, rhonchi  CV: Normal rate regular rhythm, S1S2, no murmurs, rubs or gallops  Abdominal: Soft, NT, ND +BS,   Extremities: No edema, + peripheral pulses  Skin: No Rashes, Hematoma, Ecchymosis  Incisions:   Tubes:    A/p  Atypical CP r/o MI  Weakness/ Hypotension r/o orthostatic hypotension   -admit to tele   -serial EKG and repeat Trop   -c/w current outpatient Rx (w/ hold parameters on Metoprolol )  -agree w/ orthostatic vitals and fall precautions     HTN   -as above     Dementia   -c/w current Rx    H/o GERD   -agree w/ D/C of Pepcid  -trial of PPI    PT eval

## 2020-11-10 NOTE — ED PROVIDER NOTE - OBJECTIVE STATEMENT
79 yo male with a pmh of HTN, CAD s/p stent x3, and dementia presents for chest pain from [facility]. spoke with [facility] staff that states pt became lethargic around 1315 w/ a BP of 90/50 and around 1500 started to experienced chest pain. pt currently has no complaints and does not recall having chest pain earlier today. denies any symptoms including fevers, chill, headache, recent illness/travel, cough, abdominal pain, or SOB.

## 2020-11-10 NOTE — ED ADULT NURSE NOTE - NSIMPLEMENTINTERV_GEN_ALL_ED
Implemented All Universal Safety Interventions:  [place] to call system. Call bell, personal items and telephone within reach. Instruct patient to call for assistance. Room bathroom lighting operational. Non-slip footwear when patient is off stretcher. Physically safe environment: no spills, clutter or unnecessary equipment. Stretcher in lowest position, wheels locked, appropriate side rails in place.

## 2020-11-10 NOTE — H&P ADULT - ASSESSMENT
80 M PMH CAD s/p CABG and stent (last stent 08/31/20), HTN, Dementia, Stage IV follicular lymphoma, recent admission fall 2 weeks ago presented to ED for weakness and chest pain.    IMPRESSION   ?Chest Pain-ACS w/u negative, pain resolved quickly  Weakness  Dementia    #Chest Pain in context of CAD s/p CABG and s/p PCI (last stent Aug 2020)  -EKG sinus mikey, otherwise unremarkable. Troponin negative X 1, can check another set  -patient had extensive w/u syncope last admission, but can check for tele events 24hr  -will defer cardio consult as there seems to be no acute pathology  -patient is poor historian, history of GERD, possible referred pain  -c/w DAPT (plavix/ASA)  -c/w metorpolol, hold if patient becomes unstable/HR <60    # HTN- c/w Lisinopril and metoprolol   # Dementia- c/w donepezil and memantine     #GERD-possible source of questionable chest pain   - c/w Famotidine for now; discuss discontinuing as famotidine is on [facility] Criteria list. Possible contirbuting to weakness    #Fall history/weakness  -PT eval  ________________________  DIET-DASH  DVT ppx- lovenox  AAT  PT eval  Dispo-Acute, tele monitoring, can likely dc in 24 hr  Patient Full CODE (GOC from October 2020)     80 M PMH CAD s/p CABG and stent (last stent 08/31/20), HTN, Dementia, Stage IV follicular lymphoma, recent admission fall 2 weeks ago presented to ED for weakness and chest pain.    IMPRESSION   ?Chest Pain-ACS w/u negative, pain resolved quickly  Weakness  Dementia    #Chest Pain in context of CAD s/p CABG and s/p PCI (last stent Aug 2020)  -EKG sinus mikey, otherwise unremarkable. Troponin negative X 1, can check another set  -patient had extensive w/u syncope last admission, but can check for tele events 24hr  -will defer cardio consult as there seems to be no acute pathology  -patient is poor historian, history of GERD, possible referred pain  -c/w DAPT (plavix/ASA)  -c/w metorpolol, hold if patient becomes unstable/HR <60  -can check orthostatics    # HTN- c/w Lisinopril and metoprolol   # Dementia- c/w donepezil and memantine     #GERD-possible source of questionable chest pain   - c/w Famotidine for now; discuss discontinuing as famotidine is on [facility] Criteria list. Possible contirbuting to weakness    #Fall history/weakness  -PT eval  ________________________  DIET-DASH  DVT ppx- lovenox  AAT  PT eval  Dispo-Acute, tele monitoring, can likely dc in 24 hr  Patient Full CODE (GOC from October 2020)

## 2020-11-10 NOTE — H&P ADULT - NSHPPHYSICALEXAM_GEN_ALL_CORE
VITAL SIGNS: AFebrile, vital signs stable  CONSTITUTIONAL: Well-developed; well-nourished; in no acute distress.  SKIN: Skin exam is warm and dry, no acute rash.  HEAD: Normocephalic; atraumatic.  EYES: Pupils equal round reactive to light, Extraocular movements intact; conjunctiva and sclera clear.  ENT: seen on 2LNC sating 100%  NECK: Supple; non tender. No rigidity  CARD: Regular rate and rhythm. Normal S1, S2  RESP: Lungs clear to auscultation bilaterally.   ABD: Abdomen soft; non-tender; non-distended  EXT: Normal ROM.   NEURO: Alert and oriented x 3; however does have difficulty recalling history

## 2020-11-10 NOTE — ED PROVIDER NOTE - CLINICAL SUMMARY MEDICAL DECISION MAKING FREE TEXT BOX
79 yo man with complaint of chest pain and sudden near syncope.  Now with generalized weakness.  From SNF.  ? Iatrogenic vs. vasovagal .   Will admit to tele to allow medical team to determine cause, continue monitoring and consider medication adjustment considering metoprolol and donepezil can lead to malignant mikey dysrhythmias.

## 2020-11-10 NOTE — ED PROVIDER NOTE - ATTENDING CONTRIBUTION TO CARE
I personally evaluated the patient. I reviewed the Resident’s or Physician Assistant’s note (as assigned above), and agree with the findings and plan except as documented in my note.  79 yo man with dementia presents from SNF after chest pain and episode of syncope vs. near syncope.  EKG with bradycardia.  Just was admitted for extensive syncope workup.  Patient is on metoprolol and donepezil.   Both have the potential to cause bradycardia.  Likely will need admission for medication adjustment and monitoring.  At this time, otherwise stable.

## 2020-11-11 LAB
ANION GAP SERPL CALC-SCNC: 13 MMOL/L — SIGNIFICANT CHANGE UP (ref 7–14)
BASOPHILS # BLD AUTO: 0.03 K/UL — SIGNIFICANT CHANGE UP (ref 0–0.2)
BASOPHILS NFR BLD AUTO: 0.4 % — SIGNIFICANT CHANGE UP (ref 0–1)
BUN SERPL-MCNC: 15 MG/DL — SIGNIFICANT CHANGE UP (ref 10–20)
CALCIUM SERPL-MCNC: 9.2 MG/DL — SIGNIFICANT CHANGE UP (ref 8.5–10.1)
CHLORIDE SERPL-SCNC: 102 MMOL/L — SIGNIFICANT CHANGE UP (ref 98–110)
CO2 SERPL-SCNC: 21 MMOL/L — SIGNIFICANT CHANGE UP (ref 17–32)
CREAT SERPL-MCNC: 1 MG/DL — SIGNIFICANT CHANGE UP (ref 0.7–1.5)
EOSINOPHIL # BLD AUTO: 0.09 K/UL — SIGNIFICANT CHANGE UP (ref 0–0.7)
EOSINOPHIL NFR BLD AUTO: 1.1 % — SIGNIFICANT CHANGE UP (ref 0–8)
GLUCOSE SERPL-MCNC: 88 MG/DL — SIGNIFICANT CHANGE UP (ref 70–99)
HCT VFR BLD CALC: 40.7 % — LOW (ref 42–52)
HGB BLD-MCNC: 13.7 G/DL — LOW (ref 14–18)
IMM GRANULOCYTES NFR BLD AUTO: 0.2 % — SIGNIFICANT CHANGE UP (ref 0.1–0.3)
LYMPHOCYTES # BLD AUTO: 1.03 K/UL — LOW (ref 1.2–3.4)
LYMPHOCYTES # BLD AUTO: 12.2 % — LOW (ref 20.5–51.1)
MAGNESIUM SERPL-MCNC: 2.1 MG/DL — SIGNIFICANT CHANGE UP (ref 1.8–2.4)
MCHC RBC-ENTMCNC: 30.9 PG — SIGNIFICANT CHANGE UP (ref 27–31)
MCHC RBC-ENTMCNC: 33.7 G/DL — SIGNIFICANT CHANGE UP (ref 32–37)
MCV RBC AUTO: 91.7 FL — SIGNIFICANT CHANGE UP (ref 80–94)
MONOCYTES # BLD AUTO: 0.99 K/UL — HIGH (ref 0.1–0.6)
MONOCYTES NFR BLD AUTO: 11.7 % — HIGH (ref 1.7–9.3)
NEUTROPHILS # BLD AUTO: 6.27 K/UL — SIGNIFICANT CHANGE UP (ref 1.4–6.5)
NEUTROPHILS NFR BLD AUTO: 74.4 % — SIGNIFICANT CHANGE UP (ref 42.2–75.2)
NRBC # BLD: 0 /100 WBCS — SIGNIFICANT CHANGE UP (ref 0–0)
PLATELET # BLD AUTO: 176 K/UL — SIGNIFICANT CHANGE UP (ref 130–400)
POTASSIUM SERPL-MCNC: 4.4 MMOL/L — SIGNIFICANT CHANGE UP (ref 3.5–5)
POTASSIUM SERPL-SCNC: 4.4 MMOL/L — SIGNIFICANT CHANGE UP (ref 3.5–5)
RBC # BLD: 4.44 M/UL — LOW (ref 4.7–6.1)
RBC # FLD: 13.3 % — SIGNIFICANT CHANGE UP (ref 11.5–14.5)
SARS-COV-2 RNA SPEC QL NAA+PROBE: SIGNIFICANT CHANGE UP
SODIUM SERPL-SCNC: 136 MMOL/L — SIGNIFICANT CHANGE UP (ref 135–146)
TROPONIN T SERPL-MCNC: <0.01 NG/ML — SIGNIFICANT CHANGE UP
TROPONIN T SERPL-MCNC: <0.01 NG/ML — SIGNIFICANT CHANGE UP
WBC # BLD: 8.43 K/UL — SIGNIFICANT CHANGE UP (ref 4.8–10.8)
WBC # FLD AUTO: 8.43 K/UL — SIGNIFICANT CHANGE UP (ref 4.8–10.8)

## 2020-11-11 PROCEDURE — 93010 ELECTROCARDIOGRAM REPORT: CPT

## 2020-11-11 PROCEDURE — 99233 SBSQ HOSP IP/OBS HIGH 50: CPT

## 2020-11-11 RX ORDER — POLYETHYLENE GLYCOL 3350 17 G/17G
17 POWDER, FOR SOLUTION ORAL DAILY
Refills: 0 | Status: DISCONTINUED | OUTPATIENT
Start: 2020-11-11 | End: 2020-11-12

## 2020-11-11 RX ORDER — SENNA PLUS 8.6 MG/1
2 TABLET ORAL AT BEDTIME
Refills: 0 | Status: DISCONTINUED | OUTPATIENT
Start: 2020-11-11 | End: 2020-11-12

## 2020-11-11 RX ADMIN — POLYETHYLENE GLYCOL 3350 17 GRAM(S): 17 POWDER, FOR SOLUTION ORAL at 13:57

## 2020-11-11 RX ADMIN — ENOXAPARIN SODIUM 30 MILLIGRAM(S): 100 INJECTION SUBCUTANEOUS at 21:27

## 2020-11-11 RX ADMIN — ATORVASTATIN CALCIUM 40 MILLIGRAM(S): 80 TABLET, FILM COATED ORAL at 21:27

## 2020-11-11 RX ADMIN — MEMANTINE HYDROCHLORIDE 10 MILLIGRAM(S): 10 TABLET ORAL at 11:32

## 2020-11-11 RX ADMIN — SENNA PLUS 2 TABLET(S): 8.6 TABLET ORAL at 13:57

## 2020-11-11 RX ADMIN — CLOPIDOGREL BISULFATE 75 MILLIGRAM(S): 75 TABLET, FILM COATED ORAL at 11:32

## 2020-11-11 RX ADMIN — DONEPEZIL HYDROCHLORIDE 10 MILLIGRAM(S): 10 TABLET, FILM COATED ORAL at 21:27

## 2020-11-11 RX ADMIN — LISINOPRIL 10 MILLIGRAM(S): 2.5 TABLET ORAL at 06:17

## 2020-11-11 RX ADMIN — FAMOTIDINE 40 MILLIGRAM(S): 10 INJECTION INTRAVENOUS at 11:32

## 2020-11-11 RX ADMIN — Medication 81 MILLIGRAM(S): at 11:32

## 2020-11-11 NOTE — PROGRESS NOTE ADULT - ASSESSMENT
80 M PMH CAD s/p CABG and stent (last stent 08/31/20), HTN, Dementia, Stage IV follicular lymphoma, recent admission fall 2 weeks ago presented to ED for weakness and chest pain.    A/P:   Chest pain: resolved.   CAD s/p CABG and PCI in Aug 2020   EKG showed no ischemic changes  Serial troponin x2 negative. ACS ruled out.   Continue ASA, Plavix, Metoprolol and Lipitor. Discontinue Telemetry.     Fatigue and Generalized Weakness   Baseline using walker at home, was at NH for rehab.   No fall or recent trauma. He moves all limbs, no new deficit or focal   PT and rehab evaluation.     HTN: continue Lisinopril and metoprolol   Dementia- c/w donepezil and memantine       DVT Prophylaxis: lovenox    #Progress Note Handoff:  Pending (specify):   Family discussion:  Disposition: SNF

## 2020-11-11 NOTE — PROGRESS NOTE ADULT - SUBJECTIVE AND OBJECTIVE BOX
24H events:    Patient is a 80y old Male who presents with a chief complaint of Weakness (10 Nov 2020 22:04)    Primary diagnosis of Syncope       Today is hospital day 1d. This morning patient was seen and examined at bedside, resting comfortably in bed.      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 From [facility] (10 Nov 2020 22:04)      ALLERGIES:  No Known Allergies    MEDICATIONS:  STANDING MEDICATIONS  aspirin  chewable 81 milliGRAM(s) Oral daily  atorvastatin 40 milliGRAM(s) Oral at bedtime  clopidogrel Tablet 75 milliGRAM(s) Oral daily  donepezil 10 milliGRAM(s) Oral at bedtime  enoxaparin Injectable 30 milliGRAM(s) SubCutaneous at bedtime  famotidine  Oral Tab/Cap - Peds 40 milliGRAM(s) Oral daily  lisinopril 10 milliGRAM(s) Oral daily  memantine 10 milliGRAM(s) Oral daily  metoprolol succinate ER 50 milliGRAM(s) Oral daily    PRN MEDICATIONS        LABS:                        13.7   8.43  )-----------( 176      ( 11 Nov 2020 05:01 )             40.7     11-11    136  |  102  |  15  ----------------------------<  88  4.4   |  21  |  1.0    Ca    9.2      11 Nov 2020 05:01  Mg     2.1     11-11    TPro  7.3  /  Alb  4.0  /  TBili  0.6  /  DBili  x   /  AST  19  /  ALT  20  /  AlkPhos  91  11-10          Troponin T, Serum: <0.01 ng/mL (11-11-20 @ 05:01)  Troponin T, Serum: <0.01 ng/mL (11-10-20 @ 16:05)      CARDIAC MARKERS ( 11 Nov 2020 05:01 )  x     / <0.01 ng/mL / x     / x     / x      CARDIAC MARKERS ( 10 Nov 2020 16:05 )  x     / <0.01 ng/mL / x     / x     / x          RADIOLOGY:  < from: CT Head No Cont (11.10.20 @ 19:58) >    IMPRESSION:    No CT evidence for acute intracranial pathology.    < end of copied text >      VITALS:   T(F): 97.1  HR: 58  BP: 113/60  RR: 18  SpO2: 100%    PHYSICAL EXAM:  CONSTITUTIONAL: Well-developed; well-nourished; in no acute distress.  SKIN: Skin exam is warm and dry, no acute rash.  HEAD: Normocephalic; atraumatic.  EYES: Pupils equal round reactive to light, Extraocular movements intact; conjunctiva and sclera clear.  NECK: Supple; non tender. No rigidity  CARD: Regular rate and rhythm. Normal S1, S2  RESP: Lungs clear to auscultation bilaterally.   ABD: Abdomen soft; non-tender; non-distended  EXT: Normal ROM.   NEURO: Alert and oriented x 3; have difficulty recalling history        Assessment and Plan:   Assessment:  · Assessment	  80 M PMH CAD s/p CABG and stent (last stent 08/31/20), HTN, Dementia, Stage IV follicular lymphoma, recent admission fall 2 weeks ago presented to ED for weakness and chest pain.    # weakness   - He walked with a walker at home.  - lives alone   - Had a full syncope work-up last admission , it was neg   - PT eval  - no event on Tele     #Chest Pain in context of CAD s/p CABG and s/p PCI (last stent Aug 2020)  - Pain resolved   -patient is poor historian  -EKG sinus mikey, otherwise unremarkable. Troponin negative X 1, can check another set  -patient had extensive w/u syncope last admission, but can check for tele events 24hrs  -will defer cardio consult as there seems to be no acute pathology  -c/w DAPT (plavix/ASA)  -c/w metorpolol, hold if patient becomes unstable/HR <60  -can check orthostatics    # HTN- c/w Lisinopril and metoprolol   # Dementia- c/w donepezil and memantine     #GERD-possible source of questionable chest pain   - c/w Famotidine for now    #Fall history/weakness  -PT eval    DIET-DASH  DVT ppx- lovenox  AAT  PT eval  Dispo-Acute, tele monitoring

## 2020-11-11 NOTE — PROGRESS NOTE ADULT - SUBJECTIVE AND OBJECTIVE BOX
CAMRONNOEMY GOODWIN  80y  Male      Patient is a 80y old  Male who presents with a chief complaint of Weakness (11 Nov 2020 09:04)      INTERVAL HPI/OVERNIGHT EVENTS:  He feels ok, no chest pain or SOB, no fever, no new complaints,   Vital Signs Last 24 Hrs  T(C): 36.2 (11 Nov 2020 08:30), Max: 37.2 (10 Nov 2020 22:00)  T(F): 97.1 (11 Nov 2020 08:30), Max: 98.9 (10 Nov 2020 22:00)  HR: 58 (11 Nov 2020 09:11) (53 - 68)  BP: 114/56 (11 Nov 2020 09:11) (112/61 - 148/68)  BP(mean): --  RR: 18 (11 Nov 2020 08:30) (17 - 18)  SpO2: 98% (11 Nov 2020 09:11) (98% - 100%)            Consultant(s) Notes Reviewed:  [x ] YES  [ ] NO          MEDICATIONS  (STANDING):  aspirin  chewable 81 milliGRAM(s) Oral daily  atorvastatin 40 milliGRAM(s) Oral at bedtime  clopidogrel Tablet 75 milliGRAM(s) Oral daily  donepezil 10 milliGRAM(s) Oral at bedtime  enoxaparin Injectable 30 milliGRAM(s) SubCutaneous at bedtime  famotidine  Oral Tab/Cap - Peds 40 milliGRAM(s) Oral daily  lisinopril 10 milliGRAM(s) Oral daily  memantine 10 milliGRAM(s) Oral daily  metoprolol succinate ER 50 milliGRAM(s) Oral daily    MEDICATIONS  (PRN):      LABS                          13.7   8.43  )-----------( 176      ( 11 Nov 2020 05:01 )             40.7     11-11    136  |  102  |  15  ----------------------------<  88  4.4   |  21  |  1.0    Ca    9.2      11 Nov 2020 05:01  Mg     2.1     11-11    TPro  7.3  /  Alb  4.0  /  TBili  0.6  /  DBili  x   /  AST  19  /  ALT  20  /  AlkPhos  91  11-10          Lactate Trend    CARDIAC MARKERS ( 11 Nov 2020 05:01 )  x     / <0.01 ng/mL / x     / x     / x      CARDIAC MARKERS ( 10 Nov 2020 16:05 )  x     / <0.01 ng/mL / x     / x     / x          CAPILLARY BLOOD GLUCOSE            RADIOLOGY & ADDITIONAL TESTS:    Imaging Personally Reviewed:  [ ] YES  [ ] NO    HEALTH ISSUES - PROBLEM Dx:          PHYSICAL EXAM:  GENERAL: NAD, well-developed.  HEAD:  Atraumatic, Normocephalic.  EYES: EOMI, PERRLA, conjunctiva and sclera clear.  NECK: Supple, No JVD.  CHEST/LUNG: Clear to auscultation bilaterally; No wheeze.  HEART: Regular rate and rhythm; S1 S2.   ABDOMEN: Soft, Nontender, Nondistended; Bowel sounds present.  EXTREMITIES:  2+ Peripheral Pulses, No clubbing, cyanosis, or edema.  PSYCH: AAOx3.  NEUROLOGY: non-focal.  SKIN: No rashes or lesions.

## 2020-11-11 NOTE — PHYSICAL THERAPY INITIAL EVALUATION ADULT - GENERAL OBSERVATIONS, REHAB EVAL
Pt encountered in semifowler position in bed in NAD, +O2 2L via NC, +cardiac monitoring, no c/o pain and agreeable to participate with PT. Pt requires Mod A in bed mobility, Mod A in transfer mobility, and ambulated 10 ft Min A using RW with unsteady balance. Pt demonstrates limited mobility secondary low endurance and BLE weakness. Pt left in bed as found, SPO2 98% RA.

## 2020-11-11 NOTE — PHYSICAL THERAPY INITIAL EVALUATION ADULT - PERTINENT HX OF CURRENT PROBLEM, REHAB EVAL
80 M PMH CAD s/p CABG and stent (last stent 08/31/20), HTN, Dementia, Stage IV follicular lymphoma, recent admission fall 2 weeks ago presented to ED for weakness and chest pain.

## 2020-11-12 ENCOUNTER — TRANSCRIPTION ENCOUNTER (OUTPATIENT)
Age: 80
End: 2020-11-12

## 2020-11-12 VITALS
RESPIRATION RATE: 18 BRPM | DIASTOLIC BLOOD PRESSURE: 63 MMHG | SYSTOLIC BLOOD PRESSURE: 137 MMHG | TEMPERATURE: 97 F | HEART RATE: 67 BPM

## 2020-11-12 DIAGNOSIS — F03.90 UNSPECIFIED DEMENTIA, UNSPECIFIED SEVERITY, WITHOUT BEHAVIORAL DISTURBANCE, PSYCHOTIC DISTURBANCE, MOOD DISTURBANCE, AND ANXIETY: ICD-10-CM

## 2020-11-12 DIAGNOSIS — K21.9 GASTRO-ESOPHAGEAL REFLUX DISEASE WITHOUT ESOPHAGITIS: ICD-10-CM

## 2020-11-12 DIAGNOSIS — I10 ESSENTIAL (PRIMARY) HYPERTENSION: ICD-10-CM

## 2020-11-12 LAB
ALBUMIN SERPL ELPH-MCNC: 3.9 G/DL — SIGNIFICANT CHANGE UP (ref 3.5–5.2)
ALP SERPL-CCNC: 84 U/L — SIGNIFICANT CHANGE UP (ref 30–115)
ALT FLD-CCNC: 18 U/L — SIGNIFICANT CHANGE UP (ref 0–41)
ANION GAP SERPL CALC-SCNC: 11 MMOL/L — SIGNIFICANT CHANGE UP (ref 7–14)
AST SERPL-CCNC: 16 U/L — SIGNIFICANT CHANGE UP (ref 0–41)
BILIRUB SERPL-MCNC: 0.7 MG/DL — SIGNIFICANT CHANGE UP (ref 0.2–1.2)
BUN SERPL-MCNC: 20 MG/DL — SIGNIFICANT CHANGE UP (ref 10–20)
CALCIUM SERPL-MCNC: 9 MG/DL — SIGNIFICANT CHANGE UP (ref 8.5–10.1)
CHLORIDE SERPL-SCNC: 107 MMOL/L — SIGNIFICANT CHANGE UP (ref 98–110)
CO2 SERPL-SCNC: 22 MMOL/L — SIGNIFICANT CHANGE UP (ref 17–32)
CREAT SERPL-MCNC: 1.2 MG/DL — SIGNIFICANT CHANGE UP (ref 0.7–1.5)
GLUCOSE SERPL-MCNC: 115 MG/DL — HIGH (ref 70–99)
HCT VFR BLD CALC: 39.5 % — LOW (ref 42–52)
HGB BLD-MCNC: 13.2 G/DL — LOW (ref 14–18)
MCHC RBC-ENTMCNC: 30.4 PG — SIGNIFICANT CHANGE UP (ref 27–31)
MCHC RBC-ENTMCNC: 33.4 G/DL — SIGNIFICANT CHANGE UP (ref 32–37)
MCV RBC AUTO: 91 FL — SIGNIFICANT CHANGE UP (ref 80–94)
NRBC # BLD: 0 /100 WBCS — SIGNIFICANT CHANGE UP (ref 0–0)
PLATELET # BLD AUTO: 161 K/UL — SIGNIFICANT CHANGE UP (ref 130–400)
POTASSIUM SERPL-MCNC: 4 MMOL/L — SIGNIFICANT CHANGE UP (ref 3.5–5)
POTASSIUM SERPL-SCNC: 4 MMOL/L — SIGNIFICANT CHANGE UP (ref 3.5–5)
PROT SERPL-MCNC: 6.6 G/DL — SIGNIFICANT CHANGE UP (ref 6–8)
RBC # BLD: 4.34 M/UL — LOW (ref 4.7–6.1)
RBC # FLD: 13.4 % — SIGNIFICANT CHANGE UP (ref 11.5–14.5)
SODIUM SERPL-SCNC: 140 MMOL/L — SIGNIFICANT CHANGE UP (ref 135–146)
WBC # BLD: 7.33 K/UL — SIGNIFICANT CHANGE UP (ref 4.8–10.8)
WBC # FLD AUTO: 7.33 K/UL — SIGNIFICANT CHANGE UP (ref 4.8–10.8)

## 2020-11-12 PROCEDURE — 99239 HOSP IP/OBS DSCHRG MGMT >30: CPT

## 2020-11-12 RX ORDER — INFLUENZA VIRUS VACCINE 15; 15; 15; 15 UG/.5ML; UG/.5ML; UG/.5ML; UG/.5ML
0.5 SUSPENSION INTRAMUSCULAR ONCE
Refills: 0 | Status: COMPLETED | OUTPATIENT
Start: 2020-11-12 | End: 2020-11-12

## 2020-11-12 RX ADMIN — LISINOPRIL 10 MILLIGRAM(S): 2.5 TABLET ORAL at 05:30

## 2020-11-12 RX ADMIN — Medication 50 MILLIGRAM(S): at 05:30

## 2020-11-12 RX ADMIN — POLYETHYLENE GLYCOL 3350 17 GRAM(S): 17 POWDER, FOR SOLUTION ORAL at 13:25

## 2020-11-12 RX ADMIN — MEMANTINE HYDROCHLORIDE 10 MILLIGRAM(S): 10 TABLET ORAL at 13:25

## 2020-11-12 RX ADMIN — FAMOTIDINE 40 MILLIGRAM(S): 10 INJECTION INTRAVENOUS at 13:25

## 2020-11-12 RX ADMIN — CLOPIDOGREL BISULFATE 75 MILLIGRAM(S): 75 TABLET, FILM COATED ORAL at 13:25

## 2020-11-12 RX ADMIN — Medication 81 MILLIGRAM(S): at 13:26

## 2020-11-12 NOTE — DISCHARGE NOTE NURSING/CASE MANAGEMENT/SOCIAL WORK - PATIENT PORTAL LINK FT
You can access the FollowMyHealth Patient Portal offered by [facility] by registering at the following website: [email or web address]. By joining [employer]’s FollowMyHealth portal, you will also be able to view your health information using other applications (apps) compatible with our system.

## 2020-11-12 NOTE — MEDICAL STUDENT PROGRESS NOTE(EDUCATION) - NS MD HP STUD ASPLAN ASSES FT
Patient is a 80-yo M presenting with weakness and chest pain, admitted for r/o ACS. Patient is a 80-yo M PMHx CAD s/p CABG and stent (last stent 8/31/2020), HTN, dementia, stage IV follicular lymphoma presented with weakness and chest pain, admitted for r/o ACS.

## 2020-11-12 NOTE — MEDICAL STUDENT PROGRESS NOTE(EDUCATION) - SUBJECTIVE AND OBJECTIVE BOX
Medical Student Note    Patient Information:  NOEMY GRUBBS / 80y / Male / MRN#:[id number]    Hospital Day: 2d    HPI:  80 M PMH CAD s/p CABG and stent (last stent 08/31/20), HTN, Dementia, Stage IV follicular lymphoma, recent admission fall 2 weeks ago presented to ED for weakness and chest pain.  History from [place] staff (from ED) noted that patient was lethargic around 1pm day of admission and had BP 90/50. Patient also endorsed chest pain at that time, although denied any chest pain on interview. When asked, patient stated he did not have any chest pain on day of admission. Patient is a poor historian. In ED vital signs were stable, heart rate was 58, EKG NSR, troponin negative. Patient heart rate in previous admissions tend to be on lower side. Patient had extensive syncope workup last admission 2 weeks ago. CT head was negative on this admission. Labs grossly unremarkable.       Interval History:  Patient seen and examined at bedside. Reports feeling well and pain has resolved. PE unremarkable. No acute events overnight.    Past Medical History:  Chronic GERD    Dementia    Coronary artery disease involving coronary bypass graft of native heart without angina pectoris    HTN (hypertension)      Past Surgical History:  S/P CABG x 4    H/O heart surgery      Allergies:  No Known Allergies    Medications:  PRN:    Standing:  aspirin  chewable 81 milliGRAM(s) Oral daily  atorvastatin 40 milliGRAM(s) Oral at bedtime  clopidogrel Tablet 75 milliGRAM(s) Oral daily  donepezil 10 milliGRAM(s) Oral at bedtime  enoxaparin Injectable 30 milliGRAM(s) SubCutaneous at bedtime  famotidine  Oral Tab/Cap - Peds 40 milliGRAM(s) Oral daily  influenza   Vaccine 0.5 milliLiter(s) IntraMuscular once  lisinopril 10 milliGRAM(s) Oral daily  memantine 10 milliGRAM(s) Oral daily  metoprolol succinate ER 50 milliGRAM(s) Oral daily  polyethylene glycol 3350 17 Gram(s) Oral daily  senna 2 Tablet(s) Oral at bedtime    Home:  aspirin 81 mg oral tablet, chewable: 1 tab(s) orally once a day  atorvastatin 40 mg oral tablet: 1 tab(s) orally once a day (at bedtime)  clopidogrel 75 mg oral tablet: 1 tab(s) orally once a day  donepezil 10 mg oral tablet: 1 tab(s) orally once a day (at bedtime)  famotidine 40 mg oral tablet: 1 tab(s) orally once a day  lisinopril 10 mg oral tablet: 1 tab(s) orally once a day  memantine 10 mg oral tablet: 1 tab(s) orally once a day  metoprolol succinate 50 mg oral tablet, extended release: 1 tab(s) orally once a day    Vitals:  T(C): 36.4, Max: 37.4 (11-11-20 @ 23:40)  T(F): 97.5, Max: 99.3 (11-11-20 @ 23:40)  HR: 64 (61 - 69)  BP: 121/57 (99/57 - 121/57)  RR: 19 (18 - 19)  SpO2: 94% (94% - 97%)    Physical Exam:  General: Awake, Alert. Not in acute distress.  Head: Normocephalic atraumatic.  Neck: Supple  Heart: Regular rate and rhythm; S1, S2; No murmurs.  Lungs: Clear to auscultation bilaterally.  Abdomen: Soft, nontender, nondistended.  Extremities: No edema in upper or lower extremities.  Neuro: AAOx3, No focal deficits.    Labs:  CBC (11-12 @ 05:42)                        Hgb: 13.2   WBC: 7.33  )-----------------( Plts: 161                              Hct: 39.5     Chem (11-12 @ 05:42)  Na: 140  |     Cl: 107     |  BUN: 20  -----------------------------------------< Gluc: 115    K: 4.0   |    HCO3: 22  |  Cr: 1.2    Ca 9.0 (11-12 @ 05:42)  Mg 2.1 (11-11 @ 05:01)    LFTs (11-12 @ 05:42)  TPro 6.6  /  Alb 3.9  TBili 0.7  /  DBili     AST 16  /  ALT 18  /  AlkPhos 84    Cardiac Markers (11-11 @ 11:53)  Troponin I X  Troponin T <0.01  CK X  CKMB X  CKMB Units X  Myoglobin X  Lactate X  ESR X    Cardiac Markers (11-11 @ 05:01)  Troponin I X  Troponin T <0.01  CK X  CKMB X  CKMB Units X  Myoglobin X  Lactate X  ESR X    Cardiac Markers (11-10 @ 16:05)  Troponin I X  Troponin T <0.01  CK X  CKMB X  CKMB Units X  Myoglobin X  Lactate X  ESR X            Microbiology:    Radiology:  No CT evidence for acute intracranial pathology.    INTERPRETATION:  Clinical History / Reason for exam: Chest pain.    Comparison : Chest radiograph October 28, 2020.    Technique/Positioning: Frontal portable.    Findings:    Support devices: Telemetry leads overlie the thorax.    Cardiac/mediastinum/hilum: Patient is status post median sternotomy.    Lung parenchyma/Pleura: Within normal limits.    Skeleton/soft tissues: Degenerative changes of the shoulders.    Impression:    Cardiomegaly. Post median sternotomy changes.

## 2020-11-12 NOTE — PROGRESS NOTE ADULT - ASSESSMENT
Mr Escamilla is a 79 yo Man with medical history of CAD s/p CABG and stent (last stent 08/31/20), HTN, Dementia, Stage IV follicular lymphoma, and recent admission fall 2 weeks ago presented to ED for weakness and chest pain.     #Chest pain -resolved.   #CAD s/p CABG and PCI in Aug 2020   EKG showed no ischemic changes  Serial troponin x2 negative. ACS ruled out.   c/w ASA & Plavix  c/w Metoprolol   c/w Lipitor  Discontinue Telemetry.     #Fatigue and Generalized Weakness   Baseline using walker at home, was at NH for rehab  No fall or recent trauma  He moves all limbs, no new deficit or focal   PT and rehab evaluation    #HTN   c/w Lisinopril and metoprolol     #Dementia   c/w donepezil and memantine       DVT Prophylaxis: lovenox

## 2020-11-12 NOTE — DISCHARGE NOTE PROVIDER - NSDCCPCAREPLAN_GEN_ALL_CORE_FT
PRINCIPAL DISCHARGE DIAGNOSIS  Diagnosis: Chest pain  Assessment and Plan of Treatment: You came in with chest pain. You did not have a heart attack. Your EKG and blood work were fine. Please take your medications as prescribed and follow up with your cardiologist and primary care doctor      SECONDARY DISCHARGE DIAGNOSES  Diagnosis: Fatigue  Assessment and Plan of Treatment: You complained of weakness when you came in. You did not faint this admission. Work up was done last admission 2 weeks ago and it was all normal. Please follow up with your primary care doctor     PRINCIPAL DISCHARGE DIAGNOSIS  Diagnosis: Chest pain  Assessment and Plan of Treatment: You came in with chest pain. You did not have a heart attack. Your EKG and blood work were fine. Please take your medications as prescribed and follow up with your cardiologist and primary care doctor      SECONDARY DISCHARGE DIAGNOSES  Diagnosis: Follicular lymphoma  Assessment and Plan of Treatment: You have stage 4 follicular lymphoma. Please follow up with oncologist outpatient    Diagnosis: Fatigue  Assessment and Plan of Treatment: You complained of weakness when you came in. You did not faint this admission. Work up was done last admission 2 weeks ago and it was all normal. Please follow up with your primary care doctor

## 2020-11-12 NOTE — DISCHARGE NOTE PROVIDER - PROVIDER TOKENS
PROVIDER:[TOKEN:[[id number]],FOLLOWUP:[2 weeks]],PROVIDER:[TOKEN:[[id number]],FOLLOWUP:[Routine]] PROVIDER:[TOKEN:[[id number]],FOLLOWUP:[2 weeks]],PROVIDER:[TOKEN:[[id number]],FOLLOWUP:[Routine]],PROVIDER:[TOKEN:[[id number]],FOLLOWUP:[2 weeks]]

## 2020-11-12 NOTE — DISCHARGE NOTE PROVIDER - CARE PROVIDER_API CALL
Zana Sher  INTERNAL MEDICINE  [address] [place] [address]  Phone: [phone]  Fax: [phone]  Follow Up Time: 2 weeks    Brian Bernal  CARDIOLOGY  [address] [place] [address], [place] [address]  Phone: [phone]  Fax: [phone]  Follow Up Time: Routine   Zana Sher  INTERNAL MEDICINE  [address] [place] [address]  Phone: [phone]  Fax: [phone]  Follow Up Time: 2 weeks    Brian Bernal  CARDIOLOGY  [address] [place] [address], [place] [address]  Phone: [phone]  Fax: [phone]  Follow Up Time: Routine    Leonidas Barrera  INTERNAL MEDICINE  [address] [place] [address]  Phone: [phone]  Fax: [phone]  Follow Up Time: 2 weeks

## 2020-11-12 NOTE — DISCHARGE NOTE PROVIDER - CARE PROVIDERS DIRECT ADDRESSES
,DirectAddress_Unknown,[email or web address] ,DirectAddress_Unknown,[email or web address]

## 2020-11-12 NOTE — MEDICAL STUDENT PROGRESS NOTE(EDUCATION) - NS MD HP STUD ASPLAN PLAN FT
#Chest Pain in context of CAD s/p CABG and s/p PCI (last stent Aug 2020)  - Pain resolved   -patient is poor historian  -EKG sinus mikey, otherwise unremarkable. Troponin negative X3  -patient had extensive w/u syncope last admission. all negative  -will defer cardio consult as there seems to be no acute pathology  -c/w DAPT (plavix/ASA)  -c/w metoprolol, hold if patient becomes unstable/HR <60  -can check orthostatics  - no event on Tele, dc tele  -dispo pending placement for dc    # fall history/weakness   - He walked with a walker at home.  - lives alone   - Had a full syncope work-up last admission , it was neg   - PT eval, walked 10ft with walker    # HTN- c/w Lisinopril and metoprolol   # Dementia- c/w donepezil and memantine     #GERD-possible source of questionable chest pain   - c/w Famotidine for now # Chest Pain in context of CAD s/p CABG and s/p PCI (last stent Aug 2020)  - Pain resolved   - No ischemic changes on EKG  - Troponin negative X3  - will defer cardio consult as there seems to be no acute pathology  - c/w DAPT (plavix/ASA)  - c/w toprol 50 daily  - no event on Tele so dc tele    # Weakness  - recent admission for fall on 10/27  - Had a full syncope work-up last admission, CTH, MRI, EEG all negative  - c/w PT     # HTN  - c/w Lisinopril 10 mg and toprol 50 mg daily    # Dementia  - c/w donepezil and memantine     # GERD  - c/w Famotidine     # Misc:  DVT ppx: Lovenox  GI ppx: famotidine  Activity: IAT  Diet: DASH  Code status: Full  Dispo: anticipated for d/c 11/13

## 2020-11-12 NOTE — DISCHARGE NOTE PROVIDER - HOSPITAL COURSE
80 M PMH CAD s/p CABG and stent (last stent 08/31/20), HTN, Dementia, Stage IV follicular lymphoma, recent admission for fall 2 weeks ago presented to ED for weakness and chest pain.  In ED, EKG showed no ischemic changes, 3 sets of negative trops.   As for his weakness, workup for syncope (EGG, CT, MRI) was done in the previous admission (2 weeks prior to this one) and was negative

## 2020-11-12 NOTE — PROGRESS NOTE ADULT - ATTENDING COMMENTS
I have seen and examined the patient on their discharge day. More than 30 minutes was personally spent on discharge planning on the day of discharge in coordination of care, counseling the patient, and preparation of discharge and transfer paperwork.

## 2020-11-12 NOTE — PROGRESS NOTE ADULT - SUBJECTIVE AND OBJECTIVE BOX
Pt was seen and examined.  Pt denied any complaints at the moment.  Requesting to go home.     PAST MEDICAL & SURGICAL HISTORY:  Chronic GERD    Dementia    Coronary artery disease involving coronary bypass graft of native heart without angina pectoris    HTN (hypertension)    S/P CABG x 4  33 years ago    MEDICATIONS  (STANDING):  aspirin  chewable 81 milliGRAM(s) Oral daily  atorvastatin 40 milliGRAM(s) Oral at bedtime  clopidogrel Tablet 75 milliGRAM(s) Oral daily  donepezil 10 milliGRAM(s) Oral at bedtime  enoxaparin Injectable 30 milliGRAM(s) SubCutaneous at bedtime  famotidine  Oral Tab/Cap - Peds 40 milliGRAM(s) Oral daily  influenza   Vaccine 0.5 milliLiter(s) IntraMuscular once  lisinopril 10 milliGRAM(s) Oral daily  memantine 10 milliGRAM(s) Oral daily  metoprolol succinate ER 50 milliGRAM(s) Oral daily  polyethylene glycol 3350 17 Gram(s) Oral daily  senna 2 Tablet(s) Oral at bedtime    MEDICATIONS  (PRN):    Allergies    No Known Allergies    ROS: neg except as above     Vital Signs Last 24 Hrs  T(C): 36.3 (12 Nov 2020 13:27), Max: 37.4 (11 Nov 2020 23:40)  T(F): 97.3 (12 Nov 2020 13:27), Max: 99.3 (11 Nov 2020 23:40)  HR: 65 (12 Nov 2020 13:27) (61 - 69)  BP: 125/65 (12 Nov 2020 13:27) (99/57 - 125/65)  BP(mean): --  RR: 18 (12 Nov 2020 13:27) (18 - 19)  SpO2: 94% (12 Nov 2020 01:00) (94% - 97%)    Physical Exam:  Constitutional: Not in acute distress  SKIN: No rashes or lesions  HEENT: Atraumatic. Normocephalic. Anicteric  NECK:  No JVD.   PULMONARY: Clear to ausculation bilaterally. non labored respirations. No wheezing. No rales  Cardiovascular: Normal S1, S2. Regular rate and rhythm. No murmurs.  ABDOMEN: Soft, Nontender, Nondistended; Bowel sounds are present  EXTREMITIES:  non edematous  NEUROLOGIC:  Alert & oriented x 3, No motor deficit.  PSYCH: normal affect    Labs:                          13.2   7.33  )-----------( 161      ( 12 Nov 2020 05:42 )             39.5       11-12    140  |  107  |  20  ----------------------------<  115<H>  4.0   |  22  |  1.2    Ca    9.0      12 Nov 2020 05:42  Mg     2.1     11-11    TPro  6.6  /  Alb  3.9  /  TBili  0.7  /  DBili  x   /  AST  16  /  ALT  18  /  AlkPhos  84  11-12

## 2021-01-04 PROBLEM — F03.90 UNSPECIFIED DEMENTIA, UNSPECIFIED SEVERITY, WITHOUT BEHAVIORAL DISTURBANCE, PSYCHOTIC DISTURBANCE, MOOD DISTURBANCE, AND ANXIETY: Chronic | Status: ACTIVE | Noted: 2020-11-10

## 2021-01-04 PROBLEM — K21.9 GASTRO-ESOPHAGEAL REFLUX DISEASE WITHOUT ESOPHAGITIS: Chronic | Status: ACTIVE | Noted: 2020-11-10

## 2021-01-04 PROBLEM — F03.90 UNSPECIFIED DEMENTIA WITHOUT BEHAVIORAL DISTURBANCE: Chronic | Status: ACTIVE | Noted: 2020-11-10

## 2021-03-29 ENCOUNTER — APPOINTMENT (OUTPATIENT)
Dept: CARDIOLOGY | Facility: CLINIC | Age: 81
End: 2021-03-29
Payer: MEDICARE

## 2021-03-29 VITALS
DIASTOLIC BLOOD PRESSURE: 90 MMHG | HEIGHT: 64 IN | SYSTOLIC BLOOD PRESSURE: 132 MMHG | TEMPERATURE: 98.1 F | HEART RATE: 76 BPM | WEIGHT: 178 LBS | BODY MASS INDEX: 30.39 KG/M2

## 2021-03-29 DIAGNOSIS — Z00.00 ENCOUNTER FOR GENERAL ADULT MEDICAL EXAMINATION W/OUT ABNORMAL FINDINGS: ICD-10-CM

## 2021-03-29 PROCEDURE — 99213 OFFICE O/P EST LOW 20 MIN: CPT

## 2021-03-29 PROCEDURE — 93000 ELECTROCARDIOGRAM COMPLETE: CPT

## 2021-03-29 RX ORDER — CHOLECALCIFEROL (VITAMIN D3) 1250 MCG
1.25 MG CAPSULE ORAL
Refills: 0 | Status: ACTIVE | COMMUNITY

## 2021-03-29 RX ORDER — FAMOTIDINE 40 MG/1
40 TABLET, FILM COATED ORAL
Refills: 0 | Status: ACTIVE | COMMUNITY

## 2021-03-29 RX ORDER — MEMANTINE HYDROCHLORIDE 10 MG/1
10 TABLET, FILM COATED ORAL
Refills: 0 | Status: ACTIVE | COMMUNITY

## 2021-03-29 RX ORDER — SENNOSIDES 8.6 MG
CAPSULE ORAL
Refills: 0 | Status: ACTIVE | COMMUNITY

## 2021-03-29 NOTE — PHYSICAL EXAM
[General Appearance - Well Developed] : well developed [Normal Appearance] : normal appearance [Well Groomed] : well groomed [General Appearance - Well Nourished] : well nourished [No Deformities] : no deformities [General Appearance - In No Acute Distress] : no acute distress [Normal Conjunctiva] : the conjunctiva exhibited no abnormalities [Eyelids - No Xanthelasma] : the eyelids demonstrated no xanthelasmas [Normal Oral Mucosa] : normal oral mucosa [No Oral Pallor] : no oral pallor [No Oral Cyanosis] : no oral cyanosis [] : no respiratory distress [Auscultation Breath Sounds / Voice Sounds] : lungs were clear to auscultation bilaterally [Heart Rate And Rhythm] : heart rate and rhythm were normal [Heart Sounds] : normal S1 and S2 [Murmurs] : no murmurs present [Arterial Pulses Normal] : the arterial pulses were normal [Edema] : no peripheral edema present [Bowel Sounds] : normal bowel sounds [Abdomen Soft] : soft [Nail Clubbing] : no clubbing of the fingernails [Cyanosis, Localized] : no localized cyanosis [Oriented To Time, Place, And Person] : oriented to person, place, and time [FreeTextEntry1] : No JVD

## 2021-03-29 NOTE — REASON FOR VISIT
[FreeTextEntry1] : pt called office to c/o CP since yesterday radiating to neck,back, arms. pt saw PCP yesterday, told PCP but pt states PCP told him to follow up with [facility]. pt states he took one nitro already today and it helped, advised pt to go to ED for eval, pt agreed and his states his daughter is coming now to take him. will inform dr javed

## 2021-03-29 NOTE — ASSESSMENT
[FreeTextEntry1] : CABG LIMA totalled to LAD \par  SVG to RCA gone\par LAD diffuses disease , cx and RCA totalled med tx\par BMS to RAMUS\par Follicular lymphoma stage IV chem and removal as per pt stable and no issues was in right groin, gets PET scan yearly and told (-) \par Increase LFT past\par Class I-II angina\par Dementia \par HTN \par still upsetover wifes death\par clinically stable\par seeing neuro for memory  \par creat 1.4 seeing nephro \par will d/c ASA since CAD stable and has GI issues \par f/u 6 mos

## 2021-04-20 ENCOUNTER — TRANSCRIPTION ENCOUNTER (OUTPATIENT)
Age: 81
End: 2021-04-20

## 2021-06-19 ENCOUNTER — EMERGENCY (EMERGENCY)
Facility: HOSPITAL | Age: 81
LOS: 0 days | Discharge: HOME | End: 2021-06-19
Attending: STUDENT IN AN ORGANIZED HEALTH CARE EDUCATION/TRAINING PROGRAM | Admitting: STUDENT IN AN ORGANIZED HEALTH CARE EDUCATION/TRAINING PROGRAM
Payer: MEDICARE

## 2021-06-19 ENCOUNTER — TRANSCRIPTION ENCOUNTER (OUTPATIENT)
Age: 81
End: 2021-06-19

## 2021-06-19 VITALS
SYSTOLIC BLOOD PRESSURE: 134 MMHG | DIASTOLIC BLOOD PRESSURE: 62 MMHG | RESPIRATION RATE: 20 BRPM | OXYGEN SATURATION: 96 % | HEART RATE: 71 BPM

## 2021-06-19 VITALS
OXYGEN SATURATION: 97 % | HEART RATE: 83 BPM | HEIGHT: 66 IN | RESPIRATION RATE: 20 BRPM | DIASTOLIC BLOOD PRESSURE: 75 MMHG | WEIGHT: 171.96 LBS | TEMPERATURE: 98 F | SYSTOLIC BLOOD PRESSURE: 156 MMHG

## 2021-06-19 DIAGNOSIS — Z95.1 PRESENCE OF AORTOCORONARY BYPASS GRAFT: Chronic | ICD-10-CM

## 2021-06-19 DIAGNOSIS — F03.90 UNSPECIFIED DEMENTIA WITHOUT BEHAVIORAL DISTURBANCE: ICD-10-CM

## 2021-06-19 DIAGNOSIS — S41.112A LACERATION WITHOUT FOREIGN BODY OF LEFT UPPER ARM, INITIAL ENCOUNTER: ICD-10-CM

## 2021-06-19 DIAGNOSIS — K21.9 GASTRO-ESOPHAGEAL REFLUX DISEASE WITHOUT ESOPHAGITIS: ICD-10-CM
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
## 2021-06-19 DIAGNOSIS — Z79.899 OTHER LONG TERM (CURRENT) DRUG THERAPY: ICD-10-CM

## 2021-06-19 DIAGNOSIS — I10 ESSENTIAL (PRIMARY) HYPERTENSION: ICD-10-CM

## 2021-06-19 DIAGNOSIS — Z20.822 CONTACT WITH AND (SUSPECTED) EXPOSURE TO COVID-19: ICD-10-CM
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
## 2021-06-19 DIAGNOSIS — Z79.82 LONG TERM (CURRENT) USE OF ASPIRIN: ICD-10-CM

## 2021-06-19 DIAGNOSIS — I25.10 ATHEROSCLEROTIC HEART DISEASE OF NATIVE CORONARY ARTERY WITHOUT ANGINA PECTORIS: ICD-10-CM

## 2021-06-19 DIAGNOSIS — Z79.02 LONG TERM (CURRENT) USE OF ANTITHROMBOTICS/ANTIPLATELETS: ICD-10-CM

## 2021-06-19 DIAGNOSIS — Z95.1 PRESENCE OF AORTOCORONARY BYPASS GRAFT: ICD-10-CM

## 2021-06-19 LAB
ALBUMIN SERPL ELPH-MCNC: 4.3 G/DL — SIGNIFICANT CHANGE UP (ref 3.5–5.2)
ALP SERPL-CCNC: 97 U/L — SIGNIFICANT CHANGE UP (ref 30–115)
ALT FLD-CCNC: 44 U/L — HIGH (ref 0–41)
ANION GAP SERPL CALC-SCNC: 13 MMOL/L — SIGNIFICANT CHANGE UP (ref 7–14)
APPEARANCE UR: CLEAR — SIGNIFICANT CHANGE UP
APTT BLD: 30.1 SEC — SIGNIFICANT CHANGE UP (ref 27–39.2)
AST SERPL-CCNC: 109 U/L — HIGH (ref 0–41)
BASE EXCESS BLDV CALC-SCNC: 2.3 MMOL/L — HIGH (ref -2–2)
BASOPHILS # BLD AUTO: 0.02 K/UL — SIGNIFICANT CHANGE UP (ref 0–0.2)
BASOPHILS NFR BLD AUTO: 0.2 % — SIGNIFICANT CHANGE UP (ref 0–1)
BILIRUB SERPL-MCNC: 1 MG/DL — SIGNIFICANT CHANGE UP (ref 0.2–1.2)
BILIRUB UR-MCNC: NEGATIVE — SIGNIFICANT CHANGE UP
BUN SERPL-MCNC: 19 MG/DL — SIGNIFICANT CHANGE UP (ref 10–20)
CA-I SERPL-SCNC: 1.15 MMOL/L — SIGNIFICANT CHANGE UP (ref 1.12–1.3)
CALCIUM SERPL-MCNC: 9.7 MG/DL — SIGNIFICANT CHANGE UP (ref 8.5–10.1)
CHLORIDE SERPL-SCNC: 102 MMOL/L — SIGNIFICANT CHANGE UP (ref 98–110)
CK SERPL-CCNC: 5145 U/L — HIGH (ref 0–225)
CO2 SERPL-SCNC: 22 MMOL/L — SIGNIFICANT CHANGE UP (ref 17–32)
COLOR SPEC: YELLOW — SIGNIFICANT CHANGE UP
CREAT SERPL-MCNC: 1.1 MG/DL — SIGNIFICANT CHANGE UP (ref 0.7–1.5)
DIFF PNL FLD: ABNORMAL
EOSINOPHIL # BLD AUTO: 0.02 K/UL — SIGNIFICANT CHANGE UP (ref 0–0.7)
EOSINOPHIL NFR BLD AUTO: 0.2 % — SIGNIFICANT CHANGE UP (ref 0–8)
EPI CELLS # UR: ABNORMAL /HPF
GAS PNL BLDV: 139 MMOL/L — SIGNIFICANT CHANGE UP (ref 136–145)
GAS PNL BLDV: SIGNIFICANT CHANGE UP
GLUCOSE SERPL-MCNC: 106 MG/DL — HIGH (ref 70–99)
GLUCOSE UR QL: NEGATIVE MG/DL — SIGNIFICANT CHANGE UP
HCO3 BLDV-SCNC: 28 MMOL/L — SIGNIFICANT CHANGE UP (ref 22–29)
HCT VFR BLD CALC: 43 % — SIGNIFICANT CHANGE UP (ref 42–52)
HCT VFR BLDA CALC: 55.4 % — HIGH (ref 34–44)
HGB BLD CALC-MCNC: 18.1 G/DL — HIGH (ref 14–18)
HGB BLD-MCNC: 14.5 G/DL — SIGNIFICANT CHANGE UP (ref 14–18)
HOROWITZ INDEX BLDV+IHG-RTO: 21 — SIGNIFICANT CHANGE UP
IMM GRANULOCYTES NFR BLD AUTO: 0.3 % — SIGNIFICANT CHANGE UP (ref 0.1–0.3)
INR BLD: 1.19 RATIO — SIGNIFICANT CHANGE UP (ref 0.65–1.3)
KETONES UR-MCNC: NEGATIVE — SIGNIFICANT CHANGE UP
LACTATE BLDV-MCNC: 1.7 MMOL/L — HIGH (ref 0.5–1.6)
LACTATE SERPL-SCNC: 3.4 MMOL/L — HIGH (ref 0.7–2)
LEUKOCYTE ESTERASE UR-ACNC: NEGATIVE — SIGNIFICANT CHANGE UP
LIDOCAIN IGE QN: 25 U/L — SIGNIFICANT CHANGE UP (ref 7–60)
LYMPHOCYTES # BLD AUTO: 1.25 K/UL — SIGNIFICANT CHANGE UP (ref 1.2–3.4)
LYMPHOCYTES # BLD AUTO: 10.2 % — LOW (ref 20.5–51.1)
MCHC RBC-ENTMCNC: 29.8 PG — SIGNIFICANT CHANGE UP (ref 27–31)
MCHC RBC-ENTMCNC: 33.7 G/DL — SIGNIFICANT CHANGE UP (ref 32–37)
MCV RBC AUTO: 88.3 FL — SIGNIFICANT CHANGE UP (ref 80–94)
MONOCYTES # BLD AUTO: 1.54 K/UL — HIGH (ref 0.1–0.6)
MONOCYTES NFR BLD AUTO: 12.6 % — HIGH (ref 1.7–9.3)
NEUTROPHILS # BLD AUTO: 9.36 K/UL — HIGH (ref 1.4–6.5)
NEUTROPHILS NFR BLD AUTO: 76.5 % — HIGH (ref 42.2–75.2)
NITRITE UR-MCNC: NEGATIVE — SIGNIFICANT CHANGE UP
NRBC # BLD: 0 /100 WBCS — SIGNIFICANT CHANGE UP (ref 0–0)
PCO2 BLDV: 48 MMHG — SIGNIFICANT CHANGE UP (ref 41–51)
PH BLDV: 7.38 — SIGNIFICANT CHANGE UP (ref 7.26–7.43)
PH UR: 7 — SIGNIFICANT CHANGE UP (ref 5–8)
PLATELET # BLD AUTO: 180 K/UL — SIGNIFICANT CHANGE UP (ref 130–400)
PO2 BLDV: 29 MMHG — SIGNIFICANT CHANGE UP (ref 20–40)
POTASSIUM BLDV-SCNC: 4.6 MMOL/L — SIGNIFICANT CHANGE UP (ref 3.3–5.6)
POTASSIUM SERPL-MCNC: 5.2 MMOL/L — HIGH (ref 3.5–5)
POTASSIUM SERPL-SCNC: 5.2 MMOL/L — HIGH (ref 3.5–5)
PROT SERPL-MCNC: 8 G/DL — SIGNIFICANT CHANGE UP (ref 6–8)
PROT UR-MCNC: ABNORMAL MG/DL
PROTHROM AB SERPL-ACNC: 13.7 SEC — HIGH (ref 9.95–12.87)
RBC # BLD: 4.87 M/UL — SIGNIFICANT CHANGE UP (ref 4.7–6.1)
RBC # FLD: 12.9 % — SIGNIFICANT CHANGE UP (ref 11.5–14.5)
RBC CASTS # UR COMP ASSIST: ABNORMAL /HPF
SAO2 % BLDV: 55 % — SIGNIFICANT CHANGE UP
SARS-COV-2 RNA SPEC QL NAA+PROBE: SIGNIFICANT CHANGE UP
SODIUM SERPL-SCNC: 137 MMOL/L — SIGNIFICANT CHANGE UP (ref 135–146)
SP GR SPEC: 1.01 — SIGNIFICANT CHANGE UP (ref 1.01–1.03)
UROBILINOGEN FLD QL: 0.2 MG/DL — SIGNIFICANT CHANGE UP (ref 0.2–0.2)
WBC # BLD: 12.23 K/UL — HIGH (ref 4.8–10.8)
WBC # FLD AUTO: 12.23 K/UL — HIGH (ref 4.8–10.8)

## 2021-06-19 PROCEDURE — 74177 CT ABD & PELVIS W/CONTRAST: CPT | Mod: 26,MA

## 2021-06-19 PROCEDURE — 93010 ELECTROCARDIOGRAM REPORT: CPT | Mod: 76

## 2021-06-19 PROCEDURE — 99284 EMERGENCY DEPT VISIT MOD MDM: CPT

## 2021-06-19 PROCEDURE — 72125 CT NECK SPINE W/O DYE: CPT | Mod: 26,MA

## 2021-06-19 PROCEDURE — 71260 CT THORAX DX C+: CPT | Mod: 26,MA

## 2021-06-19 PROCEDURE — 70450 CT HEAD/BRAIN W/O DYE: CPT | Mod: 26,MA

## 2021-06-19 PROCEDURE — 72170 X-RAY EXAM OF PELVIS: CPT | Mod: 26

## 2021-06-19 PROCEDURE — 71045 X-RAY EXAM CHEST 1 VIEW: CPT | Mod: 26

## 2021-06-19 RX ORDER — SODIUM CHLORIDE 9 MG/ML
1000 INJECTION INTRAMUSCULAR; INTRAVENOUS; SUBCUTANEOUS ONCE
Refills: 0 | Status: COMPLETED | OUTPATIENT
Start: 2021-06-19 | End: 2021-06-19

## 2021-06-19 RX ADMIN — SODIUM CHLORIDE 1000 MILLILITER(S): 9 INJECTION INTRAMUSCULAR; INTRAVENOUS; SUBCUTANEOUS at 12:00

## 2021-06-19 RX ADMIN — SODIUM CHLORIDE 1000 MILLILITER(S): 9 INJECTION INTRAMUSCULAR; INTRAVENOUS; SUBCUTANEOUS at 09:52

## 2021-06-19 RX ADMIN — SODIUM CHLORIDE 1000 MILLILITER(S): 9 INJECTION INTRAMUSCULAR; INTRAVENOUS; SUBCUTANEOUS at 13:20

## 2021-06-19 RX ADMIN — SODIUM CHLORIDE 1000 MILLILITER(S): 9 INJECTION INTRAMUSCULAR; INTRAVENOUS; SUBCUTANEOUS at 14:43

## 2021-06-19 NOTE — ED PROVIDER NOTE - OBJECTIVE STATEMENT
82 yo M pmhx GERD, dementia, HTN, CAD, CABG x 4 (on Plavix) BIBEMS for evaluation of mechanical fall last night, pt states about 12 hours prior to arrival, states he was leaning over to clean the floor, fell forward, states B/L hands broke fall, no head trauma, no loc, pt states he normally ambulates with a walker, pt did not have walker at that time. Pt daughter went to see him this morning and helped him off the floor, 82 yo M pmhx GERD, dementia, HTN, CAD, CABG x 4 (on Plavix) BIBEMS for evaluation of mechanical fall last night, pt states about 12 hours prior to arrival, states he was leaning over to clean the floor, fell forward, states B/L hands broke fall, no head trauma, no loc, pt states he normally ambulates with a walker, pt did not have walker at that time. Pt daughter went to see him this morning and helped him off the floor, pt states he thinks he was on the floor for 10 hours. Pt states he has not ambulated since time of injury. Denies any headache, dizziness, confusion, neck pain, back pain, extremity pain, nausea, vomiting, abd pain.

## 2021-06-19 NOTE — ED PROVIDER NOTE - PHYSICAL EXAMINATION
GENERAL: Well-nourished, Well-developed. NAD.  HEAD: NCAT  Eyes: PERRLA, EOMI. No asymmetry. No nystagmus. No conjunctival injection. Non-icteric sclera.  ENMT: MMM  Neck: Supple. No cervical midline TTP. No paravertebral TTP to traps. FROM  CVS: No reproducible chest wall tenderness. Normal S1,S2. No murmurs appreciated on auscultation   RESP: No use of accessory muscles. Chest rise symmetrical with good expansion. Lungs clear to auscultation B/L. No wheezing, rales, or rhonchi auscultated.  GI: Normal auscultation of bowel sounds in all 4 quadrants. Soft, Nontender, Nondistended. No guarding or rebound tenderness. No CVAT B/L.  MSK: Extremities w/o deformity or ttp. No visible signs of trauma such as ecchymosis, erythema, or swelling. FROM of upper and lower extremities B/L. No midline spinal TTP. FROM of back with flexion and extension.  Skin: (+)superficial skin tear to LUE.   EXT: Radial and pedal pulses present B/L. No calf tenderness or swelling B/L. No palpable cords. No pedal edema B/L.  Neuro: AA&O x 3. Sensation grossly intact. Strength 5/5 B/L. Negative Romberg and Pronator Drift. Normal Finger to nose exam. Visual fields intact.

## 2021-06-19 NOTE — ED PROVIDER NOTE - PATIENT PORTAL LINK FT
You can access the FollowMyHealth Patient Portal offered by [facility] by registering at the following website: [email or web address]. By joining [facility]’s FollowMyHealth portal, you will also be able to view your health information using other applications (apps) compatible with our system.

## 2021-06-19 NOTE — ED ADULT NURSE NOTE - PMH
Chronic GERD    Coronary artery disease involving coronary bypass graft of native heart without angina pectoris    Dementia    HTN (hypertension)

## 2021-06-19 NOTE — ED PROVIDER NOTE - ATTENDING CONTRIBUTION TO CARE
80 yo M pmh gerd, cad s/p cabg, htn pw fall. Mechanical trip and fall last night while trying to clean something off the floor. Fell forwards onto bilateral hands. Unable to get up afterwards for approximately 10 hours until his daughter came to help him. Currently has no complaints. No ha, no head trauma, no loc, no ac use, no neck pain, no cp, no sob, no abd pain, no back pain, no extremity pain, no lacerations/abrasions.     CONSTITUTIONAL: Well-developed; well-nourished; in no acute distress. Sitting up and providing appropriate history and physical examination  SKIN: skin exam is warm and dry, no acute rash.  HEAD: Normocephalic; atraumatic.  EYES: PERRL, 3 mm bilateral, no nystagmus, EOM intact; conjunctiva and sclera clear.  ENT: No nasal discharge; airway clear.  NECK: Supple; non tender. + full passive ROM in all directions. No JVD  CARD: S1, S2 normal; no murmurs, gallops, or rubs. Regular rate and rhythm. + Symmetric Strong Pulses  RESP: No wheezes, rales or rhonchi. Good air movement bilaterally  ABD: soft; non-distended; non-tender. No Rebound, No Guarding, No signs of peritonitis, No CVA tenderness. No pulsatile abdominal mass. + Strong and Symmetric Pulses  EXT: Normal ROM. No clubbing, cyanosis or edema. Dp and Pt Pulses intact. Cap refill less than 3 seconds  NEURO: CN 2-12 intact, normal finger to nose, normal romberg, stable gait, no sensory or motor deficits, Alert, oriented, grossly unremarkable. No Focal deficits. GCS 15. NIH 0  PSYCH: Cooperative, appropriate.

## 2021-06-19 NOTE — ED ADULT NURSE REASSESSMENT NOTE - NS ED NURSE REASSESS COMMENT FT1
Rt forearm abrasion after fall at home cleansed with NS and covered with Bacitracin dressing, daughter at the bedside. IVF completed

## 2021-06-19 NOTE — ED PROVIDER NOTE - CLINICAL SUMMARY MEDICAL DECISION MAKING FREE TEXT BOX
I personally evaluated the patient. I reviewed the Resident’s or Physician Assistant’s note (as assigned above), and agree with the findings and plan except as documented in my note. Patient evaluated for fall. Labs, XRs, CT pan scan performed in ED. Pt ambulatory with baseline gait, discussed with patient and daughter and patient safe to return home. I have fully discussed the medical management and delivery of care with the patient. I have discussed any available labs, imaging and treatment options with the patient. Patient confirms understanding and has been given detailed return precautions. Patient instructed to return to the ED should symptoms persist or worsen. Patient has demonstrated capacity and has verbalized understanding. Patient is well appearing upon discharge.

## 2021-06-19 NOTE — ED PROVIDER NOTE - NSFOLLOWUPINSTRUCTIONS_ED_ALL_ED_FT
**follow up with your primary care doctor within 1 week**    Fall Prevention    WHAT YOU NEED TO KNOW:    Fall prevention includes ways to make your home and other areas safer. It also includes ways you can move more carefully to prevent a fall. Health conditions that cause changes in your blood pressure, vision, or muscle strength and coordination may increase your risk for falls. Medicines may also increase your risk for falls if they make you dizzy, weak, or sleepy.     DISCHARGE INSTRUCTIONS:    Call 911 or have someone else call if:     You have fallen and are unconscious.      You have fallen and cannot move part of your body.    Contact your healthcare provider if:     You have fallen and have pain or a headache.      You have questions or concerns about your condition or care.    Fall prevention tips:     Stand or sit up slowly. This may help you keep your balance and prevent falls.      Use assistive devices as directed. Your healthcare provider may suggest that you use a cane or walker to help you keep your balance. You may need to have grab bars put in your bathroom near the toilet or in the shower.      Wear shoes that fit well and have soles that . Wear shoes both inside and outside. Use slippers with good . Do not wear shoes with high heels.      Wear a personal alarm. This is a device that allows you to call 911 if you fall and need help. Ask your healthcare provider for more information.      Stay active. Exercise can help strengthen your muscles and improve your balance. Your healthcare provider may recommend water aerobics or walking. He or she may also recommend physical therapy to improve your coordination. Never start an exercise program without talking to your healthcare provider first.       Manage your medical conditions. Keep all appointments with your healthcare providers. Visit your eye doctor as directed.    Home safety tips:     Add items to prevent falls in the bathroom. Put nonslip strips on your bath or shower floor to prevent you from slipping. Use a bath mat if you do not have carpet in the bathroom. This will prevent you from falling when you step out of the bath or shower. Use a shower seat so you do not need to stand while you shower. Sit on the toilet or a chair in your bathroom to dry yourself and put on clothing. This will prevent you from losing your balance from drying or dressing yourself while you are standing.       Keep paths clear. Remove books, shoes, and other objects from walkways and stairs. Place cords for telephones and lamps out of the way so that you do not need to walk over them. Tape them down if you cannot move them. Remove small rugs. If you cannot remove a rug, secure it with double-sided tape. This will prevent you from tripping.       Install bright lights in your home. Use night lights to help light paths to the bathroom or kitchen. Always turn on the light before you start walking.      Keep items you use often on shelves within reach. Do not use a step stool to help you reach an item.      Paint or place reflective tape on the edges of your stairs. This will help you see the stairs better.    Follow up with your healthcare provider as directed: Write down your questions so you remember to ask them during your visits.        © Copyright [employer] 2020

## 2021-06-19 NOTE — ED PROVIDER NOTE - NS ED ROS FT
Constitutional: (-) fever (-) malaise (-) diaphoresis (-) chills   Eyes: (-) visual changes (-) eye pain  ENMT: (-) nasal or chest congestion (-) runny nose (-) sore throat (-) hoarseness  (-) hearing changes (-) ear pain (-) ear discharge or infections (-) neck pain (-) neck stiffness  Cardiac: (-) chest pain  (-) palpitations (-) syncope (-) edema  Respiratory: (-) cough (-) SOB (-) MCBRIDE  GI: (-) nausea (-) vomiting (-) diarrhea (-) abdominal pain  : (-) dysuria (-) increased frequency  (-) hematuria (-) incontinence  MS: (-) back pain (-) myalgia (-) muscle weakness (-)  joint pain  Neuro: (-) headache (-) dizziness (-) numbness/tingling to extremities B/L (-) weakness (-) LOC (-) head injury (-) AMS (-) saddle anesthesia (-) tremors  Skin: (-) rash (-) laceration    Except as documented in the HPI, all other systems are negative.

## 2021-09-29 ENCOUNTER — APPOINTMENT (OUTPATIENT)
Dept: CARDIOLOGY | Facility: CLINIC | Age: 81
End: 2021-09-29

## 2021-10-02 ENCOUNTER — INPATIENT (INPATIENT)
Facility: HOSPITAL | Age: 81
LOS: 3 days | Discharge: NURSING, REHAB OR LONG-TERM CARE | End: 2021-10-06
Attending: STUDENT IN AN ORGANIZED HEALTH CARE EDUCATION/TRAINING PROGRAM | Admitting: STUDENT IN AN ORGANIZED HEALTH CARE EDUCATION/TRAINING PROGRAM
Payer: MEDICARE

## 2021-10-02 VITALS
DIASTOLIC BLOOD PRESSURE: 76 MMHG | HEART RATE: 66 BPM | SYSTOLIC BLOOD PRESSURE: 123 MMHG | RESPIRATION RATE: 18 BRPM | WEIGHT: 169.98 LBS | TEMPERATURE: 98 F | OXYGEN SATURATION: 97 % | HEIGHT: 66 IN

## 2021-10-02 DIAGNOSIS — Z95.1 PRESENCE OF AORTOCORONARY BYPASS GRAFT: Chronic | ICD-10-CM

## 2021-10-02 LAB
ALBUMIN SERPL ELPH-MCNC: 4.2 G/DL — SIGNIFICANT CHANGE UP (ref 3.5–5.2)
ALP SERPL-CCNC: 91 U/L — SIGNIFICANT CHANGE UP (ref 30–115)
ALT FLD-CCNC: 23 U/L — SIGNIFICANT CHANGE UP (ref 0–41)
ANION GAP SERPL CALC-SCNC: 12 MMOL/L — SIGNIFICANT CHANGE UP (ref 7–14)
APAP SERPL-MCNC: <5 UG/ML — LOW (ref 10–30)
APTT BLD: 35.4 SEC — SIGNIFICANT CHANGE UP (ref 27–39.2)
AST SERPL-CCNC: 24 U/L — SIGNIFICANT CHANGE UP (ref 0–41)
BASOPHILS # BLD AUTO: 0.04 K/UL — SIGNIFICANT CHANGE UP (ref 0–0.2)
BASOPHILS NFR BLD AUTO: 0.3 % — SIGNIFICANT CHANGE UP (ref 0–1)
BILIRUB SERPL-MCNC: 0.4 MG/DL — SIGNIFICANT CHANGE UP (ref 0.2–1.2)
BUN SERPL-MCNC: 22 MG/DL — HIGH (ref 10–20)
CALCIUM SERPL-MCNC: 9.5 MG/DL — SIGNIFICANT CHANGE UP (ref 8.5–10.1)
CHLORIDE SERPL-SCNC: 101 MMOL/L — SIGNIFICANT CHANGE UP (ref 98–110)
CK SERPL-CCNC: 265 U/L — HIGH (ref 0–225)
CO2 SERPL-SCNC: 21 MMOL/L — SIGNIFICANT CHANGE UP (ref 17–32)
CREAT SERPL-MCNC: 1.3 MG/DL — SIGNIFICANT CHANGE UP (ref 0.7–1.5)
EOSINOPHIL # BLD AUTO: 0.01 K/UL — SIGNIFICANT CHANGE UP (ref 0–0.7)
EOSINOPHIL NFR BLD AUTO: 0.1 % — SIGNIFICANT CHANGE UP (ref 0–8)
ETHANOL SERPL-MCNC: <10 MG/DL — SIGNIFICANT CHANGE UP
GLUCOSE SERPL-MCNC: 126 MG/DL — HIGH (ref 70–99)
HCT VFR BLD CALC: 40.7 % — LOW (ref 42–52)
HGB BLD-MCNC: 13.6 G/DL — LOW (ref 14–18)
IMM GRANULOCYTES NFR BLD AUTO: 0.4 % — HIGH (ref 0.1–0.3)
INR BLD: 1.14 RATIO — SIGNIFICANT CHANGE UP (ref 0.65–1.3)
LACTATE SERPL-SCNC: 2.4 MMOL/L — HIGH (ref 0.7–2)
LIDOCAIN IGE QN: 29 U/L — SIGNIFICANT CHANGE UP (ref 7–60)
LYMPHOCYTES # BLD AUTO: 0.64 K/UL — LOW (ref 1.2–3.4)
LYMPHOCYTES # BLD AUTO: 5 % — LOW (ref 20.5–51.1)
MAGNESIUM SERPL-MCNC: 1.8 MG/DL — SIGNIFICANT CHANGE UP (ref 1.8–2.4)
MCHC RBC-ENTMCNC: 29.6 PG — SIGNIFICANT CHANGE UP (ref 27–31)
MCHC RBC-ENTMCNC: 33.4 G/DL — SIGNIFICANT CHANGE UP (ref 32–37)
MCV RBC AUTO: 88.7 FL — SIGNIFICANT CHANGE UP (ref 80–94)
MONOCYTES # BLD AUTO: 0.82 K/UL — HIGH (ref 0.1–0.6)
MONOCYTES NFR BLD AUTO: 6.4 % — SIGNIFICANT CHANGE UP (ref 1.7–9.3)
NEUTROPHILS # BLD AUTO: 11.26 K/UL — HIGH (ref 1.4–6.5)
NEUTROPHILS NFR BLD AUTO: 87.8 % — HIGH (ref 42.2–75.2)
NRBC # BLD: 0 /100 WBCS — SIGNIFICANT CHANGE UP (ref 0–0)
PLATELET # BLD AUTO: 161 K/UL — SIGNIFICANT CHANGE UP (ref 130–400)
POTASSIUM SERPL-MCNC: 4.4 MMOL/L — SIGNIFICANT CHANGE UP (ref 3.5–5)
POTASSIUM SERPL-SCNC: 4.4 MMOL/L — SIGNIFICANT CHANGE UP (ref 3.5–5)
PROT SERPL-MCNC: 7.5 G/DL — SIGNIFICANT CHANGE UP (ref 6–8)
PROTHROM AB SERPL-ACNC: 13.1 SEC — HIGH (ref 9.95–12.87)
RBC # BLD: 4.59 M/UL — LOW (ref 4.7–6.1)
RBC # FLD: 13.2 % — SIGNIFICANT CHANGE UP (ref 11.5–14.5)
SALICYLATES SERPL-MCNC: <0.3 MG/DL — LOW (ref 4–30)
SARS-COV-2 RNA SPEC QL NAA+PROBE: SIGNIFICANT CHANGE UP
SODIUM SERPL-SCNC: 134 MMOL/L — LOW (ref 135–146)
WBC # BLD: 12.82 K/UL — HIGH (ref 4.8–10.8)
WBC # FLD AUTO: 12.82 K/UL — HIGH (ref 4.8–10.8)

## 2021-10-02 PROCEDURE — 99285 EMERGENCY DEPT VISIT HI MDM: CPT

## 2021-10-02 PROCEDURE — 74177 CT ABD & PELVIS W/CONTRAST: CPT | Mod: 26,MA

## 2021-10-02 PROCEDURE — 71260 CT THORAX DX C+: CPT | Mod: 26,MA

## 2021-10-02 PROCEDURE — 72170 X-RAY EXAM OF PELVIS: CPT | Mod: 26

## 2021-10-02 PROCEDURE — 72125 CT NECK SPINE W/O DYE: CPT | Mod: 26,MA

## 2021-10-02 PROCEDURE — 71045 X-RAY EXAM CHEST 1 VIEW: CPT | Mod: 26

## 2021-10-02 PROCEDURE — 99497 ADVNCD CARE PLAN 30 MIN: CPT | Mod: 25

## 2021-10-02 PROCEDURE — 93010 ELECTROCARDIOGRAM REPORT: CPT

## 2021-10-02 PROCEDURE — 99223 1ST HOSP IP/OBS HIGH 75: CPT

## 2021-10-02 PROCEDURE — 70450 CT HEAD/BRAIN W/O DYE: CPT | Mod: 26,MA

## 2021-10-02 PROCEDURE — 73090 X-RAY EXAM OF FOREARM: CPT | Mod: 26,LT

## 2021-10-02 RX ORDER — ATORVASTATIN CALCIUM 80 MG/1
40 TABLET, FILM COATED ORAL AT BEDTIME
Refills: 0 | Status: DISCONTINUED | OUTPATIENT
Start: 2021-10-02 | End: 2021-10-06

## 2021-10-02 RX ORDER — SODIUM CHLORIDE 9 MG/ML
1000 INJECTION, SOLUTION INTRAVENOUS ONCE
Refills: 0 | Status: COMPLETED | OUTPATIENT
Start: 2021-10-02 | End: 2021-10-02

## 2021-10-02 RX ORDER — TETANUS TOXOID, REDUCED DIPHTHERIA TOXOID AND ACELLULAR PERTUSSIS VACCINE, ADSORBED 5; 2.5; 8; 8; 2.5 [IU]/.5ML; [IU]/.5ML; UG/.5ML; UG/.5ML; UG/.5ML
0.5 SUSPENSION INTRAMUSCULAR ONCE
Refills: 0 | Status: COMPLETED | OUTPATIENT
Start: 2021-10-02 | End: 2021-10-02

## 2021-10-02 RX ORDER — DONEPEZIL HYDROCHLORIDE 10 MG/1
10 TABLET, FILM COATED ORAL AT BEDTIME
Refills: 0 | Status: DISCONTINUED | OUTPATIENT
Start: 2021-10-02 | End: 2021-10-06

## 2021-10-02 RX ORDER — ASPIRIN/CALCIUM CARB/MAGNESIUM 324 MG
81 TABLET ORAL DAILY
Refills: 0 | Status: DISCONTINUED | OUTPATIENT
Start: 2021-10-02 | End: 2021-10-06

## 2021-10-02 RX ORDER — METOPROLOL TARTRATE 50 MG
50 TABLET ORAL DAILY
Refills: 0 | Status: DISCONTINUED | OUTPATIENT
Start: 2021-10-02 | End: 2021-10-06

## 2021-10-02 RX ORDER — MEMANTINE HYDROCHLORIDE 10 MG/1
10 TABLET ORAL DAILY
Refills: 0 | Status: DISCONTINUED | OUTPATIENT
Start: 2021-10-02 | End: 2021-10-06

## 2021-10-02 RX ORDER — LISINOPRIL 2.5 MG/1
10 TABLET ORAL DAILY
Refills: 0 | Status: DISCONTINUED | OUTPATIENT
Start: 2021-10-02 | End: 2021-10-06

## 2021-10-02 RX ORDER — FAMOTIDINE 10 MG/ML
40 INJECTION INTRAVENOUS
Refills: 0 | Status: DISCONTINUED | OUTPATIENT
Start: 2021-10-02 | End: 2021-10-06

## 2021-10-02 RX ADMIN — SODIUM CHLORIDE 1000 MILLILITER(S): 9 INJECTION, SOLUTION INTRAVENOUS at 20:32

## 2021-10-02 RX ADMIN — TETANUS TOXOID, REDUCED DIPHTHERIA TOXOID AND ACELLULAR PERTUSSIS VACCINE, ADSORBED 0.5 MILLILITER(S): 5; 2.5; 8; 8; 2.5 SUSPENSION INTRAMUSCULAR at 19:12

## 2021-10-02 NOTE — ED ADULT TRIAGE NOTE - CHIEF COMPLAINT QUOTE
BIBEMS pt found on floor in prone position with laceration to his upper lip. unwitnessed fall and unknown downtime, pt does not recall fall.

## 2021-10-02 NOTE — H&P ADULT - NSHPPHYSICALEXAM_GEN_ALL_CORE
GENERAL: NAD, well-developed  HEAD:  Atraumatic, Normocephalic  EYES: EOMI, PERRLA, conjunctiva and sclera clear  ENT: Normal tympanic membrane. No nasal obstruction or discharge. No tonsillar exudate, swelling or erythema.  NECK: Supple, No JVD  CHEST/LUNG: Clear to auscultation bilaterally; No wheeze  HEART: Regular rate and rhythm; No murmurs, rubs, or gallops  ABDOMEN: Soft, Nontender, Nondistended; Bowel sounds present  EXTREMITIES:  2+ Peripheral Pulses, No clubbing, cyanosis, or edema  PSYCH: AAOx3  NEUROLOGY: non-focal  SKIN: No rashes or lesions GENERAL: NAD, well-developed  HEAD:  abrasion and echymosis to forehead   EYES: EOMI, PERRLA, conjunctiva and sclera clear  ENT: Normal tympanic membrane. No nasal obstruction or discharge. No tonsillar exudate, swelling or erythema.  NECK: Supple, No JVD  CHEST/LUNG: Clear to auscultation bilaterally; No wheeze  HEART: Regular rate and rhythm; No murmurs, rubs, or gallops  ABDOMEN: Soft, Nontender, Nondistended; Bowel sounds present  EXTREMITIES:  2+ Peripheral Pulses, No clubbing, cyanosis, or edema  PSYCH: AAOx3, confused at times   NEUROLOGY: non-focal  SKIN: blisters noted on the left forearm GENERAL: NAD, well-developed  HEAD:  abrasion and echymosis to forehead   EYES: EOMI, PERRLA, conjunctiva and sclera clear  ENT: Normal tympanic membrane. No nasal obstruction or discharge. No tonsillar exudate, swelling or erythema.  NECK: Supple, No JVD  CHEST/LUNG: Clear to auscultation bilaterally; No wheeze  HEART: Regular rate and rhythm; No murmurs, rubs, or gallops  ABDOMEN: Soft, Nontender, Nondistended; Bowel sounds present  EXTREMITIES:  2+ Peripheral Pulses, No clubbing, cyanosis, 2+ b/l LE edema   PSYCH: AAOx3, confused at times   NEUROLOGY: non-focal  SKIN: blisters noted on the left forearm GENERAL: NAD, well-developed  HEAD:  abrasion and ecchymosis to forehead   EYES: EOMI, PERRLA, conjunctiva and sclera clear  ENT: Normal tympanic membrane. No nasal obstruction or discharge. No tonsillar exudate, swelling or erythema.  NECK: Supple, No JVD  CHEST/LUNG: Clear to auscultation bilaterally; No wheeze  HEART: Regular rate and rhythm; No murmurs, rubs, or gallops  ABDOMEN: Soft, Nontender, Nondistended; Bowel sounds present  EXTREMITIES:  2+ Peripheral Pulses, No clubbing, cyanosis, 2+ b/l LE edema   PSYCH: AAOx3, confused at times   NEUROLOGY: non-focal  SKIN: blisters noted on the left forearm

## 2021-10-02 NOTE — H&P ADULT - HISTORY OF PRESENT ILLNESS
81 yr old male with hx of CAD s/p CABG and stent (last stent 08/31/20), HTN, Dementia, Stage IV follicular lymphoma  81 yr old male with hx of CAD s/p CABG and stent (last stent 08/31/20), HTN, Dementia, Stage IV follicular lymphoma was BIBA for fall. Patient is a poor historian. As per daughter, family was not able to get in touch with patient and went to check on him and found on floor covered in urine and feces. Patient states he remember having a soda and then woke up on the floor. Pt denies any fever chills, sob, chest pain, palpation abd pain, no urinary or bowel issues Lives home alone, ambulates with walker, social negx3

## 2021-10-02 NOTE — H&P ADULT - NSHPSOCIALHISTORY_GEN_ALL_CORE
Marital Status:  (   )    (   ) Single    (   )    ( x )   Lives with: ( x ) alone  (  ) children   (  ) spouse   (  ) parents  (  ) other  Recent Travel: No recent travel    Substance Use (street drugs): ( x ) never used  (  ) other:  Tobacco Usage:  ( x  ) never smoked   (   ) former smoker   (   ) current smoker  (     ) pack year  Alcohol Usage: None Marital Status:  (   )    (   ) Single    (   )    ( x )   Lives with: ( x ) alone  (  ) children   (  ) spouse   (  ) parents  (  ) other  Recent Travel: No recent travel    Substance Use (street drugs): ( x ) never used  (  ) other:  Tobacco Usage:  ( x  ) never smoked   (   ) former smoker   (   ) current smoker  (     ) pack year  Alcohol Usage: None  Patient is DNR/DNI

## 2021-10-02 NOTE — H&P ADULT - ASSESSMENT
81 yr old male with hx of CAD s/p CABG and stent (last stent 08/31/20), HTN, Dementia, Stage IV follicular lymphoma admitted for fall.     # Metabolic Encephalopathy likely secondary to progression of Dementia vs infection  # s/p Fall  # Chronic Muscular Deconditioning  # Elevated CK  # Lactic Acidosis  # Mild ROYAL  - AAOx1-2 at baseline   - trauma workup negative  - CK: 265 --> trend  - LA 2.4 (likely secondary to dehydration) --> repeat in AM  - Cr 1.3 (likely prerenal)   - start NS@65  - c/w donepezil and memantine   - check UA  - check orthostatic  - ambulate as tolerate  - baseline using walker at home, was at NH for rehab  - fall precaution   - PT and rehab evaluation  - pt lives alone and family requesting placement     # CAD s/p CABG and PCI in Aug 2020   - c/w ASA & Metoprolol, Lipitor    # HTN   - c/w Lisinopril and metoprolol     # DVT Prophylaxis  - start Lovenox    # DASH diet    # Full code   81 yr old male with hx of CAD s/p CABG and stent (last stent 08/31/20), HTN, Dementia, Stage IV follicular lymphoma admitted for fall.     # Metabolic Encephalopathy likely secondary to progression of Dementia vs infection  # s/p Fall  # Chronic Muscular Deconditioning  # Elevated CK  # Lactic Acidosis  # Mild ROYAL  - AAOx1-2 at baseline   - trauma workup negative  - CK: 265 --> trend  - LA 2.4 (likely secondary to dehydration) --> repeat in AM  - Cr 1.3 (likely prerenal)   - start NS@65  - c/w donepezil and memantine   - check UA  - check orthostatic  - ambulate as tolerate  - baseline using walker at home  - fall precaution   - PT and rehab evaluation  - pt lives alone and family requesting placement     # CAD s/p CABG and PCI in Aug 2020   - c/w ASA & Metoprolol, Lipitor    # HTN   - c/w Lisinopril and metoprolol     # Dementia  - AAOx2-3 at baseline  - c/w memantine, donepezil     # DVT Prophylaxis  - start Lovenox    # DASH diet    # DNR / DNI 81 yr old male with hx of CAD s/p CABG and stent (last stent 08/31/20), HTN, Dementia, Stage IV follicular lymphoma admitted for fall.     # Syncope   # Chronic Muscular Deconditioning  # Elevated CK  # Lactic Acidosis  # Mild ROYAL  - AAOx1-2 at baseline   - trauma workup negative  - CK: 265 --> trend  - check AM trop  - ECHO (08/21/20): EF 55%, no wall abnormalities; G1DD  - repeat echo   - LA 2.4 (likely secondary to dehydration) --> repeat in AM  - Cr 1.3 (likely prerenal)   - start NS@65  - c/w donepezil and memantine   - check UA, BNP  - admit to tele floor   - check orthostatic  - ambulate as tolerate  - baseline using walker at home  - fall precaution   - PT and rehab evaluation  - pt lives alone and family requesting placement     # Blister on left forearm  - r/o shingles  - isolation precaution   - ID eval     # CAD s/p CABG and PCI in Aug 2020   - c/w ASA & Metoprolol, Lipitor    # HTN   - c/w Lisinopril and metoprolol     # Dementia  - AAOx2-3 at baseline  - c/w memantine, donepezil     # DVT Prophylaxis  - start Lovenox    # DASH diet    # DNR / DNI 81 yr old male with hx of CAD s/p CABG and stent (last stent 08/31/20), HTN, Dementia, Stage IV follicular lymphoma admitted for fall.     # Syncope   # Chronic Muscular Deconditioning  # Elevated CK  # Lactic Acidosis  # Mild ROYAL  - AAOx2-3 at baseline   - trauma workup negative  - CK: 265 --> trend  - check AM trop  - ECHO (08/21/20): EF 55%, no wall abnormalities; G1DD  - repeat echo   - LA 2.4 (likely secondary to dehydration) --> repeat in AM  - Cr 1.3 (likely prerenal)   - start NS@65  - c/w donepezil and memantine   - check UA, BNP  - admit to tele floor   - check orthostatic  - ambulate as tolerate  - baseline using walker at home  - fall precaution   - PT and rehab evaluation  - pt lives alone and family requesting placement     # Blister on left forearm  - r/o shingles  - isolation precaution   - ID eval     # CAD s/p CABG and PCI in Aug 2020   - c/w ASA & Metoprolol, Lipitor    # HTN   - c/w Lisinopril and metoprolol     # Dementia  - AAOx2-3 at baseline  - c/w memantine, donepezil     # DVT Prophylaxis  - start heparin s/q    # DASH diet    # DNR / DNI

## 2021-10-02 NOTE — H&P ADULT - CONVERSATION DETAILS
Goals of care discussed in light of patient's comorbidities and presenting symptoms, patient wishes to be full code.     Declined need to contact family at this time, lost his wife 5 years ago, concern for lack of social support from his three children, states they are only after his money. Spoke with pt regarding living will and what his wishes would be if his heart were to stop beating or his respiratory status worsened. pt states at this point he would want all life sustaining methods NOT to be carried out and therefore remain DNR / DNI

## 2021-10-02 NOTE — ED ADULT NURSE NOTE - NSIMPLEMENTINTERV_GEN_ALL_ED
Implemented All Universal Safety Interventions:  [place] to call system. Call bell, personal items and telephone within reach. Instruct patient to call for assistance. Room bathroom lighting operational. Non-slip footwear when patient is off stretcher. Physically safe environment: no spills, clutter or unnecessary equipment. Stretcher in lowest position, wheels locked, appropriate side rails in place.

## 2021-10-02 NOTE — H&P ADULT - NSHPLABSRESULTS_GEN_ALL_CORE
13.6   12.82 )-----------( 161      ( 02 Oct 2021 19:23 )             40.7         10-02    134<L>  |  101  |  22<H>  ----------------------------<  126<H>  4.4   |  21  |  1.3    Ca    9.5      02 Oct 2021 19:23  Mg     1.8     10-02    TPro  7.5  /  Alb  4.2  /  TBili  0.4  /  DBili  x   /  AST  24  /  ALT  23  /  AlkPhos  91  10-02      CT Head: No acute process seen

## 2021-10-02 NOTE — ED PROVIDER NOTE - OBJECTIVE STATEMENT
80 yo male, pmh of dementia, htn, cad, presents to ed for fall per family, lives alone, last heard from this am, ems found pt on floor, in own feces. Pt at baseline mental status- oriented 1-2. Abrasion to forehead, dried blood to nares. No obvious trauma.

## 2021-10-02 NOTE — ED PROVIDER NOTE - PHYSICAL EXAMINATION
Constitutional: Well developed, well nourished. NAD  TRAUMA: ABC intact.   Head: Normocephalic, atraumatic.  Eyes: PERRL. EOMI. No Raccoon eyes.   ENT: No nasal discharge. No septal hematoma. No Nguyen sign. Mucous membranes moist.  Neck: Supple. Painless ROM. No midline tenderness or stepoffs.  Cardiovascular: Normal S1, S2. Regular rate and rhythm. No murmurs, rubs, or gallops.  Pulmonary: Normal respiratory rate and effort. Lungs clear to auscultation bilaterally. No wheezing, rales, or rhonchi.  CHEST: No chest wall tenderness or crepitus.  Abdominal: Soft. Nondistended. Nontender. No rebound, guarding, or rigidity.  BACK: No midline T/L/S tenderness or stepoffs. No saddle paresthesia.  Extremities. Pelvis stable. No traumatic deformities or tenderness of extremities.  Skin: abrasion to forehead, dried blood in nares.   Neuro: AAOx1-2. Strength 5/5 in all extremities. Sensation intact throughout. No focal neurological deficits.  Psych: Normal mood. Normal affect.

## 2021-10-02 NOTE — ED PROVIDER NOTE - CLINICAL SUMMARY MEDICAL DECISION MAKING FREE TEXT BOX
Pt s/p fall, lives alone.  CTs and imaging reviewed.  CK slightly elevated.  IVFs given.  Pt will need admission for medical optimization as well as for possible placement

## 2021-10-02 NOTE — ED PROVIDER NOTE - ATTENDING CONTRIBUTION TO CARE
80 yo M PMHx noted presents via EMS a/p fall.  Pt unsure how he fell. Found on floor covered in urine and feces.  Pt did hit head, states no LOC. On exam pt in NAD Alert and awake, + abrasion to forehead and lip, dried blood to nares, GCS 15, no raccoon no pak, no midline vertebral tenderness, no step offm abd soft nt nd, equal AE b/l, hips non tender, Pelvis stable, + blisters to left forearm,

## 2021-10-03 LAB
ANION GAP SERPL CALC-SCNC: 10 MMOL/L — SIGNIFICANT CHANGE UP (ref 7–14)
BASOPHILS # BLD AUTO: 0.03 K/UL — SIGNIFICANT CHANGE UP (ref 0–0.2)
BASOPHILS NFR BLD AUTO: 0.4 % — SIGNIFICANT CHANGE UP (ref 0–1)
BUN SERPL-MCNC: 18 MG/DL — SIGNIFICANT CHANGE UP (ref 10–20)
CALCIUM SERPL-MCNC: 9.2 MG/DL — SIGNIFICANT CHANGE UP (ref 8.5–10.1)
CHLORIDE SERPL-SCNC: 103 MMOL/L — SIGNIFICANT CHANGE UP (ref 98–110)
CK SERPL-CCNC: 544 U/L — HIGH (ref 0–225)
CO2 SERPL-SCNC: 23 MMOL/L — SIGNIFICANT CHANGE UP (ref 17–32)
CREAT SERPL-MCNC: 1.2 MG/DL — SIGNIFICANT CHANGE UP (ref 0.7–1.5)
EOSINOPHIL # BLD AUTO: 0.07 K/UL — SIGNIFICANT CHANGE UP (ref 0–0.7)
EOSINOPHIL NFR BLD AUTO: 0.8 % — SIGNIFICANT CHANGE UP (ref 0–8)
GLUCOSE SERPL-MCNC: 102 MG/DL — HIGH (ref 70–99)
HCT VFR BLD CALC: 39.4 % — LOW (ref 42–52)
HGB BLD-MCNC: 13.2 G/DL — LOW (ref 14–18)
IMM GRANULOCYTES NFR BLD AUTO: 0.4 % — HIGH (ref 0.1–0.3)
LACTATE SERPL-SCNC: 1.6 MMOL/L — SIGNIFICANT CHANGE UP (ref 0.7–2)
LYMPHOCYTES # BLD AUTO: 1.22 K/UL — SIGNIFICANT CHANGE UP (ref 1.2–3.4)
LYMPHOCYTES # BLD AUTO: 14.7 % — LOW (ref 20.5–51.1)
MAGNESIUM SERPL-MCNC: 1.9 MG/DL — SIGNIFICANT CHANGE UP (ref 1.8–2.4)
MCHC RBC-ENTMCNC: 29.8 PG — SIGNIFICANT CHANGE UP (ref 27–31)
MCHC RBC-ENTMCNC: 33.5 G/DL — SIGNIFICANT CHANGE UP (ref 32–37)
MCV RBC AUTO: 88.9 FL — SIGNIFICANT CHANGE UP (ref 80–94)
MONOCYTES # BLD AUTO: 1.01 K/UL — HIGH (ref 0.1–0.6)
MONOCYTES NFR BLD AUTO: 12.2 % — HIGH (ref 1.7–9.3)
NEUTROPHILS # BLD AUTO: 5.95 K/UL — SIGNIFICANT CHANGE UP (ref 1.4–6.5)
NEUTROPHILS NFR BLD AUTO: 71.5 % — SIGNIFICANT CHANGE UP (ref 42.2–75.2)
NRBC # BLD: 0 /100 WBCS — SIGNIFICANT CHANGE UP (ref 0–0)
NT-PROBNP SERPL-SCNC: 498 PG/ML — HIGH (ref 0–300)
PLATELET # BLD AUTO: 155 K/UL — SIGNIFICANT CHANGE UP (ref 130–400)
POTASSIUM SERPL-MCNC: 4.2 MMOL/L — SIGNIFICANT CHANGE UP (ref 3.5–5)
POTASSIUM SERPL-SCNC: 4.2 MMOL/L — SIGNIFICANT CHANGE UP (ref 3.5–5)
RBC # BLD: 4.43 M/UL — LOW (ref 4.7–6.1)
RBC # FLD: 13.2 % — SIGNIFICANT CHANGE UP (ref 11.5–14.5)
SODIUM SERPL-SCNC: 136 MMOL/L — SIGNIFICANT CHANGE UP (ref 135–146)
TROPONIN T SERPL-MCNC: <0.01 NG/ML — SIGNIFICANT CHANGE UP
WBC # BLD: 8.31 K/UL — SIGNIFICANT CHANGE UP (ref 4.8–10.8)
WBC # FLD AUTO: 8.31 K/UL — SIGNIFICANT CHANGE UP (ref 4.8–10.8)

## 2021-10-03 PROCEDURE — 99232 SBSQ HOSP IP/OBS MODERATE 35: CPT

## 2021-10-03 RX ORDER — HEPARIN SODIUM 5000 [USP'U]/ML
5000 INJECTION INTRAVENOUS; SUBCUTANEOUS EVERY 12 HOURS
Refills: 0 | Status: DISCONTINUED | OUTPATIENT
Start: 2021-10-03 | End: 2021-10-06

## 2021-10-03 RX ORDER — SODIUM CHLORIDE 9 MG/ML
1000 INJECTION INTRAMUSCULAR; INTRAVENOUS; SUBCUTANEOUS
Refills: 0 | Status: DISCONTINUED | OUTPATIENT
Start: 2021-10-03 | End: 2021-10-06

## 2021-10-03 RX ADMIN — ATORVASTATIN CALCIUM 40 MILLIGRAM(S): 80 TABLET, FILM COATED ORAL at 21:01

## 2021-10-03 RX ADMIN — DONEPEZIL HYDROCHLORIDE 10 MILLIGRAM(S): 10 TABLET, FILM COATED ORAL at 21:01

## 2021-10-03 RX ADMIN — FAMOTIDINE 40 MILLIGRAM(S): 10 INJECTION INTRAVENOUS at 17:01

## 2021-10-03 RX ADMIN — MEMANTINE HYDROCHLORIDE 10 MILLIGRAM(S): 10 TABLET ORAL at 17:01

## 2021-10-03 RX ADMIN — HEPARIN SODIUM 5000 UNIT(S): 5000 INJECTION INTRAVENOUS; SUBCUTANEOUS at 17:02

## 2021-10-03 RX ADMIN — SODIUM CHLORIDE 65 MILLILITER(S): 9 INJECTION INTRAMUSCULAR; INTRAVENOUS; SUBCUTANEOUS at 21:01

## 2021-10-03 RX ADMIN — Medication 81 MILLIGRAM(S): at 17:01

## 2021-10-03 RX ADMIN — SODIUM CHLORIDE 65 MILLILITER(S): 9 INJECTION INTRAMUSCULAR; INTRAVENOUS; SUBCUTANEOUS at 02:00

## 2021-10-03 RX ADMIN — FAMOTIDINE 40 MILLIGRAM(S): 10 INJECTION INTRAVENOUS at 06:28

## 2021-10-03 RX ADMIN — LISINOPRIL 10 MILLIGRAM(S): 2.5 TABLET ORAL at 06:28

## 2021-10-03 RX ADMIN — HEPARIN SODIUM 5000 UNIT(S): 5000 INJECTION INTRAVENOUS; SUBCUTANEOUS at 06:29

## 2021-10-03 NOTE — PROGRESS NOTE ADULT - ASSESSMENT
patient admitted for fall    ·	mechanical Fall  ·	HTN/CAD/CABG  ·	Dyslipidemia   ·	Age related Debility   ·	Senile Dementia   ·	Elevated CK levels   ·	r/o shingles     -fall precautions  -rehab/pt as tolerated   -IVF for now; monitor CK levels in am     Attending Physician Dr. Tayler Guerrero # [id number]

## 2021-10-03 NOTE — PHYSICAL THERAPY INITIAL EVALUATION ADULT - ADDITIONAL COMMENTS
Pt reports he lives alone in a private house - states there are no stairs to enter and no stairs inside. Pt ambulates with RW at baseline. Unable to provide additional social history.

## 2021-10-03 NOTE — PHYSICAL THERAPY INITIAL EVALUATION ADULT - GAIT DEVIATIONS NOTED, PT EVAL
forward flexed trunk, dec heel strike and push off/decreased michael/decreased step length/decreased stride length/decreased weight-shifting ability

## 2021-10-03 NOTE — PATIENT PROFILE ADULT - FALLEN IN THE PAST
BRIEF OPERATIVE NOTE    Date of Procedure: 12/5/2017   Preoperative Diagnosis: Right ovarian cyst [N83.201]  Pelvic pain in female [R10.2]  Menorrhagia with regular cycle [N92.0]  Family history of ovarian cancer [Z80.41]  Postoperative Diagnosis: Right ovarian cyst [N83.201]  Pelvic pain in female [R10.2]  Menorrhagia with regular cycle [N92.0]  Family history of ovarian cancer [Z80.41]    Procedure(s): HYSTERECTOMY TOTAL LAPAROSCOPIC WITH RIGHT SALPINGECTOMY AND OOPHORECTOMY with cystoscopy  Surgeon(s) and Role:     * Alexander Newby MD - Primary     * Gladys Squires DO - Assisting         Assistant Staff:       Surgical Staff:  Circ-1: Maudry Castleman, RN  Scrub Tech-1: Portal [place]  Scrub Tech-2: Justin Kenyon  Event Time In   Incision Start 3013   Incision Close 1488     Anesthesia: General   Estimated Blood Loss: 50 cc  Specimens:   ID Type Source Tests Collected by Time Destination   1 : uterus with right tube and ovary Fresh Uterus with Fallopian Tube & Ovary  Alexander Newby MD 12/5/2017 8590 Pathology      Findings: adhesions omentum and small bowel and colon to left side of uterus.   Small bowel loop to anterior abdominal wall not taken down   Complications: none  Implants: * No implants in log *
yes

## 2021-10-03 NOTE — PHYSICAL THERAPY INITIAL EVALUATION ADULT - GENERAL OBSERVATIONS, REHAB EVAL
Pt encountered semi-reclined in bed, NAD, +tele, +IV, ok to be seen by PT as confirmed by RN and pt agreeable. +chart reviewed

## 2021-10-03 NOTE — PATIENT PROFILE ADULT - NSPROMEDSADMININFO_GEN_A_NUR
no concerns HISTORY OF PRESENT ILLNESS/HIV/DISPOSITION/REVIEW OF SYSTEMS/PHYSICAL EXAM/PAST MEDICAL/SURGICAL/SOCIAL HISTORY/VITAL SIGNS( Pullset)

## 2021-10-04 LAB
ANION GAP SERPL CALC-SCNC: 10 MMOL/L — SIGNIFICANT CHANGE UP (ref 7–14)
BUN SERPL-MCNC: 15 MG/DL — SIGNIFICANT CHANGE UP (ref 10–20)
CALCIUM SERPL-MCNC: 8.5 MG/DL — SIGNIFICANT CHANGE UP (ref 8.5–10.1)
CHLORIDE SERPL-SCNC: 104 MMOL/L — SIGNIFICANT CHANGE UP (ref 98–110)
CK SERPL-CCNC: 303 U/L — HIGH (ref 0–225)
CO2 SERPL-SCNC: 24 MMOL/L — SIGNIFICANT CHANGE UP (ref 17–32)
COVID-19 SPIKE DOMAIN AB INTERP: POSITIVE
COVID-19 SPIKE DOMAIN ANTIBODY RESULT: 179 U/ML — HIGH
CREAT SERPL-MCNC: 1.2 MG/DL — SIGNIFICANT CHANGE UP (ref 0.7–1.5)
GLUCOSE SERPL-MCNC: 107 MG/DL — HIGH (ref 70–99)
HCT VFR BLD CALC: 37.5 % — LOW (ref 42–52)
HGB BLD-MCNC: 12.6 G/DL — LOW (ref 14–18)
MCHC RBC-ENTMCNC: 30 PG — SIGNIFICANT CHANGE UP (ref 27–31)
MCHC RBC-ENTMCNC: 33.6 G/DL — SIGNIFICANT CHANGE UP (ref 32–37)
MCV RBC AUTO: 89.3 FL — SIGNIFICANT CHANGE UP (ref 80–94)
NRBC # BLD: 0 /100 WBCS — SIGNIFICANT CHANGE UP (ref 0–0)
PLATELET # BLD AUTO: 151 K/UL — SIGNIFICANT CHANGE UP (ref 130–400)
POTASSIUM SERPL-MCNC: 4 MMOL/L — SIGNIFICANT CHANGE UP (ref 3.5–5)
POTASSIUM SERPL-SCNC: 4 MMOL/L — SIGNIFICANT CHANGE UP (ref 3.5–5)
RBC # BLD: 4.2 M/UL — LOW (ref 4.7–6.1)
RBC # FLD: 13.3 % — SIGNIFICANT CHANGE UP (ref 11.5–14.5)
SARS-COV-2 IGG+IGM SERPL QL IA: 179 U/ML — HIGH
SARS-COV-2 IGG+IGM SERPL QL IA: POSITIVE
SODIUM SERPL-SCNC: 138 MMOL/L — SIGNIFICANT CHANGE UP (ref 135–146)
WBC # BLD: 6.91 K/UL — SIGNIFICANT CHANGE UP (ref 4.8–10.8)
WBC # FLD AUTO: 6.91 K/UL — SIGNIFICANT CHANGE UP (ref 4.8–10.8)

## 2021-10-04 PROCEDURE — 99232 SBSQ HOSP IP/OBS MODERATE 35: CPT

## 2021-10-04 RX ADMIN — HEPARIN SODIUM 5000 UNIT(S): 5000 INJECTION INTRAVENOUS; SUBCUTANEOUS at 05:02

## 2021-10-04 RX ADMIN — FAMOTIDINE 40 MILLIGRAM(S): 10 INJECTION INTRAVENOUS at 17:14

## 2021-10-04 RX ADMIN — DONEPEZIL HYDROCHLORIDE 10 MILLIGRAM(S): 10 TABLET, FILM COATED ORAL at 21:37

## 2021-10-04 RX ADMIN — ATORVASTATIN CALCIUM 40 MILLIGRAM(S): 80 TABLET, FILM COATED ORAL at 21:37

## 2021-10-04 RX ADMIN — LISINOPRIL 10 MILLIGRAM(S): 2.5 TABLET ORAL at 05:01

## 2021-10-04 RX ADMIN — Medication 81 MILLIGRAM(S): at 11:14

## 2021-10-04 RX ADMIN — FAMOTIDINE 40 MILLIGRAM(S): 10 INJECTION INTRAVENOUS at 05:02

## 2021-10-04 RX ADMIN — Medication 50 MILLIGRAM(S): at 05:01

## 2021-10-04 RX ADMIN — HEPARIN SODIUM 5000 UNIT(S): 5000 INJECTION INTRAVENOUS; SUBCUTANEOUS at 17:14

## 2021-10-04 RX ADMIN — MEMANTINE HYDROCHLORIDE 10 MILLIGRAM(S): 10 TABLET ORAL at 11:14

## 2021-10-04 RX ADMIN — SODIUM CHLORIDE 65 MILLILITER(S): 9 INJECTION INTRAMUSCULAR; INTRAVENOUS; SUBCUTANEOUS at 05:01

## 2021-10-04 NOTE — DISCHARGE NOTE PROVIDER - NSDCMRMEDTOKEN_GEN_ALL_CORE_FT
aspirin 81 mg oral tablet, chewable: 1 tab(s) orally once a day  atorvastatin 40 mg oral tablet: 1 tab(s) orally once a day (at bedtime)  donepezil 10 mg oral tablet: 1 tab(s) orally once a day (at bedtime)  famotidine 40 mg oral tablet: 1 tab(s) orally once a day  lisinopril 10 mg oral tablet: 1 tab(s) orally once a day  memantine 10 mg oral tablet: 1 tab(s) orally once a day  metoprolol succinate 50 mg oral tablet, extended release: 1 tab(s) orally once a day

## 2021-10-04 NOTE — DISCHARGE NOTE PROVIDER - NSDCCPCAREPLAN_GEN_ALL_CORE_FT
PRINCIPAL DISCHARGE DIAGNOSIS  Diagnosis: Fall  Assessment and Plan of Treatment: Patient sustained a mechanical fall on 10/02/2021. CT head, chest, abdomen and pelvis were negative. Pelvic X-ray was also negative. Seen and evaluated by PT during hospitalization.

## 2021-10-04 NOTE — DISCHARGE NOTE PROVIDER - CARE PROVIDER_API CALL
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  She said that this driving issue surfaced some time ago when she had blurry vision.  States she's been followed by [facility] for this and they've fit her with a hard contact to correct the blurriness. I see she did see Dr. Oscar in March and is scheduled to see him again in September.  Given that he's following her for this condition, I will send this request on to Dr. Oscar's office.      I will route this note through [employer], and will fax the original form directly to Dr. Oscar's office at [phone].     Subjective:       Patient ID: Teodoro Mast is a 72 y.o. Black or  male who presents for follow up of CKD.    HPI     Mr. Mast was seen for follow up of CKD. He was seen on 9/6/16 in new patient evaluation and last followed on 10/3/18. Pt has longstanding hypertension since 1974, remote h/o tobacco smoking, hyperlipidemia, BPH, CKD, proteinuria. He reports family h/o kidney disease in his father that was not diagnosed until he was in his 80's. Pt reports he was diagnosed with hypertension when he was 28 years old. He reports in the past he had some obstruction to the urinary tract but unable to provide any details.    In the past he was using medicines like Indomethacin, ibuprofen. He has been diagnosed with sleep apnea. He is using CPAP regularly.    Overall, he reports he has been doing fine. He denies any recent acute illness. He still has symptoms of difficulty with emptying the bladder and hesitance and is under follow up of urology for BPH. He feels his symptoms of prostate enlargement are better since started using Flomax. He also reports his sleep quality is better with CPAP machine.    He denies any leg swelling. He admits he could do better with low sodium diet. Of note, his reports of BP readings from home suggest suboptimally controlled hypertension. He did not bring readings but reports his SBP is usually in 140 range in the morning and DBP around 80's but throughout the day he feels it can go up to 150-160.    Of note, he saw his PCP/ cardiologist in January 2019 when his BP was noted to be 190's/90's. He was started on torsemide by his cardiologist for better BP control. Pre clinic that time creatinine was 2.0, it did rise to 2.4 in February 2019 labs along with rise in BUN to 39. His cardiologist reduced the dose of diuretic in response to this. But overall, he has progression of CKD at this point.    He admits he has white coat syndrome. But overall home BP readings he is  reporting is still suboptimally controlled. He does admit feeling lot of stress with watching TV news and he feels very anxious watching TV.     He does not have SOB/ peripheral edema. He denies any nausea/ vomiting/ diarrhea recently.    He reports he had prior episodes of gout and he feels intake of beer, craw fish triggers it for him. He has been taking allopurinol regularly which has helped lower the uric acid levels gradually.    He has slow progression of CKD. Labs were reviewed and d/w him. His creatinine ha gradually increased from 1.4 t0 1.9 since 2010. Prior urine studies show proteinuria. He appears to have chronic anemia. He has been on lisinopril and aldactone containing regimen. He denies any episode of low BP or lightheadedness.     prior labs showed SPEP/ SHELIA was normal. hep B/C screen was negative. US kidneys from 10/16 showed 10.1 and 12.9 cm kidneys, changes of CKD, cortical thinning, multiple cysts on both sides and enlarged prostate.     Renal Function:  Lab Results   Component Value Date    GLU 97 04/18/2019     02/07/2019     04/18/2019     02/07/2019    K 4.3 04/18/2019    K 4.0 02/07/2019     (H) 04/18/2019     02/07/2019    CO2 23 04/18/2019    CO2 27 02/07/2019    BUN 32 (H) 04/18/2019    BUN 39 (H) 02/07/2019    CALCIUM 9.1 04/18/2019    CALCIUM 8.5 (L) 02/07/2019    CREATININE 2.3 (H) 04/18/2019    CREATININE 2.4 (H) 02/07/2019    ALBUMIN 3.3 (L) 04/18/2019    ALBUMIN 3.3 (L) 11/02/2018    PHOS 2.4 (L) 04/18/2019    PHOS 2.4 (L) 11/02/2018    ESTGFRAFRICA 31.8 (A) 04/18/2019    ESTGFRAFRICA 30.2 (A) 02/07/2019    EGFRNONAA 27.5 (A) 04/18/2019    EGFRNONAA 26.2 (A) 02/07/2019       Urinalysis:  Lab Results   Component Value Date    APPEARANCEUA Clear 04/18/2019    PHUR 5.0 04/18/2019    SPECGRAV 1.010 04/18/2019    PROTEINUA 2+ (A) 04/18/2019    GLUCUA Negative 04/18/2019    OCCULTUA Negative 04/18/2019    NITRITE Negative 04/18/2019    LEUKOCYTESUR Negative  04/18/2019       Protein/Creatinine Ratio:  Lab Results   Component Value Date    PROTEINURINE 283 (H) 04/18/2019    CREATRANDUR 128.0 04/18/2019    UTPCR 2.21 (H) 04/18/2019       CBC:  Lab Results   Component Value Date    WBC 4.92 01/16/2019    HGB 11.4 (L) 01/16/2019    HCT 36.0 (L) 01/16/2019       Vit D 33  Uric acid 7.8 FROM 6.6    US Kidneys 6/2018  10.4 and 13.6 cm kidneys, cortical thinning, loss of corticomedullary distinction, multiple cysts on both kidneys, prostatomegaly.    Review of Systems   Constitutional: Negative for activity change, appetite change, chills, fatigue and fever.   Eyes: Negative for pain and discharge.   Respiratory: Negative for cough, shortness of breath and wheezing.    Cardiovascular: Negative for chest pain and leg swelling.   Gastrointestinal: Negative for abdominal pain, diarrhea, nausea and vomiting.   Endocrine: Negative for polydipsia and polyuria.   Genitourinary: Negative for decreased urine volume, difficulty urinating, dysuria, flank pain, frequency and hematuria.   Musculoskeletal: Negative for back pain and myalgias.   Skin: Negative for pallor and rash.   Neurological: Negative for dizziness, light-headedness and headaches.   Psychiatric/Behavioral: The patient is not nervous/anxious.        Objective:      Physical Exam   Constitutional: He is oriented to person, place, and time. He appears well-developed and well-nourished. No distress.   Eyes: Right eye exhibits no discharge. Left eye exhibits no discharge.   Neck: Neck supple.   Cardiovascular: Normal rate, regular rhythm and normal heart sounds.   Pulmonary/Chest: Effort normal and breath sounds normal. No respiratory distress. He has no wheezes. He has no rales.   Musculoskeletal: He exhibits no edema.   Neurological: He is alert and oriented to person, place, and time.   Skin: Skin is warm. He is not diaphoretic.   Psychiatric: He has a normal mood and affect. His behavior is normal.   Vitals  Zana Sher  INTERNAL MEDICINE  [address] [place] [address]  Phone: [phone]  Fax: [phone]  Follow Up Time: 1 week   reviewed.      Assessment:       1. Hypertensive kidney disease with stage 3 chronic kidney disease    2. Kidney cysts    3. Proteinuria, unspecified type    4. Hyperuricemia    5. Metabolic acidosis    6. Secondary hyperparathyroidism    7. BPH with urinary obstruction    8. Vitamin D deficiency        Plan:     Mr. Mast has longstanding hypertension, hyperlipidemia, BPH, CKD III with proteinuria. He has hypertensive glomerulosclerosis causing CKD as well has NSAID induced kidney damage. He denies any NSAID use currently but it appears in the past he was taking indomethacin. He has slow progression of CKD. Also he has slow progression of proteinuria. Most recently he had uncontrolled hypertension which could have led to progression of CKD and proteinuria. As such he has hypertension since 1970's.     He developed KINGS in January/ February which is likely due to diuretic dosing which was added to address his severely uncontrolled hypertension around that time. Overall since then he does have further somewhat rapid progression of CKD. I brought this to his attention. Stressed need for better BP control. Ideally his SBP should be < 130 with sub nephrotic proteinuria he has been having. Pt acknowledged his BP control could improve. Will defer management to his cardiologist. Pt encouraged to decreased or stop consumption of coffee and consider relaxations techniques to help with stressors, especially watching TV news.     Since adding allopurinol at a lower dose his hyperuricemia seems to have improved. Continue current dose for now.    I explained to him about CKD staging, potential for progression, risk factors for CKD etc. Stressed importance of good BP control, low salt diet, keeping hydrated, avoiding NSAID, nephrotoxins etc. Previously noted, cysts on both kidneys and changes of CKD on US.     will follow K levels closely. Pt advised to avoid high K containing foods.     Plan  - continue allopurinol, renal  dosing  - periodically monitor renal panel for electrolytes, acid base status, eGFR  - risk of CKD progression d/w patient  - sodium bicarbonate bid for correction of metabolic acidosis  - low salt diet  - follow PTH levels, vit D, decrease OTC vitamin D, follow corrected Ca  - trend urine studies for proteinuria  - avoid NSAID/ bactrim/ IV contrast/ gadolinium/ aminoglycoside where possible  - continue ACE-I containing regimen for RAAS blockade. He is on lisinopril  - he has chronic anemia, periodically trend Hb  - continue to follow with urology, continue Flomax. He has prostate enlargement and elevated PSA for several years. He has occasional microhematuria seen on urine studies  - management of hyperlipidemia per cardiology   - continue home BP monitoring  - management of sleep apnea per sleep lab  - repeat US kidneys for follow up on kidney cysts     RTC 3 to 4 months         Plan, labs, recommendations were discussed with patient, his questions were answered to his satisfaction.

## 2021-10-04 NOTE — DISCHARGE NOTE PROVIDER - HOSPITAL COURSE
81 yr old male with hx of CAD s/p CABG and stent (last stent 08/31/20), HTN, Dementia, Stage IV follicular lymphoma admitted for fall.     #Mechanical fall  - Evaluated by PT while hospitalized.     #L forearm rash with blister  - Placed on isolation precautions and evaluated by ID while hospitalized.   - HSV PCR taken to rule out Shingles virus, with results pending.     #CAD s/p CABG and PCI in 08/2020  - Continue taking ASA, Metoprolol & Lipitor upon discharge.    #Dementia   - AAOx2-3 at baseline  - Continue taking Memantine and Donepezil upon discharge.     #HTN  - BP controlled and monitored while hospitalized.   - Continue taking Lisinopril and Metoprolol upon discharge.     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     #Mechanical fall  - Evaluated by PT while hospitalized.     #L forearm rash with blister- stable  ID evaluated. HSV PCR - negative    #CAD s/p CABG and PCI in 08/2020  - Continue taking ASA, Metoprolol & Lipitor upon discharge.    #Dementia   - AAOx2-3 at baseline  - Continue taking Memantine and Donepezil upon discharge.     #HTN  - BP controlled and monitored while hospitalized.   - Continue taking Lisinopril and Metoprolol upon discharge.     #Stage IV follicular lymphoma

## 2021-10-04 NOTE — CONSULT NOTE ADULT - ASSESSMENT
IMPRESSION: Rehab of 81 y o  m  ptn rehab  for  debility  gd  sp  fall  .    PRECAUTIONS: [  ] Cardiac  [  ] Respiratory  [  ] Seizures [  ] Contact Isolation  [  ] Droplet Isolation  [ FALL ] Other    Weight Bearing Status:     RECOMMENDATION:    Out of Bed to Chair     DVT/Decubiti Prophylaxis    REHAB PLAN:     [  xx ] Bedside P/T 3-5 times a week   [   ]   Bedside O/T  2-3 times a week             [   ] No Rehab Therapy Indicated                   [   ]  Speech Therapy   Conditioning/ROM                                    ADL  Bed Mobility                                               Conditioning/ROM  Transfers                                                     Bed Mobility  Sitting /Standing Balance                         Transfers                                        Gait Training                                               Sitting/Standing Balance  Stair Training [   ]Applicable                    Home equipment Eval                                                                        Splinting  [   ] Only      GOALS:   ADL   [  x ]   Independent                    Transfers  [ x  ] Independent                          Ambulation  [  x ] Independent     [x    ] With device                            [ x  ]  CG                                                         [  x ]  CG                                                                  [ x  ] CG                            [    ] Min A                                                   [   ] Min A                                                              [   ] Min  A          DISCHARGE PLAN:   [   ]  Good candidate for Intensive Rehabilitation/Hospital based-[facility]A [facility]                                             Will tolerate 3hrs Intensive Rehab Daily                                       [  xx ]  Short Term Rehab in Skilled Nursing Facility cont curent care  may  need  home  care  vs  LTC                                       [    ]  Home with Outpatient or VN services                                         [    ]  Possible Candidate for Intensive Hospital based Rehab

## 2021-10-04 NOTE — CONSULT NOTE ADULT - SUBJECTIVE AND OBJECTIVE BOX
HUMERA NOEMY  81y, Male  Allergy: No Known Allergies      CHIEF COMPLAINT: 81 YEAR OLD MALE C/O FALL . (03 Oct 2021 12:19)      LOS  2d    HPI:  81 yr old male with hx of CAD s/p CABG and stent (last stent 08/31/20), HTN, Dementia, Stage IV follicular lymphoma was BIBA for fall. Patient is a poor historian. As per daughter, family was not able to get in touch with patient and went to check on him and found on floor covered in urine and feces. Patient states he remember having a soda and then woke up on the floor. Pt denies any fever chills, sob, chest pain, palpation abd pain, no urinary or bowel issues Lives home alone, ambulates with walker, social negx3  (02 Oct 2021 21:14)      INFECTIOUS DISEASE HISTORY:  History as above     PAST MEDICAL & SURGICAL HISTORY:  HTN (hypertension)    Coronary artery disease involving coronary bypass graft of native heart without angina pectoris    Dementia    Chronic GERD    S/P CABG x 4  33 years ago        FAMILY HISTORY  No pertinent family history in first degree relatives        SOCIAL HISTORY  Social History:  Marital Status:  (   )    (   ) Single    (   )    ( x )   Lives with: ( x ) alone  (  ) children   (  ) spouse   (  ) parents  (  ) other  Recent Travel: No recent travel    Substance Use (street drugs): ( x ) never used  (  ) other:  Tobacco Usage:  ( x  ) never smoked   (   ) former smoker   (   ) current smoker  (     ) pack year  Alcohol Usage: None (02 Oct 2021 21:14)        ROS  General: Denies rigors, nightsweats  HEENT: Denies headache, rhinorrhea, sore throat, eye pain  CV: Denies CP, palpitations  PULM: Denies wheezing, hemoptysis  GI: Denies hematemesis, hematochezia, melena  : Denies discharge, hematuria  MSK: Denies arthralgias, myalgias  SKIN: Denies rash, lesions  NEURO: Denies paresthesias, weakness  PSYCH: Denies depression, anxiety    VITALS:  T(F): 96.5, Max: 97.9 (10-03-21 @ 20:45)  HR: 62  BP: 150/67  RR: 18Vital Signs Last 24 Hrs  T(C): 35.8 (04 Oct 2021 05:11), Max: 36.6 (03 Oct 2021 20:45)  T(F): 96.5 (04 Oct 2021 05:11), Max: 97.9 (03 Oct 2021 20:45)  HR: 62 (04 Oct 2021 07:03) (62 - 71)  BP: 150/67 (04 Oct 2021 07:03) (136/68 - 180/74)  BP(mean): --  RR: 18 (04 Oct 2021 05:11) (18 - 18)  SpO2: 98% (04 Oct 2021 05:11) (98% - 98%)    PHYSICAL EXAM:  Gen: NAD, resting in bed  HEENT: Normocephalic, atraumatic  Neck: supple, no lymphadenopathy  CV: Regular rate & regular rhythm  Lungs: decreased BS at bases, no fremitus  Abdomen: Soft, BS present  Ext: Warm, well perfused  Neuro: non focal, awake  Skin: no rash, no erythema  Lines: no phlebitis    TESTS & MEASUREMENTS:                        13.2   8.31  )-----------( 155      ( 03 Oct 2021 08:51 )             39.4     10-03    136  |  103  |  18  ----------------------------<  102<H>  4.2   |  23  |  1.2    Ca    9.2      03 Oct 2021 08:51  Mg     1.9     10-03    TPro  7.5  /  Alb  4.2  /  TBili  0.4  /  DBili  x   /  AST  24  /  ALT  23  /  AlkPhos  91  10-02    eGFR if Non African American: 57 mL/min/1.73M2 (10-03-21 @ 08:51)  eGFR if : 66 mL/min/1.73M2 (10-03-21 @ 08:51)    LIVER FUNCTIONS - ( 02 Oct 2021 19:23 )  Alb: 4.2 g/dL / Pro: 7.5 g/dL / ALK PHOS: 91 U/L / ALT: 23 U/L / AST: 24 U/L / GGT: x                 Lactate, Blood: 1.6 mmol/L (10-03-21 @ 08:51)  Lactate, Blood: 2.4 mmol/L (10-02-21 @ 19:23)      INFECTIOUS DISEASES TESTING  COVID-19 PCR: NotDetec (10-02-21 @ 19:00)  COVID-19 PCR: NotDetec (06-19-21 @ 10:00)  COVID-19 PCR: NotDete (11-10-20 @ 17:12)      RADIOLOGY & ADDITIONAL TESTS:  I have personally reviewed the last Chest xray  CXR  Xray Chest 1 View- PORTABLE-Urgent:   EXAM:  XR CHEST PORTABLE URGENT 1V            PROCEDURE DATE:  10/02/2021            INTERPRETATION:  Clinical History / Reason for exam: Trauma    Comparison : Chest radiograph 6/19/2021    Technique/Positioning: AP portable.    Findings:    Support devices: None.    Cardiac/mediastinum/hilum: Magnified cardiac silhouette. The patient is poststernotomy and CABG.    Lung parenchyma/Pleura: Within normal limits.    Skeleton/soft tissues: Degenerative changes of the thoracic spine. Degenerative changes of both shoulders.    Impression:    No radiographic evidence of acute cardiopulmonary disease.        --- End of Report ---              OTILIA CRANE MD; Attending Radiologist  This document has been electronically signed. Oct  3 2021  8:56AM (10-02-21 @ 19:39)      CT  CT Chest w/ IV Cont:   EXAM:  CT CHEST IC        EXAM:  CT ABDOMEN AND PELVIS IC            PROCEDURE DATE:  10/02/2021            INTERPRETATION:  CLINICAL STATEMENT: Trauma code, history of follicular lymphoma with metastasis    TECHNIQUE: Contiguous axial CT images were obtained from the lower chest to the pubic symphysis following administration of 95 cc Optiray 320 intravenous contrast.  Oral contrast was not administered.  Reformatted images in the coronal and sagittal planes were acquired.    COMPARISON: CT chest, abdomen and pelvis 6/19/2021    QUALITY STATEMENT: Patient upper extremities causing streak artifact inherently degrades image quality and limits this exam.    FINDINGS:    THORACIC NODES: No lymphadenopathy.    MEDIASTINUM/GREAT VESSELS: Heart is normal in size. Coronary artery calcifications. Posterior sternotomy and CABG. Borderline ectatic ascending aorta measuring approximately 4 cm at the level of the main pulmonary artery.    LUNGS, PLEURA, AIRWAYS: No pleural effusion or pneumothorax.Right middle lobe atelectasis.    6 mm solid nodule, right upper lobe (3-107)  7 mm solid nodule, right lower lobe (3-142).  Additional multiple scattered pulmonary nodules.    HEPATOBILIARY: Cholelithiasis. Multiple hypoattenuating hepatic lesions, too small to characterize.    SPLEEN: Unremarkable.    PANCREAS: Unremarkable.    ADRENAL GLANDS: Unremarkable.    KIDNEYS: Unremarkable.    ABDOMINOPELVIC NODES: Unremarkable.    PELVIC ORGANS: Prostate measures 5.0 cm in transverse diameter.    PERITONEUM/MESENTERY/BOWEL: No bowel obstruction, pneumoperitoneum or ascites. Appendix is normal. Bilateral fat-containing inguinal hernias. Small fat-containing umbilical hernia. Colonic diverticulosis without evidence of acute diverticulitis.    BONES/SOFT TISSUES: No acute osseous or soft tissue abnormalities.    OTHER: Abdominal aorta is normal caliber. Atherosclerotic ossifications of the abdominal aorta and its branches.    IMPRESSION:    No acute intrathoracic or abdominopelvic abnormalities.    Other chronic findings as above.    --- End of Report ---            KRYS ALLAN DO; Resident Radiologist  This document has been electronically signed.  CARLOS PUGH MD; Attending Radiologist  This document has been electronically signed. Oct  05808  9:44PM (10-02-21 @ 19:46)      CARDIOLOGY TESTING  12 Lead ECG:   Ventricular Rate 64 BPM    Atrial Rate 64 BPM    P-R Interval 144 ms    QRS Duration 94 ms    Q-T Interval 428 ms    QTC Calculation(Bazett) 441 ms    P Axis 36 degrees    R Axis -2 degrees    T Axis 22 degrees    Diagnosis Line Normal sinus rhythm  Moderate voltage criteria for LVH, may be normal variant  Borderline ECG    Confirmed by UNRULY ALVA MD ([id number]) on 10/3/2021 8:53:38 AM (10-02-21 @ 19:06)      MEDICATIONS  aspirin  chewable 81 Oral daily  atorvastatin 40 Oral at bedtime  donepezil 10 Oral at bedtime  famotidine    Tablet 40 Oral two times a day  heparin   Injectable 5000 SubCutaneous every 12 hours  lisinopril 10 Oral daily  memantine 10 Oral daily  metoprolol succinate ER 50 Oral daily  sodium chloride 0.9%. 1000 IV Continuous <Continuous>      Weight  Weight (kg): 77.1 (10-02-21 @ 19:06)    ANTIBIOTICS:      ALLERGIES:  No Known Allergies     HUMERANOEMY  81y, Male  Allergy: No Known Allergies      CHIEF COMPLAINT: 81 YEAR OLD MALE C/O FALL . (03 Oct 2021 12:19)      LOS  2d    HPI:  81 yr old male with hx of CAD s/p CABG and stent (last stent 08/31/20), HTN, Dementia, Stage IV follicular lymphoma was BIBA for fall. Patient is a poor historian. As per daughter, family was not able to get in touch with patient and went to check on him and found on floor covered in urine and feces. Patient states he remember having a soda and then woke up on the floor. Pt denies any fever chills, sob, chest pain, palpation abd pain, no urinary or bowel issues Lives home alone, ambulates with walker, social negx3  (02 Oct 2021 21:14)      INFECTIOUS DISEASE HISTORY:  History as above   ID consulted for rash on left arm.   Unclear when rash started - says that its been there before the hospital and prior to fall.   Denies any pain to lesion.     PAST MEDICAL & SURGICAL HISTORY:  HTN (hypertension)    Coronary artery disease involving coronary bypass graft of native heart without angina pectoris    Dementia    Chronic GERD    S/P CABG x 4  33 years ago        FAMILY HISTORY  No pertinent family history in first degree relatives        SOCIAL HISTORY  Social History:  Marital Status:  (   )    (   ) Single    (   )    ( x )   Lives with: ( x ) alone  (  ) children   (  ) spouse   (  ) parents  (  ) other  Recent Travel: No recent travel    Substance Use (street drugs): ( x ) never used  (  ) other:  Tobacco Usage:  ( x  ) never smoked   (   ) former smoker   (   ) current smoker  (     ) pack year  Alcohol Usage: None (02 Oct 2021 21:14)        ROS  General: Denies rigors, nightsweats  HEENT: Denies headache, rhinorrhea, sore throat, eye pain  CV: Denies CP, palpitations  PULM: Denies wheezing, hemoptysis  GI: Denies hematemesis, hematochezia, melena  : Denies discharge, hematuria  MSK: Denies arthralgias, myalgias  SKIN: Denies rash, lesions  NEURO: Denies paresthesias, weakness  PSYCH: Denies depression, anxiety    VITALS:  T(F): 96.5, Max: 97.9 (10-03-21 @ 20:45)  HR: 62  BP: 150/67  RR: 18Vital Signs Last 24 Hrs  T(C): 35.8 (04 Oct 2021 05:11), Max: 36.6 (03 Oct 2021 20:45)  T(F): 96.5 (04 Oct 2021 05:11), Max: 97.9 (03 Oct 2021 20:45)  HR: 62 (04 Oct 2021 07:03) (62 - 71)  BP: 150/67 (04 Oct 2021 07:03) (136/68 - 180/74)  BP(mean): --  RR: 18 (04 Oct 2021 05:11) (18 - 18)  SpO2: 98% (04 Oct 2021 05:11) (98% - 98%)    PHYSICAL EXAM:  Gen: NAD, resting in bed  HEENT: Normocephalic, atraumatic  Neck: supple, no lymphadenopathy  CV: Regular rate & regular rhythm  Lungs: decreased BS at bases, no fremitus  Abdomen: Soft, BS present  Ext: Warm, well perfused  Neuro: non focal, awake  Skin: left arm with areas of blistering - mild erythema on base- non-tender   Lines: no phlebitis    TESTS & MEASUREMENTS:                        13.2   8.31  )-----------( 155      ( 03 Oct 2021 08:51 )             39.4     10-03    136  |  103  |  18  ----------------------------<  102<H>  4.2   |  23  |  1.2    Ca    9.2      03 Oct 2021 08:51  Mg     1.9     10-03    TPro  7.5  /  Alb  4.2  /  TBili  0.4  /  DBili  x   /  AST  24  /  ALT  23  /  AlkPhos  91  10-02    eGFR if Non African American: 57 mL/min/1.73M2 (10-03-21 @ 08:51)  eGFR if : 66 mL/min/1.73M2 (10-03-21 @ 08:51)    LIVER FUNCTIONS - ( 02 Oct 2021 19:23 )  Alb: 4.2 g/dL / Pro: 7.5 g/dL / ALK PHOS: 91 U/L / ALT: 23 U/L / AST: 24 U/L / GGT: x                 Lactate, Blood: 1.6 mmol/L (10-03-21 @ 08:51)  Lactate, Blood: 2.4 mmol/L (10-02-21 @ 19:23)      INFECTIOUS DISEASES TESTING  COVID-19 PCR: NotDetec (10-02-21 @ 19:00)  COVID-19 PCR: NotDetec (06-19-21 @ 10:00)  COVID-19 PCR: NotDetec (11-10-20 @ 17:12)      RADIOLOGY & ADDITIONAL TESTS:  I have personally reviewed the last Chest xray  CXR  Xray Chest 1 View- PORTABLE-Urgent:   EXAM:  XR CHEST PORTABLE URGENT 1V            PROCEDURE DATE:  10/02/2021            INTERPRETATION:  Clinical History / Reason for exam: Trauma    Comparison : Chest radiograph 6/19/2021    Technique/Positioning: AP portable.    Findings:    Support devices: None.    Cardiac/mediastinum/hilum: Magnified cardiac silhouette. The patient is poststernotomy and CABG.    Lung parenchyma/Pleura: Within normal limits.    Skeleton/soft tissues: Degenerative changes of the thoracic spine. Degenerative changes of both shoulders.    Impression:    No radiographic evidence of acute cardiopulmonary disease.        --- End of Report ---              OTILIA CRANE MD; Attending Radiologist  This document has been electronically signed. Oct  3 2021  8:56AM (10-02-21 @ 19:39)      CT  CT Chest w/ IV Cont:   EXAM:  CT CHEST IC        EXAM:  CT ABDOMEN AND PELVIS IC            PROCEDURE DATE:  10/02/2021            INTERPRETATION:  CLINICAL STATEMENT: Trauma code, history of follicular lymphoma with metastasis    TECHNIQUE: Contiguous axial CT images were obtained from the lower chest to the pubic symphysis following administration of 95 cc Optiray 320 intravenous contrast.  Oral contrast was not administered.  Reformatted images in the coronal and sagittal planes were acquired.    COMPARISON: CT chest, abdomen and pelvis 6/19/2021    QUALITY STATEMENT: Patient upper extremities causing streak artifact inherently degrades image quality and limits this exam.    FINDINGS:    THORACIC NODES: No lymphadenopathy.    MEDIASTINUM/GREAT VESSELS: Heart is normal in size. Coronary artery calcifications. Posterior sternotomy and CABG. Borderline ectatic ascending aorta measuring approximately 4 cm at the level of the main pulmonary artery.    LUNGS, PLEURA, AIRWAYS: No pleural effusion or pneumothorax.Right middle lobe atelectasis.    6 mm solid nodule, right upper lobe (3-107)  7 mm solid nodule, right lower lobe (3-142).  Additional multiple scattered pulmonary nodules.    HEPATOBILIARY: Cholelithiasis. Multiple hypoattenuating hepatic lesions, too small to characterize.    SPLEEN: Unremarkable.    PANCREAS: Unremarkable.    ADRENAL GLANDS: Unremarkable.    KIDNEYS: Unremarkable.    ABDOMINOPELVIC NODES: Unremarkable.    PELVIC ORGANS: Prostate measures 5.0 cm in transverse diameter.    PERITONEUM/MESENTERY/BOWEL: No bowel obstruction, pneumoperitoneum or ascites. Appendix is normal. Bilateral fat-containing inguinal hernias. Small fat-containing umbilical hernia. Colonic diverticulosis without evidence of acute diverticulitis.    BONES/SOFT TISSUES: No acute osseous or soft tissue abnormalities.    OTHER: Abdominal aorta is normal caliber. Atherosclerotic ossifications of the abdominal aorta and its branches.    IMPRESSION:    No acute intrathoracic or abdominopelvic abnormalities.    Other chronic findings as above.    --- End of Report ---            KRYS ALLAN DO; Resident Radiologist  This document has been electronically signed.  CARLOS PUGH MD; Attending Radiologist  This document has been electronically signed. Oct  84735  9:44PM (10-02-21 @ 19:46)      CARDIOLOGY TESTING  12 Lead ECG:   Ventricular Rate 64 BPM    Atrial Rate 64 BPM    P-R Interval 144 ms    QRS Duration 94 ms    Q-T Interval 428 ms    QTC Calculation(Bazett) 441 ms    P Axis 36 degrees    R Axis -2 degrees    T Axis 22 degrees    Diagnosis Line Normal sinus rhythm  Moderate voltage criteria for LVH, may be normal variant  Borderline ECG    Confirmed by UNRULY ALVA MD ([id number]) on 10/3/2021 8:53:38 AM (10-02-21 @ 19:06)      MEDICATIONS  aspirin  chewable 81 Oral daily  atorvastatin 40 Oral at bedtime  donepezil 10 Oral at bedtime  famotidine    Tablet 40 Oral two times a day  heparin   Injectable 5000 SubCutaneous every 12 hours  lisinopril 10 Oral daily  memantine 10 Oral daily  metoprolol succinate ER 50 Oral daily  sodium chloride 0.9%. 1000 IV Continuous <Continuous>      Weight  Weight (kg): 77.1 (10-02-21 @ 19:06)    ANTIBIOTICS:      ALLERGIES:  No Known Allergies

## 2021-10-04 NOTE — CONSULT NOTE ADULT - ASSESSMENT
ASSESSMENT  81 yr old male with hx of CAD s/p CABG and stent (last stent 08/31/20), HTN, Dementia, Stage IV follicular lymphoma was BIBA for fall.    IMPRESSION  #S/p Fall  #Rash  #Stage IV follicular lyphoma  #CAD s/p CABG  #Dementia  #Abx allergy: NKDA    RECOMMENDATIONS  This is a preliminary incomplete pended note, all final recommendations to follow after interview and examination of the patient.    Please call or message on [employer] Teams if with any questions.  Spectra [phone]     ASSESSMENT  81 yr old male with hx of CAD s/p CABG and stent (last stent 08/31/20), HTN, Dementia, Stage IV follicular lymphoma was BIBA for fall.    IMPRESSION  #S/p Fall  #Rash with blister  #Stage IV follicular lyphoma  #CAD s/p CABG  #Dementia  #Abx allergy: NKDA    RECOMMENDATIONS  - does not appear consistent with shingles, but will send swab for HSV PCR  - if negative, can discontinue precautions  - hold off on empiric treatment     Please call or message on [employer] Teams if with any questions.  Spectra [phone]

## 2021-10-04 NOTE — CONSULT NOTE ADULT - SUBJECTIVE AND OBJECTIVE BOX
HPI: 81 yr old male with hx of CAD s/p CABG and stent (last stent 08/31/20), HTN, Dementia, Stage IV follicular lymphoma was BIBA for fall. Patient is a poor historian. As per daughter, family was not able to get in touch with patient and went to check on him and found on floor covered in urine and feces. Patient states he remember having a soda and then woke up on the floor. Pt denies any fever chills, sob, chest pain, palpation abd pain, no urinary or bowel issues Lives home alone, ambulates with walker, social negx3  ptn  seen at  bed  side  nad   command  notice  skin  abrasion  on  the  front  of the  rt  side  of  his  head      PTN  REFERRED TO ACUTE  REHAB  FOR  EVAL AND  TX   PAST MEDICAL & SURGICAL HISTORY:  HTN (hypertension)    Coronary artery disease involving coronary bypass graft of native heart without angina pectoris    Dementia    Chronic GERD    S/P CABG x 4  33 years ago        Hospital Course:    TODAY'S SUBJECTIVE & REVIEW OF SYMPTOMS:     Constitutional WNL   Cardio WNL   Resp WNL   GI WNL  Heme WNL  Endo WNL  Skin WNL  MSK WNL  Neuro WNL  Cognitive WNL  Psych WNL      MEDICATIONS  (STANDING):  aspirin  chewable 81 milliGRAM(s) Oral daily  atorvastatin 40 milliGRAM(s) Oral at bedtime  donepezil 10 milliGRAM(s) Oral at bedtime  famotidine    Tablet 40 milliGRAM(s) Oral two times a day  heparin   Injectable 5000 Unit(s) SubCutaneous every 12 hours  lisinopril 10 milliGRAM(s) Oral daily  memantine 10 milliGRAM(s) Oral daily  metoprolol succinate ER 50 milliGRAM(s) Oral daily  sodium chloride 0.9%. 1000 milliLiter(s) (65 mL/Hr) IV Continuous <Continuous>    MEDICATIONS  (PRN):      FAMILY HISTORY:  No pertinent family history in first degree relatives        Allergies    No Known Allergies    Intolerances        SOCIAL HISTORY:    [  ] Etoh  [  ] Smoking  [  ] Substance abuse     Home Environment:  [xx  ] Home Alone  [  ] Lives with Family  [  ] Home Health Aid    Dwelling:  [ x ] Apartment  [  ] Private House  [  ] Adult Home  [  ] Skilled Nursing Facility      [  ] Short Term  [  ] Long Term  [  -] Stairs       Elevator [  ]    FUNCTIONAL STATUS PTA: (Check all that apply)  Ambulation: [  x ]Independent    [  ] Dependent     [  ] Non-Ambulatory  Assistive Device: [  ] SA Cane  [  ]  Q Cane  [ x ] Walker  [  ]  Wheelchair  ADL : [x ] Independent  [  ]  Dependent       Vital Signs Last 24 Hrs  T(C): 35.8 (04 Oct 2021 05:11), Max: 36.6 (03 Oct 2021 20:45)  T(F): 96.5 (04 Oct 2021 05:11), Max: 97.9 (03 Oct 2021 20:45)  HR: 62 (04 Oct 2021 07:03) (62 - 71)  BP: 150/67 (04 Oct 2021 07:03) (136/68 - 180/74)  BP(mean): --  RR: 18 (04 Oct 2021 05:11) (18 - 18)  SpO2: 98% (04 Oct 2021 05:11) (98% - 98%)      PHYSICAL EXAM: Alert & Oriented X 2  GENERAL: NAD, well-groomed, well-developed  HEAD:  Atraumatic, Normocephalic  EYES: EOMI, PERRLA, conjunctiva and sclera clear  NECK: Supple, No JVD, Normal thyroid  CHEST/LUNG: Clear to percussion bilaterally; No rales, rhonchi, wheezing, or rubs  HEART: Regular rate and rhythm; No murmurs, rubs, or gallops  ABDOMEN: Soft, Nontender, Nondistended; Bowel sounds present  EXTREMITIES:  2+ Peripheral Pulses, No clubbing, cyanosis, or edema    NERVOUS SYSTEM:  Cranial Nerves 2-12 intact [ x ] Abnormal  [  ]  ROM: WFL all extremities [  ]  Abnormal [ x ]  Motor Strength: WFL all extremities  [  ]  Abnormal [ 3-4/5  all ext  ]  Sensation: intact to light touch [  ] Abnormal [ x ]  Reflexes: Symmetric [  ]  Abnormal [  ]    FUNCTIONAL STATUS:  Bed Mobility: Independent [  ]  Supervision [  ]  Needs Assistance [ x ]  N/A [  ]  Transfers: Independent [  ]  Supervision [  ]  Needs Assistance [  x]  N/A [  ]   Ambulation: Independent [  ]  Supervision [  ]  Needs Assistance [ x ]  N/A [  ]  ADL: Independent [  ] Requires Assistance [  ] N/A [  x]  SEE PT/OT IE NOTES    LABS:                        12.6   6.91  )-----------( 151      ( 04 Oct 2021 08:00 )             37.5     10-03    136  |  103  |  18  ----------------------------<  102<H>  4.2   |  23  |  1.2    Ca    9.2      03 Oct 2021 08:51  Mg     1.9     10-03    TPro  7.5  /  Alb  4.2  /  TBili  0.4  /  DBili  x   /  AST  24  /  ALT  23  /  AlkPhos  91  10-02    PT/INR - ( 02 Oct 2021 19:23 )   PT: 13.10 sec;   INR: 1.14 ratio         PTT - ( 02 Oct 2021 19:23 )  PTT:35.4 sec      RADIOLOGY & ADDITIONAL STUDIES:< from: CT Head No Cont (10.02.21 @ 19:46) >  Comparison 6/19/2021  Cisterns and ventricles are normal with no shift of the midline structures.  No hemorrhage or acute infarct or mass formation is seen. Old infarcts formation seen in the left central white matter.  The skull is intact.    IMPRESSION: No acute process seen    < end of copied text >      Assesment:

## 2021-10-04 NOTE — DISCHARGE NOTE PROVIDER - NSDCFUADDINST_GEN_ALL_CORE_FT
take fall precautions   take fall precautions    Dry sterile dressing daily  on the left forearm in the area of blisters

## 2021-10-04 NOTE — PROGRESS NOTE ADULT - ASSESSMENT
patient admitted for fall    ·	mechanical Fall  ·	HTN/CAD/CABG  ·	Dyslipidemia   ·	Age related Debility   ·	Senile Dementia   ·	Elevated CK levels   ·	r/o shingles     -fall precautions  -rehab/pt as tolerated   -IVF 24 hrs; CK downtrending   -ID c/s for reccs on isolation for r/o active zoster      Attending Physician Dr. Tayler Guerrero # [id number]  patient admitted for fall    ·	mechanical Fall  ·	HTN/CAD/CABG  ·	Dyslipidemia   ·	Age related Debility   ·	Senile Dementia   ·	Elevated CK levels   ·	r/o shingles   ·	Stage IV Follicular Lymphoma (outpatient heme/onc follow up)    -fall precautions  -rehab/pt as tolerated   -IVF 24 hrs; CK downtrending   -ID c/s for reccs on isolation for r/o active zoster      Attending Physician Dr. Tayler Guerrero # [id number]

## 2021-10-05 LAB
CK SERPL-CCNC: 228 U/L — HIGH (ref 0–225)
HERPES SIMPLEX VIRUS 1/2 SURVEILLANCE PCR RESULT: SIGNIFICANT CHANGE UP
HERPES SIMPLEX VIRUS 1/2 SURVEILLANCE PCR SOURCE: SIGNIFICANT CHANGE UP
HSV1+2 DNA SPEC QL NAA+PROBE: SIGNIFICANT CHANGE UP

## 2021-10-05 PROCEDURE — 99232 SBSQ HOSP IP/OBS MODERATE 35: CPT

## 2021-10-05 PROCEDURE — 93306 TTE W/DOPPLER COMPLETE: CPT | Mod: 26

## 2021-10-05 RX ADMIN — MEMANTINE HYDROCHLORIDE 10 MILLIGRAM(S): 10 TABLET ORAL at 11:45

## 2021-10-05 RX ADMIN — SODIUM CHLORIDE 65 MILLILITER(S): 9 INJECTION INTRAMUSCULAR; INTRAVENOUS; SUBCUTANEOUS at 06:01

## 2021-10-05 RX ADMIN — FAMOTIDINE 40 MILLIGRAM(S): 10 INJECTION INTRAVENOUS at 05:59

## 2021-10-05 RX ADMIN — Medication 50 MILLIGRAM(S): at 05:59

## 2021-10-05 RX ADMIN — LISINOPRIL 10 MILLIGRAM(S): 2.5 TABLET ORAL at 05:59

## 2021-10-05 RX ADMIN — HEPARIN SODIUM 5000 UNIT(S): 5000 INJECTION INTRAVENOUS; SUBCUTANEOUS at 17:57

## 2021-10-05 RX ADMIN — HEPARIN SODIUM 5000 UNIT(S): 5000 INJECTION INTRAVENOUS; SUBCUTANEOUS at 06:04

## 2021-10-05 RX ADMIN — Medication 81 MILLIGRAM(S): at 11:45

## 2021-10-05 RX ADMIN — ATORVASTATIN CALCIUM 40 MILLIGRAM(S): 80 TABLET, FILM COATED ORAL at 23:24

## 2021-10-05 RX ADMIN — DONEPEZIL HYDROCHLORIDE 10 MILLIGRAM(S): 10 TABLET, FILM COATED ORAL at 23:23

## 2021-10-05 RX ADMIN — FAMOTIDINE 40 MILLIGRAM(S): 10 INJECTION INTRAVENOUS at 17:57

## 2021-10-05 NOTE — PROGRESS NOTE ADULT - ASSESSMENT
patient admitted for fall    ·	mechanical Fall  ·	HTN/CAD/CABG  ·	Dyslipidemia   ·	Age related Debility   ·	Senile Dementia   ·	Elevated CK levels   ·	r/o shingles   ·	Stage IV Follicular Lymphoma (outpatient heme/onc follow up)    -fall precautions  -rehab/pt as tolerated   -d/c IVF; CK levels downtrended   -ID consult appreciated  -pending HSV PCR from L forearm lesions  -DISPO: NH once cleared by ID     Attending Physician Dr. Tayler Guerrero # [id number]

## 2021-10-06 ENCOUNTER — TRANSCRIPTION ENCOUNTER (OUTPATIENT)
Age: 81
End: 2021-10-06

## 2021-10-06 VITALS — RESPIRATION RATE: 18 BRPM | HEART RATE: 62 BPM | TEMPERATURE: 99 F

## 2021-10-06 LAB — SARS-COV-2 RNA SPEC QL NAA+PROBE: SIGNIFICANT CHANGE UP

## 2021-10-06 PROCEDURE — 99238 HOSP IP/OBS DSCHRG MGMT 30/<: CPT

## 2021-10-06 RX ADMIN — HEPARIN SODIUM 5000 UNIT(S): 5000 INJECTION INTRAVENOUS; SUBCUTANEOUS at 18:15

## 2021-10-06 RX ADMIN — HEPARIN SODIUM 5000 UNIT(S): 5000 INJECTION INTRAVENOUS; SUBCUTANEOUS at 06:12

## 2021-10-06 RX ADMIN — SODIUM CHLORIDE 65 MILLILITER(S): 9 INJECTION INTRAMUSCULAR; INTRAVENOUS; SUBCUTANEOUS at 07:43

## 2021-10-06 RX ADMIN — FAMOTIDINE 40 MILLIGRAM(S): 10 INJECTION INTRAVENOUS at 06:12

## 2021-10-06 RX ADMIN — MEMANTINE HYDROCHLORIDE 10 MILLIGRAM(S): 10 TABLET ORAL at 13:27

## 2021-10-06 RX ADMIN — Medication 81 MILLIGRAM(S): at 13:27

## 2021-10-06 RX ADMIN — Medication 50 MILLIGRAM(S): at 06:11

## 2021-10-06 RX ADMIN — LISINOPRIL 10 MILLIGRAM(S): 2.5 TABLET ORAL at 06:12

## 2021-10-06 RX ADMIN — FAMOTIDINE 40 MILLIGRAM(S): 10 INJECTION INTRAVENOUS at 18:14

## 2021-10-06 NOTE — PROGRESS NOTE ADULT - TIME BILLING
I have personally seen and examined this patient.    I have reviewed all pertinent clinical information and reviewed all relevant imaging and diagnostic studies personally.   I counseled the patient about diagnostic testing and treatment plan. All questions were answered.   I discussed recommendations with the primary team.
direct patient care  -coordinated current plan of care with medical and ID staff
direct patient care  -coordinated current plan of care with medical and case management and ID staff
direct patient care  -coordinated current plan of care with medical and rehab staff

## 2021-10-06 NOTE — PROGRESS NOTE ADULT - REASON FOR ADMISSION
81 YEAR OLD MALE C/O FALL .

## 2021-10-06 NOTE — PROGRESS NOTE ADULT - ASSESSMENT
81 yr old male with hx of CAD s/p CABG and stent (last stent 08/31/20), HTN, Dementia, Stage IV follicular lymphoma admitted for fall.     #Mechanical fall  - Evaluated by PT while hospitalized.     #L forearm rash with blister- stable  ID evaluated. HSV PCR - negative    #CAD s/p CABG and PCI in 08/2020  - Continue taking ASA, Metoprolol & Lipitor upon discharge.    #Dementia   - AAOx2-3 at baseline  - Continue taking Memantine and Donepezil upon discharge.     #HTN  - BP controlled and monitored while hospitalized.   - Continue taking Lisinopril and Metoprolol upon discharge.     #Stage IV follicular lymphoma;     Discharge plan to rehab

## 2021-10-06 NOTE — DISCHARGE NOTE NURSING/CASE MANAGEMENT/SOCIAL WORK - NSDCVIVACCINE_GEN_ALL_CORE_FT
Tdap; 26-Oct-2020 14:23; Ngoc Naylor (RN); [employer]; [id number] (Exp. Date: 31-Jul-2022); IntraMuscular; Deltoid Left.; 0.5 milliLiter(s); VIS (VIS Published: 09-May-2013, VIS Presented: 26-Oct-2020);   Tdap; 02-Oct-2021 19:12; Herminia Robertson (RN); [employer]; [id number] (Exp. Date: 15-Jul-2023); IntraMuscular; Deltoid Left.; 0.5 milliLiter(s); VIS (VIS Published: 09-May-2013, VIS Presented: 02-Oct-2021);

## 2021-10-06 NOTE — PROGRESS NOTE ADULT - ASSESSMENT
ASSESSMENT  81 yr old male with hx of CAD s/p CABG and stent (last stent 08/31/20), HTN, Dementia, Stage IV follicular lymphoma was BIBA for fall.    IMPRESSION  #S/p Fall  #Rash with blister  #Stage IV follicular lyphoma  #CAD s/p CABG  #Dementia  #Abx allergy: NKDA    RECOMMENDATIONS  - HSV PCR of skin lesion is negative  - can discontinue precautions     Please call or message on Microsoft Teams if with any questions.  Spectra [phone]

## 2021-10-06 NOTE — DISCHARGE NOTE NURSING/CASE MANAGEMENT/SOCIAL WORK - PATIENT PORTAL LINK FT
You can access the FollowMyHealth Patient Portal offered by [facility] by registering at the following website: [email or web address]. By joining [employer]’s FollowMyHealth portal, you will also be able to view your health information using other applications (apps) compatible with our system.

## 2021-10-06 NOTE — PROGRESS NOTE ADULT - SUBJECTIVE AND OBJECTIVE BOX
NOEMY GRUBBS  81y  Male      Patient is a 81y old  Male who presents with a chief complaint of 81 YEAR OLD MALE C/O FALL . (02 Oct 2021 21:14)      INTERVAL HPI/OVERNIGHT EVENTS:    pt seen and examined at bedside this morning   -on isolation by admitting physician and ID consult placed --- will continue with current plan and have ID decide if isolation indicated   -rehab/pt as tolerated -- fall risk; discussed with physical therapist this morning     REVIEW OF SYSTEMS:  no complaints     --Vital Signs Last 24 Hrs  T(C): 36.2 (03 Oct 2021 05:45), Max: 36.6 (02 Oct 2021 19:06)  T(F): 97.1 (03 Oct 2021 05:45), Max: 97.8 (02 Oct 2021 19:06)  HR: 65 (03 Oct 2021 05:45) (64 - 66)  BP: 165/72 (03 Oct 2021 05:45) (123/76 - 165/72)  RR: 18 (03 Oct 2021 05:45) (18 - 20)  SpO2: 98% (02 Oct 2021 21:57) (97% - 98%)    PHYSICAL EXAM:  GENERAL: NAD, speaking appropriately   HEAD:  Atraumatic, Normocephalic  EYES: EOMI, PERRLA, conjunctiva and sclera clear  NERVOUS SYSTEM:  Alert & Oriented X 2  CHEST/LUNG: Clear to percussion bilaterally; No rales, rhonchi, wheezing, or rubs  CV/HEART: Regular rate and rhythm; No murmurs, rubs, or gallops  GI/ABDOMEN: Soft, Nontender, Nondistended; Bowel sounds present  EXTREMITIES:  2+ Peripheral Pulses, No clubbing, cyanosis, or edema  SKIN: Left arm blister     LAB:                        13.2   8.31  )-----------( 155      ( 03 Oct 2021 08:51 )             39.4     10-03    136  |  103  |  18  ----------------------------<  102<H>  4.2   |  23  |  1.2    Ca    9.2      03 Oct 2021 08:51  Mg     1.9     10-03    TPro  7.5  /  Alb  4.2  /  TBili  0.4  /  DBili  x   /  AST  24  /  ALT  23  /  AlkPhos  91  10-02    CARDIAC MARKERS ( 03 Oct 2021 08:51 )  x     / <0.01 ng/mL / 544 U/L / x     / x      CARDIAC MARKERS ( 02 Oct 2021 19:23 )  x     / x     / 265 U/L / x     / x          Daily Height in cm: 167.64 (02 Oct 2021 19:06)    Daily   CAPILLARY BLOOD GLUCOSE      LIVER FUNCTIONS - ( 02 Oct 2021 19:23 )  Alb: 4.2 g/dL / Pro: 7.5 g/dL / ALK PHOS: 91 U/L / ALT: 23 U/L / AST: 24 U/L / GGT: x             RADIOLOGY:    Imaging Personally visualized and Reviewed:  [ y ] YES  [ ] NO    HEALTH ISSUES - PROBLEM Dx:      MEDS:    aspirin  chewable 81 milliGRAM(s) Oral daily  atorvastatin 40 milliGRAM(s) Oral at bedtime  donepezil 10 milliGRAM(s) Oral at bedtime  famotidine    Tablet 40 milliGRAM(s) Oral two times a day  heparin   Injectable 5000 Unit(s) SubCutaneous every 12 hours  lisinopril 10 milliGRAM(s) Oral daily  memantine 10 milliGRAM(s) Oral daily  metoprolol succinate ER 50 milliGRAM(s) Oral daily  sodium chloride 0.9%. 1000 milliLiter(s) IV Continuous <Continuous>      
HPI  Patient is a 81y old Male who presents with a chief complaint of 81 YEAR OLD MALE C/O FALL . (06 Oct 2021 09:11)    Currently admitted to medicine with the primary diagnosis of Fall       Today is hospital day 4d.     INTERVAL HPI / OVERNIGHT EVENTS:  Patient was examined and seen at bedside.   non new complaints  denies any chest pain or SOB. NO abdominal pain /N/V      PAST MEDICAL & SURGICAL HISTORY  HTN (hypertension)    Coronary artery disease involving coronary bypass graft of native heart without angina pectoris    Dementia    Chronic GERD    S/P CABG x 4  33 years ago      ALLERGIES  No Known Allergies    MEDICATIONS  STANDING MEDICATIONS  aspirin  chewable 81 milliGRAM(s) Oral daily  atorvastatin 40 milliGRAM(s) Oral at bedtime  donepezil 10 milliGRAM(s) Oral at bedtime  famotidine    Tablet 40 milliGRAM(s) Oral two times a day  heparin   Injectable 5000 Unit(s) SubCutaneous every 12 hours  lisinopril 10 milliGRAM(s) Oral daily  memantine 10 milliGRAM(s) Oral daily  metoprolol succinate ER 50 milliGRAM(s) Oral daily    PRN MEDICATIONS    VITALS:  T(F): 96.2  HR: 57  BP: 178/81  RR: 16  SpO2: --    PHYSICAL EXAM  GEN: NAD, Resting comfortably in bed  cehstL: sternal surgical scar seen  PULM: Clear to auscultation bilaterally, No wheezing  CVS: Regular rate and rhythm, S1-S2, no rubs or gallops  ABD: Soft, non-tender, non-distended, no guarding  EXT: No edema  NEURO: A&Ox2, moving all extremities    LABS                    CARDIAC MARKERS ( 05 Oct 2021 09:06 )  x     / x     / 228 U/L / x     / x          RADIOLOGY    
  CAMRONNOEMY GOODWIN  81y  Male      Patient is a 81y old  Male who presents with a chief complaint of 81 YEAR OLD MALE C/O FALL . (04 Oct 2021 10:08)      INTERVAL HPI/OVERNIGHT EVENTS:  pt seen and examined at bedside this morning   -discussed with ID; will keep patient on isolation r/o active zoster; will send blister cultures  -case management working on d/c planning next 24 hrs NH (once cleared by ID)    REVIEW OF SYSTEMS:  -no complaints to me     Vital Signs Last 24 Hrs  T(C): 35.8 (04 Oct 2021 05:11), Max: 36.6 (03 Oct 2021 20:45)  T(F): 96.5 (04 Oct 2021 05:11), Max: 97.9 (03 Oct 2021 20:45)  HR: 62 (04 Oct 2021 07:03) (62 - 71)  BP: 150/67 (04 Oct 2021 07:03) (136/68 - 180/74)  BP(mean): --  RR: 18 (04 Oct 2021 05:11) (18 - 18)  SpO2: 98% (04 Oct 2021 05:11) (98% - 98%)    PHYSICAL EXAM:  GENERAL: NAD, speaking appropriately   HEAD:  Atraumatic, Normocephalic  EYES: EOMI, PERRLA, conjunctiva and sclera clear  NERVOUS SYSTEM:  Alert & Oriented X 2-3 at times   CHEST/LUNG: Clear to percussion bilaterally; No rales, rhonchi, wheezing, or rubs  CV/HEART: Regular rate and rhythm; No murmurs, rubs, or gallops  GI/ABDOMEN: Soft, Nontender, Nondistended; Bowel sounds present  EXTREMITIES:  2+ Peripheral Pulses, No clubbing, cyanosis, or edema  SKIN: blisters left forearm     LAB:           Creatine Kinase, Serum: 303 U/L (10.04.21 @ 08:00)                12.6   6.91  )-----------( 151      ( 04 Oct 2021 08:00 )             37.5     10-04    138  |  104  |  15  ----------------------------<  107<H>  4.0   |  24  |  1.2    Ca    8.5      04 Oct 2021 08:00  Mg     1.9     10-03    TPro  7.5  /  Alb  4.2  /  TBili  0.4  /  DBili  x   /  AST  24  /  ALT  23  /  AlkPhos  91  10-02    CARDIAC MARKERS ( 04 Oct 2021 08:00 )  x     / x     / 303 U/L / x     / x      CARDIAC MARKERS ( 03 Oct 2021 08:51 )  x     / <0.01 ng/mL / 544 U/L / x     / x      CARDIAC MARKERS ( 02 Oct 2021 19:23 )  x     / x     / 265 U/L / x     / x        Daily     Daily   CAPILLARY BLOOD GLUCOSE    LIVER FUNCTIONS - ( 02 Oct 2021 19:23 )  Alb: 4.2 g/dL / Pro: 7.5 g/dL / ALK PHOS: 91 U/L / ALT: 23 U/L / AST: 24 U/L / GGT: x             RADIOLOGY:    Imaging Personally visualized and Reviewed:  [ y ] YES  [ ] NO    HEALTH ISSUES - PROBLEM Dx:    MEDS:  aspirin  chewable 81 milliGRAM(s) Oral daily  atorvastatin 40 milliGRAM(s) Oral at bedtime  donepezil 10 milliGRAM(s) Oral at bedtime  famotidine    Tablet 40 milliGRAM(s) Oral two times a day  heparin   Injectable 5000 Unit(s) SubCutaneous every 12 hours  lisinopril 10 milliGRAM(s) Oral daily  memantine 10 milliGRAM(s) Oral daily  metoprolol succinate ER 50 milliGRAM(s) Oral daily  sodium chloride 0.9%. 1000 milliLiter(s) IV Continuous <Continuous>      
  NOEMY GRUBBS  81y  Male      Patient is a 81y old  Male who presents with a chief complaint of 81 YEAR OLD MALE C/O FALL . (04 Oct 2021 10:08)      INTERVAL HPI/OVERNIGHT EVENTS:  pt seen and examined at bedside this morning   -pending HSV PCR (from left forearm lesions); no antivirals as per ID  -once pcr resulted and isolation status clarified/discontinued; d/c planning to NH   -oob to chair as tolerated with assistance     REVIEW OF SYSTEMS:  -no complaints to me     Vital Signs Last 24 Hrs  T(C): 35.8 (04 Oct 2021 05:11), Max: 36.6 (03 Oct 2021 20:45)  T(F): 96.5 (04 Oct 2021 05:11), Max: 97.9 (03 Oct 2021 20:45)  HR: 62 (04 Oct 2021 07:03) (62 - 71)  BP: 150/67 (04 Oct 2021 07:03) (136/68 - 180/74)  RR: 18 (04 Oct 2021 05:11) (18 - 18)  SpO2: 98% (04 Oct 2021 05:11) (98% - 98%)    PHYSICAL EXAM:  GENERAL: NAD, speaking appropriately   HEAD:  Atraumatic, Normocephalic  EYES: EOMI, PERRLA, conjunctiva and sclera clear  NERVOUS SYSTEM:  Alert & Oriented X 2-3 at times   CHEST/LUNG: Clear to percussion bilaterally; No rales, rhonchi, wheezing, or rubs  CV/HEART: Regular rate and rhythm; No murmurs, rubs, or gallops  GI/ABDOMEN: Soft, Nontender, Nondistended; Bowel sounds present  EXTREMITIES:  2+ Peripheral Pulses, No clubbing, cyanosis, or edema  SKIN: blisters left forearm     LAB:           Creatine Kinase, Serum: 303 U/L (10.04.21 @ 08:00)                12.6   6.91  )-----------( 151      ( 04 Oct 2021 08:00 )             37.5     10-04    138  |  104  |  15  ----------------------------<  107<H>  4.0   |  24  |  1.2    Ca    8.5      04 Oct 2021 08:00  Mg     1.9     10-03    TPro  7.5  /  Alb  4.2  /  TBili  0.4  /  DBili  x   /  AST  24  /  ALT  23  /  AlkPhos  91  10-02    CARDIAC MARKERS ( 04 Oct 2021 08:00 )  x     / x     / 303 U/L / x     / x      CARDIAC MARKERS ( 03 Oct 2021 08:51 )  x     / <0.01 ng/mL / 544 U/L / x     / x      CARDIAC MARKERS ( 02 Oct 2021 19:23 )  x     / x     / 265 U/L / x     / x        Daily     Daily   CAPILLARY BLOOD GLUCOSE    LIVER FUNCTIONS - ( 02 Oct 2021 19:23 )  Alb: 4.2 g/dL / Pro: 7.5 g/dL / ALK PHOS: 91 U/L / ALT: 23 U/L / AST: 24 U/L / GGT: x             RADIOLOGY:    Imaging Personally visualized and Reviewed:  [ y ] YES  [ ] NO    HEALTH ISSUES - PROBLEM Dx:    MEDICATIONS  (STANDING):  aspirin  chewable 81 milliGRAM(s) Oral daily  atorvastatin 40 milliGRAM(s) Oral at bedtime  donepezil 10 milliGRAM(s) Oral at bedtime  famotidine    Tablet 40 milliGRAM(s) Oral two times a day  heparin   Injectable 5000 Unit(s) SubCutaneous every 12 hours  lisinopril 10 milliGRAM(s) Oral daily  memantine 10 milliGRAM(s) Oral daily  metoprolol succinate ER 50 milliGRAM(s) Oral daily  sodium chloride 0.9%. 1000 milliLiter(s) (65 mL/Hr) IV Continuous <Continuous>    MEDICATIONS  (PRN):  
CAMRONNOEMY GOODWIN  81y, Male  Allergy: No Known Allergies      LOS  4d    CHIEF COMPLAINT: 81 YEAR OLD MALE C/O FALL . (05 Oct 2021 09:02)      INTERVAL EVENTS/HPI  - No acute events overnight  - T(F): , Max: 97.3 (10-05-21 @ 21:00)  - confused  - denies any pain by arm rash   - WBC Count: 6.91 (10-04-21 @ 08:00)     -      ROS  General: Denies rigors, nightsweats  HEENT: Denies headache, rhinorrhea, sore throat, eye pain  CV: Denies CP, palpitations  PULM: Denies wheezing, hemoptysis  GI: Denies hematemesis, hematochezia, melena  : Denies discharge, hematuria  MSK: Denies arthralgias, myalgias  SKIN: Denies rash, lesions  NEURO: Denies paresthesias, weakness  PSYCH: Denies depression, anxiety    VITALS:  T(F): 96.2, Max: 97.3 (10-05-21 @ 21:00)  HR: 57  BP: 178/81  RR: 16Vital Signs Last 24 Hrs  T(C): 35.7 (06 Oct 2021 05:35), Max: 36.3 (05 Oct 2021 21:00)  T(F): 96.2 (06 Oct 2021 05:35), Max: 97.3 (05 Oct 2021 21:00)  HR: 57 (06 Oct 2021 05:35) (57 - 58)  BP: 178/81 (06 Oct 2021 05:35) (152/71 - 178/81)  BP(mean): --  RR: 16 (06 Oct 2021 05:35) (16 - 17)  SpO2: --    PHYSICAL EXAM:  Gen: NAD, resting in bed  HEENT: Normocephalic, atraumatic  Neck: supple, no lymphadenopathy  CV: Regular rate & regular rhythm  Lungs: decreased BS at bases, no fremitus  Abdomen: Soft, BS present  Ext: Warm, well perfused  Neuro: non focal, awake  Skin: area of blisters by LUE, non-tender  Lines: no phlebitis    FH: Non-contributory  Social Hx: Non-contributory    TESTS & MEASUREMENTS:                    Lactate, Blood: 1.6 mmol/L (10-03-21 @ 08:51)  Lactate, Blood: 2.4 mmol/L (10-02-21 @ 19:23)      INFECTIOUS DISEASES TESTING  COVID-19 PCR: NotDetec (10-02-21 @ 19:00)  COVID-19 PCR: NotDetec (06-19-21 @ 10:00)  COVID-19 PCR: NotDetec (11-10-20 @ 17:12)  Rapid RVP Result: NotDetec (10-30-20 @ 14:14)  Rapid RVP Result: NotDetec (10-26-20 @ 16:33)      INFLAMMATORY MARKERS      RADIOLOGY & ADDITIONAL TESTS:  I have personally reviewed the last available Chest xray  CXR      CT      CARDIOLOGY TESTING  12 Lead ECG:   Ventricular Rate 64 BPM    Atrial Rate 64 BPM    P-R Interval 144 ms    QRS Duration 94 ms    Q-T Interval 428 ms    QTC Calculation(Bazett) 441 ms    P Axis 36 degrees    R Axis -2 degrees    T Axis 22 degrees    Diagnosis Line Normal sinus rhythm  Moderate voltage criteria for LVH, may be normal variant  Borderline ECG    Confirmed by UNRULY ALVA MD ([id number]) on 10/3/2021 8:53:38 AM (10-02-21 @ 19:06)      MEDICATIONS  aspirin  chewable 81 Oral daily  atorvastatin 40 Oral at bedtime  donepezil 10 Oral at bedtime  famotidine    Tablet 40 Oral two times a day  heparin   Injectable 5000 SubCutaneous every 12 hours  lisinopril 10 Oral daily  memantine 10 Oral daily  metoprolol succinate ER 50 Oral daily      WEIGHT  Weight (kg): 77.1 (10-02-21 @ 19:06)      ANTIBIOTICS:      All available historical records have been reviewed
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
## 2021-10-13 DIAGNOSIS — R55 SYNCOPE AND COLLAPSE: ICD-10-CM

## 2021-10-13 DIAGNOSIS — R74.8 ABNORMAL LEVELS OF OTHER SERUM ENZYMES: ICD-10-CM

## 2021-10-13 DIAGNOSIS — E86.0 DEHYDRATION: ICD-10-CM

## 2021-10-13 DIAGNOSIS — E78.5 HYPERLIPIDEMIA, UNSPECIFIED: ICD-10-CM

## 2021-10-13 DIAGNOSIS — C82.80 OTHER TYPES OF FOLLICULAR LYMPHOMA, UNSPECIFIED SITE: ICD-10-CM

## 2021-10-13 DIAGNOSIS — K21.9 GASTRO-ESOPHAGEAL REFLUX DISEASE WITHOUT ESOPHAGITIS: ICD-10-CM

## 2021-10-13 DIAGNOSIS — Z95.1 PRESENCE OF AORTOCORONARY BYPASS GRAFT: ICD-10-CM

## 2021-10-13 DIAGNOSIS — E87.2 ACIDOSIS: ICD-10-CM

## 2021-10-13 DIAGNOSIS — I25.10 ATHEROSCLEROTIC HEART DISEASE OF NATIVE CORONARY ARTERY WITHOUT ANGINA PECTORIS: ICD-10-CM

## 2021-10-13 DIAGNOSIS — S50.822A BLISTER (NONTHERMAL) OF LEFT FOREARM, INITIAL ENCOUNTER: ICD-10-CM

## 2021-10-13 DIAGNOSIS — R21 RASH AND OTHER NONSPECIFIC SKIN ERUPTION: ICD-10-CM

## 2021-10-13 DIAGNOSIS — F03.90 UNSPECIFIED DEMENTIA WITHOUT BEHAVIORAL DISTURBANCE: ICD-10-CM

## 2021-10-13 DIAGNOSIS — Z95.5 PRESENCE OF CORONARY ANGIOPLASTY IMPLANT AND GRAFT: ICD-10-CM
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
## 2021-10-13 DIAGNOSIS — I10 ESSENTIAL (PRIMARY) HYPERTENSION: ICD-10-CM

## 2022-04-12 ENCOUNTER — APPOINTMENT (OUTPATIENT)
Dept: CARDIOLOGY | Facility: CLINIC | Age: 82
End: 2022-04-12

## 2022-05-03 ENCOUNTER — APPOINTMENT (OUTPATIENT)
Dept: CARDIOLOGY | Facility: CLINIC | Age: 82
End: 2022-05-03
Payer: MEDICARE

## 2022-05-03 PROCEDURE — 99214 OFFICE O/P EST MOD 30 MIN: CPT

## 2022-05-03 PROCEDURE — 93000 ELECTROCARDIOGRAM COMPLETE: CPT

## 2022-05-18 ENCOUNTER — APPOINTMENT (OUTPATIENT)
Dept: CARDIOLOGY | Facility: CLINIC | Age: 82
End: 2022-05-18
Payer: MEDICARE

## 2022-05-18 PROCEDURE — 99214 OFFICE O/P EST MOD 30 MIN: CPT

## 2022-05-18 PROCEDURE — 93000 ELECTROCARDIOGRAM COMPLETE: CPT

## 2022-05-18 PROCEDURE — 93306 TTE W/DOPPLER COMPLETE: CPT

## 2022-08-25 ENCOUNTER — APPOINTMENT (OUTPATIENT)
Dept: CARDIOLOGY | Facility: CLINIC | Age: 82
End: 2022-08-25

## 2022-08-25 PROCEDURE — 93000 ELECTROCARDIOGRAM COMPLETE: CPT

## 2022-08-25 PROCEDURE — 99214 OFFICE O/P EST MOD 30 MIN: CPT

## 2022-12-27 ENCOUNTER — APPOINTMENT (OUTPATIENT)
Dept: CARDIOLOGY | Facility: CLINIC | Age: 82
End: 2022-12-27

## 2022-12-27 PROCEDURE — 93978 VASCULAR STUDY: CPT

## 2022-12-27 PROCEDURE — 93880 EXTRACRANIAL BILAT STUDY: CPT

## 2023-01-04 ENCOUNTER — APPOINTMENT (OUTPATIENT)
Dept: CARDIOLOGY | Facility: CLINIC | Age: 83
End: 2023-01-04
Payer: MEDICARE

## 2023-01-04 PROCEDURE — 99214 OFFICE O/P EST MOD 30 MIN: CPT

## 2023-01-04 PROCEDURE — 93000 ELECTROCARDIOGRAM COMPLETE: CPT

## 2023-03-28 NOTE — ED CDU PROVIDER DISPOSITION NOTE - CLINICAL COURSE
measured 2.4 cm. The incision was closed  with interrupted sutures of 3-0 Vicryl in the subcutaneous fascia. The  skin was closed with running subcuticular suture of 4-0 Vicryl followed  by Benzoin, Steri-Strips, and dry sterile dressings. All sponge,  needle, and instrument counts were correct at the end of case. The  patient tolerated the procedure well. She was taken to the recovery  area in stable condition. Bandar Stockton MD    D: 03/27/2023 13:06:53       T: 03/27/2023 13:11:29     [email or web address]  Job#: [id number]     Doc#: [id number]    CC:   Ronan Jang MD pt signed out to me pending re-eval and trending of CK. CK improved, however family requests admission as pt is fall risk and they do not feel safe for dc. no acute traumatic injury

## 2023-06-21 ENCOUNTER — APPOINTMENT (OUTPATIENT)
Dept: CARDIOLOGY | Facility: CLINIC | Age: 83
End: 2023-06-21
Payer: MEDICARE

## 2023-06-21 DIAGNOSIS — R94.31 ABNORMAL ELECTROCARDIOGRAM [ECG] [EKG]: ICD-10-CM

## 2023-06-21 DIAGNOSIS — R60.9 EDEMA, UNSPECIFIED: ICD-10-CM

## 2023-06-21 PROCEDURE — 99214 OFFICE O/P EST MOD 30 MIN: CPT

## 2023-06-21 PROCEDURE — 93000 ELECTROCARDIOGRAM COMPLETE: CPT

## 2023-06-23 PROBLEM — R94.31 ABNORMAL ECG: Status: ACTIVE | Noted: 2022-05-04

## 2023-06-23 PROBLEM — R60.9 EDEMA: Status: ACTIVE | Noted: 2022-05-04

## 2023-07-09 ENCOUNTER — INPATIENT (INPATIENT)
Facility: HOSPITAL | Age: 83
LOS: 2 days | Discharge: HOME HEALTH | DRG: 683 | End: 2023-07-12
Attending: INTERNAL MEDICINE | Admitting: INTERNAL MEDICINE
Payer: MEDICARE

## 2023-07-09 VITALS
RESPIRATION RATE: 19 BRPM | DIASTOLIC BLOOD PRESSURE: 76 MMHG | WEIGHT: 195.11 LBS | HEIGHT: 65 IN | TEMPERATURE: 98 F | SYSTOLIC BLOOD PRESSURE: 149 MMHG | HEART RATE: 71 BPM | OXYGEN SATURATION: 100 %

## 2023-07-09 DIAGNOSIS — Z95.1 PRESENCE OF AORTOCORONARY BYPASS GRAFT: Chronic | ICD-10-CM
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
## 2023-07-09 LAB
ALBUMIN SERPL ELPH-MCNC: 4.7 G/DL — SIGNIFICANT CHANGE UP (ref 3.5–5.2)
ALP SERPL-CCNC: 94 U/L — SIGNIFICANT CHANGE UP (ref 30–115)
ALT FLD-CCNC: 30 U/L — SIGNIFICANT CHANGE UP (ref 0–41)
ANION GAP SERPL CALC-SCNC: 20 MMOL/L — HIGH (ref 7–14)
APPEARANCE UR: CLEAR — SIGNIFICANT CHANGE UP
APTT BLD: 33.5 SEC — SIGNIFICANT CHANGE UP (ref 27–39.2)
AST SERPL-CCNC: 39 U/L — SIGNIFICANT CHANGE UP (ref 0–41)
BACTERIA # UR AUTO: SIGNIFICANT CHANGE UP /HPF
BASE EXCESS BLDV CALC-SCNC: 12.6 MMOL/L — HIGH (ref -2–3)
BASOPHILS # BLD AUTO: 0.02 K/UL — SIGNIFICANT CHANGE UP (ref 0–0.2)
BASOPHILS NFR BLD AUTO: 0.2 % — SIGNIFICANT CHANGE UP (ref 0–1)
BILIRUB SERPL-MCNC: 0.6 MG/DL — SIGNIFICANT CHANGE UP (ref 0.2–1.2)
BILIRUB UR-MCNC: NEGATIVE — SIGNIFICANT CHANGE UP
BUN SERPL-MCNC: 31 MG/DL — HIGH (ref 10–20)
CA-I SERPL-SCNC: 1.1 MMOL/L — LOW (ref 1.15–1.33)
CALCIUM SERPL-MCNC: 9.8 MG/DL — SIGNIFICANT CHANGE UP (ref 8.4–10.5)
CHLORIDE SERPL-SCNC: 89 MMOL/L — LOW (ref 98–110)
CK SERPL-CCNC: 670 U/L — HIGH (ref 0–225)
CO2 SERPL-SCNC: 28 MMOL/L — SIGNIFICANT CHANGE UP (ref 17–32)
COLOR SPEC: YELLOW — SIGNIFICANT CHANGE UP
CREAT SERPL-MCNC: 1.8 MG/DL — HIGH (ref 0.7–1.5)
DIFF PNL FLD: ABNORMAL
EGFR: 37 ML/MIN/1.73M2 — LOW
EOSINOPHIL # BLD AUTO: 0.01 K/UL — SIGNIFICANT CHANGE UP (ref 0–0.7)
EOSINOPHIL NFR BLD AUTO: 0.1 % — SIGNIFICANT CHANGE UP (ref 0–8)
EPI CELLS # UR: ABNORMAL /HPF (ref 0–5)
GAS PNL BLDV: 133 MMOL/L — LOW (ref 136–145)
GAS PNL BLDV: SIGNIFICANT CHANGE UP
GLUCOSE SERPL-MCNC: 132 MG/DL — HIGH (ref 70–99)
GLUCOSE UR QL: NEGATIVE MG/DL — SIGNIFICANT CHANGE UP
HCO3 BLDV-SCNC: 38 MMOL/L — HIGH (ref 22–29)
HCT VFR BLD CALC: 40.3 % — LOW (ref 42–52)
HCT VFR BLDA CALC: 43 % — SIGNIFICANT CHANGE UP (ref 39–51)
HGB BLD CALC-MCNC: 14.4 G/DL — SIGNIFICANT CHANGE UP (ref 12.6–17.4)
HGB BLD-MCNC: 13.8 G/DL — LOW (ref 14–18)
HYALINE CASTS # UR AUTO: ABNORMAL /LPF
IMM GRANULOCYTES NFR BLD AUTO: 0.3 % — SIGNIFICANT CHANGE UP (ref 0.1–0.3)
INR BLD: 1.09 RATIO — SIGNIFICANT CHANGE UP (ref 0.65–1.3)
KETONES UR-MCNC: NEGATIVE — SIGNIFICANT CHANGE UP
LACTATE BLDV-MCNC: 4.8 MMOL/L — CRITICAL HIGH (ref 0.5–2)
LEUKOCYTE ESTERASE UR-ACNC: NEGATIVE — SIGNIFICANT CHANGE UP
LYMPHOCYTES # BLD AUTO: 1.19 K/UL — LOW (ref 1.2–3.4)
LYMPHOCYTES # BLD AUTO: 10.1 % — LOW (ref 20.5–51.1)
MAGNESIUM SERPL-MCNC: 2.3 MG/DL — SIGNIFICANT CHANGE UP (ref 1.8–2.4)
MCHC RBC-ENTMCNC: 29.5 PG — SIGNIFICANT CHANGE UP (ref 27–31)
MCHC RBC-ENTMCNC: 34.2 G/DL — SIGNIFICANT CHANGE UP (ref 32–37)
MCV RBC AUTO: 86.1 FL — SIGNIFICANT CHANGE UP (ref 80–94)
MONOCYTES # BLD AUTO: 1.22 K/UL — HIGH (ref 0.1–0.6)
MONOCYTES NFR BLD AUTO: 10.4 % — HIGH (ref 1.7–9.3)
NEUTROPHILS # BLD AUTO: 9.29 K/UL — HIGH (ref 1.4–6.5)
NEUTROPHILS NFR BLD AUTO: 78.9 % — HIGH (ref 42.2–75.2)
NITRITE UR-MCNC: NEGATIVE — SIGNIFICANT CHANGE UP
NRBC # BLD: 0 /100 WBCS — SIGNIFICANT CHANGE UP (ref 0–0)
NT-PROBNP SERPL-SCNC: 297 PG/ML — SIGNIFICANT CHANGE UP (ref 0–300)
PCO2 BLDV: 47 MMHG — SIGNIFICANT CHANGE UP (ref 42–55)
PH BLDV: 7.51 — HIGH (ref 7.32–7.43)
PH UR: 7 — SIGNIFICANT CHANGE UP (ref 5–8)
PLATELET # BLD AUTO: 191 K/UL — SIGNIFICANT CHANGE UP (ref 130–400)
PMV BLD: 12 FL — HIGH (ref 7.4–10.4)
PO2 BLDV: 47 MMHG — SIGNIFICANT CHANGE UP
POTASSIUM BLDV-SCNC: 2.8 MMOL/L — CRITICAL LOW (ref 3.5–5.1)
POTASSIUM SERPL-MCNC: 3.2 MMOL/L — LOW (ref 3.5–5)
POTASSIUM SERPL-SCNC: 3.2 MMOL/L — LOW (ref 3.5–5)
PROT SERPL-MCNC: 8.2 G/DL — HIGH (ref 6–8)
PROT UR-MCNC: NEGATIVE MG/DL — SIGNIFICANT CHANGE UP
PROTHROM AB SERPL-ACNC: 12.5 SEC — SIGNIFICANT CHANGE UP (ref 9.95–12.87)
RBC # BLD: 4.68 M/UL — LOW (ref 4.7–6.1)
RBC # FLD: 13.1 % — SIGNIFICANT CHANGE UP (ref 11.5–14.5)
RBC CASTS # UR COMP ASSIST: ABNORMAL /HPF (ref 0–4)
SAO2 % BLDV: 79.2 % — SIGNIFICANT CHANGE UP
SODIUM SERPL-SCNC: 137 MMOL/L — SIGNIFICANT CHANGE UP (ref 135–146)
SP GR SPEC: 1.01 — SIGNIFICANT CHANGE UP (ref 1.01–1.03)
TROPONIN T SERPL-MCNC: <0.01 NG/ML — SIGNIFICANT CHANGE UP
UROBILINOGEN FLD QL: 0.2 MG/DL — SIGNIFICANT CHANGE UP
WBC # BLD: 11.77 K/UL — HIGH (ref 4.8–10.8)
WBC # FLD AUTO: 11.77 K/UL — HIGH (ref 4.8–10.8)
WBC UR QL: SIGNIFICANT CHANGE UP /HPF (ref 0–5)

## 2023-07-09 PROCEDURE — 97162 PT EVAL MOD COMPLEX 30 MIN: CPT | Mod: GP

## 2023-07-09 PROCEDURE — 93306 TTE W/DOPPLER COMPLETE: CPT

## 2023-07-09 PROCEDURE — 36415 COLL VENOUS BLD VENIPUNCTURE: CPT

## 2023-07-09 PROCEDURE — 93010 ELECTROCARDIOGRAM REPORT: CPT | Mod: 76

## 2023-07-09 PROCEDURE — 99285 EMERGENCY DEPT VISIT HI MDM: CPT | Mod: FS

## 2023-07-09 PROCEDURE — 82550 ASSAY OF CK (CPK): CPT

## 2023-07-09 PROCEDURE — 70450 CT HEAD/BRAIN W/O DYE: CPT | Mod: 26,MA

## 2023-07-09 PROCEDURE — 83605 ASSAY OF LACTIC ACID: CPT

## 2023-07-09 PROCEDURE — 85027 COMPLETE CBC AUTOMATED: CPT

## 2023-07-09 PROCEDURE — 71045 X-RAY EXAM CHEST 1 VIEW: CPT

## 2023-07-09 PROCEDURE — 72170 X-RAY EXAM OF PELVIS: CPT | Mod: 26

## 2023-07-09 PROCEDURE — 99222 1ST HOSP IP/OBS MODERATE 55: CPT

## 2023-07-09 PROCEDURE — 85025 COMPLETE CBC W/AUTO DIFF WBC: CPT

## 2023-07-09 PROCEDURE — 83735 ASSAY OF MAGNESIUM: CPT

## 2023-07-09 PROCEDURE — 80053 COMPREHEN METABOLIC PANEL: CPT

## 2023-07-09 PROCEDURE — 72125 CT NECK SPINE W/O DYE: CPT | Mod: 26,MA

## 2023-07-09 PROCEDURE — 97116 GAIT TRAINING THERAPY: CPT | Mod: GP

## 2023-07-09 PROCEDURE — 84100 ASSAY OF PHOSPHORUS: CPT

## 2023-07-09 PROCEDURE — 71045 X-RAY EXAM CHEST 1 VIEW: CPT | Mod: 26

## 2023-07-09 PROCEDURE — 97110 THERAPEUTIC EXERCISES: CPT | Mod: GP

## 2023-07-09 RX ORDER — POTASSIUM CHLORIDE 20 MEQ
40 PACKET (EA) ORAL ONCE
Refills: 0 | Status: COMPLETED | OUTPATIENT
Start: 2023-07-09 | End: 2023-07-09

## 2023-07-09 RX ORDER — SODIUM CHLORIDE 9 MG/ML
1000 INJECTION INTRAMUSCULAR; INTRAVENOUS; SUBCUTANEOUS ONCE
Refills: 0 | Status: COMPLETED | OUTPATIENT
Start: 2023-07-09 | End: 2023-07-09

## 2023-07-09 RX ORDER — SODIUM CHLORIDE 9 MG/ML
500 INJECTION INTRAMUSCULAR; INTRAVENOUS; SUBCUTANEOUS ONCE
Refills: 0 | Status: COMPLETED | OUTPATIENT
Start: 2023-07-09 | End: 2023-07-09

## 2023-07-09 RX ADMIN — SODIUM CHLORIDE 1000 MILLILITER(S): 9 INJECTION INTRAMUSCULAR; INTRAVENOUS; SUBCUTANEOUS at 20:37

## 2023-07-09 RX ADMIN — Medication 40 MILLIEQUIVALENT(S): at 21:58

## 2023-07-09 RX ADMIN — SODIUM CHLORIDE 2000 MILLILITER(S): 9 INJECTION INTRAMUSCULAR; INTRAVENOUS; SUBCUTANEOUS at 21:58

## 2023-07-09 NOTE — H&P ADULT - NSHPLABSRESULTS_GEN_ALL_CORE
LABS:  07-09 @ 20:36 Creatine 670 U/L<H> [0 - 225]  cret                        13.8   11.77 )-----------( 191      ( 09 Jul 2023 20:36 )             40.3     07-09    137  |  89<L>  |  31<H>  ----------------------------<  132<H>  3.2<L>   |  28  |  1.8<H>    Ca    9.8      09 Jul 2023 20:36  Mg     2.3     07-09    TPro  8.2<H>  /  Alb  4.7  /  TBili  0.6  /  DBili  x   /  AST  39  /  ALT  30  /  AlkPhos  94  07-09    PT/INR - ( 09 Jul 2023 20:36 )   PT: 12.50 sec;   INR: 1.09 ratio         PTT - ( 09 Jul 2023 20:36 )  PTT:33.5 sec      RADIOLOGY:    CT Head No Cont (07.09.23 @ 21:04)   CT Neck    IMPRESSION:    No acute intracranial hemorrhage, mass-effect or midline shift.    No evidence of acute cervical spine fracture or subluxation.        Xray Pelvis AP only (07.09.23 @ 21:03)     IMPRESSION:   No fractures seen

## 2023-07-09 NOTE — H&P ADULT - NSHPPHYSICALEXAM_GEN_ALL_CORE
VITALS:   T(C): 36.7 (07-10-23 @ 00:04), Max: 36.7 (07-10-23 @ 00:04)  HR: 74 (07-10-23 @ 00:04) (71 - 74)  BP: 141/72 (07-10-23 @ 00:04) (141/72 - 149/76)  RR: 18 (07-10-23 @ 00:04) (18 - 19)  SpO2: 97% (07-10-23 @ 00:04) (97% - 100%)    GENERAL: NAD, lying in bed comfortably  HEAD:  + abrasion to left side baljinder, no ecchymosis or hematoma, no ttp  EYES: EOMI, conjunctiva and sclera clear  ENT: Moist mucous membranes  NECK: Supple, No JVD  CHEST/LUNG: CTA b/l,  Unlabored respirations  HEART: Regular rate and rhythm; No murmurs, rubs, or gallops  ABDOMEN: obese, Bowel sounds present; Soft, Nontender, Nondistended.   EXTREMITIES:   No clubbing, cyanosis; 3+ b/l LE edema  NERVOUS SYSTEM:  Alert & Oriented X2, speech clear. No deficits   MSK: FROM all 4 extremities, full and equal strength  SKIN: abrasion below left elbow, no active bleed

## 2023-07-09 NOTE — ED PROVIDER NOTE - CARE PLAN
1 Principal Discharge DX:	Fall  Secondary Diagnosis:	Dehydration  Secondary Diagnosis:	Hypokalemia

## 2023-07-09 NOTE — ED PROVIDER NOTE - MUSCULOSKELETAL, MLM
Spine appears normal, range of motion is not limited, no muscle or joint tenderness, chronic leg edema,

## 2023-07-09 NOTE — H&P ADULT - HISTORY OF PRESENT ILLNESS
83-year-old male, history of dementia, CAD, CABG, lymphoma, brought in by EMS for unwitnessed fall at home.    Patient BIBA, Found on floor today by family , Lives alone, Neighbors check on patient earlier and was OK this AM<   Patient with dementia, Does not remember falling, Has no complaints of pain, SOB, 83-year-old male, history of dementia, HTN, Hypercholesterolemia, CAD s/p CABG x4, lymphoma, brought in by EMS for unwitnessed fall at home. Pt was found on floor today by daughter who stated pt mentioned to her he was on the floor for about an hour.  Lives alone, Neighbors check on patient earlier and was OK this AM.   Patient with dementia, Does not remember falling, Has no complaints of pain, SOB, chest pain, HA, dizziness  Pt is AA+O x2 83-year-old male, history of dementia, HTN, Hypercholesterolemia, HFpEF, CAD s/p CABG x4, lymphoma, brought in by EMS for unwitnessed fall at home. Pt was found on floor today by daughter who stated pt mentioned to her he was on the floor for about an hour.  Lives alone, Neighbors check on patient earlier and was OK this AM.   Patient with dementia, Does not remember falling, Has no complaints of pain, SOB, chest pain, HA, dizziness  Pt is AA+O x2

## 2023-07-09 NOTE — H&P ADULT - ASSESSMENT
83-year-old male, history of dementia, HTN, Hypercholesterolemia, CAD s/p CABG x4, lymphoma, brought in by EMS for unwitnessed fall at home    # Fall  - ambulate with assistance and as tolerated  - fall risk  - PT consult  - bacitracin to abrasions with dressing  - , will trend  - lactate 2.6, will trend  - am labs    # Hypokalemia  - KK+ 3.2  - repleted in ED  - will repeat in am    # HTN  - c/w metoprolol and lisinopril    # Hypercholesterolemia  - c/w statin    # Dementia  - c/w  83-year-old male, history of dementia, HTN, Hypercholesterolemia, HFpEF, CAD s/p CABG x4, lymphoma, brought in by EMS for unwitnessed fall at home    # Fall  - ambulate with assistance and as tolerated  - fall risk  - PT consult  - bacitracin to abrasions with dressing  - , will trend  - lactate 2.6, will trend  - am labs    # Hypokalemia  - KK+ 3.2  - repleted in ED  - will repeat in am    # HTN  - c/w metoprolol and lisinopril    # HFpEF  - c/w lasix     # Hypercholesterolemia  - c/w statin    # Dementia  - c/w donepezil and memantine

## 2023-07-09 NOTE — H&P ADULT - NS ATTEND AMEND GEN_ALL_CORE FT
Seen while in the ER Seen at bedside soon after arrival to medical floor, currently resting. Agree with assessment and plan as outlined Seen at bedside soon after arrival to medical floor, currently resting. Agree with assessment (mild elevation of CK but not rhabdomyolysis) and plan as outlined

## 2023-07-09 NOTE — ED PROVIDER NOTE - ATTENDING APP SHARED VISIT CONTRIBUTION OF CARE
83-year-old male, history of dementia, CAD, CABG, lymphoma, brought in by EMS for unwitnessed fall at home.  Denies any complaints.  Exam shows alert patient in no distress, HEENT erythema and swelling left forehead, PERRL, neck nontender, lungs clear, RR S1S2, abdomen soft NT +BS, no CCE, FROM, neuro A&OX3 GCS 15 no deficits.

## 2023-07-09 NOTE — ED PROVIDER NOTE - OBJECTIVE STATEMENT
Patient BIBA, Found on floor today by family , Lives alone, Neighbors check on patient earlier and was OK this AM<   Patient with dementia, Does not remember falling, Has no complaints of pain, SOB,

## 2023-07-09 NOTE — ED PROVIDER NOTE - CLINICAL SUMMARY MEDICAL DECISION MAKING FREE TEXT BOX
83-year-old male, history of dementia, CAD, CABG, lymphoma, brought in by EMS for unwitnessed fall at home. Labs noted for WBC 11.7, K3.2, BUN 31, creatinine 1.8, lactate 4.8.  EKG normal sinus rhythm nonspecific changes.  X-ray chest and pelvis negative.  CT head and neck negative.  Given IV fluids and KCl.  Patient lives alone and is unable to ambulate.  Unsafe for discharge.  Will admit.

## 2023-07-09 NOTE — H&P ADULT - NSCORESITESY/N_GEN_A_CORE_RD
Pt experiencing hemmoroids since Monday or Tuesday. The pain has gotten so bad tonight that he can't sleep.  
No

## 2023-07-09 NOTE — ED PROVIDER NOTE - NS ED ATTENDING STATEMENT MOD
This was a shared visit with the NNIG. I reviewed and verified the documentation and independently performed the documented:

## 2023-07-10 LAB
ALBUMIN SERPL ELPH-MCNC: 4.2 G/DL — SIGNIFICANT CHANGE UP (ref 3.5–5.2)
ALP SERPL-CCNC: 90 U/L — SIGNIFICANT CHANGE UP (ref 30–115)
ALT FLD-CCNC: 32 U/L — SIGNIFICANT CHANGE UP (ref 0–41)
ANION GAP SERPL CALC-SCNC: 14 MMOL/L — SIGNIFICANT CHANGE UP (ref 7–14)
AST SERPL-CCNC: 57 U/L — HIGH (ref 0–41)
BILIRUB SERPL-MCNC: 0.8 MG/DL — SIGNIFICANT CHANGE UP (ref 0.2–1.2)
BUN SERPL-MCNC: 29 MG/DL — HIGH (ref 10–20)
CALCIUM SERPL-MCNC: 9 MG/DL — SIGNIFICANT CHANGE UP (ref 8.4–10.5)
CHLORIDE SERPL-SCNC: 94 MMOL/L — LOW (ref 98–110)
CK SERPL-CCNC: 1114 U/L — HIGH (ref 0–225)
CK SERPL-CCNC: 1313 U/L — HIGH (ref 0–225)
CO2 SERPL-SCNC: 30 MMOL/L — SIGNIFICANT CHANGE UP (ref 17–32)
CREAT SERPL-MCNC: 1.7 MG/DL — HIGH (ref 0.7–1.5)
EGFR: 40 ML/MIN/1.73M2 — LOW
GLUCOSE SERPL-MCNC: 136 MG/DL — HIGH (ref 70–99)
HCT VFR BLD CALC: 39.6 % — LOW (ref 42–52)
HGB BLD-MCNC: 13.2 G/DL — LOW (ref 14–18)
LACTATE SERPL-SCNC: 2.6 MMOL/L — HIGH (ref 0.7–2)
LACTATE SERPL-SCNC: 2.8 MMOL/L — HIGH (ref 0.7–2)
MCHC RBC-ENTMCNC: 29 PG — SIGNIFICANT CHANGE UP (ref 27–31)
MCHC RBC-ENTMCNC: 33.3 G/DL — SIGNIFICANT CHANGE UP (ref 32–37)
MCV RBC AUTO: 87 FL — SIGNIFICANT CHANGE UP (ref 80–94)
NRBC # BLD: 0 /100 WBCS — SIGNIFICANT CHANGE UP (ref 0–0)
PLATELET # BLD AUTO: 178 K/UL — SIGNIFICANT CHANGE UP (ref 130–400)
PMV BLD: 11.8 FL — HIGH (ref 7.4–10.4)
POTASSIUM SERPL-MCNC: 3 MMOL/L — LOW (ref 3.5–5)
POTASSIUM SERPL-SCNC: 3 MMOL/L — LOW (ref 3.5–5)
PROT SERPL-MCNC: 7.3 G/DL — SIGNIFICANT CHANGE UP (ref 6–8)
RBC # BLD: 4.55 M/UL — LOW (ref 4.7–6.1)
RBC # FLD: 13.2 % — SIGNIFICANT CHANGE UP (ref 11.5–14.5)
SODIUM SERPL-SCNC: 138 MMOL/L — SIGNIFICANT CHANGE UP (ref 135–146)
WBC # BLD: 8.96 K/UL — SIGNIFICANT CHANGE UP (ref 4.8–10.8)
WBC # FLD AUTO: 8.96 K/UL — SIGNIFICANT CHANGE UP (ref 4.8–10.8)

## 2023-07-10 PROCEDURE — 71045 X-RAY EXAM CHEST 1 VIEW: CPT | Mod: 26

## 2023-07-10 PROCEDURE — 99232 SBSQ HOSP IP/OBS MODERATE 35: CPT

## 2023-07-10 PROCEDURE — 93306 TTE W/DOPPLER COMPLETE: CPT | Mod: 26

## 2023-07-10 RX ORDER — ONDANSETRON 8 MG/1
4 TABLET, FILM COATED ORAL EVERY 8 HOURS
Refills: 0 | Status: DISCONTINUED | OUTPATIENT
Start: 2023-07-10 | End: 2023-07-12

## 2023-07-10 RX ORDER — POTASSIUM CHLORIDE 20 MEQ
40 PACKET (EA) ORAL DAILY
Refills: 0 | Status: COMPLETED | OUTPATIENT
Start: 2023-07-10 | End: 2023-07-12

## 2023-07-10 RX ORDER — ATORVASTATIN CALCIUM 80 MG/1
40 TABLET, FILM COATED ORAL AT BEDTIME
Refills: 0 | Status: DISCONTINUED | OUTPATIENT
Start: 2023-07-10 | End: 2023-07-12

## 2023-07-10 RX ORDER — SODIUM CHLORIDE 9 MG/ML
1000 INJECTION INTRAMUSCULAR; INTRAVENOUS; SUBCUTANEOUS
Refills: 0 | Status: DISCONTINUED | OUTPATIENT
Start: 2023-07-10 | End: 2023-07-10

## 2023-07-10 RX ORDER — ASPIRIN/CALCIUM CARB/MAGNESIUM 324 MG
81 TABLET ORAL DAILY
Refills: 0 | Status: DISCONTINUED | OUTPATIENT
Start: 2023-07-10 | End: 2023-07-12

## 2023-07-10 RX ORDER — BACITRACIN ZINC 500 UNIT/G
1 OINTMENT IN PACKET (EA) TOPICAL
Refills: 0 | Status: DISCONTINUED | OUTPATIENT
Start: 2023-07-10 | End: 2023-07-12

## 2023-07-10 RX ORDER — FUROSEMIDE 40 MG
80 TABLET ORAL DAILY
Refills: 0 | Status: DISCONTINUED | OUTPATIENT
Start: 2023-07-10 | End: 2023-07-10

## 2023-07-10 RX ORDER — ACETAMINOPHEN 500 MG
650 TABLET ORAL EVERY 6 HOURS
Refills: 0 | Status: DISCONTINUED | OUTPATIENT
Start: 2023-07-10 | End: 2023-07-12

## 2023-07-10 RX ORDER — LISINOPRIL 2.5 MG/1
10 TABLET ORAL DAILY
Refills: 0 | Status: DISCONTINUED | OUTPATIENT
Start: 2023-07-10 | End: 2023-07-10

## 2023-07-10 RX ORDER — ENOXAPARIN SODIUM 100 MG/ML
40 INJECTION SUBCUTANEOUS EVERY 24 HOURS
Refills: 0 | Status: DISCONTINUED | OUTPATIENT
Start: 2023-07-10 | End: 2023-07-12

## 2023-07-10 RX ORDER — MEMANTINE HYDROCHLORIDE 10 MG/1
10 TABLET ORAL DAILY
Refills: 0 | Status: DISCONTINUED | OUTPATIENT
Start: 2023-07-10 | End: 2023-07-12

## 2023-07-10 RX ORDER — METOPROLOL TARTRATE 50 MG
50 TABLET ORAL DAILY
Refills: 0 | Status: DISCONTINUED | OUTPATIENT
Start: 2023-07-10 | End: 2023-07-12

## 2023-07-10 RX ORDER — DONEPEZIL HYDROCHLORIDE 10 MG/1
10 TABLET, FILM COATED ORAL AT BEDTIME
Refills: 0 | Status: DISCONTINUED | OUTPATIENT
Start: 2023-07-10 | End: 2023-07-12

## 2023-07-10 RX ADMIN — Medication 40 MILLIEQUIVALENT(S): at 14:32

## 2023-07-10 RX ADMIN — Medication 1 APPLICATION(S): at 18:37

## 2023-07-10 RX ADMIN — Medication 80 MILLIGRAM(S): at 01:59

## 2023-07-10 RX ADMIN — ATORVASTATIN CALCIUM 40 MILLIGRAM(S): 80 TABLET, FILM COATED ORAL at 23:27

## 2023-07-10 RX ADMIN — Medication 1 APPLICATION(S): at 06:17

## 2023-07-10 RX ADMIN — MEMANTINE HYDROCHLORIDE 10 MILLIGRAM(S): 10 TABLET ORAL at 12:34

## 2023-07-10 RX ADMIN — Medication 50 MILLIGRAM(S): at 06:17

## 2023-07-10 RX ADMIN — DONEPEZIL HYDROCHLORIDE 10 MILLIGRAM(S): 10 TABLET, FILM COATED ORAL at 23:26

## 2023-07-10 RX ADMIN — Medication 81 MILLIGRAM(S): at 12:34

## 2023-07-10 NOTE — PATIENT PROFILE ADULT - FALL HARM RISK - HARM RISK INTERVENTIONS

## 2023-07-10 NOTE — CHART NOTE - NSCHARTNOTEFT_GEN_A_CORE
Called for lactate of 2.6, will repeat in am Called for lactate of 2.6, will repeat in am though source unclear Called for lactate of 2.6, will repeat in am though source unclear. IV fluids given

## 2023-07-10 NOTE — PHYSICAL THERAPY INITIAL EVALUATION ADULT - GAIT PATTERN USED, PT EVAL
Health Maintenance Due   Topic Date Due   • COVID-19 Vaccine (3 - Pediatric Pfizer series) 09/05/2022   • Annual Physical (ages 3-18)  06/03/2023       Patient is due for topics listed above, he wishes to proceed with Annual Wellness Visit (ages 3-18), but is not proceeding with Immunization(s) COVID-19 at this time. The following has occurred:    3-point gait

## 2023-07-10 NOTE — PHYSICAL THERAPY INITIAL EVALUATION ADULT - PERTINENT HX OF CURRENT PROBLEM, REHAB EVAL
83-year-old male, history of dementia, HTN, Hypercholesterolemia, HFpEF, CAD s/p CABG x4, lymphoma, brought in by EMS for unwitnessed fall at home. Pt was found on floor today by daughter who stated pt mentioned to her he was on the floor for about an hour.  Lives alone, Neighbors check on patient earlier and was OK this AM.   Patient with dementia, Does not remember falling, Has no complaints of pain, SOB, chest pain, HA, dizziness  Pt is AA+O x2

## 2023-07-10 NOTE — PHYSICAL THERAPY INITIAL EVALUATION ADULT - ADDITIONAL COMMENTS
pt lives alone In a private house without steps to enter and one level inside.  Pt amb with QC prior to admission.  Pt's daughter comes to help with cooking, food shopping, shower

## 2023-07-11 ENCOUNTER — APPOINTMENT (OUTPATIENT)
Dept: CARDIOLOGY | Facility: CLINIC | Age: 83
End: 2023-07-11

## 2023-07-11 LAB
ALBUMIN SERPL ELPH-MCNC: 3.8 G/DL — SIGNIFICANT CHANGE UP (ref 3.5–5.2)
ALP SERPL-CCNC: 85 U/L — SIGNIFICANT CHANGE UP (ref 30–115)
ALT FLD-CCNC: 35 U/L — SIGNIFICANT CHANGE UP (ref 0–41)
ANION GAP SERPL CALC-SCNC: 10 MMOL/L — SIGNIFICANT CHANGE UP (ref 7–14)
AST SERPL-CCNC: 54 U/L — HIGH (ref 0–41)
BASOPHILS # BLD AUTO: 0.03 K/UL — SIGNIFICANT CHANGE UP (ref 0–0.2)
BASOPHILS NFR BLD AUTO: 0.3 % — SIGNIFICANT CHANGE UP (ref 0–1)
BILIRUB SERPL-MCNC: 0.8 MG/DL — SIGNIFICANT CHANGE UP (ref 0.2–1.2)
BUN SERPL-MCNC: 29 MG/DL — HIGH (ref 10–20)
CALCIUM SERPL-MCNC: 8.5 MG/DL — SIGNIFICANT CHANGE UP (ref 8.4–10.5)
CHLORIDE SERPL-SCNC: 97 MMOL/L — LOW (ref 98–110)
CK SERPL-CCNC: 865 U/L — HIGH (ref 0–225)
CO2 SERPL-SCNC: 32 MMOL/L — SIGNIFICANT CHANGE UP (ref 17–32)
CREAT SERPL-MCNC: 1.5 MG/DL — SIGNIFICANT CHANGE UP (ref 0.7–1.5)
CULTURE RESULTS: NO GROWTH — SIGNIFICANT CHANGE UP
EGFR: 46 ML/MIN/1.73M2 — LOW
EOSINOPHIL # BLD AUTO: 0.1 K/UL — SIGNIFICANT CHANGE UP (ref 0–0.7)
EOSINOPHIL NFR BLD AUTO: 1 % — SIGNIFICANT CHANGE UP (ref 0–8)
GLUCOSE SERPL-MCNC: 107 MG/DL — HIGH (ref 70–99)
HCT VFR BLD CALC: 37.8 % — LOW (ref 42–52)
HGB BLD-MCNC: 12.4 G/DL — LOW (ref 14–18)
IMM GRANULOCYTES NFR BLD AUTO: 0.3 % — SIGNIFICANT CHANGE UP (ref 0.1–0.3)
LACTATE SERPL-SCNC: 1.2 MMOL/L — SIGNIFICANT CHANGE UP (ref 0.7–2)
LYMPHOCYTES # BLD AUTO: 1.27 K/UL — SIGNIFICANT CHANGE UP (ref 1.2–3.4)
LYMPHOCYTES # BLD AUTO: 13.1 % — LOW (ref 20.5–51.1)
MAGNESIUM SERPL-MCNC: 2.3 MG/DL — SIGNIFICANT CHANGE UP (ref 1.8–2.4)
MCHC RBC-ENTMCNC: 29 PG — SIGNIFICANT CHANGE UP (ref 27–31)
MCHC RBC-ENTMCNC: 32.8 G/DL — SIGNIFICANT CHANGE UP (ref 32–37)
MCV RBC AUTO: 88.3 FL — SIGNIFICANT CHANGE UP (ref 80–94)
MONOCYTES # BLD AUTO: 1.11 K/UL — HIGH (ref 0.1–0.6)
MONOCYTES NFR BLD AUTO: 11.5 % — HIGH (ref 1.7–9.3)
NEUTROPHILS # BLD AUTO: 7.12 K/UL — HIGH (ref 1.4–6.5)
NEUTROPHILS NFR BLD AUTO: 73.8 % — SIGNIFICANT CHANGE UP (ref 42.2–75.2)
NRBC # BLD: 0 /100 WBCS — SIGNIFICANT CHANGE UP (ref 0–0)
PHOSPHATE SERPL-MCNC: 2.8 MG/DL — SIGNIFICANT CHANGE UP (ref 2.1–4.9)
PLATELET # BLD AUTO: 163 K/UL — SIGNIFICANT CHANGE UP (ref 130–400)
PMV BLD: 11.9 FL — HIGH (ref 7.4–10.4)
POTASSIUM SERPL-MCNC: 2.9 MMOL/L — LOW (ref 3.5–5)
POTASSIUM SERPL-SCNC: 2.9 MMOL/L — LOW (ref 3.5–5)
PROT SERPL-MCNC: 6.8 G/DL — SIGNIFICANT CHANGE UP (ref 6–8)
RBC # BLD: 4.28 M/UL — LOW (ref 4.7–6.1)
RBC # FLD: 13.3 % — SIGNIFICANT CHANGE UP (ref 11.5–14.5)
SODIUM SERPL-SCNC: 139 MMOL/L — SIGNIFICANT CHANGE UP (ref 135–146)
SPECIMEN SOURCE: SIGNIFICANT CHANGE UP
WBC # BLD: 9.66 K/UL — SIGNIFICANT CHANGE UP (ref 4.8–10.8)
WBC # FLD AUTO: 9.66 K/UL — SIGNIFICANT CHANGE UP (ref 4.8–10.8)

## 2023-07-11 PROCEDURE — 99232 SBSQ HOSP IP/OBS MODERATE 35: CPT

## 2023-07-11 RX ORDER — SIMETHICONE 80 MG/1
80 TABLET, CHEWABLE ORAL
Refills: 0 | Status: DISCONTINUED | OUTPATIENT
Start: 2023-07-11 | End: 2023-07-12

## 2023-07-11 RX ORDER — POTASSIUM CHLORIDE 20 MEQ
20 PACKET (EA) ORAL
Refills: 0 | Status: COMPLETED | OUTPATIENT
Start: 2023-07-11 | End: 2023-07-11

## 2023-07-11 RX ORDER — SODIUM CHLORIDE 9 MG/ML
1000 INJECTION INTRAMUSCULAR; INTRAVENOUS; SUBCUTANEOUS
Refills: 0 | Status: DISCONTINUED | OUTPATIENT
Start: 2023-07-11 | End: 2023-07-12

## 2023-07-11 RX ADMIN — Medication 40 MILLIEQUIVALENT(S): at 12:57

## 2023-07-11 RX ADMIN — Medication 50 MILLIEQUIVALENT(S): at 15:51

## 2023-07-11 RX ADMIN — SIMETHICONE 80 MILLIGRAM(S): 80 TABLET, CHEWABLE ORAL at 19:00

## 2023-07-11 RX ADMIN — SIMETHICONE 80 MILLIGRAM(S): 80 TABLET, CHEWABLE ORAL at 12:56

## 2023-07-11 RX ADMIN — SODIUM CHLORIDE 50 MILLILITER(S): 9 INJECTION INTRAMUSCULAR; INTRAVENOUS; SUBCUTANEOUS at 21:36

## 2023-07-11 RX ADMIN — Medication 50 MILLIGRAM(S): at 06:30

## 2023-07-11 RX ADMIN — Medication 81 MILLIGRAM(S): at 12:55

## 2023-07-11 RX ADMIN — Medication 1 APPLICATION(S): at 19:00

## 2023-07-11 RX ADMIN — Medication 50 MILLIEQUIVALENT(S): at 12:57

## 2023-07-11 RX ADMIN — Medication 50 MILLIEQUIVALENT(S): at 19:01

## 2023-07-11 RX ADMIN — Medication 650 MILLIGRAM(S): at 00:39

## 2023-07-11 RX ADMIN — Medication 1 APPLICATION(S): at 06:30

## 2023-07-11 RX ADMIN — Medication 30 MILLILITER(S): at 00:40

## 2023-07-11 RX ADMIN — DONEPEZIL HYDROCHLORIDE 10 MILLIGRAM(S): 10 TABLET, FILM COATED ORAL at 21:37

## 2023-07-11 RX ADMIN — ATORVASTATIN CALCIUM 40 MILLIGRAM(S): 80 TABLET, FILM COATED ORAL at 21:37

## 2023-07-11 RX ADMIN — ONDANSETRON 4 MILLIGRAM(S): 8 TABLET, FILM COATED ORAL at 00:39

## 2023-07-11 RX ADMIN — ENOXAPARIN SODIUM 40 MILLIGRAM(S): 100 INJECTION SUBCUTANEOUS at 06:30

## 2023-07-11 RX ADMIN — Medication 650 MILLIGRAM(S): at 01:08

## 2023-07-11 RX ADMIN — MEMANTINE HYDROCHLORIDE 10 MILLIGRAM(S): 10 TABLET ORAL at 12:56

## 2023-07-11 NOTE — PROGRESS NOTE ADULT - SUBJECTIVE AND OBJECTIVE BOX
MEDICINE ATTENDING PROGRESS NOTE  83-year-old male, history of dementia, HTN, Hypercholesterolemia, HFpEF, CAD s/p CABG x4, lymphoma, brought in by EMS for unwitnessed fall at home.  Admitted for further management.    Hand/off  [ ] prepare to dc tomorrow if clinically stable    Interval/Overnight Events  - States his abdominal pain, which is relieved after passing gas -> trial of simethicone  - CK improves to 865 from 1313; encourage PO fluid given h/o CHF  - Creat improves to 1.5 from 1.7  - Lactate improves to 1.2 from 2.8  - K still low in setting of s/p diuretic -> will continue supplementation  - Patient eats all of his food  - PT: home PT  - 2D echo benign  - can be dc tomorrow if patient condition continues to improve    ROS  General: Denies fevers, chills, nightsweats, weight loss  HEENT: Denies headache, rhinorrhea, sore throat, eye pain  CV: Denies CP, palpitations  PULM: Denies SOB, cough  GI: Denies abdominal pain, diarrhea  : Denies dysuria, hematuria  MSK: Denies arthralgias  SKIN: Denies rash   NEURO: Denies paresthesias, weakness  PSYCH: Denies depression    MEDICATIONS  (STANDING):  aspirin  chewable 81 milliGRAM(s) Oral daily  atorvastatin 40 milliGRAM(s) Oral at bedtime  bacitracin   Ointment 1 Application(s) Topical two times a day  donepezil 10 milliGRAM(s) Oral at bedtime  enoxaparin Injectable 40 milliGRAM(s) SubCutaneous every 24 hours  memantine 10 milliGRAM(s) Oral daily  metoprolol succinate ER 50 milliGRAM(s) Oral daily  potassium chloride   Powder 40 milliEquivalent(s) Oral daily  potassium chloride  20 mEq/100 mL IVPB 20 milliEquivalent(s) IV Intermittent every 2 hours    MEDICATIONS  (PRN):  acetaminophen     Tablet .. 650 milliGRAM(s) Oral every 6 hours PRN Temp greater or equal to 38C (100.4F), Mild Pain (1 - 3)  aluminum hydroxide/magnesium hydroxide/simethicone Suspension 30 milliLiter(s) Oral every 4 hours PRN Dyspepsia  ondansetron Injectable 4 milliGRAM(s) IV Push every 8 hours PRN Nausea and/or Vomiting    ANTIBIOTICS:      VITALS:  T(F): 96.4, Max: 96.7 (07-10-23 @ 14:54)  HR: 58  BP: 134/62  RR: 18Vital Signs Last 24 Hrs  T(C): 35.8 (11 Jul 2023 04:51), Max: 35.9 (10 Jul 2023 14:54)  T(F): 96.4 (11 Jul 2023 04:51), Max: 96.7 (10 Jul 2023 14:54)  HR: 58 (11 Jul 2023 04:51) (58 - 68)  BP: 134/62 (11 Jul 2023 04:51) (120/57 - 144/65)  BP(mean): --  RR: 18 (11 Jul 2023 04:51) (18 - 20)  SpO2: 94% (11 Jul 2023 04:51) (94% - 99%)    Parameters below as of 11 Jul 2023 04:51  Patient On (Oxygen Delivery Method): room air     I&O's Summary    10 Jul 2023 07:01  -  11 Jul 2023 07:00  --------------------------------------------------------  IN: 0 mL / OUT: 400 mL / NET: -400 mL      PHYSICAL EXAM:  Gen: NAD, resting in bed  HEENT: Normocephalic, atraumatic  Neck: supple, no lymphadenopathy  CV: Regular rate & regular rhythm  Lungs: CTABL no wheeze  Abdomen: Soft, NTND+ BS present  Ext: Warm, well perfused no CCE, trace edema  Neuro: non focal, awake, CN II-XII intact   Skin: no rash, no erythema  Psych: no SI, HI, Hallucination     LABS/MICROBIOLOGY:                     12.4   9.66  )-----------( 163      ( 11 Jul 2023 07:05 )             37.8     07-11    139  |  97<L>  |  29<H>  ----------------------------<  107<H>  2.9<L>   |  32  |  1.5    Ca    8.5      11 Jul 2023 07:05  Phos  2.8     07-11  Mg     2.3     07-11    TPro  6.8  /  Alb  3.8  /  TBili  0.8  /  DBili  x   /  AST  54<H>  /  ALT  35  /  AlkPhos  85  07-11    LIVER FUNCTIONS - ( 11 Jul 2023 07:05 )  Alb: 3.8 g/dL / Pro: 6.8 g/dL / ALK PHOS: 85 U/L / ALT: 35 U/L / AST: 54 U/L / GGT: x           PT/INR - ( 09 Jul 2023 20:36 )   PT: 12.50 sec;   INR: 1.09 ratio    PTT - ( 09 Jul 2023 20:36 )  PTT:33.5 sec    CARDIAC MARKERS ( 11 Jul 2023 07:05 )  x     / x     / 865 U/L / x     / x      CARDIAC MARKERS ( 10 Jul 2023 20:02 )  x     / x     / 1114 U/L / x     / x      CARDIAC MARKERS ( 10 Jul 2023 11:27 )  x     / x     / 1313 U/L / x     / x      CARDIAC MARKERS ( 09 Jul 2023 20:36 )  x     / <0.01 ng/mL / 670 U/L / x     / x        MICROBIOLOGY  Urinalysis Basic - ( 11 Jul 2023 07:05 )  Color: x / Appearance: x / SG: x / pH: x  Gluc: 107 mg/dL / Ketone: x  / Bili: x / Urobili: x   Blood: x / Protein: x / Nitrite: x   Leuk Esterase: x / RBC: x / WBC x   Sq Epi: x / Non Sq Epi: x / Bacteria: x    Lactate, Blood: 1.2 mmol/L (07-11-23 @ 07:05)  Lactate, Blood: 2.8 mmol/L (07-10-23 @ 11:27)  Lactate, Blood: 2.6 mmol/L (07-09-23 @ 22:46)  Blood Gas Venous - Lactate: 4.80 mmol/L (07-09-23 @ 20:41)    IMAGING:  TTE Echo Complete w/ Contrast w/ Doppler (07.10.23 @ 13:21)  Summary:   1. Normal global left ventricular systolic function.   2. LV Ejection Fraction by Cadet's Method with a biplane EF of 54 %.   3. Normal right ventricular size and function.   4. Normal left atrial size.   5. Normal right atrial size.   6. There is moderate aortic root calcification.   7. Sclerotic aortic valve with normal opening.   8. Mild mitral annular calcification.   9. Trace mitral valve regurgitation.  10. Dilatation of the aortic root, measures 3.8 cm.  11. Adequate TR velocity was not obtained to accurately assess RVSP.  12. There is no evidence of pericardial effusion.    CT Head No Cont (07.09.23 @ 21:04)  FINDINGS:  Ventricles and sulci are compatible with parenchymal volume loss.  There is no acute intracranial hemorrhage, extra-axial fluid collections or midline shift.  There are chronic left basal ganglia lacunar infarcts.  Bilateral subcortical and periventricular white matter hypodensities, which likely represent chronic microvascular changes.  There is no depressed calvarial fracture. The mastoid air cells are aerated.  The paranasal sinuses are aerated.  Beam hardening artifact is noted overlying the brain stem and posterior fossa which is inherent to CT in this location.  There is no evidence of acute cervical spine fracture,. There is no evidence of compression deformity within the cervical spine. There is no evidence offacet subluxation within the cervical spine. The dens is intact. The atlantoaxial relationships are maintained. There is no significant prevertebral soft tissue swelling.  Multilevel degenerative changes.  The visualized paraspinal soft tissues are unremarkable.  IMPRESSION:  No acute intracranial hemorrhage, mass-effect or midline shift.  No evidence of acute cervical spine fracture or subluxation.    Xray Pelvis AP only (07.09.23 @ 21:03)   IMPRESSION: No fractures seen    CT Cervical Spine No Cont (07.09.23 @ 21:03)  Ventricles and sulci are compatible with parenchymal volume loss.  There is no acute intracranial hemorrhage, extra-axial fluid collections or midline shift.  There are chronic left basal ganglia lacunar infarcts.  Bilateral subcortical and periventricular white matter hypodensities, which likely represent chronic microvascular changes.  There is no depressed calvarial fracture. The mastoid air cells are aerated.  The paranasal sinuses are aerated.  Beam hardening artifact is noted overlying the brain stem and posterior fossa which is inherent to CT in this location.  There is no evidence of acute cervical spine fracture,. There is no evidence of compression deformity within the cervical spine. There is no evidence offacet subluxation within the cervical spine. The dens is intact. The atlantoaxial relationships are maintained. There is no significant prevertebral soft tissue swelling.  Multilevel degenerative changes.  The visualized paraspinal soft tissues are unremarkable.  IMPRESSION:  No acute intracranial hemorrhage, mass-effect or midline shift.  No evidence of acute cervical spine fracture or subluxation.    Xray Chest 1 View- PORTABLE-Urgent:   Findings: Partial lordotic view  Support devices:  Leads overlie thorax, obscuring anatomy  Cardiac/mediastinum/hilum: Poststernotomy. Magnified cardiac silhouette  Lung parenchyma/ Pleura: Mild elevation right diaphragm with adjacent basilar discoid atelectasis. No pneumothorax  Skeleton/soft tissues: Degenerative changes thoracic spine and shoulders.  Impression:  Mild elevation right diaphragm with adjacent basilar discoid atelectasis.     CARDIOLOGY TESTING  QRS axis to [] ° and NSR at a rate of [] BPM. There was no atrial enlargement. There was no ventricular hypertrophy. There were no ST-T changes and all intervals were normal.    12 Lead ECG:   Ventricular Rate 64 BPM  Atrial Rate 63 BPM  P-R Interval 170 ms  QRS Duration 106 ms  Q-T Interval 398 ms  QTC Calculation(Bazett) 410 ms  P Axis 49 degrees  R Axis -4 degrees  T Axis 35 degrees    Diagnosis Line Normal sinus rhythm  Nonspecific ST and T wave abnormality  Abnormal ECG    Confirmed by UNRULY ALVA MD ([id number]) on 7/10/2023 9:12:25 AM (07-09-23 @ 20:13)    12 Lead ECG:   Ventricular Rate 71 BPM  Atrial Rate 71 BPM  P-R Interval 168 ms  QRS Duration 98 ms  Q-T Interval 594 ms  QTC Calculation(Bazett) 645 ms  P Axis 29 degrees  R Axis -12 degrees  T Axis 48 degrees    Diagnosis Line Normal sinus rhythm  Minimal voltage criteria for LVH, may be normal variant  Nonspecific ST and T wave abnormality  Abnormal ECG    Confirmed by UNRULY ALVA MD ([id number]) on 7/10/2023 9:12:19 AM (07-09-23 @ 20:09)    ASSESSMENT/PLAN:  83-year-old male, history of dementia, HTN, Hypercholesterolemia, HFpEF, CAD s/p CABG x4, lymphoma, brought in by EMS for unwitnessed fall at home.  Admitted for further management.    #Fall/Syncope likely due to Dehydration in setting of Diurese, cannot r/o cardiac etiology  #ROYAL in setting of Dehydration and Rhabdomyolysis, resolving  #Rhabdomyolysis in setting of Fall/syncope, resolving  #Forehead abrasion in setting of Fall/syncope, improving  #Generalized possibly due to Poor Po intake, improving  - Creat 1.7 (was previously 1.2) > 1.5  trend  -  > 1313 s/p IVF > 865   - lactate 2.6 > 1.2  - Imaging neg for acute fracture  - EKG: NSR  - 2D echo benign  - s/p IVF for rhabdomyolysis; encourage PO fluid due to h/o CHF  - c/w bacitracin to abrasions with dressing  - ambulate with assistance and as tolerated  - fall risk  - PT consult for generalized weakness: Home PT  - Appreciate Nutrition consult     # Hypokalemia in setting of diuretic  - K+ 3.2 > 3.0 > 2.9  - c/w supplementation    #Abdominal Pain, resolved  #History of constipation  - Resolved after having a BM today    #Mild elevation right diaphragm with adjacent basilar discoid atelectasis.   - Consider CT chest if in respiratory distress. Otherwise, outpatient follow up with PMD    # HTN  - c/w metoprolol and lisinopril    # HFpEF  - hold Lasix resume if worsening edema  - c/w ASA, Lipitor 40mg, and metoprolol 40mg    # Hypercholesterolemia  - c/w statin    # Dementia  - c/w donepezil and memantine     #Supportive Management:  Dispo:   DVT Ppx: enoxaparin Injectable 40 milliGRAM(s) SubCutaneous every 24 hours  GI Ppx: Diet:  Diet, Regular:   DASH/TLC Sodium & Cholesterol Restricted  Easy to Chew (EASYTOCHEW)  Supplement Feeding Modality:  Oral  Ensure Plus High Protein Cans or Servings Per Day:  1       Frequency:  Three Times a day (07-11-23 @ 11:52) [Active]    Total time spent to complete patient's bedside assessment, review medical chart, discuss medical plan of care with covering medical team was more than 45 minutes with >50% of time spent face to face with patient, discussion with patient/family and/or coordination of care    Lloyd Hutchison MD/Fahad  Attending Physician MEDICINE ATTENDING PROGRESS NOTE  83-year-old male, history of dementia, HTN, Hypercholesterolemia, HFpEF, CAD s/p CABG x4, lymphoma, brought in by EMS for unwitnessed fall at home.  Admitted for further management.    Hand/off  [ ] prepare to dc tomorrow if clinically stable    Interval/Overnight Events  - States his abdominal pain is relieved after passing gas -> trial of simethicone  - CK improves to 865 from 1313; encourage PO fluid given h/o CHF  - Creat improves to 1.5 from 1.7  - Lactate improves to 1.2 from 2.8  - K still low in setting of s/p diuretic -> will continue supplementation  - Patient eats all of his food  - PT: home PT  - 2D echo benign  - can be dc tomorrow if patient condition continues to improve    ROS  General: Denies fevers, chills, nightsweats, weight loss  HEENT: Denies headache, rhinorrhea, sore throat, eye pain  CV: Denies CP, palpitations  PULM: Denies SOB, cough  GI: Denies abdominal pain, diarrhea  : Denies dysuria, hematuria  MSK: Denies arthralgias  SKIN: Denies rash   NEURO: Denies paresthesias, weakness  PSYCH: Denies depression    MEDICATIONS  (STANDING):  aspirin  chewable 81 milliGRAM(s) Oral daily  atorvastatin 40 milliGRAM(s) Oral at bedtime  bacitracin   Ointment 1 Application(s) Topical two times a day  donepezil 10 milliGRAM(s) Oral at bedtime  enoxaparin Injectable 40 milliGRAM(s) SubCutaneous every 24 hours  memantine 10 milliGRAM(s) Oral daily  metoprolol succinate ER 50 milliGRAM(s) Oral daily  potassium chloride   Powder 40 milliEquivalent(s) Oral daily  potassium chloride  20 mEq/100 mL IVPB 20 milliEquivalent(s) IV Intermittent every 2 hours    MEDICATIONS  (PRN):  acetaminophen     Tablet .. 650 milliGRAM(s) Oral every 6 hours PRN Temp greater or equal to 38C (100.4F), Mild Pain (1 - 3)  aluminum hydroxide/magnesium hydroxide/simethicone Suspension 30 milliLiter(s) Oral every 4 hours PRN Dyspepsia  ondansetron Injectable 4 milliGRAM(s) IV Push every 8 hours PRN Nausea and/or Vomiting    ANTIBIOTICS:      VITALS:  T(F): 96.4, Max: 96.7 (07-10-23 @ 14:54)  HR: 58  BP: 134/62  RR: 18Vital Signs Last 24 Hrs  T(C): 35.8 (11 Jul 2023 04:51), Max: 35.9 (10 Jul 2023 14:54)  T(F): 96.4 (11 Jul 2023 04:51), Max: 96.7 (10 Jul 2023 14:54)  HR: 58 (11 Jul 2023 04:51) (58 - 68)  BP: 134/62 (11 Jul 2023 04:51) (120/57 - 144/65)  BP(mean): --  RR: 18 (11 Jul 2023 04:51) (18 - 20)  SpO2: 94% (11 Jul 2023 04:51) (94% - 99%)    Parameters below as of 11 Jul 2023 04:51  Patient On (Oxygen Delivery Method): room air     I&O's Summary    10 Jul 2023 07:01  -  11 Jul 2023 07:00  --------------------------------------------------------  IN: 0 mL / OUT: 400 mL / NET: -400 mL      PHYSICAL EXAM:  Gen: NAD, resting in bed  HEENT: Normocephalic, atraumatic  Neck: supple, no lymphadenopathy  CV: Regular rate & regular rhythm  Lungs: CTABL no wheeze  Abdomen: Soft, NTND+ BS present  Ext: Warm, well perfused no CCE, trace edema  Neuro: non focal, awake, CN II-XII intact   Skin: no rash, no erythema  Psych: no SI, HI, Hallucination     LABS/MICROBIOLOGY:                     12.4   9.66  )-----------( 163      ( 11 Jul 2023 07:05 )             37.8     07-11    139  |  97<L>  |  29<H>  ----------------------------<  107<H>  2.9<L>   |  32  |  1.5    Ca    8.5      11 Jul 2023 07:05  Phos  2.8     07-11  Mg     2.3     07-11    TPro  6.8  /  Alb  3.8  /  TBili  0.8  /  DBili  x   /  AST  54<H>  /  ALT  35  /  AlkPhos  85  07-11    LIVER FUNCTIONS - ( 11 Jul 2023 07:05 )  Alb: 3.8 g/dL / Pro: 6.8 g/dL / ALK PHOS: 85 U/L / ALT: 35 U/L / AST: 54 U/L / GGT: x           PT/INR - ( 09 Jul 2023 20:36 )   PT: 12.50 sec;   INR: 1.09 ratio    PTT - ( 09 Jul 2023 20:36 )  PTT:33.5 sec    CARDIAC MARKERS ( 11 Jul 2023 07:05 )  x     / x     / 865 U/L / x     / x      CARDIAC MARKERS ( 10 Jul 2023 20:02 )  x     / x     / 1114 U/L / x     / x      CARDIAC MARKERS ( 10 Jul 2023 11:27 )  x     / x     / 1313 U/L / x     / x      CARDIAC MARKERS ( 09 Jul 2023 20:36 )  x     / <0.01 ng/mL / 670 U/L / x     / x        MICROBIOLOGY  Urinalysis Basic - ( 11 Jul 2023 07:05 )  Color: x / Appearance: x / SG: x / pH: x  Gluc: 107 mg/dL / Ketone: x  / Bili: x / Urobili: x   Blood: x / Protein: x / Nitrite: x   Leuk Esterase: x / RBC: x / WBC x   Sq Epi: x / Non Sq Epi: x / Bacteria: x    Lactate, Blood: 1.2 mmol/L (07-11-23 @ 07:05)  Lactate, Blood: 2.8 mmol/L (07-10-23 @ 11:27)  Lactate, Blood: 2.6 mmol/L (07-09-23 @ 22:46)  Blood Gas Venous - Lactate: 4.80 mmol/L (07-09-23 @ 20:41)    IMAGING:  TTE Echo Complete w/ Contrast w/ Doppler (07.10.23 @ 13:21)  Summary:   1. Normal global left ventricular systolic function.   2. LV Ejection Fraction by Cadet's Method with a biplane EF of 54 %.   3. Normal right ventricular size and function.   4. Normal left atrial size.   5. Normal right atrial size.   6. There is moderate aortic root calcification.   7. Sclerotic aortic valve with normal opening.   8. Mild mitral annular calcification.   9. Trace mitral valve regurgitation.  10. Dilatation of the aortic root, measures 3.8 cm.  11. Adequate TR velocity was not obtained to accurately assess RVSP.  12. There is no evidence of pericardial effusion.    CT Head No Cont (07.09.23 @ 21:04)  FINDINGS:  Ventricles and sulci are compatible with parenchymal volume loss.  There is no acute intracranial hemorrhage, extra-axial fluid collections or midline shift.  There are chronic left basal ganglia lacunar infarcts.  Bilateral subcortical and periventricular white matter hypodensities, which likely represent chronic microvascular changes.  There is no depressed calvarial fracture. The mastoid air cells are aerated.  The paranasal sinuses are aerated.  Beam hardening artifact is noted overlying the brain stem and posterior fossa which is inherent to CT in this location.  There is no evidence of acute cervical spine fracture,. There is no evidence of compression deformity within the cervical spine. There is no evidence offacet subluxation within the cervical spine. The dens is intact. The atlantoaxial relationships are maintained. There is no significant prevertebral soft tissue swelling.  Multilevel degenerative changes.  The visualized paraspinal soft tissues are unremarkable.  IMPRESSION:  No acute intracranial hemorrhage, mass-effect or midline shift.  No evidence of acute cervical spine fracture or subluxation.    Xray Pelvis AP only (07.09.23 @ 21:03)   IMPRESSION: No fractures seen    CT Cervical Spine No Cont (07.09.23 @ 21:03)  Ventricles and sulci are compatible with parenchymal volume loss.  There is no acute intracranial hemorrhage, extra-axial fluid collections or midline shift.  There are chronic left basal ganglia lacunar infarcts.  Bilateral subcortical and periventricular white matter hypodensities, which likely represent chronic microvascular changes.  There is no depressed calvarial fracture. The mastoid air cells are aerated.  The paranasal sinuses are aerated.  Beam hardening artifact is noted overlying the brain stem and posterior fossa which is inherent to CT in this location.  There is no evidence of acute cervical spine fracture,. There is no evidence of compression deformity within the cervical spine. There is no evidence offacet subluxation within the cervical spine. The dens is intact. The atlantoaxial relationships are maintained. There is no significant prevertebral soft tissue swelling.  Multilevel degenerative changes.  The visualized paraspinal soft tissues are unremarkable.  IMPRESSION:  No acute intracranial hemorrhage, mass-effect or midline shift.  No evidence of acute cervical spine fracture or subluxation.    Xray Chest 1 View- PORTABLE-Urgent:   Findings: Partial lordotic view  Support devices:  Leads overlie thorax, obscuring anatomy  Cardiac/mediastinum/hilum: Poststernotomy. Magnified cardiac silhouette  Lung parenchyma/ Pleura: Mild elevation right diaphragm with adjacent basilar discoid atelectasis. No pneumothorax  Skeleton/soft tissues: Degenerative changes thoracic spine and shoulders.  Impression:  Mild elevation right diaphragm with adjacent basilar discoid atelectasis.     CARDIOLOGY TESTING    12 Lead ECG:   Ventricular Rate 64 BPM  Atrial Rate 63 BPM  P-R Interval 170 ms  QRS Duration 106 ms  Q-T Interval 398 ms  QTC Calculation(Bazett) 410 ms  P Axis 49 degrees  R Axis -4 degrees  T Axis 35 degrees    Diagnosis Line Normal sinus rhythm  Nonspecific ST and T wave abnormality  Abnormal ECG    Confirmed by UNRULY ALVA MD ([id number]) on 7/10/2023 9:12:25 AM (07-09-23 @ 20:13)    12 Lead ECG:   Ventricular Rate 71 BPM  Atrial Rate 71 BPM  P-R Interval 168 ms  QRS Duration 98 ms  Q-T Interval 594 ms  QTC Calculation(Bazett) 645 ms  P Axis 29 degrees  R Axis -12 degrees  T Axis 48 degrees    Diagnosis Line Normal sinus rhythm  Minimal voltage criteria for LVH, may be normal variant  Nonspecific ST and T wave abnormality  Abnormal ECG    Confirmed by UNRULY ALVA MD ([id number]) on 7/10/2023 9:12:19 AM (07-09-23 @ 20:09)    ASSESSMENT/PLAN:  83-year-old male, history of dementia, HTN, Hypercholesterolemia, HFpEF, CAD s/p CABG x4, lymphoma, brought in by EMS for unwitnessed fall at home.  Admitted for further management.    #Fall/Syncope likely due to Dehydration in setting of Diurese, cannot r/o cardiac etiology  #ROYAL in setting of Dehydration and Rhabdomyolysis, resolving  #Rhabdomyolysis in setting of Fall/syncope, resolving  #Forehead abrasion in setting of Fall/syncope, improving  #Generalized possibly due to Poor Po intake, improving  - Creat 1.7 (was previously 1.2) > 1.5  trend  -  > 1313 s/p IVF > 865   - lactate 2.6 > 1.2  - Imaging neg for acute fracture  - EKG: NSR  - 2D echo benign  - s/p IVF for rhabdomyolysis; encourage PO fluid due to h/o CHF  - c/w bacitracin to abrasions with dressing  - ambulate with assistance and as tolerated  - fall risk  - PT consult for generalized weakness: Home PT  - Appreciate Nutrition consult     # Hypokalemia in setting of diuretic  - K+ 3.2 > 3.0 > 2.9  - c/w supplementation    #Abdominal Pain, resolved  #History of constipation  - Resolved after having a BM today    #Mild elevation right diaphragm with adjacent basilar discoid atelectasis.   - Consider CT chest if in respiratory distress. Otherwise, outpatient follow up with PMD    # HTN  - c/w metoprolol and lisinopril    # HFpEF  - hold Lasix resume if worsening edema  - c/w ASA, Lipitor 40mg, and metoprolol 40mg    # Hypercholesterolemia  - c/w statin    # Dementia  - c/w donepezil and memantine     #Supportive Management:  Dispo:   DVT Ppx: enoxaparin Injectable 40 milliGRAM(s) SubCutaneous every 24 hours  GI Ppx: Diet:  Diet, Regular:   DASH/TLC Sodium & Cholesterol Restricted  Easy to Chew (EASYTOCHEW)  Supplement Feeding Modality:  Oral  Ensure Plus High Protein Cans or Servings Per Day:  1       Frequency:  Three Times a day (07-11-23 @ 11:52) [Active]    Total time spent to complete patient's bedside assessment, review medical chart, discuss medical plan of care with covering medical team was more than 45 minutes with >50% of time spent face to face with patient, discussion with patient/family and/or coordination of care    Lloyd Hutchison MD/Fahad  Attending Physician

## 2023-07-11 NOTE — CONSULT NOTE ADULT - ASSESSMENT
83-year-old male, history of dementia, HTN, Hypercholesterolemia, HFpEF, CAD s/p CABG x4, lymphoma, brought in by EMS for unwitnessed fall at home.  Admitted for further management.    #Fall/Syncope likely due to Dehydration in setting of Diuresis  #ROYAL in setting of Dehydration and Rhabdomyolysis  #Forehead abrasion due to Fall/syncope  # Hypokalemia r/t diuretic    #poor po intake likely r/t overdiuresis and generalized resultant weakness      - pt c/o being hungry this morning and then ate his entire breakfast     - d/w nurse and PCA  suggest observing po intake closely for the next day or two    pt requests softer food    add Ensure Enlive between meals and perhaps a low calorie yogurt    f/u phos with next am labs, along with K+  d/w hospitalist

## 2023-07-11 NOTE — CONSULT NOTE ADULT - SUBJECTIVE AND OBJECTIVE BOX
NUTRITION SUPPORT TEAM  -  CONSULT NOTE     83-year-old male, history of dementia, HTN, Hypercholesterolemia, HFpEF, CAD s/p CABG x4, lymphoma, brought in by EMS for unwitnessed fall at home. Pt was found on floor today by daughter who stated pt mentioned to her he was on the floor for about an hour.  Lives alone, Neighbors check on patient earlier and was OK this AM.   Patient with dementia, Does not remember falling, Has no complaints of pain, SOB, chest pain, HA, dizziness  Pt is AA+O x2   (09 Jul 2023 23:55)  per hospitalist d/w pt's daughter:  - Daughter reports that patient was found on the floor by her. A neighbor previously did a wellness check on the patient who complained at that time generalized weakness and abdominal pain.  - Patient unable to provide more to the story due dementia. However, the daughter endorses that patient has been on 2 diuretic (Lasix and HCTZ) for his BLE edema, and loss a lot of pounds since started on them.  - Daughter also endorses patient with history of constipation, which may account for the abdominal pain  - Today, patient has no abdominal pain. Had a BM this morning.  - It is likely the syncope could be dehydration in setting of overdiuresis. Patient was bolus yesterday, and maintained this am -> will dc IVF and encourage PO fluid given h/o CHF, and patient now reported SOB; f/u repeat CXR  - Pt with ROYAL likely due to dehydration in setting of Lasix and Rhabdomyolysis    NUTRITION SUPPORT NOTE:  pt with decreased po intake for several days pta, reported wt loss (much of which was likely r/t over diuresis)  + abdominal discomfort and distention on admission resolved with having BM    REVIEW OF SYSTEMS:  Negative except as noted above.     PAST MEDICAL/SURGICAL HISTORY:   HTN (hypertension)  Coronary artery disease involving coronary bypass graft of native heart without angina pectoris  Dementia  Chronc GERD  S/P CABG x 4  33 years ago    ALLERGIES:  No Known Allergies    VITALS:  T(F): 96.4 (07-11 @ 04:51), Max: 96.4 (07-11 @ 04:51)  HR: 58 (07-11 @ 04:51) (58 - 58)  BP: 134/62 (07-11 @ 04:51) (134/62 - 134/62)  RR: 18 (07-11 @ 04:51) (18 - 18)  SpO2: 94% (07-11 @ 04:51) (94% - 94%)    HEIGHT/WEIGHT/BMI:   Height (cm): 160 (07-10)  Weight (kg): 88.6 (07-10)  BMI (kg/m2): 34.6 (07-10)    I/Os:   07-10-23 @ 07:01  -  07-11-23 @ 07:00  --------------------------------------------------------  IN:  Total IN: 0 mL incomplete documentation  OUT:    Voided (mL): 400 mL  Total OUT: 400 mL    Total NET: -400 mL    PHYSICAL EXAM:   GENERAL: NAD, well nourished, alert, verbal, some confusion  HEENT: Moist mucous membranes, anicteric, conj pink  ABDOMEN: Soft, Nontender, Nondistended, somewhat obese  EXTREMITIES:  No clubbing, cyanosis, or edema  SKIN: warm, turgor good, large bruise on L forehead  IV ACCESS: periph     STANDING MEDICATIONS:   aspirin  chewable 81 milliGRAM(s) Oral daily  atorvastatin 40 milliGRAM(s) Oral at bedtime  bacitracin   Ointment 1 Application(s) Topical two times a day  donepezil 10 milliGRAM(s) Oral at bedtime  enoxaparin Injectable 40 milliGRAM(s) SubCutaneous every 24 hours  memantine 10 milliGRAM(s) Oral daily  metoprolol succinate ER 50 milliGRAM(s) Oral daily  potassium chloride   Powder 40 milliEquivalent(s) Oral daily    LABS:                         12.4   9.66  )-----------( 163      ( 11 Jul 2023 07:05 )             37.8     139  |  97<L>  |  29<H>  ----------------------------<  107<H>          (07-11-23 @ 07:05)  2.9<L>   |  32  |  1.5    Ca    8.5          (07-11-23 @ 07:05)  Phos  2.8         (07-11-23 @ 07:05)  Mg     2.3         (07-11-23 @ 07:05)    TPro  6.8  /  Alb  3.8  /  TBili  0.8  /  DBili  x   /  AST  54<H>  /  ALT  35  /  AlkPhos  85       07-11-23 @ 07:05    DIET:   Diet, Regular:   DASH/TLC Sodium & Cholesterol Restricted (07-10-23 @ 01:14) [Active]

## 2023-07-12 ENCOUNTER — TRANSCRIPTION ENCOUNTER (OUTPATIENT)
Age: 83
End: 2023-07-12

## 2023-07-12 VITALS
TEMPERATURE: 97 F | RESPIRATION RATE: 18 BRPM | SYSTOLIC BLOOD PRESSURE: 127 MMHG | DIASTOLIC BLOOD PRESSURE: 60 MMHG | HEART RATE: 59 BPM

## 2023-07-12 LAB
ALBUMIN SERPL ELPH-MCNC: 3.6 G/DL — SIGNIFICANT CHANGE UP (ref 3.5–5.2)
ALP SERPL-CCNC: 77 U/L — SIGNIFICANT CHANGE UP (ref 30–115)
ALT FLD-CCNC: 35 U/L — SIGNIFICANT CHANGE UP (ref 0–41)
ANION GAP SERPL CALC-SCNC: 9 MMOL/L — SIGNIFICANT CHANGE UP (ref 7–14)
AST SERPL-CCNC: 42 U/L — HIGH (ref 0–41)
BASOPHILS # BLD AUTO: 0.02 K/UL — SIGNIFICANT CHANGE UP (ref 0–0.2)
BASOPHILS NFR BLD AUTO: 0.2 % — SIGNIFICANT CHANGE UP (ref 0–1)
BILIRUB SERPL-MCNC: 0.6 MG/DL — SIGNIFICANT CHANGE UP (ref 0.2–1.2)
BUN SERPL-MCNC: 20 MG/DL — SIGNIFICANT CHANGE UP (ref 10–20)
CALCIUM SERPL-MCNC: 8.4 MG/DL — SIGNIFICANT CHANGE UP (ref 8.4–10.5)
CHLORIDE SERPL-SCNC: 104 MMOL/L — SIGNIFICANT CHANGE UP (ref 98–110)
CK SERPL-CCNC: 413 U/L — HIGH (ref 0–225)
CO2 SERPL-SCNC: 28 MMOL/L — SIGNIFICANT CHANGE UP (ref 17–32)
CREAT SERPL-MCNC: 1.3 MG/DL — SIGNIFICANT CHANGE UP (ref 0.7–1.5)
EGFR: 55 ML/MIN/1.73M2 — LOW
EOSINOPHIL # BLD AUTO: 0.18 K/UL — SIGNIFICANT CHANGE UP (ref 0–0.7)
EOSINOPHIL NFR BLD AUTO: 1.9 % — SIGNIFICANT CHANGE UP (ref 0–8)
GLUCOSE SERPL-MCNC: 96 MG/DL — SIGNIFICANT CHANGE UP (ref 70–99)
HCT VFR BLD CALC: 37.4 % — LOW (ref 42–52)
HGB BLD-MCNC: 12.1 G/DL — LOW (ref 14–18)
IMM GRANULOCYTES NFR BLD AUTO: 0.2 % — SIGNIFICANT CHANGE UP (ref 0.1–0.3)
LYMPHOCYTES # BLD AUTO: 1.5 K/UL — SIGNIFICANT CHANGE UP (ref 1.2–3.4)
LYMPHOCYTES # BLD AUTO: 16 % — LOW (ref 20.5–51.1)
MAGNESIUM SERPL-MCNC: 2.3 MG/DL — SIGNIFICANT CHANGE UP (ref 1.8–2.4)
MCHC RBC-ENTMCNC: 29.1 PG — SIGNIFICANT CHANGE UP (ref 27–31)
MCHC RBC-ENTMCNC: 32.4 G/DL — SIGNIFICANT CHANGE UP (ref 32–37)
MCV RBC AUTO: 89.9 FL — SIGNIFICANT CHANGE UP (ref 80–94)
MONOCYTES # BLD AUTO: 1.04 K/UL — HIGH (ref 0.1–0.6)
MONOCYTES NFR BLD AUTO: 11.1 % — HIGH (ref 1.7–9.3)
NEUTROPHILS # BLD AUTO: 6.63 K/UL — HIGH (ref 1.4–6.5)
NEUTROPHILS NFR BLD AUTO: 70.6 % — SIGNIFICANT CHANGE UP (ref 42.2–75.2)
NRBC # BLD: 0 /100 WBCS — SIGNIFICANT CHANGE UP (ref 0–0)
PHOSPHATE SERPL-MCNC: 2.2 MG/DL — SIGNIFICANT CHANGE UP (ref 2.1–4.9)
PLATELET # BLD AUTO: 154 K/UL — SIGNIFICANT CHANGE UP (ref 130–400)
PMV BLD: 11.5 FL — HIGH (ref 7.4–10.4)
POTASSIUM SERPL-MCNC: 4.1 MMOL/L — SIGNIFICANT CHANGE UP (ref 3.5–5)
POTASSIUM SERPL-SCNC: 4.1 MMOL/L — SIGNIFICANT CHANGE UP (ref 3.5–5)
PROT SERPL-MCNC: 6.4 G/DL — SIGNIFICANT CHANGE UP (ref 6–8)
RBC # BLD: 4.16 M/UL — LOW (ref 4.7–6.1)
RBC # FLD: 13.3 % — SIGNIFICANT CHANGE UP (ref 11.5–14.5)
SODIUM SERPL-SCNC: 141 MMOL/L — SIGNIFICANT CHANGE UP (ref 135–146)
WBC # BLD: 9.39 K/UL — SIGNIFICANT CHANGE UP (ref 4.8–10.8)
WBC # FLD AUTO: 9.39 K/UL — SIGNIFICANT CHANGE UP (ref 4.8–10.8)

## 2023-07-12 PROCEDURE — 99239 HOSP IP/OBS DSCHRG MGMT >30: CPT

## 2023-07-12 RX ORDER — FUROSEMIDE 40 MG
1 TABLET ORAL
Qty: 30 | Refills: 0
Start: 2023-07-12 | End: 2023-08-10

## 2023-07-12 RX ORDER — BACITRACIN ZINC 500 UNIT/G
1 OINTMENT IN PACKET (EA) TOPICAL
Qty: 0 | Refills: 0 | DISCHARGE
Start: 2023-07-12

## 2023-07-12 RX ORDER — FUROSEMIDE 40 MG
0 TABLET ORAL
Qty: 0 | Refills: 3 | DISCHARGE

## 2023-07-12 RX ADMIN — MEMANTINE HYDROCHLORIDE 10 MILLIGRAM(S): 10 TABLET ORAL at 11:45

## 2023-07-12 RX ADMIN — Medication 1 APPLICATION(S): at 17:40

## 2023-07-12 RX ADMIN — SIMETHICONE 80 MILLIGRAM(S): 80 TABLET, CHEWABLE ORAL at 17:40

## 2023-07-12 RX ADMIN — SIMETHICONE 80 MILLIGRAM(S): 80 TABLET, CHEWABLE ORAL at 05:56

## 2023-07-12 RX ADMIN — Medication 1 APPLICATION(S): at 05:56

## 2023-07-12 RX ADMIN — ENOXAPARIN SODIUM 40 MILLIGRAM(S): 100 INJECTION SUBCUTANEOUS at 05:57

## 2023-07-12 RX ADMIN — Medication 40 MILLIEQUIVALENT(S): at 11:45

## 2023-07-12 RX ADMIN — Medication 81 MILLIGRAM(S): at 11:45

## 2023-07-12 NOTE — DISCHARGE NOTE PROVIDER - HOSPITAL COURSE
83-year-old male, history of dementia, HTN, Hypercholesterolemia, HFpEF, CAD s/p CABG x4, lymphoma, brought in by EMS for unwitnessed fall at home.  Admitted for further management. Monitored on tele with no events or arrhythmia noted. EKG with no acute or concerning findings. Echo done and overall unremarkable. Was hydrated with IVF given slight ROYAL, rhabdo, and lactic acidosis. Additionally noted to have electrolyte imbalances and required supplementation. These findings suggest syncope likely in setting of dehydration with diuretic use and poor PO intake. Seen by PT who recommended home PT. Can continue with home meds and follow up with outpatient providers.     #Fall/Syncope likely due to Dehydration in setting of Diurese, cannot r/o cardiac etiology  #ROYAL in setting of Dehydration and Rhabdomyolysis, resolving  #Rhabdomyolysis in setting of Fall/syncope, resolved  #Forehead abrasion in setting of Fall/syncope  #Generalized possibly due to Poor Po intake  - Creat 1.7 (was previously 1.2) given IVF with improvement to 1.3  -  > 1313 s/p IVF > 865   - lactate 2.6 > 1.2 s/p IVF  - Imaging neg for acute fracture  - EKG: NSR  - 2D echo benign  - encouraged PO fluid due to h/o CHF  - bacitracin to abrasions with dressing  - ambulate with assistance and as tolerated  - PT consult for generalized weakness: Home PT      # Hypokalemia  - in setting of diuretic and poor PO intake   - Supplemented prn and stable on discharge     #Abdominal Pain, resolved  #History of constipation  - Resolved after having a BM     #Mild elevation right diaphragm with adjacent basilar discoid atelectasis.   -  outpatient follow up with PMD    # HTN  - c/w metoprolol and lisinopril    # HFpEF  - held Lasix given syncope/dehydration   - c/w ASA, Lipitor 40mg, and metoprolol 40mg    # Hypercholesterolemia  - c/w statin    # Dementia  - c/w donepezil and memantine

## 2023-07-12 NOTE — DISCHARGE NOTE PROVIDER - CARE PROVIDER_API CALL
CURTIS, SUNDEE  [address] [facility] [place] [address]  Phone: [phone]  Fax: [phone]  Follow Up Time: Routine   DYLAN ACEVEDO  [address] [facility] [place] [address]  Phone: [phone]  Fax: [phone])-  Follow Up Time: Routine    Edmar Quiros  Internal Medicine  [address] [place] [address], [address] [address]  Phone: [phone]  Fax: [phone]  Follow Up Time:

## 2023-07-12 NOTE — DISCHARGE NOTE NURSING/CASE MANAGEMENT/SOCIAL WORK - NSDCVIVACCINE_GEN_ALL_CORE_FT
Tdap; 26-Oct-2020 14:23; Ngoc Naylor (RN); [employer]; [id number] (Exp. Date: 31-Jul-2022); IntraMuscular; Deltoid Left.; 0.5 milliLiter(s); VIS (VIS Published: 09-May-2013, VIS Presented: 26-Oct-2020);   Tdap; 02-Oct-2021 19:12; Herminia Robertson (RN); [employer]; [id number] (Exp. Date: 15-Jul-2023); IntraMuscular; Deltoid Left.; 0.5 milliLiter(s); VIS (VIS Published: 09-May-2013, VIS Presented: 02-Oct-2021);

## 2023-07-12 NOTE — DISCHARGE NOTE PROVIDER - PROVIDER TOKENS
PROVIDER:[TOKEN:[[id number]],FOLLOWUP:[Routine]] PROVIDER:[TOKEN:[[id number]],FOLLOWUP:[Routine]],PROVIDER:[TOKEN:[[id number]]]

## 2023-07-12 NOTE — DISCHARGE NOTE PROVIDER - NSDCMRMEDTOKEN_GEN_ALL_CORE_FT
aspirin 81 mg oral tablet, chewable: 1 tab(s) orally once a day  atorvastatin 40 mg oral tablet: 1 tab(s) orally once a day (at bedtime)  donepezil 10 mg oral tablet: 1 tab(s) orally once a day (at bedtime)  famotidine 40 mg oral tablet: 1 tab(s) orally once a day  FUROSEMIDE 40MG TABLETS: TAKE 2 TABLETS BY MOUTH DAILY  lisinopril 10 mg oral tablet: 1 tab(s) orally once a day  memantine 10 mg oral tablet: 1 tab(s) orally once a day  metoprolol succinate 50 mg oral tablet, extended release: 1 tab(s) orally once a day   aspirin 81 mg oral tablet, chewable: 1 tab(s) orally once a day  atorvastatin 40 mg oral tablet: 1 tab(s) orally once a day (at bedtime)  bacitracin 500 units/g topical ointment: 1 Apply topically to affected area 2 times a day  donepezil 10 mg oral tablet: 1 tab(s) orally once a day (at bedtime)  famotidine 40 mg oral tablet: 1 tab(s) orally once a day  furosemide 40 mg oral tablet: 1 tab(s) orally once a day  lisinopril 10 mg oral tablet: 1 tab(s) orally once a day  memantine 10 mg oral tablet: 1 tab(s) orally once a day  metoprolol succinate 50 mg oral tablet, extended release: 1 tab(s) orally once a day   aspirin 81 mg oral tablet, chewable: 1 tab(s) orally once a day  atorvastatin 40 mg oral tablet: 1 tab(s) orally once a day (at bedtime)  donepezil 10 mg oral tablet: 1 tab(s) orally once a day (at bedtime)  famotidine 40 mg oral tablet: 1 tab(s) orally once a day  furosemide 40 mg oral tablet: 1 tab(s) orally once a day  lisinopril 10 mg oral tablet: 1 tab(s) orally once a day  memantine 10 mg oral tablet: 1 tab(s) orally once a day  metoprolol succinate 50 mg oral tablet, extended release: 1 tab(s) orally once a day

## 2023-07-12 NOTE — DISCHARGE NOTE NURSING/CASE MANAGEMENT/SOCIAL WORK - PATIENT PORTAL LINK FT
You can access the FollowMyHealth Patient Portal offered by [facility] by registering at the following website: [email or web address]. By joining [facility]’s FollowMyHealth portal, you will also be able to view your health information using other applications (apps) compatible with our system.

## 2023-07-12 NOTE — DISCHARGE NOTE PROVIDER - NSDCCPCAREPLAN_GEN_ALL_CORE_FT
PRINCIPAL DISCHARGE DIAGNOSIS  Diagnosis: Fall  Assessment and Plan of Treatment: Likely a syncopal event in setting of dehydration and poor oral intake. EKG was normal. Lab work was unremarkable. Echo was also unremarkable. You were given IV fluids and vitamin supplementation. Your lab work and symptoms improved. You are stable for discharge home      SECONDARY DISCHARGE DIAGNOSES  Diagnosis: Dehydration  Assessment and Plan of Treatment: Hydrated with IV fluids. Labs and symptoms improved    Diagnosis: Hypokalemia  Assessment and Plan of Treatment: Replenished with oral and IV potassium     PRINCIPAL DISCHARGE DIAGNOSIS  Diagnosis: Fall  Assessment and Plan of Treatment: Likely a syncopal event in setting of dehydration and poor oral intake. EKG was normal. Lab work was unremarkable. Echo was also unremarkable. You were given IV fluids and vitamin supplementation. Your lab work and symptoms improved. Your echocardiogram did not show any structtural abnormalities that would explain your fall/syncopal episode. You are stable for discharge home. Please follow up with a BMP to monitor your kidney function and potassium levels within 3 days of discharge      SECONDARY DISCHARGE DIAGNOSES  Diagnosis: Dehydration  Assessment and Plan of Treatment: Hydrated with IV fluids. Labs and symptoms improved    Diagnosis: Hypokalemia  Assessment and Plan of Treatment: Replenished with oral and IV potassium

## 2023-07-13 ENCOUNTER — INPATIENT (INPATIENT)
Facility: HOSPITAL | Age: 83
LOS: 9 days | Discharge: HOME | DRG: 177 | End: 2023-07-23
Attending: INTERNAL MEDICINE | Admitting: FAMILY MEDICINE
Payer: MEDICARE

## 2023-07-13 VITALS
DIASTOLIC BLOOD PRESSURE: 67 MMHG | WEIGHT: 156.09 LBS | RESPIRATION RATE: 18 BRPM | HEIGHT: 63 IN | HEART RATE: 70 BPM | TEMPERATURE: 99 F | SYSTOLIC BLOOD PRESSURE: 142 MMHG | OXYGEN SATURATION: 95 %

## 2023-07-13 DIAGNOSIS — Z95.1 PRESENCE OF AORTOCORONARY BYPASS GRAFT: Chronic | ICD-10-CM

## 2023-07-13 DIAGNOSIS — J18.9 PNEUMONIA, UNSPECIFIED ORGANISM: ICD-10-CM

## 2023-07-13 LAB
ALBUMIN SERPL ELPH-MCNC: 4.1 G/DL — SIGNIFICANT CHANGE UP (ref 3.5–5.2)
ALP SERPL-CCNC: 81 U/L — SIGNIFICANT CHANGE UP (ref 30–115)
ALT FLD-CCNC: 48 U/L — HIGH (ref 0–41)
ANION GAP SERPL CALC-SCNC: 14 MMOL/L — SIGNIFICANT CHANGE UP (ref 7–14)
AST SERPL-CCNC: 56 U/L — HIGH (ref 0–41)
BASOPHILS # BLD AUTO: 0.02 K/UL — SIGNIFICANT CHANGE UP (ref 0–0.2)
BASOPHILS NFR BLD AUTO: 0.2 % — SIGNIFICANT CHANGE UP (ref 0–1)
BILIRUB SERPL-MCNC: 0.8 MG/DL — SIGNIFICANT CHANGE UP (ref 0.2–1.2)
BUN SERPL-MCNC: 17 MG/DL — SIGNIFICANT CHANGE UP (ref 10–20)
CALCIUM SERPL-MCNC: 9.3 MG/DL — SIGNIFICANT CHANGE UP (ref 8.4–10.5)
CHLORIDE SERPL-SCNC: 100 MMOL/L — SIGNIFICANT CHANGE UP (ref 98–110)
CO2 SERPL-SCNC: 23 MMOL/L — SIGNIFICANT CHANGE UP (ref 17–32)
CREAT SERPL-MCNC: 1.5 MG/DL — SIGNIFICANT CHANGE UP (ref 0.7–1.5)
EGFR: 46 ML/MIN/1.73M2 — LOW
EOSINOPHIL # BLD AUTO: 0.14 K/UL — SIGNIFICANT CHANGE UP (ref 0–0.7)
EOSINOPHIL NFR BLD AUTO: 1.7 % — SIGNIFICANT CHANGE UP (ref 0–8)
GLUCOSE SERPL-MCNC: 111 MG/DL — HIGH (ref 70–99)
HCT VFR BLD CALC: 38.9 % — LOW (ref 42–52)
HGB BLD-MCNC: 12.9 G/DL — LOW (ref 14–18)
IMM GRANULOCYTES NFR BLD AUTO: 0.1 % — SIGNIFICANT CHANGE UP (ref 0.1–0.3)
LACTATE SERPL-SCNC: 1.4 MMOL/L — SIGNIFICANT CHANGE UP (ref 0.7–2)
LYMPHOCYTES # BLD AUTO: 0.91 K/UL — LOW (ref 1.2–3.4)
LYMPHOCYTES # BLD AUTO: 11.1 % — LOW (ref 20.5–51.1)
MCHC RBC-ENTMCNC: 29.1 PG — SIGNIFICANT CHANGE UP (ref 27–31)
MCHC RBC-ENTMCNC: 33.2 G/DL — SIGNIFICANT CHANGE UP (ref 32–37)
MCV RBC AUTO: 87.8 FL — SIGNIFICANT CHANGE UP (ref 80–94)
MONOCYTES # BLD AUTO: 1.16 K/UL — HIGH (ref 0.1–0.6)
MONOCYTES NFR BLD AUTO: 14.1 % — HIGH (ref 1.7–9.3)
NEUTROPHILS # BLD AUTO: 5.96 K/UL — SIGNIFICANT CHANGE UP (ref 1.4–6.5)
NEUTROPHILS NFR BLD AUTO: 72.8 % — SIGNIFICANT CHANGE UP (ref 42.2–75.2)
NRBC # BLD: 0 /100 WBCS — SIGNIFICANT CHANGE UP (ref 0–0)
PLATELET # BLD AUTO: 166 K/UL — SIGNIFICANT CHANGE UP (ref 130–400)
PMV BLD: 11.3 FL — HIGH (ref 7.4–10.4)
POTASSIUM SERPL-MCNC: 4.9 MMOL/L — SIGNIFICANT CHANGE UP (ref 3.5–5)
POTASSIUM SERPL-SCNC: 4.9 MMOL/L — SIGNIFICANT CHANGE UP (ref 3.5–5)
PROT SERPL-MCNC: 7.7 G/DL — SIGNIFICANT CHANGE UP (ref 6–8)
RBC # BLD: 4.43 M/UL — LOW (ref 4.7–6.1)
RBC # FLD: 13.5 % — SIGNIFICANT CHANGE UP (ref 11.5–14.5)
SODIUM SERPL-SCNC: 137 MMOL/L — SIGNIFICANT CHANGE UP (ref 135–146)
WBC # BLD: 8.2 K/UL — SIGNIFICANT CHANGE UP (ref 4.8–10.8)
WBC # FLD AUTO: 8.2 K/UL — SIGNIFICANT CHANGE UP (ref 4.8–10.8)

## 2023-07-13 PROCEDURE — 71045 X-RAY EXAM CHEST 1 VIEW: CPT | Mod: 26

## 2023-07-13 PROCEDURE — 87641 MR-STAPH DNA AMP PROBE: CPT

## 2023-07-13 PROCEDURE — 36415 COLL VENOUS BLD VENIPUNCTURE: CPT

## 2023-07-13 PROCEDURE — 92526 ORAL FUNCTION THERAPY: CPT | Mod: GN

## 2023-07-13 PROCEDURE — 99285 EMERGENCY DEPT VISIT HI MDM: CPT | Mod: FS

## 2023-07-13 PROCEDURE — 97116 GAIT TRAINING THERAPY: CPT | Mod: GP

## 2023-07-13 PROCEDURE — 97110 THERAPEUTIC EXERCISES: CPT | Mod: GP

## 2023-07-13 PROCEDURE — 80053 COMPREHEN METABOLIC PANEL: CPT

## 2023-07-13 PROCEDURE — 80048 BASIC METABOLIC PNL TOTAL CA: CPT

## 2023-07-13 PROCEDURE — 92610 EVALUATE SWALLOWING FUNCTION: CPT | Mod: GN

## 2023-07-13 PROCEDURE — 87449 NOS EACH ORGANISM AG IA: CPT

## 2023-07-13 PROCEDURE — 93010 ELECTROCARDIOGRAM REPORT: CPT

## 2023-07-13 PROCEDURE — 83735 ASSAY OF MAGNESIUM: CPT

## 2023-07-13 PROCEDURE — 99497 ADVNCD CARE PLAN 30 MIN: CPT | Mod: 25

## 2023-07-13 PROCEDURE — 85027 COMPLETE CBC AUTOMATED: CPT

## 2023-07-13 PROCEDURE — 87899 AGENT NOS ASSAY W/OPTIC: CPT

## 2023-07-13 PROCEDURE — 97162 PT EVAL MOD COMPLEX 30 MIN: CPT | Mod: GP

## 2023-07-13 PROCEDURE — 99223 1ST HOSP IP/OBS HIGH 75: CPT

## 2023-07-13 PROCEDURE — 94640 AIRWAY INHALATION TREATMENT: CPT

## 2023-07-13 PROCEDURE — 82550 ASSAY OF CK (CPK): CPT

## 2023-07-13 PROCEDURE — 87640 STAPH A DNA AMP PROBE: CPT

## 2023-07-13 PROCEDURE — 76775 US EXAM ABDO BACK WALL LIM: CPT

## 2023-07-13 PROCEDURE — 0225U NFCT DS DNA&RNA 21 SARSCOV2: CPT

## 2023-07-13 PROCEDURE — 84145 PROCALCITONIN (PCT): CPT

## 2023-07-13 PROCEDURE — 85025 COMPLETE CBC W/AUTO DIFF WBC: CPT

## 2023-07-13 RX ORDER — MEMANTINE HYDROCHLORIDE 10 MG/1
10 TABLET ORAL DAILY
Refills: 0 | Status: DISCONTINUED | OUTPATIENT
Start: 2023-07-13 | End: 2023-07-23

## 2023-07-13 RX ORDER — DONEPEZIL HYDROCHLORIDE 10 MG/1
10 TABLET, FILM COATED ORAL AT BEDTIME
Refills: 0 | Status: DISCONTINUED | OUTPATIENT
Start: 2023-07-13 | End: 2023-07-23

## 2023-07-13 RX ORDER — ONDANSETRON 8 MG/1
4 TABLET, FILM COATED ORAL EVERY 8 HOURS
Refills: 0 | Status: DISCONTINUED | OUTPATIENT
Start: 2023-07-13 | End: 2023-07-23

## 2023-07-13 RX ORDER — ATORVASTATIN CALCIUM 80 MG/1
40 TABLET, FILM COATED ORAL AT BEDTIME
Refills: 0 | Status: DISCONTINUED | OUTPATIENT
Start: 2023-07-13 | End: 2023-07-23

## 2023-07-13 RX ORDER — VANCOMYCIN HCL 1 G
1000 VIAL (EA) INTRAVENOUS EVERY 24 HOURS
Refills: 0 | Status: DISCONTINUED | OUTPATIENT
Start: 2023-07-13 | End: 2023-07-14

## 2023-07-13 RX ORDER — PANTOPRAZOLE SODIUM 20 MG/1
40 TABLET, DELAYED RELEASE ORAL
Refills: 0 | Status: DISCONTINUED | OUTPATIENT
Start: 2023-07-13 | End: 2023-07-23

## 2023-07-13 RX ORDER — IPRATROPIUM/ALBUTEROL SULFATE 18-103MCG
3 AEROSOL WITH ADAPTER (GRAM) INHALATION
Refills: 0 | Status: COMPLETED | OUTPATIENT
Start: 2023-07-13 | End: 2023-07-13

## 2023-07-13 RX ORDER — HEPARIN SODIUM 5000 [USP'U]/ML
5000 INJECTION INTRAVENOUS; SUBCUTANEOUS EVERY 12 HOURS
Refills: 0 | Status: DISCONTINUED | OUTPATIENT
Start: 2023-07-13 | End: 2023-07-23

## 2023-07-13 RX ORDER — CEFEPIME 1 G/1
1000 INJECTION, POWDER, FOR SOLUTION INTRAMUSCULAR; INTRAVENOUS EVERY 12 HOURS
Refills: 0 | Status: DISCONTINUED | OUTPATIENT
Start: 2023-07-13 | End: 2023-07-15

## 2023-07-13 RX ORDER — ACETAMINOPHEN 500 MG
975 TABLET ORAL ONCE
Refills: 0 | Status: COMPLETED | OUTPATIENT
Start: 2023-07-13 | End: 2023-07-13

## 2023-07-13 RX ORDER — GUAIFENESIN/DEXTROMETHORPHAN 600MG-30MG
10 TABLET, EXTENDED RELEASE 12 HR ORAL EVERY 4 HOURS
Refills: 0 | Status: DISCONTINUED | OUTPATIENT
Start: 2023-07-13 | End: 2023-07-23

## 2023-07-13 RX ORDER — ASPIRIN/CALCIUM CARB/MAGNESIUM 324 MG
81 TABLET ORAL DAILY
Refills: 0 | Status: DISCONTINUED | OUTPATIENT
Start: 2023-07-13 | End: 2023-07-23

## 2023-07-13 RX ORDER — LANOLIN ALCOHOL/MO/W.PET/CERES
3 CREAM (GRAM) TOPICAL AT BEDTIME
Refills: 0 | Status: DISCONTINUED | OUTPATIENT
Start: 2023-07-13 | End: 2023-07-23

## 2023-07-13 RX ORDER — LISINOPRIL 2.5 MG/1
10 TABLET ORAL DAILY
Refills: 0 | Status: DISCONTINUED | OUTPATIENT
Start: 2023-07-13 | End: 2023-07-16

## 2023-07-13 RX ORDER — FUROSEMIDE 40 MG
40 TABLET ORAL DAILY
Refills: 0 | Status: DISCONTINUED | OUTPATIENT
Start: 2023-07-13 | End: 2023-07-16

## 2023-07-13 RX ORDER — CEFEPIME 1 G/1
2000 INJECTION, POWDER, FOR SOLUTION INTRAMUSCULAR; INTRAVENOUS ONCE
Refills: 0 | Status: COMPLETED | OUTPATIENT
Start: 2023-07-13 | End: 2023-07-13

## 2023-07-13 RX ORDER — METOPROLOL TARTRATE 50 MG
50 TABLET ORAL DAILY
Refills: 0 | Status: DISCONTINUED | OUTPATIENT
Start: 2023-07-13 | End: 2023-07-23

## 2023-07-13 RX ORDER — ACETAMINOPHEN 500 MG
650 TABLET ORAL EVERY 6 HOURS
Refills: 0 | Status: DISCONTINUED | OUTPATIENT
Start: 2023-07-13 | End: 2023-07-23

## 2023-07-13 RX ADMIN — CEFEPIME 100 MILLIGRAM(S): 1 INJECTION, POWDER, FOR SOLUTION INTRAMUSCULAR; INTRAVENOUS at 20:14

## 2023-07-13 RX ADMIN — Medication 975 MILLIGRAM(S): at 21:39

## 2023-07-13 RX ADMIN — Medication 975 MILLIGRAM(S): at 20:05

## 2023-07-13 RX ADMIN — Medication 3 MILLILITER(S): at 20:21

## 2023-07-13 RX ADMIN — Medication 3 MILLILITER(S): at 20:06

## 2023-07-13 RX ADMIN — Medication 250 MILLIGRAM(S): at 22:52

## 2023-07-13 RX ADMIN — Medication 3 MILLILITER(S): at 20:13

## 2023-07-13 NOTE — ED PROVIDER NOTE - OBJECTIVE STATEMENT
84 year old male with pmhx of dementia, htn, hld, chf, cad s/p cabg, lymphoma, recent admission for frequent falls, presents to ed with cough and fever since yesterday. Pt denies sob, chest pain, abd pain, or nausea, vomiting, diarrhea.

## 2023-07-13 NOTE — H&P ADULT - HISTORY OF PRESENT ILLNESS
Patient is 83-year-old male, history of dementia, HTN, Hypercholesterolemia, HFpEF, CAD s/p CABG x4, lymphoma, presenting with cough, and fever that started today.  temperature of 101 at home.  denies any headache, blurry vision, dizziness, CP, SOB, palpation, LE edema, abdm pain, N/V/D, numbness or weakness

## 2023-07-13 NOTE — PATIENT PROFILE ADULT - FUNCTIONAL ASSESSMENT - BASIC MOBILITY 6.
3-calculated by average/Not able to assess (calculate score using [facility] averaging method)

## 2023-07-13 NOTE — H&P ADULT - ASSESSMENT
Patient is 83-year-old male, history of dementia, HTN, Hypercholesterolemia, HFpEF, CAD s/p CABG x4, lymphoma, presenting with    # PNA  -CXR shows LLL opacity pending official read.  recent discharge from the hospital  -Will obtain Blood culture, Sputum culture, legionella, strep antigen, MRSA swab, procalcitonin level, RVP, and ID consult   -Will start patient on cefepime, and vanco  -Monitor vanco trough  -Oxygen as needed      #HTN/HLD/CHF  -No sign of volume overload  -continue with home medications  -Monitor BP      #Progress Note Handoff  Pending (specify):  as above   Family discussion:  plan of care was discussed with patient and family in details.  all questions were answered.  seems to understand, and in agreement  Disposition:  PT

## 2023-07-13 NOTE — ED ADULT NURSE NOTE - OBJECTIVE STATEMENT
cough (severe).  Pt was discharged from hospital yesterday.  Pt had 101 rectal temp on arrival to ED

## 2023-07-13 NOTE — ED PROVIDER NOTE - CLINICAL SUMMARY MEDICAL DECISION MAKING FREE TEXT BOX
83 yo M, hx of HTn, HLD, CAD sp CABG, lympoma, dementia discharged 1 day ago after brief admission for frequent falls presents to ED with cough and weakness.    Patient found to be febrile, coarse BS with rhonchi on L.    CXR with new L sided infiltrate. Labs unreamrakble.    Patient treated for HCAP. No hypoxia, severe sepsis to warrant ICU eval.    Will admit

## 2023-07-13 NOTE — H&P ADULT - NSHPPHYSICALEXAM_GEN_ALL_CORE
Vital Signs Last 24 Hrs  T(C): 37.8 (13 Jul 2023 20:35), Max: 38.3 (13 Jul 2023 19:56)  T(F): 100.1 (13 Jul 2023 20:35), Max: 101 (13 Jul 2023 19:56)  HR: 75 (13 Jul 2023 20:35) (70 - 75)  BP: 135/62 (13 Jul 2023 20:35) (135/62 - 142/67)  BP(mean): --  RR: 18 (13 Jul 2023 20:35) (18 - 18)  SpO2: 96% (13 Jul 2023 20:35) (95% - 96%)    Parameters below as of 13 Jul 2023 20:35  Patient On (Oxygen Delivery Method): mask, aerosol      PHYSICAL EXAM-  GENERAL: NAD,    HEAD:  Atraumatic, Normocephalic  EYES: EOMI, PERRLA, conjunctiva and sclera clear  NECK: Supple, No JVD, Normal thyroid  NERVOUS SYSTEM:  Alert & Oriented X3, Moving all extremities  CHEST/LUNG: + rhonchi with good air entry   HEART: Regular rate and rhythm; No murmurs, rubs, or gallops  ABDOMEN: Soft, Nontender, Nondistended; Bowel sounds present  EXTREMITIES:   No clubbing, cyanosis, or edema  SKIN: No rashes or lesions

## 2023-07-13 NOTE — ED ADULT NURSE NOTE - NSFALLHARMRISKINTERV_ED_ALL_ED

## 2023-07-14 LAB
ALBUMIN SERPL ELPH-MCNC: 3.8 G/DL — SIGNIFICANT CHANGE UP (ref 3.5–5.2)
ALP SERPL-CCNC: 78 U/L — SIGNIFICANT CHANGE UP (ref 30–115)
ALT FLD-CCNC: 45 U/L — HIGH (ref 0–41)
ANION GAP SERPL CALC-SCNC: 15 MMOL/L — HIGH (ref 7–14)
AST SERPL-CCNC: 45 U/L — HIGH (ref 0–41)
BILIRUB SERPL-MCNC: 0.8 MG/DL — SIGNIFICANT CHANGE UP (ref 0.2–1.2)
BUN SERPL-MCNC: 18 MG/DL — SIGNIFICANT CHANGE UP (ref 10–20)
CALCIUM SERPL-MCNC: 8.9 MG/DL — SIGNIFICANT CHANGE UP (ref 8.4–10.5)
CHLORIDE SERPL-SCNC: 101 MMOL/L — SIGNIFICANT CHANGE UP (ref 98–110)
CO2 SERPL-SCNC: 27 MMOL/L — SIGNIFICANT CHANGE UP (ref 17–32)
CREAT SERPL-MCNC: 1.5 MG/DL — SIGNIFICANT CHANGE UP (ref 0.7–1.5)
EGFR: 46 ML/MIN/1.73M2 — LOW
GLUCOSE SERPL-MCNC: 103 MG/DL — HIGH (ref 70–99)
HCT VFR BLD CALC: 36.7 % — LOW (ref 42–52)
HGB BLD-MCNC: 12.2 G/DL — LOW (ref 14–18)
MCHC RBC-ENTMCNC: 29.4 PG — SIGNIFICANT CHANGE UP (ref 27–31)
MCHC RBC-ENTMCNC: 33.2 G/DL — SIGNIFICANT CHANGE UP (ref 32–37)
MCV RBC AUTO: 88.4 FL — SIGNIFICANT CHANGE UP (ref 80–94)
MRSA PCR RESULT.: NEGATIVE — SIGNIFICANT CHANGE UP
NRBC # BLD: 0 /100 WBCS — SIGNIFICANT CHANGE UP (ref 0–0)
PLATELET # BLD AUTO: 151 K/UL — SIGNIFICANT CHANGE UP (ref 130–400)
PMV BLD: 11.5 FL — HIGH (ref 7.4–10.4)
POTASSIUM SERPL-MCNC: 4.2 MMOL/L — SIGNIFICANT CHANGE UP (ref 3.5–5)
POTASSIUM SERPL-SCNC: 4.2 MMOL/L — SIGNIFICANT CHANGE UP (ref 3.5–5)
PROCALCITONIN SERPL-MCNC: 0.12 NG/ML — HIGH (ref 0.02–0.1)
PROT SERPL-MCNC: 6.9 G/DL — SIGNIFICANT CHANGE UP (ref 6–8)
RAPID RVP RESULT: DETECTED
RBC # BLD: 4.15 M/UL — LOW (ref 4.7–6.1)
RBC # FLD: 13.4 % — SIGNIFICANT CHANGE UP (ref 11.5–14.5)
SARS-COV-2 RNA SPEC QL NAA+PROBE: DETECTED
SODIUM SERPL-SCNC: 143 MMOL/L — SIGNIFICANT CHANGE UP (ref 135–146)
WBC # BLD: 7.26 K/UL — SIGNIFICANT CHANGE UP (ref 4.8–10.8)
WBC # FLD AUTO: 7.26 K/UL — SIGNIFICANT CHANGE UP (ref 4.8–10.8)

## 2023-07-14 PROCEDURE — 99232 SBSQ HOSP IP/OBS MODERATE 35: CPT

## 2023-07-14 RX ORDER — IPRATROPIUM/ALBUTEROL SULFATE 18-103MCG
3 AEROSOL WITH ADAPTER (GRAM) INHALATION EVERY 6 HOURS
Refills: 0 | Status: DISCONTINUED | OUTPATIENT
Start: 2023-07-14 | End: 2023-07-23

## 2023-07-14 RX ADMIN — MEMANTINE HYDROCHLORIDE 10 MILLIGRAM(S): 10 TABLET ORAL at 11:41

## 2023-07-14 RX ADMIN — DONEPEZIL HYDROCHLORIDE 10 MILLIGRAM(S): 10 TABLET, FILM COATED ORAL at 21:38

## 2023-07-14 RX ADMIN — PANTOPRAZOLE SODIUM 40 MILLIGRAM(S): 20 TABLET, DELAYED RELEASE ORAL at 06:06

## 2023-07-14 RX ADMIN — HEPARIN SODIUM 5000 UNIT(S): 5000 INJECTION INTRAVENOUS; SUBCUTANEOUS at 17:07

## 2023-07-14 RX ADMIN — Medication 3 MILLILITER(S): at 14:02

## 2023-07-14 RX ADMIN — Medication 100 MILLIGRAM(S): at 04:39

## 2023-07-14 RX ADMIN — Medication 10 MILLILITER(S): at 00:48

## 2023-07-14 RX ADMIN — LISINOPRIL 10 MILLIGRAM(S): 2.5 TABLET ORAL at 05:53

## 2023-07-14 RX ADMIN — CEFEPIME 100 MILLIGRAM(S): 1 INJECTION, POWDER, FOR SOLUTION INTRAMUSCULAR; INTRAVENOUS at 17:06

## 2023-07-14 RX ADMIN — Medication 81 MILLIGRAM(S): at 11:41

## 2023-07-14 RX ADMIN — Medication 40 MILLIGRAM(S): at 05:51

## 2023-07-14 RX ADMIN — Medication 3 MILLILITER(S): at 19:45

## 2023-07-14 RX ADMIN — Medication 50 MILLIGRAM(S): at 05:52

## 2023-07-14 RX ADMIN — ATORVASTATIN CALCIUM 40 MILLIGRAM(S): 80 TABLET, FILM COATED ORAL at 21:38

## 2023-07-14 RX ADMIN — Medication 3 MILLILITER(S): at 08:01

## 2023-07-14 RX ADMIN — CEFEPIME 100 MILLIGRAM(S): 1 INJECTION, POWDER, FOR SOLUTION INTRAMUSCULAR; INTRAVENOUS at 05:52

## 2023-07-14 RX ADMIN — HEPARIN SODIUM 5000 UNIT(S): 5000 INJECTION INTRAVENOUS; SUBCUTANEOUS at 05:51

## 2023-07-14 NOTE — CONSULT NOTE ADULT - SUBJECTIVE AND OBJECTIVE BOX
CAMRONNOEMY GOODWIN  83y, Male  Allergy: No Known Allergies      CHIEF COMPLAINT:   fever/Cough (13 Jul 2023 22:12)      LOS  1d    HPI  HPI:  Patient is 83-year-old male, history of dementia, HTN, Hypercholesterolemia, HFpEF, CAD s/p CABG x4, lymphoma, presenting with cough, and fever that started today.  temperature of 101 at home.  denies any headache, blurry vision, dizziness, CP, SOB, palpation, LE edema, abdm pain, N/V/D, numbness or weakness  (13 Jul 2023 22:12)      INFECTIOUS DISEASE HISTORY:  ID consulted for pneumonia    Currently ordered for:  cefepime   IVPB 1000 milliGRAM(s) IV Intermittent every 12 hours  vancomycin  IVPB 1000 milliGRAM(s) IV Intermittent every 24 hours      PMH  PAST MEDICAL & SURGICAL HISTORY:  HTN (hypertension)  Coronary artery disease involving coronary bypass graft of native heart without angina pectoris  Dementia  Chronic GERD  S/P CABG x 4  33 years ago  FAMILY HISTORY  No pertinent family history in first degree relatives. No history of autoimmune disorders    SOCIAL HISTORY  Social History:  no recent ETOH/Tob/illicit drug usage (13 Jul 2023 22:12)    ROS  A 10 point review of systems was performed and negative except as described above.     VITALS:  T(F): 100, Max: 101 (07-13-23 @ 19:56)  HR: 66  BP: 137/60  RR: 18Vital Signs Last 24 Hrs  T(C): 37.8 (14 Jul 2023 04:57), Max: 38.3 (13 Jul 2023 19:56)  T(F): 100 (14 Jul 2023 04:57), Max: 101 (13 Jul 2023 19:56)  HR: 66 (14 Jul 2023 04:57) (66 - 75)  BP: 137/60 (14 Jul 2023 04:57) (114/62 - 142/67)  BP(mean): --  RR: 18 (14 Jul 2023 07:27) (16 - 18)  SpO2: 96% (14 Jul 2023 07:27) (91% - 96%)    Parameters below as of 14 Jul 2023 07:27  Patient On (Oxygen Delivery Method): nasal cannula  O2 Flow (L/min): 2      PHYSICAL EXAM:  ***    TESTS & MEASUREMENTS:                        12.2   7.26  )-----------( 151      ( 14 Jul 2023 06:40 )             36.7     07-14    143  |  101  |  18  ----------------------------<  103<H>  4.2   |  27  |  1.5    Ca    8.9      14 Jul 2023 06:40    TPro  6.9  /  Alb  3.8  /  TBili  0.8  /  DBili  x   /  AST  45<H>  /  ALT  45<H>  /  AlkPhos  78  07-14      LIVER FUNCTIONS - ( 14 Jul 2023 06:40 )  Alb: 3.8 g/dL / Pro: 6.9 g/dL / ALK PHOS: 78 U/L / ALT: 45 U/L / AST: 45 U/L / GGT: x           Urinalysis Basic - ( 14 Jul 2023 06:40 )    Color: x / Appearance: x / SG: x / pH: x  Gluc: 103 mg/dL / Ketone: x  / Bili: x / Urobili: x   Blood: x / Protein: x / Nitrite: x   Leuk Esterase: x / RBC: x / WBC x   Sq Epi: x / Non Sq Epi: x / Bacteria: x    Culture - Urine (collected 07-09-23 @ 20:18)  Source: Clean Catch Clean Catch (Midstream)  Final Report (07-11-23 @ 20:57):    No growth        Lactate, Blood: 1.4 mmol/L (07-13-23 @ 19:32)  Lactate, Blood: 1.2 mmol/L (07-11-23 @ 07:05)  Lactate, Blood: 2.8 mmol/L (07-10-23 @ 11:27)  Lactate, Blood: 2.6 mmol/L (07-09-23 @ 22:46)  Blood Gas Venous - Lactate: 4.80 mmol/L (07-09-23 @ 20:41)    INFECTIOUS DISEASES TESTING  MRSA PCR Result.: Negative (07-13-23 @ 22:25)    INFLAMMATORY MARKERS      RADIOLOGY & ADDITIONAL TESTS:  I have personally reviewed the last Chest xray  CXR  < from: Xray Chest 1 View-PORTABLE IMMEDIATE (Xray Chest 1 View-PORTABLE IMMEDIATE .) (07.13.23 @ 20:04) >    Impression:    Bilateral opacities greater at the left lung base. Recommend follow-up      < end of copied text >  < from: CT Head No Cont (07.09.23 @ 21:04) >  IMPRESSION:    No acute intracranial hemorrhage, mass-effect or midline shift.    No evidence of acute cervical spine fracture or subluxation.    < end of copied text >      CT      CARDIOLOGY TESTING  12 Lead ECG:   Ventricular Rate 72 BPM    Atrial Rate 72 BPM    P-R Interval 152 ms    QRS Duration 94 ms    Q-T Interval 416 ms    QTC Calculation(Bazett) 455 ms    P Axis 39 degrees    R Axis -42 degrees    T Axis 47 degrees    Diagnosis Line Normal sinus rhythm  Left axis deviation  Minimal voltage criteria for LVH, may be normal variant  Poor R wave progression  Abnormal ECG    Confirmed by Dakota Jameson ([id number]) on 7/14/2023 7:55:20 AM (07-13-23 @ 20:34)  12 Lead ECG:   Ventricular Rate 64 BPM    Atrial Rate 63 BPM    P-R Interval 170 ms    QRS Duration 106 ms    Q-T Interval 398 ms    QTC Calculation(Bazett) 410 ms    P Axis 49 degrees    R Axis -4 degrees    T Axis 35 degrees    Diagnosis Line Normal sinus rhythm  Nonspecific ST and T wave abnormality  Abnormal ECG    Confirmed by UNRULY ALVA MD [id number]) on 7/10/2023 9:12:25 AM (07-09-23 @ 20:13)      MEDICATIONS  albuterol/ipratropium for Nebulization 3 Nebulizer every 6 hours  aspirin  chewable 81 Oral daily  atorvastatin 40 Oral at bedtime  cefepime   IVPB 1000 IV Intermittent every 12 hours  donepezil 10 Oral at bedtime  furosemide    Tablet 40 Oral daily  heparin   Injectable 5000 SubCutaneous every 12 hours  lisinopril 10 Oral daily  memantine 10 Oral daily  metoprolol succinate ER 50 Oral daily  pantoprazole    Tablet 40 Oral before breakfast  vancomycin  IVPB 1000 IV Intermittent every 24 hours      ANTIBIOTICS:  cefepime   IVPB 1000 milliGRAM(s) IV Intermittent every 12 hours  vancomycin  IVPB 1000 milliGRAM(s) IV Intermittent every 24 hours      ALLERGIES:  No Known Allergies      ASSESSMENT  This is a83-year-old male, history of dementia, HTN, Hypercholesterolemia, HFpEF, CAD s/p CABG x4, Stage IV follicular lymphoma, presenting with cough, and fevers and currently admitted due to concerns of pneumonia.       IMPRESSION  #Hospital acquired pneumonia.  #Follicular lymphoma history  #Recent history of fall and rhamdomyolysis  #Obesity BMI (kg/m2): 33.3, 34.6  #Abx allergy: No Known Allergies    Creatinine: 1.5 (07-14-23 @ 06:40)    Height (cm): 162.6 (07-13-23 @ 22:27)  Weight (kg): 88 (07-13-23 @ 22:27)    RECOMMENDATIONS      If any questions, please send a message or call on [employer] Teams  Please continue to update ID with any pertinent new laboratory or radiographic findings       NOEMY GRUBBS  83y, Male  Allergy: No Known Allergies      CHIEF COMPLAINT:   fever/Cough (13 Jul 2023 22:12)      LOS  1d    HPI  HPI:  Patient is 83-year-old male, history of dementia, HTN, Hypercholesterolemia, HFpEF, CAD s/p CABG x4, lymphoma, presenting with cough, and fever that started today.  temperature of 101 at home.  denies any headache, blurry vision, dizziness, CP, SOB, palpation, LE edema, abdm pain, N/V/D, numbness or weakness  (13 Jul 2023 22:12)      INFECTIOUS DISEASE HISTORY:  ID consulted for pneumonia. Currently feeling tired, still coughing.     Currently ordered for:  cefepime   IVPB 1000 milliGRAM(s) IV Intermittent every 12 hours  vancomycin  IVPB 1000 milliGRAM(s) IV Intermittent every 24 hours      PMH  PAST MEDICAL & SURGICAL HISTORY:  HTN (hypertension)  Coronary artery disease involving coronary bypass graft of native heart without angina pectoris  Dementia  Chronic GERD  S/P CABG x 4  33 years ago  FAMILY HISTORY  No pertinent family history in first degree relatives. No history of autoimmune disorders    SOCIAL HISTORY  Social History:  no recent ETOH/Tob/illicit drug usage (13 Jul 2023 22:12)    ROS  A 10 point review of systems was performed and negative except as described above.     VITALS:  T(F): 100, Max: 101 (07-13-23 @ 19:56)  HR: 66  BP: 137/60  RR: 18Vital Signs Last 24 Hrs  T(C): 37.8 (14 Jul 2023 04:57), Max: 38.3 (13 Jul 2023 19:56)  T(F): 100 (14 Jul 2023 04:57), Max: 101 (13 Jul 2023 19:56)  HR: 66 (14 Jul 2023 04:57) (66 - 75)  BP: 137/60 (14 Jul 2023 04:57) (114/62 - 142/67)  BP(mean): --  RR: 18 (14 Jul 2023 07:27) (16 - 18)  SpO2: 96% (14 Jul 2023 07:27) (91% - 96%)    Parameters below as of 14 Jul 2023 07:27  Patient On (Oxygen Delivery Method): nasal cannula  O2 Flow (L/min): 2      PHYSICAL EXAM:  GENERAL: In mild distress. Intermittently coughing, somnolent.  HEENT: Neck supple, poor dentition  CHEST: bilateral crackles. Shallow breath sounds  HEART: Normal S1 and S2  ABDOMEN: Distended, soft. Non-tender  EXTREMITIES: No edema  SKIN: No rashes  NEURO: Alert.   BACK: Non-tender  TESTS & MEASUREMENTS:                        12.2   7.26  )-----------( 151      ( 14 Jul 2023 06:40 )             36.7     07-14    143  |  101  |  18  ----------------------------<  103<H>  4.2   |  27  |  1.5    Ca    8.9      14 Jul 2023 06:40    TPro  6.9  /  Alb  3.8  /  TBili  0.8  /  DBili  x   /  AST  45<H>  /  ALT  45<H>  /  AlkPhos  78  07-14      LIVER FUNCTIONS - ( 14 Jul 2023 06:40 )  Alb: 3.8 g/dL / Pro: 6.9 g/dL / ALK PHOS: 78 U/L / ALT: 45 U/L / AST: 45 U/L / GGT: x           Urinalysis Basic - ( 14 Jul 2023 06:40 )    Color: x / Appearance: x / SG: x / pH: x  Gluc: 103 mg/dL / Ketone: x  / Bili: x / Urobili: x   Blood: x / Protein: x / Nitrite: x   Leuk Esterase: x / RBC: x / WBC x   Sq Epi: x / Non Sq Epi: x / Bacteria: x    Culture - Urine (collected 07-09-23 @ 20:18)  Source: Clean Catch Clean Catch (Midstream)  Final Report (07-11-23 @ 20:57):    No growth        Lactate, Blood: 1.4 mmol/L (07-13-23 @ 19:32)  Lactate, Blood: 1.2 mmol/L (07-11-23 @ 07:05)  Lactate, Blood: 2.8 mmol/L (07-10-23 @ 11:27)  Lactate, Blood: 2.6 mmol/L (07-09-23 @ 22:46)  Blood Gas Venous - Lactate: 4.80 mmol/L (07-09-23 @ 20:41)    INFECTIOUS DISEASES TESTING  MRSA PCR Result.: Negative (07-13-23 @ 22:25)    INFLAMMATORY MARKERS      RADIOLOGY & ADDITIONAL TESTS:  I have personally reviewed the last Chest xray  CXR  < from: Xray Chest 1 View-PORTABLE IMMEDIATE (Xray Chest 1 View-PORTABLE IMMEDIATE .) (07.13.23 @ 20:04) >    Impression:    Bilateral opacities greater at the left lung base. Recommend follow-up      < end of copied text >  < from: CT Head No Cont (07.09.23 @ 21:04) >  IMPRESSION:    No acute intracranial hemorrhage, mass-effect or midline shift.    No evidence of acute cervical spine fracture or subluxation.    < end of copied text >      CT      CARDIOLOGY TESTING  12 Lead ECG:   Ventricular Rate 72 BPM    Atrial Rate 72 BPM    P-R Interval 152 ms    QRS Duration 94 ms    Q-T Interval 416 ms    QTC Calculation(Bazett) 455 ms    P Axis 39 degrees    R Axis -42 degrees    T Axis 47 degrees    Diagnosis Line Normal sinus rhythm  Left axis deviation  Minimal voltage criteria for LVH, may be normal variant  Poor R wave progression  Abnormal ECG    Confirmed by Dakota Jameson ([id number]) on 7/14/2023 7:55:20 AM (07-13-23 @ 20:34)  12 Lead ECG:   Ventricular Rate 64 BPM    Atrial Rate 63 BPM    P-R Interval 170 ms    QRS Duration 106 ms    Q-T Interval 398 ms    QTC Calculation(Bazett) 410 ms    P Axis 49 degrees    R Axis -4 degrees    T Axis 35 degrees    Diagnosis Line Normal sinus rhythm  Nonspecific ST and T wave abnormality  Abnormal ECG    Confirmed by UNRULY ALVA MD ([id number]) on 7/10/2023 9:12:25 AM (07-09-23 @ 20:13)      MEDICATIONS  albuterol/ipratropium for Nebulization 3 Nebulizer every 6 hours  aspirin  chewable 81 Oral daily  atorvastatin 40 Oral at bedtime  cefepime   IVPB 1000 IV Intermittent every 12 hours  donepezil 10 Oral at bedtime  furosemide    Tablet 40 Oral daily  heparin   Injectable 5000 SubCutaneous every 12 hours  lisinopril 10 Oral daily  memantine 10 Oral daily  metoprolol succinate ER 50 Oral daily  pantoprazole    Tablet 40 Oral before breakfast  vancomycin  IVPB 1000 IV Intermittent every 24 hours    ANTIBIOTICS:  cefepime   IVPB 1000 milliGRAM(s) IV Intermittent every 12 hours  vancomycin  IVPB 1000 milliGRAM(s) IV Intermittent every 24 hours      ALLERGIES:  No Known Allergies      ASSESSMENT  This is a83-year-old male, history of dementia, HTN, Hypercholesterolemia, HFpEF, CAD s/p CABG x4, Stage IV follicular lymphoma, presenting with cough, and fevers and currently admitted due to concerns of pneumonia.       IMPRESSION  #Hospital acquired pneumonia.  #Follicular lymphoma history  #Recent history of fall and rhamdomyolysis  #Obesity BMI (kg/m2): 33.3, 34.6  #Abx allergy: No Known Allergies    Creatinine: 1.5 (07-14-23 @ 06:40)    Height (cm): 162.6 (07-13-23 @ 22:27)  Weight (kg): 88 (07-13-23 @ 22:27)    RECOMMENDATIONS  -Follow up with procalcitonin.  -Follow up with urine strep and legionella antigen. Follow up with blood cultures.  -Follow up with RVP.  -If brings up sputum, send it for cultures.   -MRSA nares negative. Can stop IV vancomycin.   -Can continue with cefepime 1 gram q 12 hours for now.     If any questions, please send a message or call on [employer] Teams  Please continue to update ID with any pertinent new laboratory or radiographic findings

## 2023-07-14 NOTE — PHYSICAL THERAPY INITIAL EVALUATION ADULT - GAIT DEVIATIONS NOTED, PT EVAL
decreased michael/increased time in double stance/decreased velocity of limb motion/decreased step length/decreased stride length

## 2023-07-14 NOTE — PHYSICAL THERAPY INITIAL EVALUATION ADULT - PERTINENT HX OF CURRENT PROBLEM, REHAB EVAL
Reason for Admission: fever/Cough  History of Present Illness:   Patient is 83-year-old male, history of dementia, HTN, Hypercholesterolemia, HFpEF, CAD s/p CABG x4, lymphoma, presenting with cough, and fever that started today.  temperature of 101 at home.  denies any headache, blurry vision, dizziness, CP, SOB, palpation, LE edema, abdm pain, N/V/D, numbness or weakness

## 2023-07-14 NOTE — PHYSICAL THERAPY INITIAL EVALUATION ADULT - ADDITIONAL COMMENTS
As per daughter, via phone, pt lives alone in [place] without steps. Pt used RW for short distance ambulation. Daughter is currently staying with pt to assist.

## 2023-07-14 NOTE — PHYSICAL THERAPY INITIAL EVALUATION ADULT - GENERAL OBSERVATIONS, REHAB EVAL
8:45-9:10 Chart reviewed. Patient available to be seen for physical therapy, denies pain, confirmed with RN. Pt rec'd in bed +condom catheter, +2L nasal O2 in NAD.

## 2023-07-14 NOTE — PROGRESS NOTE ADULT - SUBJECTIVE AND OBJECTIVE BOX
Progress note    INTERVAL HPI/OVERNIGHT EVENTS:    Patient seen and examined at bedside. He denies any acute distress.      REVIEW OF SYSTEMS:  All other 13 Review of systems were reviewed and are negative    FAMILY HISTORY:  No pertinent family history in first degree relatives      T(C): 38 (07-14-23 @ 13:32), Max: 38.3 (07-13-23 @ 19:56)  HR: 78 (07-14-23 @ 13:32) (66 - 78)  BP: 113/56 (07-14-23 @ 13:32) (113/56 - 142/67)  RR: 18 (07-14-23 @ 13:32) (16 - 18)  SpO2: 95% (07-14-23 @ 13:32) (91% - 96%)  Wt(kg): --Vital Signs Last 24 Hrs  T(C): 38 (14 Jul 2023 13:32), Max: 38.3 (13 Jul 2023 19:56)  T(F): 100.4 (14 Jul 2023 13:32), Max: 101 (13 Jul 2023 19:56)  HR: 78 (14 Jul 2023 13:32) (66 - 78)  BP: 113/56 (14 Jul 2023 13:32) (113/56 - 142/67)  BP(mean): --  RR: 18 (14 Jul 2023 13:32) (16 - 18)  SpO2: 95% (14 Jul 2023 13:32) (91% - 96%)    Parameters below as of 14 Jul 2023 07:27  Patient On (Oxygen Delivery Method): nasal cannula  O2 Flow (L/min): 2    No Known Allergies      PHYSICAL EXAM:    GENERAL: NAD  HEAD:  Atraumatic, Normocephalic  EYES: EOMI, PERRLA, conjunctiva and sclera clear  NECK: Supple, No JVD, Normal thyroid  NERVOUS SYSTEM:  Alert & Oriented X3, Moving all extremities  CHEST/LUNG: + rhonchi with good air entry   HEART: Regular rate and rhythm; No murmurs, rubs, or gallops  ABDOMEN: Soft, Nontender, Nondistended; Bowel sounds present  EXTREMITIES:   No clubbing, cyanosis, or edema  SKIN: No rashes or lesions    Consultant(s) Notes Reviewed:  [x ] YES  [ ] NO  Care Discussed with Consultants/Other Providers [ x] YES  [ ] NO    LABS:      RADIOLOGY & ADDITIONAL TESTS:    Imaging Personally Reviewed:  [ ] YES  [ ] NO  acetaminophen     Tablet .. 650 milliGRAM(s) Oral every 6 hours PRN  albuterol/ipratropium for Nebulization 3 milliLiter(s) Nebulizer every 6 hours  aluminum hydroxide/magnesium hydroxide/simethicone Suspension 30 milliLiter(s) Oral every 4 hours PRN  aspirin  chewable 81 milliGRAM(s) Oral daily  atorvastatin 40 milliGRAM(s) Oral at bedtime  benzonatate 100 milliGRAM(s) Oral every 8 hours PRN  cefepime   IVPB 1000 milliGRAM(s) IV Intermittent every 12 hours  donepezil 10 milliGRAM(s) Oral at bedtime  furosemide    Tablet 40 milliGRAM(s) Oral daily  guaifenesin/dextromethorphan Oral Liquid 10 milliLiter(s) Oral every 4 hours PRN  heparin   Injectable 5000 Unit(s) SubCutaneous every 12 hours  lisinopril 10 milliGRAM(s) Oral daily  melatonin 3 milliGRAM(s) Oral at bedtime PRN  memantine 10 milliGRAM(s) Oral daily  metoprolol succinate ER 50 milliGRAM(s) Oral daily  ondansetron Injectable 4 milliGRAM(s) IV Push every 8 hours PRN  pantoprazole    Tablet 40 milliGRAM(s) Oral before breakfast  vancomycin  IVPB 1000 milliGRAM(s) IV Intermittent every 24 hours      HEALTH ISSUES - PROBLEM Dx:    Patient is 83-year-old male, history of dementia, HTN, Hypercholesterolemia, HFpEF, CAD s/p CABG x4, lymphoma, presenting with    # PNA  -CXR shows bilateral opacities L>R.   -Will obtain Blood culture, Sputum culture, legionella, strep antigen, MRSA swab, procalcitonin level, RVP  -Dc vanco (MRSA (-)), c/w cefepime for now  -Oxygen as needed  - ID consult - recs appreciated    #HTN/HLD/CHF  -No sign of volume overload  -continue with home medications  -Monitor BP      Total time spent to complete patient's bedside assessment, review medical chart, discuss medical plan of care with covering medical team was ____35____ with > 50% of time spent face to face w/ patient, discussion with patient/family and/or coordination of care

## 2023-07-14 NOTE — PHYSICAL THERAPY INITIAL EVALUATION ADULT - LEVEL OF INDEPENDENCE: BED TO CHAIR, REHAB EVAL
Unable to safely progress at this time due to significantly impaired strength balance and endurance.

## 2023-07-15 LAB
ANION GAP SERPL CALC-SCNC: 16 MMOL/L — HIGH (ref 7–14)
BUN SERPL-MCNC: 26 MG/DL — HIGH (ref 10–20)
CALCIUM SERPL-MCNC: 8.6 MG/DL — SIGNIFICANT CHANGE UP (ref 8.4–10.5)
CHLORIDE SERPL-SCNC: 98 MMOL/L — SIGNIFICANT CHANGE UP (ref 98–110)
CO2 SERPL-SCNC: 24 MMOL/L — SIGNIFICANT CHANGE UP (ref 17–32)
CREAT SERPL-MCNC: 1.8 MG/DL — HIGH (ref 0.7–1.5)
EGFR: 37 ML/MIN/1.73M2 — LOW
GLUCOSE SERPL-MCNC: 111 MG/DL — HIGH (ref 70–99)
HCT VFR BLD CALC: 38.6 % — LOW (ref 42–52)
HGB BLD-MCNC: 12.9 G/DL — LOW (ref 14–18)
LEGIONELLA AG UR QL: NEGATIVE — SIGNIFICANT CHANGE UP
MCHC RBC-ENTMCNC: 29.4 PG — SIGNIFICANT CHANGE UP (ref 27–31)
MCHC RBC-ENTMCNC: 33.4 G/DL — SIGNIFICANT CHANGE UP (ref 32–37)
MCV RBC AUTO: 87.9 FL — SIGNIFICANT CHANGE UP (ref 80–94)
NRBC # BLD: 0 /100 WBCS — SIGNIFICANT CHANGE UP (ref 0–0)
PLATELET # BLD AUTO: 138 K/UL — SIGNIFICANT CHANGE UP (ref 130–400)
PMV BLD: 11.1 FL — HIGH (ref 7.4–10.4)
POTASSIUM SERPL-MCNC: 3.5 MMOL/L — SIGNIFICANT CHANGE UP (ref 3.5–5)
POTASSIUM SERPL-SCNC: 3.5 MMOL/L — SIGNIFICANT CHANGE UP (ref 3.5–5)
RBC # BLD: 4.39 M/UL — LOW (ref 4.7–6.1)
RBC # FLD: 13.6 % — SIGNIFICANT CHANGE UP (ref 11.5–14.5)
S PNEUM AG UR QL: NEGATIVE — SIGNIFICANT CHANGE UP
SODIUM SERPL-SCNC: 138 MMOL/L — SIGNIFICANT CHANGE UP (ref 135–146)
WBC # BLD: 4.85 K/UL — SIGNIFICANT CHANGE UP (ref 4.8–10.8)
WBC # FLD AUTO: 4.85 K/UL — SIGNIFICANT CHANGE UP (ref 4.8–10.8)

## 2023-07-15 PROCEDURE — 99233 SBSQ HOSP IP/OBS HIGH 50: CPT

## 2023-07-15 RX ORDER — SODIUM CHLORIDE 9 MG/ML
100 INJECTION INTRAMUSCULAR; INTRAVENOUS; SUBCUTANEOUS
Refills: 0 | Status: DISCONTINUED | OUTPATIENT
Start: 2023-07-15 | End: 2023-07-15

## 2023-07-15 RX ORDER — DEXAMETHASONE 0.5 MG/5ML
6 ELIXIR ORAL DAILY
Refills: 0 | Status: DISCONTINUED | OUTPATIENT
Start: 2023-07-15 | End: 2023-07-21

## 2023-07-15 RX ORDER — REMDESIVIR 5 MG/ML
100 INJECTION INTRAVENOUS ONCE
Refills: 0 | Status: COMPLETED | OUTPATIENT
Start: 2023-07-16 | End: 2023-07-16

## 2023-07-15 RX ORDER — REMDESIVIR 5 MG/ML
200 INJECTION INTRAVENOUS ONCE
Refills: 0 | Status: COMPLETED | OUTPATIENT
Start: 2023-07-15 | End: 2023-07-15

## 2023-07-15 RX ADMIN — HEPARIN SODIUM 5000 UNIT(S): 5000 INJECTION INTRAVENOUS; SUBCUTANEOUS at 05:31

## 2023-07-15 RX ADMIN — Medication 3 MILLILITER(S): at 07:54

## 2023-07-15 RX ADMIN — Medication 81 MILLIGRAM(S): at 12:35

## 2023-07-15 RX ADMIN — Medication 50 MILLIGRAM(S): at 09:26

## 2023-07-15 RX ADMIN — MEMANTINE HYDROCHLORIDE 10 MILLIGRAM(S): 10 TABLET ORAL at 12:36

## 2023-07-15 RX ADMIN — ATORVASTATIN CALCIUM 40 MILLIGRAM(S): 80 TABLET, FILM COATED ORAL at 21:48

## 2023-07-15 RX ADMIN — PANTOPRAZOLE SODIUM 40 MILLIGRAM(S): 20 TABLET, DELAYED RELEASE ORAL at 05:31

## 2023-07-15 RX ADMIN — HEPARIN SODIUM 5000 UNIT(S): 5000 INJECTION INTRAVENOUS; SUBCUTANEOUS at 17:53

## 2023-07-15 RX ADMIN — CEFEPIME 100 MILLIGRAM(S): 1 INJECTION, POWDER, FOR SOLUTION INTRAMUSCULAR; INTRAVENOUS at 05:31

## 2023-07-15 RX ADMIN — DONEPEZIL HYDROCHLORIDE 10 MILLIGRAM(S): 10 TABLET, FILM COATED ORAL at 21:48

## 2023-07-15 RX ADMIN — LISINOPRIL 10 MILLIGRAM(S): 2.5 TABLET ORAL at 09:26

## 2023-07-15 RX ADMIN — Medication 40 MILLIGRAM(S): at 09:26

## 2023-07-15 RX ADMIN — REMDESIVIR 200 MILLIGRAM(S): 5 INJECTION INTRAVENOUS at 16:36

## 2023-07-15 NOTE — PROGRESS NOTE ADULT - ASSESSMENT
ASSESSMENT  This is a83-year-old male, history of dementia, HTN, Hypercholesterolemia, HFpEF, CAD s/p CABG x4, Stage IV follicular lymphoma, presenting with cough, and fevers and currently admitted due to concerns of pneumonia.       IMPRESSION  #COVID19   Streptococcus pneumoniae Ag, Ur Result: Negative (07-14-23 @ 09:00)  Procalcitonin, Serum: 0.12 (07-14-23 @ 06:40)  MRSA PCR Result.: Negative (07-13-23 @ 22:25)  CXR bilateral opacities   #Follicular lymphoma history  #Recent history of fall and rhamdomyolysis  #Obesity BMI (kg/m2): 33.3, 34.6  #Lactic acidosis  #Abx allergy: No Known Allergies  #ROYAL  Creatinine: 1.5 (07-14-23 @ 06:40)    Height (cm): 162.6 (07-13-23 @ 22:27)  Weight (kg): 88 (07-13-23 @ 22:27)    RECOMMENDATIONS  This is a preliminary incomplete pended note, all final recommendations to follow after interview and examination of the patient.     If any questions, please send a message or call on [facility] [employer] Teams  Please continue to update ID with any pertinent new laboratory or radiographic findings ASSESSMENT  This is a83-year-old male, history of dementia, HTN, Hypercholesterolemia, HFpEF, CAD s/p CABG x4, Stage IV follicular lymphoma, presenting with cough, and fevers and currently admitted due to concerns of pneumonia.       IMPRESSION  #COVID19 , severe requiring supplemental O2   Streptococcus pneumoniae Ag, Ur Result: Negative (07-14-23 @ 09:00)  Procalcitonin, Serum: 0.12 (07-14-23 @ 06:40)  MRSA PCR Result.: Negative (07-13-23 @ 22:25)  CXR bilateral opacities   #Follicular lymphoma history  #Recent history of fall and rhamdomyolysis  #Obesity BMI (kg/m2): 33.3, 34.6  #Lactic acidosis  #Abx allergy: No Known Allergies  #ROYAL  Creatinine: 1.5 (07-14-23 @ 06:40)    Height (cm): 162.6 (07-13-23 @ 22:27)  Weight (kg): 88 (07-13-23 @ 22:27)    RECOMMENDATIONS  - D/C antimicrobials doubt bacterial PNA, procalcitonin unremarkable   - STAT RDV 200mg x1 then 100mg daily x 4 days  - Dexamethasone 6mg PO daily   - A/C per primary team   - Airborne & contact, isolate neighbor    If any questions, please send a message or call on [facility] Teams  Please continue to update ID with any pertinent new laboratory or radiographic findings

## 2023-07-15 NOTE — PROGRESS NOTE ADULT - SUBJECTIVE AND OBJECTIVE BOX
Progress note    INTERVAL HPI/OVERNIGHT EVENTS:    Patient seen and examined at bedside. He is on 1L oxygen. No acute distress.      REVIEW OF SYSTEMS:  All other 13 Review of systems were reviewed and are negative    FAMILY HISTORY:  No pertinent family history in first degree relatives      T(C): 36.8 (07-15-23 @ 04:58), Max: 38 (07-14-23 @ 13:32)  HR: 82 (07-15-23 @ 09:30) (70 - 82)  BP: 118/56 (07-15-23 @ 09:30) (98/54 - 118/58)  RR: 16 (07-15-23 @ 04:58) (16 - 18)  SpO2: 94% (07-14-23 @ 20:32) (94% - 95%)  Wt(kg): --Vital Signs Last 24 Hrs  T(C): 36.8 (15 Jul 2023 04:58), Max: 38 (14 Jul 2023 13:32)  T(F): 98.2 (15 Jul 2023 04:58), Max: 100.4 (14 Jul 2023 13:32)  HR: 82 (15 Jul 2023 09:30) (70 - 82)  BP: 118/56 (15 Jul 2023 09:30) (98/54 - 118/58)  BP(mean): --  RR: 16 (15 Jul 2023 04:58) (16 - 18)  SpO2: 94% (14 Jul 2023 20:32) (94% - 95%)      No Known Allergies      PHYSICAL EXAM:                Consultant(s) Notes Reviewed:  [x ] YES  [ ] NO  Care Discussed with Consultants/Other Providers [ x] YES  [ ] NO    LABS:      RADIOLOGY & ADDITIONAL TESTS:    Imaging Personally Reviewed:  [ ] YES  [ ] NO  acetaminophen     Tablet .. 650 milliGRAM(s) Oral every 6 hours PRN  albuterol/ipratropium for Nebulization 3 milliLiter(s) Nebulizer every 6 hours  aluminum hydroxide/magnesium hydroxide/simethicone Suspension 30 milliLiter(s) Oral every 4 hours PRN  aspirin  chewable 81 milliGRAM(s) Oral daily  atorvastatin 40 milliGRAM(s) Oral at bedtime  benzonatate 100 milliGRAM(s) Oral every 8 hours PRN  donepezil 10 milliGRAM(s) Oral at bedtime  furosemide    Tablet 40 milliGRAM(s) Oral daily  guaifenesin/dextromethorphan Oral Liquid 10 milliLiter(s) Oral every 4 hours PRN  heparin   Injectable 5000 Unit(s) SubCutaneous every 12 hours  lisinopril 10 milliGRAM(s) Oral daily  melatonin 3 milliGRAM(s) Oral at bedtime PRN  memantine 10 milliGRAM(s) Oral daily  metoprolol succinate ER 50 milliGRAM(s) Oral daily  ondansetron Injectable 4 milliGRAM(s) IV Push every 8 hours PRN  pantoprazole    Tablet 40 milliGRAM(s) Oral before breakfast      HEALTH ISSUES - PROBLEM Dx:      # PNA  -CXR shows bilateral opacities L>R.   -Will obtain Blood culture, Sputum culture, legionella, strep antigen, MRSA swab, procalcitonin level, RVP  -Dc vanco (MRSA (-)), c/w cefepime for now  -Oxygen as needed  - ID consult - recs appreciated    #HTN/HLD/CHF  -No sign of volume overload  -continue with home medications  -Monitor BP        Total time spent to complete patient's bedside assessment, review medical chart, discuss medical plan of care with covering medical team was ________ with > 50% of time spent face to face w/ patient, discussion with patient/family and/or coordination of care Progress note    INTERVAL HPI/OVERNIGHT EVENTS:    Patient seen and examined at bedside. He is on 1L oxygen. No acute distress.      REVIEW OF SYSTEMS:  All other 13 Review of systems were reviewed and are negative    FAMILY HISTORY:  No pertinent family history in first degree relatives      T(C): 36.8 (07-15-23 @ 04:58), Max: 38 (07-14-23 @ 13:32)  HR: 82 (07-15-23 @ 09:30) (70 - 82)  BP: 118/56 (07-15-23 @ 09:30) (98/54 - 118/58)  RR: 16 (07-15-23 @ 04:58) (16 - 18)  SpO2: 94% (07-14-23 @ 20:32) (94% - 95%)  Wt(kg): --Vital Signs Last 24 Hrs  T(C): 36.8 (15 Jul 2023 04:58), Max: 38 (14 Jul 2023 13:32)  T(F): 98.2 (15 Jul 2023 04:58), Max: 100.4 (14 Jul 2023 13:32)  HR: 82 (15 Jul 2023 09:30) (70 - 82)  BP: 118/56 (15 Jul 2023 09:30) (98/54 - 118/58)  BP(mean): --  RR: 16 (15 Jul 2023 04:58) (16 - 18)  SpO2: 94% (14 Jul 2023 20:32) (94% - 95%)      No Known Allergies      PHYSICAL EXAM:    GENERAL: NAD  HEAD:  Atraumatic, Normocephalic  EYES: EOMI, PERRLA, conjunctiva and sclera clear  NECK: Supple, No JVD, Normal thyroid  NERVOUS SYSTEM:  Alert & Oriented X3, Moving all extremities  CHEST/LUNG: + rhonchi with good air entry   HEART: Regular rate and rhythm; No murmurs, rubs, or gallops  ABDOMEN: Soft, Nontender, Nondistended; Bowel sounds present  EXTREMITIES:   No clubbing, cyanosis, or edema  SKIN: No rashes or lesions    Consultant(s) Notes Reviewed:  [x ] YES  [ ] NO  Care Discussed with Consultants/Other Providers [ x] YES  [ ] NO    LABS:      RADIOLOGY & ADDITIONAL TESTS:    Imaging Personally Reviewed:  [ ] YES  [ ] NO  acetaminophen     Tablet .. 650 milliGRAM(s) Oral every 6 hours PRN  albuterol/ipratropium for Nebulization 3 milliLiter(s) Nebulizer every 6 hours  aluminum hydroxide/magnesium hydroxide/simethicone Suspension 30 milliLiter(s) Oral every 4 hours PRN  aspirin  chewable 81 milliGRAM(s) Oral daily  atorvastatin 40 milliGRAM(s) Oral at bedtime  benzonatate 100 milliGRAM(s) Oral every 8 hours PRN  donepezil 10 milliGRAM(s) Oral at bedtime  furosemide    Tablet 40 milliGRAM(s) Oral daily  guaifenesin/dextromethorphan Oral Liquid 10 milliLiter(s) Oral every 4 hours PRN  heparin   Injectable 5000 Unit(s) SubCutaneous every 12 hours  lisinopril 10 milliGRAM(s) Oral daily  melatonin 3 milliGRAM(s) Oral at bedtime PRN  memantine 10 milliGRAM(s) Oral daily  metoprolol succinate ER 50 milliGRAM(s) Oral daily  ondansetron Injectable 4 milliGRAM(s) IV Push every 8 hours PRN  pantoprazole    Tablet 40 milliGRAM(s) Oral before breakfast      HEALTH ISSUES - PROBLEM Dx:      # VIral PNA 2/2 COVID  -CXR shows bilateral opacities L>R.   - RVP (+) for COVID  - DC abx, Start remdesivir 200mg x 1, then 100mg  -Dexamethasone 6mg  -Oxygen as needed  - ID consult - recs appreciated    #HTN/HLD/CHF  -No sign of volume overload  -continue with home medications  -Monitor BP

## 2023-07-15 NOTE — PROGRESS NOTE ADULT - SUBJECTIVE AND OBJECTIVE BOX
NOEMY GRUBBS  83y, Male  Allergy: No Known Allergies      LOS  2d    CHIEF COMPLAINT: fever/Cough (14 Jul 2023 16:12)      INTERVAL EVENTS/HPI  - No acute events overnight, COVID +  - T(F): , Max: 100.4 (07-14-23 @ 13:32)  - Tolerating medication  - WBC Count: 4.85 (07-15-23 @ 04:30)  WBC Count: 7.26 (07-14-23 @ 06:40)     - Creatinine: 1.5 (07-14-23 @ 06:40)  Creatinine: 1.5 (07-13-23 @ 19:32)       ROS  ***    VITALS:  T(F): 98.2, Max: 100.4 (07-14-23 @ 13:32)  HR: 82  BP: 118/56  RR: 16Vital Signs Last 24 Hrs  T(C): 36.8 (15 Jul 2023 04:58), Max: 38 (14 Jul 2023 13:32)  T(F): 98.2 (15 Jul 2023 04:58), Max: 100.4 (14 Jul 2023 13:32)  HR: 82 (15 Jul 2023 09:30) (70 - 82)  BP: 118/56 (15 Jul 2023 09:30) (98/54 - 118/58)  BP(mean): --  RR: 16 (15 Jul 2023 04:58) (16 - 18)  SpO2: 94% (14 Jul 2023 20:32) (94% - 95%)        PHYSICAL EXAM:  ***    FH: Non-contributory  Social Hx: Non-contributory    TESTS & MEASUREMENTS:                        12.9   4.85  )-----------( 138      ( 15 Jul 2023 04:30 )             38.6     07-14    143  |  101  |  18  ----------------------------<  103<H>  4.2   |  27  |  1.5    Ca    8.9      14 Jul 2023 06:40    TPro  6.9  /  Alb  3.8  /  TBili  0.8  /  DBili  x   /  AST  45<H>  /  ALT  45<H>  /  AlkPhos  78  07-14      LIVER FUNCTIONS - ( 14 Jul 2023 06:40 )  Alb: 3.8 g/dL / Pro: 6.9 g/dL / ALK PHOS: 78 U/L / ALT: 45 U/L / AST: 45 U/L / GGT: x           Urinalysis Basic - ( 14 Jul 2023 06:40 )    Color: x / Appearance: x / SG: x / pH: x  Gluc: 103 mg/dL / Ketone: x  / Bili: x / Urobili: x   Blood: x / Protein: x / Nitrite: x   Leuk Esterase: x / RBC: x / WBC x   Sq Epi: x / Non Sq Epi: x / Bacteria: x        Culture - Urine (collected 07-09-23 @ 20:18)  Source: Clean Catch Clean Catch (Midstream)  Final Report (07-11-23 @ 20:57):    No growth        Lactate, Blood: 1.4 mmol/L (07-13-23 @ 19:32)  Lactate, Blood: 1.2 mmol/L (07-11-23 @ 07:05)  Lactate, Blood: 2.8 mmol/L (07-10-23 @ 11:27)      INFECTIOUS DISEASES TESTING  Streptococcus pneumoniae Ag, Ur Result: Negative (07-14-23 @ 09:00)  Procalcitonin, Serum: 0.12 (07-14-23 @ 06:40)  MRSA PCR Result.: Negative (07-13-23 @ 22:25)  Rapid RVP Result: Detected (07-13-23 @ 22:25)  strept    INFLAMMATORY MARKERS      RADIOLOGY & ADDITIONAL TESTS:  I have personally reviewed the last available Chest xray  CXR      CT      CARDIOLOGY TESTING  12 Lead ECG:   Ventricular Rate 72 BPM    Atrial Rate 72 BPM    P-R Interval 152 ms    QRS Duration 94 ms    Q-T Interval 416 ms    QTC Calculation(Bazett) 455 ms    P Axis 39 degrees    R Axis -42 degrees    T Axis 47 degrees    Diagnosis Line Normal sinus rhythm  Left axis deviation  Minimal voltage criteria for LVH, may be normal variant  Poor R wave progression  Abnormal ECG    Confirmed by Dakota Jameson ([id number]) on 7/14/2023 7:55:20 AM (07-13-23 @ 20:34)  12 Lead ECG:   Ventricular Rate 64 BPM    Atrial Rate 63 BPM    P-R Interval 170 ms    QRS Duration 106 ms    Q-T Interval 398 ms    QTC Calculation(Bazett) 410 ms    P Axis 49 degrees    R Axis -4 degrees    T Axis 35 degrees    Diagnosis Line Normal sinus rhythm  Nonspecific ST and T wave abnormality  Abnormal ECG    Confirmed by UNRULY ALVA MD ([id number]) on 7/10/2023 9:12:25 AM (07-09-23 @ 20:13)      MEDICATIONS  albuterol/ipratropium for Nebulization 3 Nebulizer every 6 hours  aspirin  chewable 81 Oral daily  atorvastatin 40 Oral at bedtime  cefepime   IVPB 1000 IV Intermittent every 12 hours  donepezil 10 Oral at bedtime  furosemide    Tablet 40 Oral daily  heparin   Injectable 5000 SubCutaneous every 12 hours  lisinopril 10 Oral daily  memantine 10 Oral daily  metoprolol succinate ER 50 Oral daily  pantoprazole    Tablet 40 Oral before breakfast      WEIGHT  Weight (kg): 88 (07-13-23 @ 22:27)      ANTIBIOTICS:  cefepime   IVPB 1000 milliGRAM(s) IV Intermittent every 12 hours      All available historical records have been reviewed       NOEMY GRUBBS  83y, Male  Allergy: No Known Allergies      LOS  2d    CHIEF COMPLAINT: fever/Cough (14 Jul 2023 16:12)      INTERVAL EVENTS/HPI  - No acute events overnight, COVID +  - T(F): , Max: 100.4 (07-14-23 @ 13:32)  - Tolerating medication  - WBC Count: 4.85 (07-15-23 @ 04:30)  WBC Count: 7.26 (07-14-23 @ 06:40)     - Creatinine: 1.5 (07-14-23 @ 06:40)  Creatinine: 1.5 (07-13-23 @ 19:32)       ROS  General: Denies rigors, nightsweats  HEENT: Denies headache, rhinorrhea, sore throat, eye pain  CV: Denies CP, palpitations  PULM: as noted above   GI: Denies hematemesis, hematochezia, melena  : Denies discharge, hematuria  MSK: Denies arthralgias, myalgias  SKIN: Denies rash, lesions  NEURO: Denies paresthesias, weakness  PSYCH: Denies depression, anxiety     VITALS:  T(F): 98.2, Max: 100.4 (07-14-23 @ 13:32)  HR: 82  BP: 118/56  RR: 16Vital Signs Last 24 Hrs  T(C): 36.8 (15 Jul 2023 04:58), Max: 38 (14 Jul 2023 13:32)  T(F): 98.2 (15 Jul 2023 04:58), Max: 100.4 (14 Jul 2023 13:32)  HR: 82 (15 Jul 2023 09:30) (70 - 82)  BP: 118/56 (15 Jul 2023 09:30) (98/54 - 118/58)  BP(mean): --  RR: 16 (15 Jul 2023 04:58) (16 - 18)  SpO2: 94% (14 Jul 2023 20:32) (94% - 95%)        PHYSICAL EXAM:  Gen: NAD, resting in bed Elderly M on NC  HEENT: Normocephalic, atraumatic  Neck: supple, no lymphadenopathy  CV: Regular rate & regular rhythm  Lungs: decreased BS at bases, no fremitus, some coarse BS  Abdomen: Soft, BS present  Ext: Warm, well perfused  Neuro: non focal, awake  Skin: no rash, no erythema  Lines: no phlebitis   lavon    FH: Non-contributory  Social Hx: Non-contributory    TESTS & MEASUREMENTS:                        12.9   4.85  )-----------( 138      ( 15 Jul 2023 04:30 )             38.6     07-14    143  |  101  |  18  ----------------------------<  103<H>  4.2   |  27  |  1.5    Ca    8.9      14 Jul 2023 06:40    TPro  6.9  /  Alb  3.8  /  TBili  0.8  /  DBili  x   /  AST  45<H>  /  ALT  45<H>  /  AlkPhos  78  07-14      LIVER FUNCTIONS - ( 14 Jul 2023 06:40 )  Alb: 3.8 g/dL / Pro: 6.9 g/dL / ALK PHOS: 78 U/L / ALT: 45 U/L / AST: 45 U/L / GGT: x           Urinalysis Basic - ( 14 Jul 2023 06:40 )    Color: x / Appearance: x / SG: x / pH: x  Gluc: 103 mg/dL / Ketone: x  / Bili: x / Urobili: x   Blood: x / Protein: x / Nitrite: x   Leuk Esterase: x / RBC: x / WBC x   Sq Epi: x / Non Sq Epi: x / Bacteria: x        Culture - Urine (collected 07-09-23 @ 20:18)  Source: Clean Catch Clean Catch (Midstream)  Final Report (07-11-23 @ 20:57):    No growth        Lactate, Blood: 1.4 mmol/L (07-13-23 @ 19:32)  Lactate, Blood: 1.2 mmol/L (07-11-23 @ 07:05)  Lactate, Blood: 2.8 mmol/L (07-10-23 @ 11:27)      INFECTIOUS DISEASES TESTING  Streptococcus pneumoniae Ag, Ur Result: Negative (07-14-23 @ 09:00)  Procalcitonin, Serum: 0.12 (07-14-23 @ 06:40)  MRSA PCR Result.: Negative (07-13-23 @ 22:25)  Rapid RVP Result: Detected (07-13-23 @ 22:25)  strept    INFLAMMATORY MARKERS      RADIOLOGY & ADDITIONAL TESTS:  I have personally reviewed the last available Chest xray  CXR      CT      CARDIOLOGY TESTING  12 Lead ECG:   Ventricular Rate 72 BPM    Atrial Rate 72 BPM    P-R Interval 152 ms    QRS Duration 94 ms    Q-T Interval 416 ms    QTC Calculation(Bazett) 455 ms    P Axis 39 degrees    R Axis -42 degrees    T Axis 47 degrees    Diagnosis Line Normal sinus rhythm  Left axis deviation  Minimal voltage criteria for LVH, may be normal variant  Poor R wave progression  Abnormal ECG    Confirmed by aDkota Jameson ([id number]) on 7/14/2023 7:55:20 AM (07-13-23 @ 20:34)  12 Lead ECG:   Ventricular Rate 64 BPM    Atrial Rate 63 BPM    P-R Interval 170 ms    QRS Duration 106 ms    Q-T Interval 398 ms    QTC Calculation(Bazett) 410 ms    P Axis 49 degrees    R Axis -4 degrees    T Axis 35 degrees    Diagnosis Line Normal sinus rhythm  Nonspecific ST and T wave abnormality  Abnormal ECG    Confirmed by UNRULY ALVA MD ([id number]) on 7/10/2023 9:12:25 AM (07-09-23 @ 20:13)      MEDICATIONS  albuterol/ipratropium for Nebulization 3 Nebulizer every 6 hours  aspirin  chewable 81 Oral daily  atorvastatin 40 Oral at bedtime  cefepime   IVPB 1000 IV Intermittent every 12 hours  donepezil 10 Oral at bedtime  furosemide    Tablet 40 Oral daily  heparin   Injectable 5000 SubCutaneous every 12 hours  lisinopril 10 Oral daily  memantine 10 Oral daily  metoprolol succinate ER 50 Oral daily  pantoprazole    Tablet 40 Oral before breakfast      WEIGHT  Weight (kg): 88 (07-13-23 @ 22:27)      ANTIBIOTICS:  cefepime   IVPB 1000 milliGRAM(s) IV Intermittent every 12 hours      All available historical records have been reviewed

## 2023-07-16 LAB
ALBUMIN SERPL ELPH-MCNC: 3.5 G/DL — SIGNIFICANT CHANGE UP (ref 3.5–5.2)
ALP SERPL-CCNC: 65 U/L — SIGNIFICANT CHANGE UP (ref 30–115)
ALT FLD-CCNC: 40 U/L — SIGNIFICANT CHANGE UP (ref 0–41)
ANION GAP SERPL CALC-SCNC: 16 MMOL/L — HIGH (ref 7–14)
AST SERPL-CCNC: 41 U/L — SIGNIFICANT CHANGE UP (ref 0–41)
BILIRUB SERPL-MCNC: 0.4 MG/DL — SIGNIFICANT CHANGE UP (ref 0.2–1.2)
BUN SERPL-MCNC: 37 MG/DL — HIGH (ref 10–20)
CALCIUM SERPL-MCNC: 8.2 MG/DL — LOW (ref 8.4–10.5)
CHLORIDE SERPL-SCNC: 98 MMOL/L — SIGNIFICANT CHANGE UP (ref 98–110)
CO2 SERPL-SCNC: 22 MMOL/L — SIGNIFICANT CHANGE UP (ref 17–32)
CREAT SERPL-MCNC: 2 MG/DL — HIGH (ref 0.7–1.5)
EGFR: 33 ML/MIN/1.73M2 — LOW
GLUCOSE SERPL-MCNC: 96 MG/DL — SIGNIFICANT CHANGE UP (ref 70–99)
POTASSIUM SERPL-MCNC: 3.7 MMOL/L — SIGNIFICANT CHANGE UP (ref 3.5–5)
POTASSIUM SERPL-SCNC: 3.7 MMOL/L — SIGNIFICANT CHANGE UP (ref 3.5–5)
PROT SERPL-MCNC: 6.4 G/DL — SIGNIFICANT CHANGE UP (ref 6–8)
SODIUM SERPL-SCNC: 136 MMOL/L — SIGNIFICANT CHANGE UP (ref 135–146)

## 2023-07-16 PROCEDURE — 99233 SBSQ HOSP IP/OBS HIGH 50: CPT

## 2023-07-16 PROCEDURE — 76775 US EXAM ABDO BACK WALL LIM: CPT | Mod: 26

## 2023-07-16 RX ORDER — SODIUM CHLORIDE 9 MG/ML
1000 INJECTION INTRAMUSCULAR; INTRAVENOUS; SUBCUTANEOUS
Refills: 0 | Status: DISCONTINUED | OUTPATIENT
Start: 2023-07-16 | End: 2023-07-18

## 2023-07-16 RX ORDER — POTASSIUM CHLORIDE 20 MEQ
40 PACKET (EA) ORAL ONCE
Refills: 0 | Status: COMPLETED | OUTPATIENT
Start: 2023-07-16 | End: 2023-07-16

## 2023-07-16 RX ADMIN — Medication 3 MILLIGRAM(S): at 21:16

## 2023-07-16 RX ADMIN — Medication 3 MILLILITER(S): at 19:17

## 2023-07-16 RX ADMIN — SODIUM CHLORIDE 75 MILLILITER(S): 9 INJECTION INTRAMUSCULAR; INTRAVENOUS; SUBCUTANEOUS at 12:49

## 2023-07-16 RX ADMIN — Medication 81 MILLIGRAM(S): at 12:49

## 2023-07-16 RX ADMIN — HEPARIN SODIUM 5000 UNIT(S): 5000 INJECTION INTRAVENOUS; SUBCUTANEOUS at 21:16

## 2023-07-16 RX ADMIN — PANTOPRAZOLE SODIUM 40 MILLIGRAM(S): 20 TABLET, DELAYED RELEASE ORAL at 05:46

## 2023-07-16 RX ADMIN — HEPARIN SODIUM 5000 UNIT(S): 5000 INJECTION INTRAVENOUS; SUBCUTANEOUS at 05:45

## 2023-07-16 RX ADMIN — MEMANTINE HYDROCHLORIDE 10 MILLIGRAM(S): 10 TABLET ORAL at 12:49

## 2023-07-16 RX ADMIN — Medication 3 MILLILITER(S): at 04:00

## 2023-07-16 RX ADMIN — Medication 3 MILLILITER(S): at 14:31

## 2023-07-16 RX ADMIN — Medication 40 MILLIGRAM(S): at 05:47

## 2023-07-16 RX ADMIN — ATORVASTATIN CALCIUM 40 MILLIGRAM(S): 80 TABLET, FILM COATED ORAL at 21:15

## 2023-07-16 RX ADMIN — REMDESIVIR 200 MILLIGRAM(S): 5 INJECTION INTRAVENOUS at 05:39

## 2023-07-16 RX ADMIN — Medication 40 MILLIEQUIVALENT(S): at 16:48

## 2023-07-16 RX ADMIN — Medication 50 MILLIGRAM(S): at 05:45

## 2023-07-16 RX ADMIN — Medication 6 MILLIGRAM(S): at 05:45

## 2023-07-16 RX ADMIN — DONEPEZIL HYDROCHLORIDE 10 MILLIGRAM(S): 10 TABLET, FILM COATED ORAL at 21:15

## 2023-07-16 RX ADMIN — Medication 100 MILLIGRAM(S): at 21:29

## 2023-07-16 NOTE — PROGRESS NOTE ADULT - SUBJECTIVE AND OBJECTIVE BOX
Progress note    INTERVAL HPI/OVERNIGHT EVENTS:    Patient seen and examined at bedside. He denies any shortness of breath. He is on Nc.      REVIEW OF SYSTEMS:  All other 13 Review of systems were reviewed and are negative    FAMILY HISTORY:  No pertinent family history in first degree relatives      T(C): 37.2 (07-16-23 @ 04:39), Max: 37.2 (07-16-23 @ 04:39)  HR: 73 (07-16-23 @ 05:40) (62 - 85)  BP: 102/54 (07-16-23 @ 05:40) (97/54 - 129/58)  RR: 16 (07-16-23 @ 04:39) (16 - 20)  SpO2: 93% (07-16-23 @ 04:39) (93% - 96%)  Wt(kg): --Vital Signs Last 24 Hrs  T(C): 37.2 (16 Jul 2023 04:39), Max: 37.2 (16 Jul 2023 04:39)  T(F): 99 (16 Jul 2023 04:39), Max: 99 (16 Jul 2023 04:39)  HR: 73 (16 Jul 2023 05:40) (62 - 85)  BP: 102/54 (16 Jul 2023 05:40) (97/54 - 129/58)  BP(mean): --  RR: 16 (16 Jul 2023 04:39) (16 - 20)  SpO2: 93% (16 Jul 2023 04:39) (93% - 96%)    Parameters below as of 16 Jul 2023 04:39  Patient On (Oxygen Delivery Method): nasal cannula  O2 Flow (L/min): 2    No Known Allergies      PHYSICAL EXAM:    GENERAL: NAD  HEAD:  Atraumatic, Normocephalic  EYES: EOMI, PERRLA, conjunctiva and sclera clear  NECK: Supple, No JVD, Normal thyroid  NERVOUS SYSTEM:  Alert & Oriented X3, Moving all extremities  CHEST/LUNG: improving rhonchi with good air entry   HEART: Regular rate and rhythm; No murmurs, rubs, or gallops  ABDOMEN: Soft, Nontender, Nondistended; Bowel sounds present  EXTREMITIES:   No clubbing, cyanosis, or edema  SKIN: No rashes or lesions    Consultant(s) Notes Reviewed:  [x ] YES  [ ] NO  Care Discussed with Consultants/Other Providers [ x] YES  [ ] NO    LABS:      RADIOLOGY & ADDITIONAL TESTS:    Imaging Personally Reviewed:  [ ] YES  [ ] NO  acetaminophen     Tablet .. 650 milliGRAM(s) Oral every 6 hours PRN  albuterol/ipratropium for Nebulization 3 milliLiter(s) Nebulizer every 6 hours  aluminum hydroxide/magnesium hydroxide/simethicone Suspension 30 milliLiter(s) Oral every 4 hours PRN  aspirin  chewable 81 milliGRAM(s) Oral daily  atorvastatin 40 milliGRAM(s) Oral at bedtime  benzonatate 100 milliGRAM(s) Oral every 8 hours PRN  dexAMETHasone     Tablet 6 milliGRAM(s) Oral daily  donepezil 10 milliGRAM(s) Oral at bedtime  furosemide    Tablet 40 milliGRAM(s) Oral daily  guaifenesin/dextromethorphan Oral Liquid 10 milliLiter(s) Oral every 4 hours PRN  heparin   Injectable 5000 Unit(s) SubCutaneous every 12 hours  lisinopril 10 milliGRAM(s) Oral daily  melatonin 3 milliGRAM(s) Oral at bedtime PRN  memantine 10 milliGRAM(s) Oral daily  metoprolol succinate ER 50 milliGRAM(s) Oral daily  ondansetron Injectable 4 milliGRAM(s) IV Push every 8 hours PRN  pantoprazole    Tablet 40 milliGRAM(s) Oral before breakfast      HEALTH ISSUES - PROBLEM Dx:    # VIral PNA 2/2 COVID  -CXR shows bilateral opacities L>R.   - RVP (+) for COVID  - DC abx, Remdesivir x3 days --> monitor LFT's  - Dexamethasone 6mg  - Oxygen as needed  - ID consult - recs appreciated    Acute kidney injury on ckd 3a  -Creat 1.8, baseline ~ 1.5  -Give NS at 75cc/hr, check bladder scan  -If no improvement, get renal US    #HTN/HLD/CHF  -No sign of volume overload  -continue with home medications  -Monitor BP      Total time spent to complete patient's bedside assessment, review medical chart, discuss medical plan of care with covering medical team was ____35____ with > 50% of time spent face to face w/ patient, discussion with patient/family and/or coordination of care Progress note    INTERVAL HPI/OVERNIGHT EVENTS:    Patient seen and examined at bedside. He denies any shortness of breath. He is on Nc.      REVIEW OF SYSTEMS:  All other 13 Review of systems were reviewed and are negative    FAMILY HISTORY:  No pertinent family history in first degree relatives      T(C): 37.2 (07-16-23 @ 04:39), Max: 37.2 (07-16-23 @ 04:39)  HR: 73 (07-16-23 @ 05:40) (62 - 85)  BP: 102/54 (07-16-23 @ 05:40) (97/54 - 129/58)  RR: 16 (07-16-23 @ 04:39) (16 - 20)  SpO2: 93% (07-16-23 @ 04:39) (93% - 96%)  Wt(kg): --Vital Signs Last 24 Hrs  T(C): 37.2 (16 Jul 2023 04:39), Max: 37.2 (16 Jul 2023 04:39)  T(F): 99 (16 Jul 2023 04:39), Max: 99 (16 Jul 2023 04:39)  HR: 73 (16 Jul 2023 05:40) (62 - 85)  BP: 102/54 (16 Jul 2023 05:40) (97/54 - 129/58)  BP(mean): --  RR: 16 (16 Jul 2023 04:39) (16 - 20)  SpO2: 93% (16 Jul 2023 04:39) (93% - 96%)    Parameters below as of 16 Jul 2023 04:39  Patient On (Oxygen Delivery Method): nasal cannula  O2 Flow (L/min): 2    No Known Allergies      PHYSICAL EXAM:    GENERAL: NAD  HEAD:  Atraumatic, Normocephalic  EYES: EOMI, PERRLA, conjunctiva and sclera clear  NECK: Supple, No JVD, Normal thyroid  NERVOUS SYSTEM:  Alert & Oriented X3, Moving all extremities  CHEST/LUNG: improving rhonchi with good air entry   HEART: Regular rate and rhythm; No murmurs, rubs, or gallops  ABDOMEN: Soft, Nontender, Nondistended; Bowel sounds present  EXTREMITIES:   No clubbing, cyanosis, or edema  SKIN: No rashes or lesions    Consultant(s) Notes Reviewed:  [x ] YES  [ ] NO  Care Discussed with Consultants/Other Providers [ x] YES  [ ] NO    LABS:      RADIOLOGY & ADDITIONAL TESTS:    Imaging Personally Reviewed:  [ ] YES  [ ] NO  acetaminophen     Tablet .. 650 milliGRAM(s) Oral every 6 hours PRN  albuterol/ipratropium for Nebulization 3 milliLiter(s) Nebulizer every 6 hours  aluminum hydroxide/magnesium hydroxide/simethicone Suspension 30 milliLiter(s) Oral every 4 hours PRN  aspirin  chewable 81 milliGRAM(s) Oral daily  atorvastatin 40 milliGRAM(s) Oral at bedtime  benzonatate 100 milliGRAM(s) Oral every 8 hours PRN  dexAMETHasone     Tablet 6 milliGRAM(s) Oral daily  donepezil 10 milliGRAM(s) Oral at bedtime  furosemide    Tablet 40 milliGRAM(s) Oral daily  guaifenesin/dextromethorphan Oral Liquid 10 milliLiter(s) Oral every 4 hours PRN  heparin   Injectable 5000 Unit(s) SubCutaneous every 12 hours  lisinopril 10 milliGRAM(s) Oral daily  melatonin 3 milliGRAM(s) Oral at bedtime PRN  memantine 10 milliGRAM(s) Oral daily  metoprolol succinate ER 50 milliGRAM(s) Oral daily  ondansetron Injectable 4 milliGRAM(s) IV Push every 8 hours PRN  pantoprazole    Tablet 40 milliGRAM(s) Oral before breakfast      HEALTH ISSUES - PROBLEM Dx:    # VIral PNA 2/2 COVID  -CXR shows bilateral opacities L>R.   - RVP (+) for COVID  - DC abx, Remdesivir x2 doses, appears to be causing ROYAL, will hold for now  - Dexamethasone 6mg  - Oxygen as needed  - ID consult - recs appreciated    Acute kidney injury on ckd 3a  -Creat 1.8-->2.0, baseline ~ 1.5  -Give NS at 75cc/hr, check bladder scan  -Get renal US, will hold further doses of Remdesivir as this may cause ROYAL  -Renal consult pending am labs    #HTN/HLD/CHF  -No sign of volume overload  -continue with home medications  -Monitor BP      Total time spent to complete patient's bedside assessment, review medical chart, discuss medical plan of care with covering medical team was ____35____ with > 50% of time spent face to face w/ patient, discussion with patient/family and/or coordination of care Progress note    INTERVAL HPI/OVERNIGHT EVENTS:    Patient seen and examined at bedside. He denies any shortness of breath. He is on Nc.      REVIEW OF SYSTEMS:  All other 13 Review of systems were reviewed and are negative    FAMILY HISTORY:  No pertinent family history in first degree relatives      T(C): 37.2 (07-16-23 @ 04:39), Max: 37.2 (07-16-23 @ 04:39)  HR: 73 (07-16-23 @ 05:40) (62 - 85)  BP: 102/54 (07-16-23 @ 05:40) (97/54 - 129/58)  RR: 16 (07-16-23 @ 04:39) (16 - 20)  SpO2: 93% (07-16-23 @ 04:39) (93% - 96%)  Wt(kg): --Vital Signs Last 24 Hrs  T(C): 37.2 (16 Jul 2023 04:39), Max: 37.2 (16 Jul 2023 04:39)  T(F): 99 (16 Jul 2023 04:39), Max: 99 (16 Jul 2023 04:39)  HR: 73 (16 Jul 2023 05:40) (62 - 85)  BP: 102/54 (16 Jul 2023 05:40) (97/54 - 129/58)  BP(mean): --  RR: 16 (16 Jul 2023 04:39) (16 - 20)  SpO2: 93% (16 Jul 2023 04:39) (93% - 96%)    Parameters below as of 16 Jul 2023 04:39  Patient On (Oxygen Delivery Method): nasal cannula  O2 Flow (L/min): 2    No Known Allergies      PHYSICAL EXAM:    GENERAL: NAD  HEAD:  Atraumatic, Normocephalic  EYES: EOMI, PERRLA, conjunctiva and sclera clear  NECK: Supple, No JVD, Normal thyroid  NERVOUS SYSTEM:  Alert & Oriented X3, Moving all extremities  CHEST/LUNG: improving rhonchi with good air entry   HEART: Regular rate and rhythm; No murmurs, rubs, or gallops  ABDOMEN: Soft, Nontender, Nondistended; Bowel sounds present  EXTREMITIES:   No clubbing, cyanosis, or edema  SKIN: No rashes or lesions    Consultant(s) Notes Reviewed:  [x ] YES  [ ] NO  Care Discussed with Consultants/Other Providers [ x] YES  [ ] NO    LABS:      RADIOLOGY & ADDITIONAL TESTS:    Imaging Personally Reviewed:  [ ] YES  [ ] NO  acetaminophen     Tablet .. 650 milliGRAM(s) Oral every 6 hours PRN  albuterol/ipratropium for Nebulization 3 milliLiter(s) Nebulizer every 6 hours  aluminum hydroxide/magnesium hydroxide/simethicone Suspension 30 milliLiter(s) Oral every 4 hours PRN  aspirin  chewable 81 milliGRAM(s) Oral daily  atorvastatin 40 milliGRAM(s) Oral at bedtime  benzonatate 100 milliGRAM(s) Oral every 8 hours PRN  dexAMETHasone     Tablet 6 milliGRAM(s) Oral daily  donepezil 10 milliGRAM(s) Oral at bedtime  furosemide    Tablet 40 milliGRAM(s) Oral daily  guaifenesin/dextromethorphan Oral Liquid 10 milliLiter(s) Oral every 4 hours PRN  heparin   Injectable 5000 Unit(s) SubCutaneous every 12 hours  lisinopril 10 milliGRAM(s) Oral daily  melatonin 3 milliGRAM(s) Oral at bedtime PRN  memantine 10 milliGRAM(s) Oral daily  metoprolol succinate ER 50 milliGRAM(s) Oral daily  ondansetron Injectable 4 milliGRAM(s) IV Push every 8 hours PRN  pantoprazole    Tablet 40 milliGRAM(s) Oral before breakfast      HEALTH ISSUES - PROBLEM Dx:    # VIral PNA 2/2 COVID  -CXR shows bilateral opacities L>R.   - RVP (+) for COVID  - DC abx, Remdesivir x2 doses, appears to be causing ROYAL, will hold for now  - Dexamethasone 6mg  - Oxygen as needed  - ID consult - recs appreciated    #Acute kidney injury on ckd 3a  #Hypotension  -Creat 1.8-->2.0, baseline ~ 1.5  -Give NS at 75cc/hr, check bladder scan  -Get renal US, will hold further doses of Remdesivir as this may cause ROYAL  -Hold furosemide and lisinopril at this time, not tachycardic and not in shock  -Renal consult pending am labs    #HTN/HLD/CHF  -No sign of volume overload  -continue with home medications  -Monitor BP

## 2023-07-17 LAB
ALBUMIN SERPL ELPH-MCNC: 3.3 G/DL — LOW (ref 3.5–5.2)
ALP SERPL-CCNC: 58 U/L — SIGNIFICANT CHANGE UP (ref 30–115)
ALT FLD-CCNC: 33 U/L — SIGNIFICANT CHANGE UP (ref 0–41)
ANION GAP SERPL CALC-SCNC: 12 MMOL/L — SIGNIFICANT CHANGE UP (ref 7–14)
AST SERPL-CCNC: 31 U/L — SIGNIFICANT CHANGE UP (ref 0–41)
BILIRUB SERPL-MCNC: 0.3 MG/DL — SIGNIFICANT CHANGE UP (ref 0.2–1.2)
BUN SERPL-MCNC: 53 MG/DL — HIGH (ref 10–20)
CALCIUM SERPL-MCNC: 8.3 MG/DL — LOW (ref 8.4–10.5)
CHLORIDE SERPL-SCNC: 103 MMOL/L — SIGNIFICANT CHANGE UP (ref 98–110)
CO2 SERPL-SCNC: 24 MMOL/L — SIGNIFICANT CHANGE UP (ref 17–32)
CREAT SERPL-MCNC: 2.1 MG/DL — HIGH (ref 0.7–1.5)
EGFR: 31 ML/MIN/1.73M2 — LOW
GLUCOSE SERPL-MCNC: 128 MG/DL — HIGH (ref 70–99)
MAGNESIUM SERPL-MCNC: 2.1 MG/DL — SIGNIFICANT CHANGE UP (ref 1.8–2.4)
POTASSIUM SERPL-MCNC: 4.2 MMOL/L — SIGNIFICANT CHANGE UP (ref 3.5–5)
POTASSIUM SERPL-SCNC: 4.2 MMOL/L — SIGNIFICANT CHANGE UP (ref 3.5–5)
PROT SERPL-MCNC: 6.3 G/DL — SIGNIFICANT CHANGE UP (ref 6–8)
SODIUM SERPL-SCNC: 139 MMOL/L — SIGNIFICANT CHANGE UP (ref 135–146)

## 2023-07-17 PROCEDURE — 99232 SBSQ HOSP IP/OBS MODERATE 35: CPT

## 2023-07-17 RX ADMIN — Medication 81 MILLIGRAM(S): at 12:29

## 2023-07-17 RX ADMIN — PANTOPRAZOLE SODIUM 40 MILLIGRAM(S): 20 TABLET, DELAYED RELEASE ORAL at 06:02

## 2023-07-17 RX ADMIN — Medication 3 MILLILITER(S): at 09:06

## 2023-07-17 RX ADMIN — HEPARIN SODIUM 5000 UNIT(S): 5000 INJECTION INTRAVENOUS; SUBCUTANEOUS at 17:06

## 2023-07-17 RX ADMIN — Medication 3 MILLILITER(S): at 14:12

## 2023-07-17 RX ADMIN — Medication 3 MILLILITER(S): at 19:44

## 2023-07-17 RX ADMIN — Medication 50 MILLIGRAM(S): at 06:01

## 2023-07-17 RX ADMIN — Medication 6 MILLIGRAM(S): at 06:02

## 2023-07-17 RX ADMIN — HEPARIN SODIUM 5000 UNIT(S): 5000 INJECTION INTRAVENOUS; SUBCUTANEOUS at 06:00

## 2023-07-17 RX ADMIN — DONEPEZIL HYDROCHLORIDE 10 MILLIGRAM(S): 10 TABLET, FILM COATED ORAL at 21:26

## 2023-07-17 RX ADMIN — MEMANTINE HYDROCHLORIDE 10 MILLIGRAM(S): 10 TABLET ORAL at 12:29

## 2023-07-17 RX ADMIN — ATORVASTATIN CALCIUM 40 MILLIGRAM(S): 80 TABLET, FILM COATED ORAL at 21:26

## 2023-07-17 NOTE — PROGRESS NOTE ADULT - SUBJECTIVE AND OBJECTIVE BOX
Progress note    INTERVAL HPI/OVERNIGHT EVENTS:    Patient seen and examined at bedside. Family at bedside. He is AAox3, He states he has nonproductive cough. Does not endorse shortness of breath.    REVIEW OF SYSTEMS:  All other 13 Review of systems were reviewed and are negative    FAMILY HISTORY:  No pertinent family history in first degree relatives      T(C): 36 (07-17-23 @ 04:40), Max: 36.1 (07-16-23 @ 20:33)  HR: 58 (07-17-23 @ 04:40) (58 - 67)  BP: 118/57 (07-17-23 @ 04:40) (93/52 - 118/57)  RR: 18 (07-17-23 @ 04:40) (16 - 18)  SpO2: 95% (07-17-23 @ 04:40) (94% - 95%)  Wt(kg): --Vital Signs Last 24 Hrs  T(C): 36 (17 Jul 2023 04:40), Max: 36.1 (16 Jul 2023 20:33)  T(F): 96.8 (17 Jul 2023 04:40), Max: 97 (16 Jul 2023 20:33)  HR: 58 (17 Jul 2023 04:40) (58 - 67)  BP: 118/57 (17 Jul 2023 04:40) (93/52 - 118/57)  BP(mean): --  RR: 18 (17 Jul 2023 04:40) (16 - 18)  SpO2: 95% (17 Jul 2023 04:40) (94% - 95%)      No Known Allergies      PHYSICAL EXAM:    GENERAL: NAD  HEAD:  Atraumatic, Normocephalic  EYES: EOMI, PERRLA, conjunctiva and sclera clear  NECK: Supple, No JVD, Normal thyroid  NERVOUS SYSTEM:  Alert & Oriented X3, Moving all extremities  CHEST/LUNG: Wheezing + rhonchi with good air entry   HEART: Regular rate and rhythm; No murmurs, rubs, or gallops  ABDOMEN: Soft, Nontender, Nondistended; Bowel sounds present  EXTREMITIES:   No clubbing, cyanosis, or edema  SKIN: No rashes or lesions      Consultant(s) Notes Reviewed:  [x ] YES  [ ] NO  Care Discussed with Consultants/Other Providers [ x] YES  [ ] NO    LABS:      RADIOLOGY & ADDITIONAL TESTS:    Imaging Personally Reviewed:  [ ] YES  [ ] NO  acetaminophen     Tablet .. 650 milliGRAM(s) Oral every 6 hours PRN  albuterol/ipratropium for Nebulization 3 milliLiter(s) Nebulizer every 6 hours  aluminum hydroxide/magnesium hydroxide/simethicone Suspension 30 milliLiter(s) Oral every 4 hours PRN  aspirin  chewable 81 milliGRAM(s) Oral daily  atorvastatin 40 milliGRAM(s) Oral at bedtime  benzonatate 100 milliGRAM(s) Oral every 8 hours PRN  dexAMETHasone     Tablet 6 milliGRAM(s) Oral daily  donepezil 10 milliGRAM(s) Oral at bedtime  guaifenesin/dextromethorphan Oral Liquid 10 milliLiter(s) Oral every 4 hours PRN  heparin   Injectable 5000 Unit(s) SubCutaneous every 12 hours  melatonin 3 milliGRAM(s) Oral at bedtime PRN  memantine 10 milliGRAM(s) Oral daily  metoprolol succinate ER 50 milliGRAM(s) Oral daily  ondansetron Injectable 4 milliGRAM(s) IV Push every 8 hours PRN  pantoprazole    Tablet 40 milliGRAM(s) Oral before breakfast  sodium chloride 0.9%. 1000 milliLiter(s) IV Continuous <Continuous>      HEALTH ISSUES - PROBLEM Dx:      # VIral PNA 2/2 COVID  -CXR shows bilateral opacities L>R.   - RVP (+) for COVID  - DC abx, Remdesivir x2 doses, appears to be causing ROYAL, will hold for now  - Dexamethasone 6mg  - Oxygen as needed  - ID consult - recs appreciated    #Acute kidney injury on ckd 3a  #Hypotension  -Creat 1.8-->2.0-->2.1, baseline ~ 1.5  -Give NS at 75cc/hr  - will hold further doses of Remdesivir as this may cause ROYAL  -Renal US (-)  -Hold furosemide and lisinopril at this time, not tachycardic and not in shock  -Consider renal consult    #HTN/HLD/CHF  -No sign of volume overload  -continue with home medications  -Monitor BP      Total time spent to complete patient's bedside assessment, review medical chart, discuss medical plan of care with covering medical team was ____35____ with > 50% of time spent face to face w/ patient, discussion with patient/family and/or coordination of care

## 2023-07-18 DIAGNOSIS — S50.312A ABRASION OF LEFT ELBOW, INITIAL ENCOUNTER: ICD-10-CM

## 2023-07-18 DIAGNOSIS — N17.9 ACUTE KIDNEY FAILURE, UNSPECIFIED: ICD-10-CM

## 2023-07-18 DIAGNOSIS — J98.11 ATELECTASIS: ICD-10-CM

## 2023-07-18 DIAGNOSIS — E87.6 HYPOKALEMIA: ICD-10-CM
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
## 2023-07-18 DIAGNOSIS — E86.0 DEHYDRATION: ICD-10-CM
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
## 2023-07-18 DIAGNOSIS — Z85.72 PERSONAL HISTORY OF NON-HODGKIN LYMPHOMAS: ICD-10-CM

## 2023-07-18 DIAGNOSIS — Z79.82 LONG TERM (CURRENT) USE OF ASPIRIN: ICD-10-CM

## 2023-07-18 DIAGNOSIS — E78.00 PURE HYPERCHOLESTEROLEMIA, UNSPECIFIED: ICD-10-CM

## 2023-07-18 DIAGNOSIS — I11.0 HYPERTENSIVE HEART DISEASE WITH HEART FAILURE: ICD-10-CM

## 2023-07-18 DIAGNOSIS — F03.90 UNSPECIFIED DEMENTIA WITHOUT BEHAVIORAL DISTURBANCE: ICD-10-CM

## 2023-07-18 DIAGNOSIS — E87.20 ACIDOSIS, UNSPECIFIED: ICD-10-CM

## 2023-07-18 DIAGNOSIS — I50.32 CHRONIC DIASTOLIC (CONGESTIVE) HEART FAILURE: ICD-10-CM

## 2023-07-18 DIAGNOSIS — I25.810 ATHEROSCLEROSIS OF CORONARY ARTERY BYPASS GRAFT(S) WITHOUT ANGINA PECTORIS: ICD-10-CM
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
## 2023-07-18 DIAGNOSIS — Z95.1 PRESENCE OF AORTOCORONARY BYPASS GRAFT: ICD-10-CM

## 2023-07-18 DIAGNOSIS — K59.00 CONSTIPATION, UNSPECIFIED: ICD-10-CM

## 2023-07-18 DIAGNOSIS — Z87.891 PERSONAL HISTORY OF NICOTINE DEPENDENCE: ICD-10-CM

## 2023-07-18 DIAGNOSIS — S00.81XA ABRASION OF OTHER PART OF HEAD, INITIAL ENCOUNTER: ICD-10-CM

## 2023-07-18 DIAGNOSIS — T79.6XXA TRAUMATIC ISCHEMIA OF MUSCLE, INITIAL ENCOUNTER: ICD-10-CM

## 2023-07-18 LAB
ANION GAP SERPL CALC-SCNC: 16 MMOL/L — HIGH (ref 7–14)
BUN SERPL-MCNC: 49 MG/DL — HIGH (ref 10–20)
CALCIUM SERPL-MCNC: 8.7 MG/DL — SIGNIFICANT CHANGE UP (ref 8.4–10.5)
CHLORIDE SERPL-SCNC: 105 MMOL/L — SIGNIFICANT CHANGE UP (ref 98–110)
CO2 SERPL-SCNC: 20 MMOL/L — SIGNIFICANT CHANGE UP (ref 17–32)
CREAT SERPL-MCNC: 1.7 MG/DL — HIGH (ref 0.7–1.5)
EGFR: 40 ML/MIN/1.73M2 — LOW
GLUCOSE SERPL-MCNC: 160 MG/DL — HIGH (ref 70–99)
HCT VFR BLD CALC: 39.5 % — LOW (ref 42–52)
HGB BLD-MCNC: 13 G/DL — LOW (ref 14–18)
MCHC RBC-ENTMCNC: 29 PG — SIGNIFICANT CHANGE UP (ref 27–31)
MCHC RBC-ENTMCNC: 32.9 G/DL — SIGNIFICANT CHANGE UP (ref 32–37)
MCV RBC AUTO: 88.2 FL — SIGNIFICANT CHANGE UP (ref 80–94)
NRBC # BLD: 0 /100 WBCS — SIGNIFICANT CHANGE UP (ref 0–0)
PLATELET # BLD AUTO: 162 K/UL — SIGNIFICANT CHANGE UP (ref 130–400)
PMV BLD: 11.4 FL — HIGH (ref 7.4–10.4)
POTASSIUM SERPL-MCNC: 4.2 MMOL/L — SIGNIFICANT CHANGE UP (ref 3.5–5)
POTASSIUM SERPL-SCNC: 4.2 MMOL/L — SIGNIFICANT CHANGE UP (ref 3.5–5)
RBC # BLD: 4.48 M/UL — LOW (ref 4.7–6.1)
RBC # FLD: 14.1 % — SIGNIFICANT CHANGE UP (ref 11.5–14.5)
SODIUM SERPL-SCNC: 141 MMOL/L — SIGNIFICANT CHANGE UP (ref 135–146)
WBC # BLD: 13.12 K/UL — HIGH (ref 4.8–10.8)
WBC # FLD AUTO: 13.12 K/UL — HIGH (ref 4.8–10.8)

## 2023-07-18 PROCEDURE — 99232 SBSQ HOSP IP/OBS MODERATE 35: CPT

## 2023-07-18 RX ORDER — HALOPERIDOL DECANOATE 100 MG/ML
3 INJECTION INTRAMUSCULAR ONCE
Refills: 0 | Status: COMPLETED | OUTPATIENT
Start: 2023-07-18 | End: 2023-07-18

## 2023-07-18 RX ORDER — HYDRALAZINE HCL 50 MG
10 TABLET ORAL ONCE
Refills: 0 | Status: COMPLETED | OUTPATIENT
Start: 2023-07-18 | End: 2023-07-18

## 2023-07-18 RX ADMIN — HEPARIN SODIUM 5000 UNIT(S): 5000 INJECTION INTRAVENOUS; SUBCUTANEOUS at 17:46

## 2023-07-18 RX ADMIN — Medication 81 MILLIGRAM(S): at 12:10

## 2023-07-18 RX ADMIN — Medication 6 MILLIGRAM(S): at 05:20

## 2023-07-18 RX ADMIN — PANTOPRAZOLE SODIUM 40 MILLIGRAM(S): 20 TABLET, DELAYED RELEASE ORAL at 05:20

## 2023-07-18 RX ADMIN — HALOPERIDOL DECANOATE 3 MILLIGRAM(S): 100 INJECTION INTRAMUSCULAR at 21:58

## 2023-07-18 RX ADMIN — Medication 3 MILLILITER(S): at 20:23

## 2023-07-18 RX ADMIN — Medication 3 MILLILITER(S): at 08:55

## 2023-07-18 RX ADMIN — MEMANTINE HYDROCHLORIDE 10 MILLIGRAM(S): 10 TABLET ORAL at 12:11

## 2023-07-18 RX ADMIN — Medication 10 MILLIGRAM(S): at 05:53

## 2023-07-18 RX ADMIN — HEPARIN SODIUM 5000 UNIT(S): 5000 INJECTION INTRAVENOUS; SUBCUTANEOUS at 05:21

## 2023-07-18 RX ADMIN — Medication 50 MILLIGRAM(S): at 05:20

## 2023-07-18 RX ADMIN — Medication 3 MILLILITER(S): at 14:29

## 2023-07-18 NOTE — CHART NOTE - NSCHARTNOTEFT_GEN_A_CORE
Patient /84. HR 66. Patient asymptotic  Patient was on lisinopril and metoprolol which was recently stopped   Will give one time dose of hydralazine 10mg  BP medication readjustment recommended

## 2023-07-18 NOTE — PROGRESS NOTE ADULT - SUBJECTIVE AND OBJECTIVE BOX
Progress note    INTERVAL HPI/OVERNIGHT EVENTS:    Patient seen and examined at bedside. He admits to coughing. He denies any shortness of breath.      REVIEW OF SYSTEMS:  All other 13 Review of systems were reviewed and are negative    FAMILY HISTORY:  No pertinent family history in first degree relatives      T(C): 35.8 (07-18-23 @ 13:20), Max: 35.9 (07-18-23 @ 05:26)  HR: 80 (07-18-23 @ 13:20) (64 - 81)  BP: 109/55 (07-18-23 @ 13:20) (109/55 - 180/84)  RR: 18 (07-18-23 @ 13:20) (18 - 18)  SpO2: 94% (07-18-23 @ 05:26) (94% - 95%)  Wt(kg): --Vital Signs Last 24 Hrs  T(C): 35.8 (18 Jul 2023 13:20), Max: 35.9 (18 Jul 2023 05:26)  T(F): 96.5 (18 Jul 2023 13:20), Max: 96.7 (18 Jul 2023 05:26)  HR: 80 (18 Jul 2023 13:20) (64 - 81)  BP: 109/55 (18 Jul 2023 13:20) (109/55 - 180/84)  BP(mean): --  RR: 18 (18 Jul 2023 13:20) (18 - 18)  SpO2: 94% (18 Jul 2023 05:26) (94% - 95%)    Parameters below as of 17 Jul 2023 22:14  Patient On (Oxygen Delivery Method): nasal cannula  O2 Flow (L/min): 2    No Known Allergies      PHYSICAL EXAM:    GENERAL: NAD  HEAD:  Atraumatic, Normocephalic  EYES: EOMI, PERRLA, conjunctiva and sclera clear  NECK: Supple, No JVD, Normal thyroid  NERVOUS SYSTEM:  Alert & Oriented X3, Moving all extremities  CHEST/LUNG: Wheezing + rhonchi with good air entry   HEART: Regular rate and rhythm; No murmurs, rubs, or gallops  ABDOMEN: Soft, Nontender, Nondistended; Bowel sounds present  EXTREMITIES:   No clubbing, cyanosis, or edema  SKIN: No rashes or lesions      Consultant(s) Notes Reviewed:  [x ] YES  [ ] NO  Care Discussed with Consultants/Other Providers [ x] YES  [ ] NO    LABS:      RADIOLOGY & ADDITIONAL TESTS:    Imaging Personally Reviewed:  [ ] YES  [ ] NO  acetaminophen     Tablet .. 650 milliGRAM(s) Oral every 6 hours PRN  albuterol/ipratropium for Nebulization 3 milliLiter(s) Nebulizer every 6 hours  aluminum hydroxide/magnesium hydroxide/simethicone Suspension 30 milliLiter(s) Oral every 4 hours PRN  aspirin  chewable 81 milliGRAM(s) Oral daily  atorvastatin 40 milliGRAM(s) Oral at bedtime  benzonatate 100 milliGRAM(s) Oral every 8 hours PRN  cloNIDine Patch 0.1 mG/24Hr(s) 1 patch Transdermal once PRN  dexAMETHasone     Tablet 6 milliGRAM(s) Oral daily  donepezil 10 milliGRAM(s) Oral at bedtime  guaifenesin/dextromethorphan Oral Liquid 10 milliLiter(s) Oral every 4 hours PRN  heparin   Injectable 5000 Unit(s) SubCutaneous every 12 hours  melatonin 3 milliGRAM(s) Oral at bedtime PRN  memantine 10 milliGRAM(s) Oral daily  metoprolol succinate ER 50 milliGRAM(s) Oral daily  ondansetron Injectable 4 milliGRAM(s) IV Push every 8 hours PRN  pantoprazole    Tablet 40 milliGRAM(s) Oral before breakfast  sodium chloride 0.9%. 1000 milliLiter(s) IV Continuous <Continuous>      HEALTH ISSUES - PROBLEM Dx:    # VIral PNA 2/2 COVID  -CXR shows bilateral opacities L>R.   - RVP (+) for COVID  - DC abx  - Remdesivir has been held due to ROYAL  - Dexamethasone 6mg qd  - Oxygen as needed  - ID consult - recs appreciated    #Acute kidney injury on ckd 3a  #Hypotension  -Creat 1.8-->2.0-->2.1-->1.7, baseline ~ 1.5  -S/p IVF  - will hold further doses of Remdesivir as this may cause ROYAL  -Renal US (-)  -Hold furosemide and lisinopril at this time, not tachycardic and not in shock    #HTN/HLD/CHF  -Clodine patch PRN  -continue with home medications  -Monitor BP    Dispo: can possibly dc tomorrow if Cr improves and maintains off oxygen    Total time spent to complete patient's bedside assessment, review medical chart, discuss medical plan of care with covering medical team was ____35____ with > 50% of time spent face to face w/ patient, discussion with patient/family and/or coordination of care

## 2023-07-19 LAB
ANION GAP SERPL CALC-SCNC: 11 MMOL/L — SIGNIFICANT CHANGE UP (ref 7–14)
BUN SERPL-MCNC: 40 MG/DL — HIGH (ref 10–20)
CALCIUM SERPL-MCNC: 8.9 MG/DL — SIGNIFICANT CHANGE UP (ref 8.4–10.5)
CHLORIDE SERPL-SCNC: 110 MMOL/L — SIGNIFICANT CHANGE UP (ref 98–110)
CO2 SERPL-SCNC: 24 MMOL/L — SIGNIFICANT CHANGE UP (ref 17–32)
CREAT SERPL-MCNC: 1.4 MG/DL — SIGNIFICANT CHANGE UP (ref 0.7–1.5)
CULTURE RESULTS: SIGNIFICANT CHANGE UP
CULTURE RESULTS: SIGNIFICANT CHANGE UP
EGFR: 50 ML/MIN/1.73M2 — LOW
GLUCOSE SERPL-MCNC: 137 MG/DL — HIGH (ref 70–99)
POTASSIUM SERPL-MCNC: 4.4 MMOL/L — SIGNIFICANT CHANGE UP (ref 3.5–5)
POTASSIUM SERPL-SCNC: 4.4 MMOL/L — SIGNIFICANT CHANGE UP (ref 3.5–5)
SODIUM SERPL-SCNC: 145 MMOL/L — SIGNIFICANT CHANGE UP (ref 135–146)
SPECIMEN SOURCE: SIGNIFICANT CHANGE UP
SPECIMEN SOURCE: SIGNIFICANT CHANGE UP

## 2023-07-19 PROCEDURE — 99232 SBSQ HOSP IP/OBS MODERATE 35: CPT

## 2023-07-19 RX ORDER — QUETIAPINE FUMARATE 200 MG/1
12.5 TABLET, FILM COATED ORAL AT BEDTIME
Refills: 0 | Status: DISCONTINUED | OUTPATIENT
Start: 2023-07-19 | End: 2023-07-23

## 2023-07-19 RX ORDER — HYDRALAZINE HCL 50 MG
50 TABLET ORAL EVERY 8 HOURS
Refills: 0 | Status: DISCONTINUED | OUTPATIENT
Start: 2023-07-19 | End: 2023-07-23

## 2023-07-19 RX ADMIN — HEPARIN SODIUM 5000 UNIT(S): 5000 INJECTION INTRAVENOUS; SUBCUTANEOUS at 05:51

## 2023-07-19 RX ADMIN — Medication 6 MILLIGRAM(S): at 05:51

## 2023-07-19 RX ADMIN — Medication 3 MILLILITER(S): at 14:53

## 2023-07-19 RX ADMIN — ATORVASTATIN CALCIUM 40 MILLIGRAM(S): 80 TABLET, FILM COATED ORAL at 22:31

## 2023-07-19 RX ADMIN — Medication 3 MILLILITER(S): at 20:18

## 2023-07-19 RX ADMIN — MEMANTINE HYDROCHLORIDE 10 MILLIGRAM(S): 10 TABLET ORAL at 13:08

## 2023-07-19 RX ADMIN — Medication 3 MILLILITER(S): at 08:07

## 2023-07-19 RX ADMIN — DONEPEZIL HYDROCHLORIDE 10 MILLIGRAM(S): 10 TABLET, FILM COATED ORAL at 22:31

## 2023-07-19 RX ADMIN — HEPARIN SODIUM 5000 UNIT(S): 5000 INJECTION INTRAVENOUS; SUBCUTANEOUS at 17:46

## 2023-07-19 RX ADMIN — QUETIAPINE FUMARATE 12.5 MILLIGRAM(S): 200 TABLET, FILM COATED ORAL at 22:31

## 2023-07-19 RX ADMIN — Medication 81 MILLIGRAM(S): at 13:07

## 2023-07-19 RX ADMIN — Medication 50 MILLIGRAM(S): at 05:51

## 2023-07-19 NOTE — PROGRESS NOTE ADULT - SUBJECTIVE AND OBJECTIVE BOX
NOEMY [id number]    Subjective/Interval History       ROS    PHYSICAL EXAM  Vital Signs Last 24 Hrs  T(C): 37.1 (19 Jul 2023 05:19), Max: 37.1 (19 Jul 2023 05:19)  T(F): 98.8 (19 Jul 2023 05:19), Max: 98.8 (19 Jul 2023 05:19)  HR: 84 (19 Jul 2023 05:19) (84 - 92)  BP: 161/67 (19 Jul 2023 05:19) (161/67 - 170/78)  BP(mean): --  RR: 18 (19 Jul 2023 05:19) (18 - 18)  SpO2: 91% (19 Jul 2023 13:05) (91% - 96%)      GA : AAOX3, NAD   HEENT: PERRLA, EOMI  NECK: no JVD, no thyromegaly   CVS: S1 S2 no murmur no rubs no gallop  RESP: CTAB no wheeze, no rhonchi no rales  ABD: Soft, NT, ND, tympanic, no rebound or guarding   : No Yao, No CVA tenderness   EXT; no pedal edema, no cyanosis  MSK: No ML spinal tenderness, normal ROM   NEURO: AAOX3, no new focal deficits     LABS/ IMAGING                        13.0   13.12 )-----------( 162      ( 18 Jul 2023 09:23 )             39.5         07-19    145  |  110  |  40<H>  ----------------------------<  137<H>  4.4   |  24  |  1.4    Ca    8.9      19 Jul 2023 09:15      CAPILLARY BLOOD GLUCOSE        Urinalysis Basic - ( 19 Jul 2023 09:15 )    Color: x / Appearance: x / SG: x / pH: x  Gluc: 137 mg/dL / Ketone: x  / Bili: x / Urobili: x   Blood: x / Protein: x / Nitrite: x   Leuk Esterase: x / RBC: x / WBC x   Sq Epi: x / Non Sq Epi: x / Bacteria: x               NOEMY [id number]    Subjective/Interval History   -lethargic this morning per daughter   - pt denies cough, improving SOB.   - BP slightly elevated.     ROS  - unable to obtain due to lethargy.     PHYSICAL EXAM  Vital Signs Last 24 Hrs  T(C): 37.1 (19 Jul 2023 05:19), Max: 37.1 (19 Jul 2023 05:19)  T(F): 98.8 (19 Jul 2023 05:19), Max: 98.8 (19 Jul 2023 05:19)  HR: 84 (19 Jul 2023 05:19) (84 - 92)  BP: 161/67 (19 Jul 2023 05:19) (161/67 - 170/78)  BP(mean): --  RR: 18 (19 Jul 2023 05:19) (18 - 18)  SpO2: 91% (19 Jul 2023 13:05) (91% - 96%)      GA : Lethargic, AOX2, NAD   HEENT: PERRL  NECK: no JVD, no thyromegaly   CVS: S1 S2 no murmur no rubs no gallop  RESP: CTAB no wheeze, no rhonchi no rales  ABD: Soft, NT, ND, tympanic, no rebound or guarding   EXT; no pedal edema, no cyanosis  NEURO:  Lethargic, AOX3, no new focal deficits     LABS/ IMAGING                        13.0   13.12 )-----------( 162      ( 18 Jul 2023 09:23 )             39.5         07-19    145  |  110  |  40<H>  ----------------------------<  137<H>  4.4   |  24  |  1.4    Ca    8.9      19 Jul 2023 09:15      CAPILLARY BLOOD GLUCOSE        Urinalysis Basic - ( 19 Jul 2023 09:15 )    Color: x / Appearance: x / SG: x / pH: x  Gluc: 137 mg/dL / Ketone: x  / Bili: x / Urobili: x   Blood: x / Protein: x / Nitrite: x   Leuk Esterase: x / RBC: x / WBC x   Sq Epi: x / Non Sq Epi: x / Bacteria: x

## 2023-07-19 NOTE — PROGRESS NOTE ADULT - ASSESSMENT
MEDICATIONS  (STANDING):  albuterol/ipratropium for Nebulization 3 milliLiter(s) Nebulizer every 6 hours  aspirin  chewable 81 milliGRAM(s) Oral daily  atorvastatin 40 milliGRAM(s) Oral at bedtime  dexAMETHasone     Tablet 6 milliGRAM(s) Oral daily  donepezil 10 milliGRAM(s) Oral at bedtime  heparin   Injectable 5000 Unit(s) SubCutaneous every 12 hours  memantine 10 milliGRAM(s) Oral daily  metoprolol succinate ER 50 milliGRAM(s) Oral daily  pantoprazole    Tablet 40 milliGRAM(s) Oral before breakfast  QUEtiapine 12.5 milliGRAM(s) Oral at bedtime    MEDICATIONS  (PRN):  acetaminophen     Tablet .. 650 milliGRAM(s) Oral every 6 hours PRN Temp greater or equal to 38C (100.4F), Mild Pain (1 - 3)  aluminum hydroxide/magnesium hydroxide/simethicone Suspension 30 milliLiter(s) Oral every 4 hours PRN Dyspepsia  benzonatate 100 milliGRAM(s) Oral every 8 hours PRN Cough  cloNIDine Patch 0.1 mG/24Hr(s) 1 patch Transdermal once PRN SBP > 165  guaifenesin/dextromethorphan Oral Liquid 10 milliLiter(s) Oral every 4 hours PRN Cough  melatonin 3 milliGRAM(s) Oral at bedtime PRN Insomnia  ondansetron Injectable 4 milliGRAM(s) IV Push every 8 hours PRN Nausea and/or Vomiting    Assessment /Plan  # VIral PNA 2/2 COVID  -CXR shows bilateral opacities L>R.   - RVP (+) for COVID  - DC abx  - Remdesivir has been held due to ROYAL  - Dexamethasone 6mg qd  - Oxygen as needed  - ID consult - recs appreciated    Steroid induced psychosis  - seroquel 12.5 mg HS  - cont dexamethasone until o2 weaned off.     #Acute kidney injury on ckd 3a  #Hypotension  -Creat 1.8-->2.0-->2.1-->1.7, baseline ~ 1.5  -S/p IVF  - will hold further doses of Remdesivir as this may cause ROYAL  -Renal US (-)  -Hold furosemide and lisinopril at this time, not tachycardic and not in shock    HTN/HLD/CHF  -Clodine patch PRN  -continue with home medications  -Monitor BP    DVT/GI px  - heparin subq/ PPI PO     Full code    Home Medications:  aspirin 81 mg oral tablet, chewable: 1 tab(s) orally once a day (19 Jun 2021 09:18)  atorvastatin 40 mg oral tablet: 1 tab(s) orally once a day (at bedtime) (19 Jun 2021 09:18)  donepezil 10 mg oral tablet: 1 tab(s) orally once a day (at bedtime) (19 Jun 2021 09:18)  famotidine 40 mg oral tablet: 1 tab(s) orally once a day (19 Jun 2021 09:18)  lisinopril 10 mg oral tablet: 1 tab(s) orally once a day (19 Jun 2021 09:18)  memantine 10 mg oral tablet: 1 tab(s) orally once a day (19 Jun 2021 09:18)  metoprolol succinate 50 mg oral tablet, extended release: 1 tab(s) orally once a day (19 Jun 2021 09:18)    MEDICATIONS  (STANDING):  albuterol/ipratropium for Nebulization 3 milliLiter(s) Nebulizer every 6 hours  aspirin  chewable 81 milliGRAM(s) Oral daily  atorvastatin 40 milliGRAM(s) Oral at bedtime  dexAMETHasone     Tablet 6 milliGRAM(s) Oral daily  donepezil 10 milliGRAM(s) Oral at bedtime  heparin   Injectable 5000 Unit(s) SubCutaneous every 12 hours  memantine 10 milliGRAM(s) Oral daily  metoprolol succinate ER 50 milliGRAM(s) Oral daily  pantoprazole    Tablet 40 milliGRAM(s) Oral before breakfast  QUEtiapine 12.5 milliGRAM(s) Oral at bedtime    MEDICATIONS  (PRN):  acetaminophen     Tablet .. 650 milliGRAM(s) Oral every 6 hours PRN Temp greater or equal to 38C (100.4F), Mild Pain (1 - 3)  aluminum hydroxide/magnesium hydroxide/simethicone Suspension 30 milliLiter(s) Oral every 4 hours PRN Dyspepsia  benzonatate 100 milliGRAM(s) Oral every 8 hours PRN Cough  guaifenesin/dextromethorphan Oral Liquid 10 milliLiter(s) Oral every 4 hours PRN Cough  melatonin 3 milliGRAM(s) Oral at bedtime PRN Insomnia  ondansetron Injectable 4 milliGRAM(s) IV Push every 8 hours PRN Nausea and/or Vomiting    Assessment /Plan  COVI 19 pneumonia b/l with hypoxia.   -CXR shows bilateral opacities L>R.   - RVP (+) for COVID  - off abx.   - Remdesivir  held due to ROYAL  - Dexamethasone 6mg qd  - Oxygen as needed  - ID consult - recs appreciated  -ECHO ef 54% dilation arotic root 3.8 cm   - PT consulted     Steroid induced psychosis  - seroquel 12.5 mg HS  - cont dexamethasone until o2 weaned off.     Dilated Aortic root 3.8 cm   -change lisinopril 10 mg to enalapril 10 mg po daily. cont toprol xl     Acute kidney injury on ckd 3a  --S/p IVF  - Creatinine improved to 1.4 which is baseline.   - hold Remdesivir d/t renal dysfunction.   - Renal US (-)  - Hold furosemide     HTN elevated  - change lisinopril to enalapril 10 mg po daily  - cont toprol xl   - PRN hydralazine      Chronic D-CHF  - held lasix, may resume in am   - cont toprol xl and enalapril     DVT/GI px  - heparin subq/ PPI PO     Full code

## 2023-07-20 LAB
ANION GAP SERPL CALC-SCNC: 11 MMOL/L — SIGNIFICANT CHANGE UP (ref 7–14)
BASOPHILS # BLD AUTO: 0.01 K/UL — SIGNIFICANT CHANGE UP (ref 0–0.2)
BASOPHILS NFR BLD AUTO: 0.1 % — SIGNIFICANT CHANGE UP (ref 0–1)
BUN SERPL-MCNC: 34 MG/DL — HIGH (ref 10–20)
CALCIUM SERPL-MCNC: 8.6 MG/DL — SIGNIFICANT CHANGE UP (ref 8.4–10.5)
CHLORIDE SERPL-SCNC: 107 MMOL/L — SIGNIFICANT CHANGE UP (ref 98–110)
CO2 SERPL-SCNC: 25 MMOL/L — SIGNIFICANT CHANGE UP (ref 17–32)
CREAT SERPL-MCNC: 1.3 MG/DL — SIGNIFICANT CHANGE UP (ref 0.7–1.5)
EGFR: 55 ML/MIN/1.73M2 — LOW
EOSINOPHIL # BLD AUTO: 0.01 K/UL — SIGNIFICANT CHANGE UP (ref 0–0.7)
EOSINOPHIL NFR BLD AUTO: 0.1 % — SIGNIFICANT CHANGE UP (ref 0–8)
GLUCOSE SERPL-MCNC: 100 MG/DL — HIGH (ref 70–99)
HCT VFR BLD CALC: 35.1 % — LOW (ref 42–52)
HGB BLD-MCNC: 11.6 G/DL — LOW (ref 14–18)
IMM GRANULOCYTES NFR BLD AUTO: 0.6 % — HIGH (ref 0.1–0.3)
LYMPHOCYTES # BLD AUTO: 1.3 K/UL — SIGNIFICANT CHANGE UP (ref 1.2–3.4)
LYMPHOCYTES # BLD AUTO: 10.8 % — LOW (ref 20.5–51.1)
MCHC RBC-ENTMCNC: 29.1 PG — SIGNIFICANT CHANGE UP (ref 27–31)
MCHC RBC-ENTMCNC: 33 G/DL — SIGNIFICANT CHANGE UP (ref 32–37)
MCV RBC AUTO: 88.2 FL — SIGNIFICANT CHANGE UP (ref 80–94)
MONOCYTES # BLD AUTO: 1.31 K/UL — HIGH (ref 0.1–0.6)
MONOCYTES NFR BLD AUTO: 10.9 % — HIGH (ref 1.7–9.3)
NEUTROPHILS # BLD AUTO: 9.29 K/UL — HIGH (ref 1.4–6.5)
NEUTROPHILS NFR BLD AUTO: 77.5 % — HIGH (ref 42.2–75.2)
NRBC # BLD: 0 /100 WBCS — SIGNIFICANT CHANGE UP (ref 0–0)
PLATELET # BLD AUTO: 153 K/UL — SIGNIFICANT CHANGE UP (ref 130–400)
PMV BLD: 11.6 FL — HIGH (ref 7.4–10.4)
POTASSIUM SERPL-MCNC: 3.9 MMOL/L — SIGNIFICANT CHANGE UP (ref 3.5–5)
POTASSIUM SERPL-SCNC: 3.9 MMOL/L — SIGNIFICANT CHANGE UP (ref 3.5–5)
RBC # BLD: 3.98 M/UL — LOW (ref 4.7–6.1)
RBC # FLD: 14.4 % — SIGNIFICANT CHANGE UP (ref 11.5–14.5)
SODIUM SERPL-SCNC: 143 MMOL/L — SIGNIFICANT CHANGE UP (ref 135–146)
WBC # BLD: 11.99 K/UL — HIGH (ref 4.8–10.8)
WBC # FLD AUTO: 11.99 K/UL — HIGH (ref 4.8–10.8)

## 2023-07-20 PROCEDURE — 99232 SBSQ HOSP IP/OBS MODERATE 35: CPT

## 2023-07-20 RX ORDER — FUROSEMIDE 40 MG
20 TABLET ORAL DAILY
Refills: 0 | Status: DISCONTINUED | OUTPATIENT
Start: 2023-07-20 | End: 2023-07-23

## 2023-07-20 RX ORDER — POLYETHYLENE GLYCOL 3350 17 G/17G
17 POWDER, FOR SOLUTION ORAL ONCE
Refills: 0 | Status: COMPLETED | OUTPATIENT
Start: 2023-07-20 | End: 2023-07-20

## 2023-07-20 RX ADMIN — POLYETHYLENE GLYCOL 3350 17 GRAM(S): 17 POWDER, FOR SOLUTION ORAL at 16:10

## 2023-07-20 RX ADMIN — POLYETHYLENE GLYCOL 3350 17 GRAM(S): 17 POWDER, FOR SOLUTION ORAL at 21:50

## 2023-07-20 RX ADMIN — DONEPEZIL HYDROCHLORIDE 10 MILLIGRAM(S): 10 TABLET, FILM COATED ORAL at 21:50

## 2023-07-20 RX ADMIN — Medication 50 MILLIGRAM(S): at 05:50

## 2023-07-20 RX ADMIN — PANTOPRAZOLE SODIUM 40 MILLIGRAM(S): 20 TABLET, DELAYED RELEASE ORAL at 08:00

## 2023-07-20 RX ADMIN — ATORVASTATIN CALCIUM 40 MILLIGRAM(S): 80 TABLET, FILM COATED ORAL at 21:50

## 2023-07-20 RX ADMIN — HEPARIN SODIUM 5000 UNIT(S): 5000 INJECTION INTRAVENOUS; SUBCUTANEOUS at 18:43

## 2023-07-20 RX ADMIN — HEPARIN SODIUM 5000 UNIT(S): 5000 INJECTION INTRAVENOUS; SUBCUTANEOUS at 05:50

## 2023-07-20 RX ADMIN — MEMANTINE HYDROCHLORIDE 10 MILLIGRAM(S): 10 TABLET ORAL at 11:31

## 2023-07-20 RX ADMIN — QUETIAPINE FUMARATE 12.5 MILLIGRAM(S): 200 TABLET, FILM COATED ORAL at 21:50

## 2023-07-20 RX ADMIN — Medication 81 MILLIGRAM(S): at 11:31

## 2023-07-20 RX ADMIN — Medication 6 MILLIGRAM(S): at 05:50

## 2023-07-20 NOTE — PROGRESS NOTE ADULT - SUBJECTIVE AND OBJECTIVE BOX
NOEMY [id number]    Subjective/Interval History       ROS    PHYSICAL EXAM  Vital Signs Last 24 Hrs  T(C): 36.4 (19 Jul 2023 22:32), Max: 36.4 (19 Jul 2023 22:32)  T(F): 97.5 (19 Jul 2023 22:32), Max: 97.5 (19 Jul 2023 22:32)  HR: 69 (20 Jul 2023 05:22) (69 - 78)  BP: 143/82 (20 Jul 2023 05:22) (126/66 - 143/82)  BP(mean): --  RR: 18 (20 Jul 2023 05:22) (18 - 18)  SpO2: 94% (20 Jul 2023 07:50) (91% - 94%)    Parameters below as of 20 Jul 2023 07:50  Patient On (Oxygen Delivery Method): room air      GA : AAOX3, NAD   HEENT: PERRLA, EOMI  NECK: no JVD, no thyromegaly   CVS: S1 S2 no murmur no rubs no gallop  RESP: CTAB no wheeze, no rhonchi no rales  ABD: Soft, NT, ND, tympanic, no rebound or guarding   : No Yao, No CVA tenderness   EXT; no pedal edema, no cyanosis  MSK: No ML spinal tenderness, normal ROM   NEURO: AAOX3, no new focal deficits     LABS/ IMAGING                        11.6   11.99 )-----------( 153      ( 20 Jul 2023 07:30 )             35.1         07-20    143  |  107  |  34<H>  ----------------------------<  100<H>  3.9   |  25  |  1.3    Ca    8.6      20 Jul 2023 07:30      CAPILLARY BLOOD GLUCOSE        Urinalysis Basic - ( 20 Jul 2023 07:30 )    Color: x / Appearance: x / SG: x / pH: x  Gluc: 100 mg/dL / Ketone: x  / Bili: x / Urobili: x   Blood: x / Protein: x / Nitrite: x   Leuk Esterase: x / RBC: x / WBC x   Sq Epi: x / Non Sq Epi: x / Bacteria: x               NOEMY [id number]    Subjective/Interval History   -constipated  - weaned off of o2    ROS  - no fever.     PHYSICAL EXAM  Vital Signs Last 24 Hrs  T(C): 36.4 (19 Jul 2023 22:32), Max: 36.4 (19 Jul 2023 22:32)  T(F): 97.5 (19 Jul 2023 22:32), Max: 97.5 (19 Jul 2023 22:32)  HR: 69 (20 Jul 2023 05:22) (69 - 78)  BP: 143/82 (20 Jul 2023 05:22) (126/66 - 143/82)  BP(mean): --  RR: 18 (20 Jul 2023 05:22) (18 - 18)  SpO2: 94% (20 Jul 2023 07:50) (91% - 94%)    Parameters below as of 20 Jul 2023 07:50  Patient On (Oxygen Delivery Method): room air      GA : AAOX3, NAD   HEENT: PERRLA, EOMI  NECK: no JVD, no thyromegaly   CVS: S1 S2 no murmur no rubs no gallop, irregularly irregular   RESP: CTAB no wheeze, no rhonchi no rales  ABD: Soft, NT, ND, tympanic, no rebound or guarding   EXT; no pedal edema, no cyanosis   NEURO: AAOX3, no new focal deficits     LABS/ IMAGING                        11.6   11.99 )-----------( 153      ( 20 Jul 2023 07:30 )             35.1         07-20    143  |  107  |  34<H>  ----------------------------<  100<H>  3.9   |  25  |  1.3    Ca    8.6      20 Jul 2023 07:30      CAPILLARY BLOOD GLUCOSE        Urinalysis Basic - ( 20 Jul 2023 07:30 )    Color: x / Appearance: x / SG: x / pH: x  Gluc: 100 mg/dL / Ketone: x  / Bili: x / Urobili: x   Blood: x / Protein: x / Nitrite: x   Leuk Esterase: x / RBC: x / WBC x   Sq Epi: x / Non Sq Epi: x / Bacteria: x

## 2023-07-20 NOTE — PROGRESS NOTE ADULT - ASSESSMENT
Home Medications:  aspirin 81 mg oral tablet, chewable: 1 tab(s) orally once a day (19 Jun 2021 09:18)  atorvastatin 40 mg oral tablet: 1 tab(s) orally once a day (at bedtime) (19 Jun 2021 09:18)  donepezil 10 mg oral tablet: 1 tab(s) orally once a day (at bedtime) (19 Jun 2021 09:18)  famotidine 40 mg oral tablet: 1 tab(s) orally once a day (19 Jun 2021 09:18)  lisinopril 10 mg oral tablet: 1 tab(s) orally once a day (19 Jun 2021 09:18)  memantine 10 mg oral tablet: 1 tab(s) orally once a day (19 Jun 2021 09:18)  metoprolol succinate 50 mg oral tablet, extended release: 1 tab(s) orally once a day (19 Jun 2021 09:18)    MEDICATIONS  (STANDING):  albuterol/ipratropium for Nebulization 3 milliLiter(s) Nebulizer every 6 hours  aspirin  chewable 81 milliGRAM(s) Oral daily  atorvastatin 40 milliGRAM(s) Oral at bedtime  dexAMETHasone     Tablet 6 milliGRAM(s) Oral daily  donepezil 10 milliGRAM(s) Oral at bedtime  heparin   Injectable 5000 Unit(s) SubCutaneous every 12 hours  memantine 10 milliGRAM(s) Oral daily  metoprolol succinate ER 50 milliGRAM(s) Oral daily  pantoprazole    Tablet 40 milliGRAM(s) Oral before breakfast  QUEtiapine 12.5 milliGRAM(s) Oral at bedtime    MEDICATIONS  (PRN):  acetaminophen     Tablet .. 650 milliGRAM(s) Oral every 6 hours PRN Temp greater or equal to 38C (100.4F), Mild Pain (1 - 3)  aluminum hydroxide/magnesium hydroxide/simethicone Suspension 30 milliLiter(s) Oral every 4 hours PRN Dyspepsia  benzonatate 100 milliGRAM(s) Oral every 8 hours PRN Cough  guaifenesin/dextromethorphan Oral Liquid 10 milliLiter(s) Oral every 4 hours PRN Cough  melatonin 3 milliGRAM(s) Oral at bedtime PRN Insomnia  ondansetron Injectable 4 milliGRAM(s) IV Push every 8 hours PRN Nausea and/or Vomiting    Assessment /Plan  COVI 19 pneumonia b/l with hypoxia.   -CXR shows bilateral opacities L>R.   - RVP (+) for COVID  - off abx.   - Remdesivir  held due to ROYAL  - Dexamethasone 6mg qd  - Oxygen as needed  - ID consult - recs appreciated  -ECHO ef 54% dilation arotic root 3.8 cm   - PT consulted     Steroid induced psychosis  - seroquel 12.5 mg HS  - cont dexamethasone until o2 weaned off.     Dilated Aortic root 3.8 cm   -change lisinopril 10 mg to enalapril 10 mg po daily. cont toprol xl     Acute kidney injury on ckd 3a  --S/p IVF  - Creatinine improved to 1.4 which is baseline.   - hold Remdesivir d/t renal dysfunction.   - Renal US (-)  - Hold furosemide     HTN elevated  - change lisinopril to enalapril 10 mg po daily  - cont toprol xl   - PRN hydralazine      Chronic D-CHF  - held lasix, may resume in am   - cont toprol xl and enalapril     DVT/GI px  - heparin subq/ PPI PO     Full code    Home Medications:  aspirin 81 mg oral tablet, chewable: 1 tab(s) orally once a day (19 Jun 2021 09:18)  atorvastatin 40 mg oral tablet: 1 tab(s) orally once a day (at bedtime) (19 Jun 2021 09:18)  donepezil 10 mg oral tablet: 1 tab(s) orally once a day (at bedtime) (19 Jun 2021 09:18)  famotidine 40 mg oral tablet: 1 tab(s) orally once a day (19 Jun 2021 09:18)  lisinopril 10 mg oral tablet: 1 tab(s) orally once a day (19 Jun 2021 09:18)  memantine 10 mg oral tablet: 1 tab(s) orally once a day (19 Jun 2021 09:18)  metoprolol succinate 50 mg oral tablet, extended release: 1 tab(s) orally once a day (19 Jun 2021 09:18)    MEDICATIONS  (STANDING):  albuterol/ipratropium for Nebulization 3 milliLiter(s) Nebulizer every 6 hours  aspirin  chewable 81 milliGRAM(s) Oral daily  atorvastatin 40 milliGRAM(s) Oral at bedtime  dexAMETHasone     Tablet 6 milliGRAM(s) Oral daily  donepezil 10 milliGRAM(s) Oral at bedtime  enalapril 10 milliGRAM(s) Oral daily  furosemide    Tablet 20 milliGRAM(s) Oral daily  heparin   Injectable 5000 Unit(s) SubCutaneous every 12 hours  memantine 10 milliGRAM(s) Oral daily  metoprolol succinate ER 50 milliGRAM(s) Oral daily  pantoprazole    Tablet 40 milliGRAM(s) Oral before breakfast  QUEtiapine 12.5 milliGRAM(s) Oral at bedtime    MEDICATIONS  (PRN):  acetaminophen     Tablet .. 650 milliGRAM(s) Oral every 6 hours PRN Temp greater or equal to 38C (100.4F), Mild Pain (1 - 3)  aluminum hydroxide/magnesium hydroxide/simethicone Suspension 30 milliLiter(s) Oral every 4 hours PRN Dyspepsia  benzonatate 100 milliGRAM(s) Oral every 8 hours PRN Cough  guaifenesin/dextromethorphan Oral Liquid 10 milliLiter(s) Oral every 4 hours PRN Cough  hydrALAZINE 50 milliGRAM(s) Oral every 8 hours PRN Systolic blood pressure >165  melatonin 3 milliGRAM(s) Oral at bedtime PRN Insomnia  ondansetron Injectable 4 milliGRAM(s) IV Push every 8 hours PRN Nausea and/or Vomiting    Assessment /Plan  COVI 19 pneumonia b/l with hypoxia  - hypoxia resolved. .   -CXR shows bilateral opacities L>R.   - RVP (+) for COVID  - off abx.   - Remdesivir  held due to ROYAL  - Dexamethasone 6mg qd d7/10   - Oxygen as needed  - ID consult - recs appreciated  -ECHO ef 54% dilation arotic root 3.8 cm   - PT consulted, recs STR.     Steroid induced psychosis (resolved)   - seroquel 12.5 mg HS  - cont dexamethasone until o2 weaned off.  D7/10     Constipation  - miralax x 1     Dilated Aortic root 3.8 cm   -change lisinopril 10 mg to enalapril 10 mg po daily. cont toprol xl     Acute kidney injury on ckd 3a  --S/p IVF  - Creatinine improved to 1.4 which is baseline.   - hold Remdesivir d/t renal dysfunction.   - Renal US (-)  - Hold furosemide     HT(improved)   - affected by steroids.   - change lisinopril to enalapril 10 mg po daily  - cont toprol xl   - PRN hydralazine      Chronic D-CHF  - resumed lasix 20 mg daily (home on 40 mg po daily)    - cont toprol xl and enalapril     DVT/GI px  - heparin subq/ PPI PO     Full code     Anticipate DC in am .

## 2023-07-21 LAB
ANION GAP SERPL CALC-SCNC: 11 MMOL/L — SIGNIFICANT CHANGE UP (ref 7–14)
BASOPHILS # BLD AUTO: 0.01 K/UL — SIGNIFICANT CHANGE UP (ref 0–0.2)
BASOPHILS NFR BLD AUTO: 0.1 % — SIGNIFICANT CHANGE UP (ref 0–1)
BUN SERPL-MCNC: 31 MG/DL — HIGH (ref 10–20)
CALCIUM SERPL-MCNC: 8.7 MG/DL — SIGNIFICANT CHANGE UP (ref 8.4–10.5)
CHLORIDE SERPL-SCNC: 107 MMOL/L — SIGNIFICANT CHANGE UP (ref 98–110)
CO2 SERPL-SCNC: 23 MMOL/L — SIGNIFICANT CHANGE UP (ref 17–32)
CREAT SERPL-MCNC: 1.2 MG/DL — SIGNIFICANT CHANGE UP (ref 0.7–1.5)
EGFR: 60 ML/MIN/1.73M2 — SIGNIFICANT CHANGE UP
EOSINOPHIL # BLD AUTO: 0.01 K/UL — SIGNIFICANT CHANGE UP (ref 0–0.7)
EOSINOPHIL NFR BLD AUTO: 0.1 % — SIGNIFICANT CHANGE UP (ref 0–8)
GLUCOSE SERPL-MCNC: 120 MG/DL — HIGH (ref 70–99)
HCT VFR BLD CALC: 35.9 % — LOW (ref 42–52)
HGB BLD-MCNC: 11.8 G/DL — LOW (ref 14–18)
IMM GRANULOCYTES NFR BLD AUTO: 0.7 % — HIGH (ref 0.1–0.3)
LYMPHOCYTES # BLD AUTO: 0.91 K/UL — LOW (ref 1.2–3.4)
LYMPHOCYTES # BLD AUTO: 7.5 % — LOW (ref 20.5–51.1)
MCHC RBC-ENTMCNC: 29.1 PG — SIGNIFICANT CHANGE UP (ref 27–31)
MCHC RBC-ENTMCNC: 32.9 G/DL — SIGNIFICANT CHANGE UP (ref 32–37)
MCV RBC AUTO: 88.4 FL — SIGNIFICANT CHANGE UP (ref 80–94)
MONOCYTES # BLD AUTO: 0.97 K/UL — HIGH (ref 0.1–0.6)
MONOCYTES NFR BLD AUTO: 8 % — SIGNIFICANT CHANGE UP (ref 1.7–9.3)
NEUTROPHILS # BLD AUTO: 10.16 K/UL — HIGH (ref 1.4–6.5)
NEUTROPHILS NFR BLD AUTO: 83.6 % — HIGH (ref 42.2–75.2)
NRBC # BLD: 0 /100 WBCS — SIGNIFICANT CHANGE UP (ref 0–0)
PLATELET # BLD AUTO: 177 K/UL — SIGNIFICANT CHANGE UP (ref 130–400)
PMV BLD: 10.7 FL — HIGH (ref 7.4–10.4)
POTASSIUM SERPL-MCNC: 4.2 MMOL/L — SIGNIFICANT CHANGE UP (ref 3.5–5)
POTASSIUM SERPL-SCNC: 4.2 MMOL/L — SIGNIFICANT CHANGE UP (ref 3.5–5)
RBC # BLD: 4.06 M/UL — LOW (ref 4.7–6.1)
RBC # FLD: 14.3 % — SIGNIFICANT CHANGE UP (ref 11.5–14.5)
SODIUM SERPL-SCNC: 141 MMOL/L — SIGNIFICANT CHANGE UP (ref 135–146)
WBC # BLD: 12.14 K/UL — HIGH (ref 4.8–10.8)
WBC # FLD AUTO: 12.14 K/UL — HIGH (ref 4.8–10.8)

## 2023-07-21 PROCEDURE — 99232 SBSQ HOSP IP/OBS MODERATE 35: CPT

## 2023-07-21 RX ADMIN — Medication 6 MILLIGRAM(S): at 05:21

## 2023-07-21 RX ADMIN — HEPARIN SODIUM 5000 UNIT(S): 5000 INJECTION INTRAVENOUS; SUBCUTANEOUS at 18:57

## 2023-07-21 RX ADMIN — Medication 20 MILLIGRAM(S): at 05:21

## 2023-07-21 RX ADMIN — MEMANTINE HYDROCHLORIDE 10 MILLIGRAM(S): 10 TABLET ORAL at 12:32

## 2023-07-21 RX ADMIN — Medication 10 MILLIGRAM(S): at 05:21

## 2023-07-21 RX ADMIN — Medication 81 MILLIGRAM(S): at 12:32

## 2023-07-21 RX ADMIN — PANTOPRAZOLE SODIUM 40 MILLIGRAM(S): 20 TABLET, DELAYED RELEASE ORAL at 05:21

## 2023-07-21 RX ADMIN — ATORVASTATIN CALCIUM 40 MILLIGRAM(S): 80 TABLET, FILM COATED ORAL at 21:58

## 2023-07-21 RX ADMIN — Medication 50 MILLIGRAM(S): at 05:21

## 2023-07-21 RX ADMIN — HEPARIN SODIUM 5000 UNIT(S): 5000 INJECTION INTRAVENOUS; SUBCUTANEOUS at 05:21

## 2023-07-21 RX ADMIN — DONEPEZIL HYDROCHLORIDE 10 MILLIGRAM(S): 10 TABLET, FILM COATED ORAL at 21:58

## 2023-07-21 RX ADMIN — QUETIAPINE FUMARATE 12.5 MILLIGRAM(S): 200 TABLET, FILM COATED ORAL at 21:58

## 2023-07-21 NOTE — PROGRESS NOTE ADULT - ASSESSMENT
Home Medications:  aspirin 81 mg oral tablet, chewable: 1 tab(s) orally once a day (19 Jun 2021 09:18)  atorvastatin 40 mg oral tablet: 1 tab(s) orally once a day (at bedtime) (19 Jun 2021 09:18)  donepezil 10 mg oral tablet: 1 tab(s) orally once a day (at bedtime) (19 Jun 2021 09:18)  famotidine 40 mg oral tablet: 1 tab(s) orally once a day (19 Jun 2021 09:18)  lisinopril 10 mg oral tablet: 1 tab(s) orally once a day (19 Jun 2021 09:18)  memantine 10 mg oral tablet: 1 tab(s) orally once a day (19 Jun 2021 09:18)  metoprolol succinate 50 mg oral tablet, extended release: 1 tab(s) orally once a day (19 Jun 2021 09:18)    MEDICATIONS  (STANDING):  albuterol/ipratropium for Nebulization 3 milliLiter(s) Nebulizer every 6 hours  aspirin  chewable 81 milliGRAM(s) Oral daily  atorvastatin 40 milliGRAM(s) Oral at bedtime  dexAMETHasone     Tablet 6 milliGRAM(s) Oral daily  donepezil 10 milliGRAM(s) Oral at bedtime  enalapril 10 milliGRAM(s) Oral daily  furosemide    Tablet 20 milliGRAM(s) Oral daily  heparin   Injectable 5000 Unit(s) SubCutaneous every 12 hours  memantine 10 milliGRAM(s) Oral daily  metoprolol succinate ER 50 milliGRAM(s) Oral daily  pantoprazole    Tablet 40 milliGRAM(s) Oral before breakfast  QUEtiapine 12.5 milliGRAM(s) Oral at bedtime    MEDICATIONS  (PRN):  acetaminophen     Tablet .. 650 milliGRAM(s) Oral every 6 hours PRN Temp greater or equal to 38C (100.4F), Mild Pain (1 - 3)  aluminum hydroxide/magnesium hydroxide/simethicone Suspension 30 milliLiter(s) Oral every 4 hours PRN Dyspepsia  benzonatate 100 milliGRAM(s) Oral every 8 hours PRN Cough  guaifenesin/dextromethorphan Oral Liquid 10 milliLiter(s) Oral every 4 hours PRN Cough  hydrALAZINE 50 milliGRAM(s) Oral every 8 hours PRN Systolic blood pressure >165  melatonin 3 milliGRAM(s) Oral at bedtime PRN Insomnia  ondansetron Injectable 4 milliGRAM(s) IV Push every 8 hours PRN Nausea and/or Vomiting    Assessment /Plan  COVI 19 pneumonia b/l with hypoxia  - hypoxia resolved. .   -CXR shows bilateral opacities L>R.   - RVP (+) for COVID  - off abx.   - Remdesivir  held due to ROYAL  - Dexamethasone 6mg qd d7/10   - Oxygen as needed  - ID consult - recs appreciated  -ECHO ef 54% dilation arotic root 3.8 cm   - PT consulted, recs STR.     Steroid induced psychosis (resolved)   - seroquel 12.5 mg HS  - cont dexamethasone until o2 weaned off.  D7/10     Constipation  - miralax x 1     Dilated Aortic root 3.8 cm   -change lisinopril 10 mg to enalapril 10 mg po daily. cont toprol xl     Acute kidney injury on ckd 3a  --S/p IVF  - Creatinine improved to 1.4 which is baseline.   - hold Remdesivir d/t renal dysfunction.   - Renal US (-)  - Hold furosemide     HT(improved)   - affected by steroids.   - change lisinopril to enalapril 10 mg po daily  - cont toprol xl   - PRN hydralazine      Chronic D-CHF  - resumed lasix 20 mg daily (home on 40 mg po daily)    - cont toprol xl and enalapril     DVT/GI px  - heparin subq/ PPI PO     Full code     Anticipate DC on Sunday       MEDICATIONS  (STANDING):  albuterol/ipratropium for Nebulization 3 milliLiter(s) Nebulizer every 6 hours  aspirin  chewable 81 milliGRAM(s) Oral daily  atorvastatin 40 milliGRAM(s) Oral at bedtime  donepezil 10 milliGRAM(s) Oral at bedtime  enalapril 10 milliGRAM(s) Oral daily  furosemide    Tablet 20 milliGRAM(s) Oral daily  heparin   Injectable 5000 Unit(s) SubCutaneous every 12 hours  memantine 10 milliGRAM(s) Oral daily  metoprolol succinate ER 50 milliGRAM(s) Oral daily  pantoprazole    Tablet 40 milliGRAM(s) Oral before breakfast  QUEtiapine 12.5 milliGRAM(s) Oral at bedtime    MEDICATIONS  (PRN):  acetaminophen     Tablet .. 650 milliGRAM(s) Oral every 6 hours PRN Temp greater or equal to 38C (100.4F), Mild Pain (1 - 3)  aluminum hydroxide/magnesium hydroxide/simethicone Suspension 30 milliLiter(s) Oral every 4 hours PRN Dyspepsia  benzonatate 100 milliGRAM(s) Oral every 8 hours PRN Cough  guaifenesin/dextromethorphan Oral Liquid 10 milliLiter(s) Oral every 4 hours PRN Cough  hydrALAZINE 50 milliGRAM(s) Oral every 8 hours PRN Systolic blood pressure >165  melatonin 3 milliGRAM(s) Oral at bedtime PRN Insomnia  ondansetron Injectable 4 milliGRAM(s) IV Push every 8 hours PRN Nausea and/or Vomiting      Assessment /Plan  COVI 19 pneumonia b/l with hypoxia  - hypoxia resolved. .   -CXR shows bilateral opacities L>R.   - RVP (+) for COVID  - off abx.   - Remdesivir  held due to ROYAL  - Dexamethasone 6mg qd d8/10   - Oxygen as needed  - ID consult - recs appreciated  -ECHO ef 54% dilation arotic root 3.8 cm   - PT consulted, recs STR.     Steroid induced psychosis (resolved)   - seroquel 12.5 mg HS  - cont dexamethasone until o2 weaned off.  D7/10     Constipation, Resolved.   - miralax x 1     Dilated Aortic root 3.8 cm   -change lisinopril 10 mg to enalapril 10 mg po daily. cont toprol xl     Acute kidney injury on ckd 3a  --S/p IVF  - Creatinine improved to 1.4/1.5  which is baseline.   - hold Remdesivir d/t renal dysfunction.   - Renal US (-)  - Restarted lasix 20 mg po daily.      HT(improved)   - affected by steroids.   - change lisinopril to enalapril 10 mg po daily  - cont toprol xl   - PRN hydralazine      Chronic D-CHF  - resumed lasix 20 mg daily (home on 40 mg po daily)    - cont toprol xl and enalapril     DVT/GI px  - heparin subq/ PPI PO     Full code     Anticipate DC on Sunday

## 2023-07-21 NOTE — PROGRESS NOTE ADULT - SUBJECTIVE AND OBJECTIVE BOX
NOEMY [id number]    Subjective/Interval History       ROS    PHYSICAL EXAM  Vital Signs Last 24 Hrs  T(C): 35.7 (21 Jul 2023 13:50), Max: 36 (21 Jul 2023 06:00)  T(F): 96.3 (21 Jul 2023 13:50), Max: 96.8 (21 Jul 2023 06:00)  HR: 66 (21 Jul 2023 13:50) (61 - 66)  BP: 101/56 (21 Jul 2023 13:50) (101/56 - 158/70)  BP(mean): --  RR: 18 (21 Jul 2023 13:50) (18 - 18)  SpO2: --      GA : AAOX3, NAD   HEENT: PERRLA, EOMI  NECK: no JVD, no thyromegaly   CVS: S1 S2 no murmur no rubs no gallop  RESP: CTAB no wheeze, no rhonchi no rales  ABD: Soft, NT, ND, tympanic, no rebound or guarding   : No Yao, No CVA tenderness   EXT; no pedal edema, no cyanosis  MSK: No ML spinal tenderness, normal ROM   NEURO: AAOX3, no new focal deficits     LABS/ IMAGING                        11.8   12.14 )-----------( 177      ( 21 Jul 2023 08:47 )             35.9         07-21    141  |  107  |  31<H>  ----------------------------<  120<H>  4.2   |  23  |  1.2    Ca    8.7      21 Jul 2023 08:47      CAPILLARY BLOOD GLUCOSE        Urinalysis Basic - ( 21 Jul 2023 08:47 )    Color: x / Appearance: x / SG: x / pH: x  Gluc: 120 mg/dL / Ketone: x  / Bili: x / Urobili: x   Blood: x / Protein: x / Nitrite: x   Leuk Esterase: x / RBC: x / WBC x   Sq Epi: x / Non Sq Epi: x / Bacteria: x               NOEMY [id number]    Subjective/Interval History   - off o2, constipation improved.   - no cough    ROS  - denied any fever.     PHYSICAL EXAM  Vital Signs Last 24 Hrs  T(C): 35.7 (21 Jul 2023 13:50), Max: 36 (21 Jul 2023 06:00)  T(F): 96.3 (21 Jul 2023 13:50), Max: 96.8 (21 Jul 2023 06:00)  HR: 66 (21 Jul 2023 13:50) (61 - 66)  BP: 101/56 (21 Jul 2023 13:50) (101/56 - 158/70)  BP(mean): --  RR: 18 (21 Jul 2023 13:50) (18 - 18)  SpO2: --      GA : AAOX3, NAD   HEENT: PERRLA, EOMI  NECK: no JVD, no thyromegaly   CVS: S1 S2 no murmur no rubs no gallop  RESP: CTAB no wheeze, no rhonchi no rales  ABD: Soft, NT, ND, tympanic, no rebound or guarding   EXT; no pedal edema, no cyanosis  NEURO: AAOX3, no new focal deficits     LABS/ IMAGING                        11.8   12.14 )-----------( 177      ( 21 Jul 2023 08:47 )             35.9         07-21    141  |  107  |  31<H>  ----------------------------<  120<H>  4.2   |  23  |  1.2    Ca    8.7      21 Jul 2023 08:47      CAPILLARY BLOOD GLUCOSE        Urinalysis Basic - ( 21 Jul 2023 08:47 )    Color: x / Appearance: x / SG: x / pH: x  Gluc: 120 mg/dL / Ketone: x  / Bili: x / Urobili: x   Blood: x / Protein: x / Nitrite: x   Leuk Esterase: x / RBC: x / WBC x   Sq Epi: x / Non Sq Epi: x / Bacteria: x

## 2023-07-22 LAB
ANION GAP SERPL CALC-SCNC: 12 MMOL/L — SIGNIFICANT CHANGE UP (ref 7–14)
BUN SERPL-MCNC: 35 MG/DL — HIGH (ref 10–20)
CALCIUM SERPL-MCNC: 8.4 MG/DL — SIGNIFICANT CHANGE UP (ref 8.4–10.5)
CHLORIDE SERPL-SCNC: 104 MMOL/L — SIGNIFICANT CHANGE UP (ref 98–110)
CK SERPL-CCNC: 44 U/L — SIGNIFICANT CHANGE UP (ref 0–225)
CO2 SERPL-SCNC: 23 MMOL/L — SIGNIFICANT CHANGE UP (ref 17–32)
CREAT SERPL-MCNC: 1.4 MG/DL — SIGNIFICANT CHANGE UP (ref 0.7–1.5)
EGFR: 50 ML/MIN/1.73M2 — LOW
GLUCOSE SERPL-MCNC: 132 MG/DL — HIGH (ref 70–99)
POTASSIUM SERPL-MCNC: 3.8 MMOL/L — SIGNIFICANT CHANGE UP (ref 3.5–5)
POTASSIUM SERPL-SCNC: 3.8 MMOL/L — SIGNIFICANT CHANGE UP (ref 3.5–5)
SODIUM SERPL-SCNC: 139 MMOL/L — SIGNIFICANT CHANGE UP (ref 135–146)

## 2023-07-22 PROCEDURE — 99232 SBSQ HOSP IP/OBS MODERATE 35: CPT

## 2023-07-22 RX ADMIN — Medication 20 MILLIGRAM(S): at 06:12

## 2023-07-22 RX ADMIN — ATORVASTATIN CALCIUM 40 MILLIGRAM(S): 80 TABLET, FILM COATED ORAL at 21:31

## 2023-07-22 RX ADMIN — MEMANTINE HYDROCHLORIDE 10 MILLIGRAM(S): 10 TABLET ORAL at 11:55

## 2023-07-22 RX ADMIN — Medication 10 MILLIGRAM(S): at 06:12

## 2023-07-22 RX ADMIN — PANTOPRAZOLE SODIUM 40 MILLIGRAM(S): 20 TABLET, DELAYED RELEASE ORAL at 06:12

## 2023-07-22 RX ADMIN — Medication 3 MILLILITER(S): at 09:11

## 2023-07-22 RX ADMIN — Medication 10 MILLILITER(S): at 17:03

## 2023-07-22 RX ADMIN — Medication 81 MILLIGRAM(S): at 11:55

## 2023-07-22 RX ADMIN — DONEPEZIL HYDROCHLORIDE 10 MILLIGRAM(S): 10 TABLET, FILM COATED ORAL at 21:31

## 2023-07-22 RX ADMIN — Medication 3 MILLILITER(S): at 14:44

## 2023-07-22 RX ADMIN — Medication 3 MILLILITER(S): at 20:00

## 2023-07-22 RX ADMIN — Medication 50 MILLIGRAM(S): at 06:12

## 2023-07-22 RX ADMIN — Medication 10 MILLILITER(S): at 11:55

## 2023-07-22 RX ADMIN — HEPARIN SODIUM 5000 UNIT(S): 5000 INJECTION INTRAVENOUS; SUBCUTANEOUS at 17:03

## 2023-07-22 RX ADMIN — HEPARIN SODIUM 5000 UNIT(S): 5000 INJECTION INTRAVENOUS; SUBCUTANEOUS at 06:12

## 2023-07-22 RX ADMIN — QUETIAPINE FUMARATE 12.5 MILLIGRAM(S): 200 TABLET, FILM COATED ORAL at 21:31

## 2023-07-22 NOTE — SWALLOW BEDSIDE ASSESSMENT ADULT - SWALLOW EVAL: DIAGNOSIS
Significant cough at baseline prior to PO inake, did not worsen significantly with PO intake.+ toleration observed without overt symptoms of penetration/aspiration for thins via SMALL SINGLE CUP SIPS. Concern for pharyngeal impairment for consecutive large sips of thins.
Cough at baseline prior to PO inake, did not worsen significantly with PO intake.+ toleration observed without overt symptoms of penetration/aspiration for Regular/thins via SMALL SINGLE CUP SIPS. Concern for pharyngeal impairment for consecutive large sips of thins.

## 2023-07-22 NOTE — SWALLOW BEDSIDE ASSESSMENT ADULT - PHARYNGEAL PHASE
+cough present at baseline, did not appear to worsen w/ PO intake other than in one instance when pt was taking consecutive large sips of water
+cough present at baseline, did not appear to worsen w/ PO intake other than in one instance when pt was taking consecutive large sips of water

## 2023-07-22 NOTE — PROGRESS NOTE ADULT - SUBJECTIVE AND OBJECTIVE BOX
NOEMY [id number]    Subjective/Interval History   - int dry cough   - no SOB    ROS  - no abd pain or constipation.     PHYSICAL EXAM  Vital Signs Last 24 Hrs  T(C): 36.1 (22 Jul 2023 04:30), Max: 36.1 (22 Jul 2023 04:30)  T(F): 97 (22 Jul 2023 04:30), Max: 97 (22 Jul 2023 04:30)  HR: 59 (22 Jul 2023 04:30) (59 - 66)  BP: 160/87 (22 Jul 2023 04:30) (101/56 - 160/87)  BP(mean): --  RR: 18 (22 Jul 2023 04:30) (18 - 18)  SpO2: --      GA : AAOX3, NAD   HEENT: PERRLA, EOMI  NECK: no JVD, no thyromegaly   CVS: S1 S2 no murmur no rubs no gallop  RESP: CTAB no wheeze, no rhonchi no rales  ABD: Soft, NT, ND, tympanic, no rebound or guarding   EXT; no pedal edema, no cyanosis  NEURO: AAOX3, no new focal deficits     LABS/ IMAGING                        11.8   12.14 )-----------( 177      ( 21 Jul 2023 08:47 )             35.9         07-21    141  |  107  |  31<H>  ----------------------------<  120<H>  4.2   |  23  |  1.2    Ca    8.7      21 Jul 2023 08:47      CAPILLARY BLOOD GLUCOSE        Urinalysis Basic - ( 21 Jul 2023 08:47 )    Color: x / Appearance: x / SG: x / pH: x  Gluc: 120 mg/dL / Ketone: x  / Bili: x / Urobili: x   Blood: x / Protein: x / Nitrite: x   Leuk Esterase: x / RBC: x / WBC x   Sq Epi: x / Non Sq Epi: x / Bacteria: x

## 2023-07-22 NOTE — PROGRESS NOTE ADULT - REASON FOR ADMISSION
fever/Cough

## 2023-07-22 NOTE — SWALLOW BEDSIDE ASSESSMENT ADULT - SLP GENERAL OBSERVATIONS
Pt received awake in bed watching TV +some confusion noted. On RA, contact precautions maintained
Pt received awake in bed watching TV +some confusion noted. Daughter at b/s. Reporting baseline cough throughout the day. On RA, contact precautions maintained

## 2023-07-22 NOTE — PROGRESS NOTE ADULT - ASSESSMENT
MEDICATIONS  (STANDING):  albuterol/ipratropium for Nebulization 3 milliLiter(s) Nebulizer every 6 hours  aspirin  chewable 81 milliGRAM(s) Oral daily  atorvastatin 40 milliGRAM(s) Oral at bedtime  donepezil 10 milliGRAM(s) Oral at bedtime  enalapril 10 milliGRAM(s) Oral daily  furosemide    Tablet 20 milliGRAM(s) Oral daily  heparin   Injectable 5000 Unit(s) SubCutaneous every 12 hours  memantine 10 milliGRAM(s) Oral daily  metoprolol succinate ER 50 milliGRAM(s) Oral daily  pantoprazole    Tablet 40 milliGRAM(s) Oral before breakfast  QUEtiapine 12.5 milliGRAM(s) Oral at bedtime    MEDICATIONS  (PRN):  acetaminophen     Tablet .. 650 milliGRAM(s) Oral every 6 hours PRN Temp greater or equal to 38C (100.4F), Mild Pain (1 - 3)  aluminum hydroxide/magnesium hydroxide/simethicone Suspension 30 milliLiter(s) Oral every 4 hours PRN Dyspepsia  benzonatate 100 milliGRAM(s) Oral every 8 hours PRN Cough  guaifenesin/dextromethorphan Oral Liquid 10 milliLiter(s) Oral every 4 hours PRN Cough  hydrALAZINE 50 milliGRAM(s) Oral every 8 hours PRN Systolic blood pressure >165  melatonin 3 milliGRAM(s) Oral at bedtime PRN Insomnia  ondansetron Injectable 4 milliGRAM(s) IV Push every 8 hours PRN Nausea and/or Vomiting        Assessment /Plan  COVI 19 pneumonia b/l with hypoxia  - hypoxia resolved. s/p O2 NC   -CXR shows bilateral opacities L>R.   - RVP (+) for COVID  - off abx.   - Remdesivir  held due to ROYAL  - Dexamethasone 6mg daily x 8 days, dc'd steroids, hypoxia resolved.    - ID consult - recs appreciated  -ECHO ef 54% dilation arotic root 3.8 cm   - PT consulted, recs STR.     Steroid induced psychosis (resolved)   - seroquel 12.5 mg HS  - cont dexamethasone until o2 weaned off.  s/p 8 days.      Constipation, Resolved.   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 cont toprol xl     Acute kidney injury on ckd 3a  --S/p IVF  - Creatinine improved to 1.4/1.5  which is baseline.   - hold Remdesivir d/t renal dysfunction.   - Renal US (-)  - Restarted lasix 20 mg po daily.      HTN(improved), but still elevated.   - affected by steroids.   - change lisinopril to enalapril increased to 20 mg po daily.   - cont toprol xl   - PRN hydralazine      Chronic D-CHF  - resumed lasix 20 mg daily (home on 40 mg po daily)    - cont toprol xl 50 and enalapril     DVT/GI px  - heparin subq/ PPI PO     Full code     Anticipate DC on Sunday DISO: STR.

## 2023-07-22 NOTE — SWALLOW BEDSIDE ASSESSMENT ADULT - SLP PERTINENT HISTORY OF CURRENT PROBLEM
83-year-old male, history of dementia, HTN, Hypercholesterolemia, HFpEF, CAD s/p CABG x4, lymphoma, presenting with cough, and fever that started today.  temperature of 101 at home. Found to be COVID +
83-year-old male, history of dementia, HTN, Hypercholesterolemia, HFpEF, CAD s/p CABG x4, lymphoma, presenting with cough, and fever that started today.  temperature of 101 at home. Found to be COVID +

## 2023-07-23 ENCOUNTER — TRANSCRIPTION ENCOUNTER (OUTPATIENT)
Age: 83
End: 2023-07-23

## 2023-07-23 VITALS
HEART RATE: 68 BPM | RESPIRATION RATE: 18 BRPM | TEMPERATURE: 97 F | DIASTOLIC BLOOD PRESSURE: 60 MMHG | SYSTOLIC BLOOD PRESSURE: 110 MMHG

## 2023-07-23 LAB
ANION GAP SERPL CALC-SCNC: 14 MMOL/L — SIGNIFICANT CHANGE UP (ref 7–14)
BASOPHILS # BLD AUTO: 0.02 K/UL — SIGNIFICANT CHANGE UP (ref 0–0.2)
BASOPHILS NFR BLD AUTO: 0.2 % — SIGNIFICANT CHANGE UP (ref 0–1)
BUN SERPL-MCNC: 29 MG/DL — HIGH (ref 10–20)
CALCIUM SERPL-MCNC: 8.7 MG/DL — SIGNIFICANT CHANGE UP (ref 8.4–10.5)
CHLORIDE SERPL-SCNC: 102 MMOL/L — SIGNIFICANT CHANGE UP (ref 98–110)
CO2 SERPL-SCNC: 24 MMOL/L — SIGNIFICANT CHANGE UP (ref 17–32)
CREAT SERPL-MCNC: 1.4 MG/DL — SIGNIFICANT CHANGE UP (ref 0.7–1.5)
EGFR: 50 ML/MIN/1.73M2 — LOW
EOSINOPHIL # BLD AUTO: 0.15 K/UL — SIGNIFICANT CHANGE UP (ref 0–0.7)
EOSINOPHIL NFR BLD AUTO: 1.5 % — SIGNIFICANT CHANGE UP (ref 0–8)
GLUCOSE SERPL-MCNC: 120 MG/DL — HIGH (ref 70–99)
HCT VFR BLD CALC: 38 % — LOW (ref 42–52)
HGB BLD-MCNC: 12.6 G/DL — LOW (ref 14–18)
IMM GRANULOCYTES NFR BLD AUTO: 1 % — HIGH (ref 0.1–0.3)
LYMPHOCYTES # BLD AUTO: 1.21 K/UL — SIGNIFICANT CHANGE UP (ref 1.2–3.4)
LYMPHOCYTES # BLD AUTO: 12.4 % — LOW (ref 20.5–51.1)
MCHC RBC-ENTMCNC: 29.2 PG — SIGNIFICANT CHANGE UP (ref 27–31)
MCHC RBC-ENTMCNC: 33.2 G/DL — SIGNIFICANT CHANGE UP (ref 32–37)
MCV RBC AUTO: 88 FL — SIGNIFICANT CHANGE UP (ref 80–94)
MONOCYTES # BLD AUTO: 1.11 K/UL — HIGH (ref 0.1–0.6)
MONOCYTES NFR BLD AUTO: 11.3 % — HIGH (ref 1.7–9.3)
NEUTROPHILS # BLD AUTO: 7.19 K/UL — HIGH (ref 1.4–6.5)
NEUTROPHILS NFR BLD AUTO: 73.6 % — SIGNIFICANT CHANGE UP (ref 42.2–75.2)
NRBC # BLD: 0 /100 WBCS — SIGNIFICANT CHANGE UP (ref 0–0)
PLATELET # BLD AUTO: 198 K/UL — SIGNIFICANT CHANGE UP (ref 130–400)
PMV BLD: 11.2 FL — HIGH (ref 7.4–10.4)
POTASSIUM SERPL-MCNC: 3.8 MMOL/L — SIGNIFICANT CHANGE UP (ref 3.5–5)
POTASSIUM SERPL-SCNC: 3.8 MMOL/L — SIGNIFICANT CHANGE UP (ref 3.5–5)
RBC # BLD: 4.32 M/UL — LOW (ref 4.7–6.1)
RBC # FLD: 14.2 % — SIGNIFICANT CHANGE UP (ref 11.5–14.5)
SODIUM SERPL-SCNC: 140 MMOL/L — SIGNIFICANT CHANGE UP (ref 135–146)
WBC # BLD: 9.78 K/UL — SIGNIFICANT CHANGE UP (ref 4.8–10.8)
WBC # FLD AUTO: 9.78 K/UL — SIGNIFICANT CHANGE UP (ref 4.8–10.8)

## 2023-07-23 PROCEDURE — 99239 HOSP IP/OBS DSCHRG MGMT >30: CPT

## 2023-07-23 RX ORDER — FUROSEMIDE 40 MG
1 TABLET ORAL
Qty: 30 | Refills: 0
Start: 2023-07-23 | End: 2023-08-21

## 2023-07-23 RX ADMIN — Medication 50 MILLIGRAM(S): at 05:07

## 2023-07-23 RX ADMIN — Medication 100 MILLIGRAM(S): at 12:03

## 2023-07-23 RX ADMIN — Medication 81 MILLIGRAM(S): at 12:03

## 2023-07-23 RX ADMIN — Medication 20 MILLIGRAM(S): at 05:07

## 2023-07-23 RX ADMIN — PANTOPRAZOLE SODIUM 40 MILLIGRAM(S): 20 TABLET, DELAYED RELEASE ORAL at 05:42

## 2023-07-23 RX ADMIN — Medication 3 MILLILITER(S): at 15:58

## 2023-07-23 RX ADMIN — Medication 3 MILLILITER(S): at 08:08

## 2023-07-23 RX ADMIN — MEMANTINE HYDROCHLORIDE 10 MILLIGRAM(S): 10 TABLET ORAL at 12:03

## 2023-07-23 RX ADMIN — HEPARIN SODIUM 5000 UNIT(S): 5000 INJECTION INTRAVENOUS; SUBCUTANEOUS at 05:06

## 2023-07-23 NOTE — DISCHARGE NOTE NURSING/CASE MANAGEMENT/SOCIAL WORK - PATIENT PORTAL LINK FT
You can access the FollowMyHealth Patient Portal offered by [facility] by registering at the following website: [email or web address]. By joining [employer]’s FollowMyHealth portal, you will also be able to view your health information using other applications (apps) compatible with our system.

## 2023-07-23 NOTE — DISCHARGE NOTE PROVIDER - NSDCMRMEDTOKEN_GEN_ALL_CORE_FT
aspirin 81 mg oral tablet, chewable: 1 tab(s) orally once a day  atorvastatin 40 mg oral tablet: 1 tab(s) orally once a day (at bedtime)  donepezil 10 mg oral tablet: 1 tab(s) orally once a day (at bedtime)  enalapril 20 mg oral tablet: 1 tab(s) orally once a day  famotidine 40 mg oral tablet: 1 tab(s) orally once a day  Lasix 20 mg oral tablet: 1 tab(s) orally once a day  memantine 10 mg oral tablet: 1 tab(s) orally once a day  metoprolol succinate 50 mg oral tablet, extended release: 1 tab(s) orally once a day

## 2023-07-23 NOTE — DISCHARGE NOTE PROVIDER - ATTENDING DISCHARGE PHYSICAL EXAMINATION:
VITALS:   T(C): 36.1 (07-23-23 @ 13:56), Max: 36.7 (07-23-23 @ 05:54)  HR: 68 (07-23-23 @ 13:56) (67 - 92)  BP: 110/60 (07-23-23 @ 13:56) (98/56 - 138/66)  RR: 18 (07-23-23 @ 13:56) (18 - 18)  SpO2: --    GENERAL: AAOx2  HEAD:  Atraumatic, Normocephalic  EYES: EOMI, PERRLA, no pallor   ENT:  normal oropharynx , no thyromegaly   NECK: Supple, No JVD  CHEST/LUNG: Clear to auscultation bilaterally; No rales, rhonchi, wheezing, or rubs.  Symmetric expansion   HEART:  S1 S2 RRR ; No murmurs or rubs   ABDOMEN: Soft, nontender, nondistended, BS wnl   EXTREMITIES:  No  cyanosis, or edema  NERVOUS SYSTEM:  A&Ox2, no focal deficits

## 2023-07-23 NOTE — DISCHARGE NOTE NURSING/CASE MANAGEMENT/SOCIAL WORK - NSDCVIVACCINE_GEN_ALL_CORE_FT
Tdap; 26-Oct-2020 14:23; Ngoc Naylor (RN); [employer]; [id number] (Exp. Date: 31-Jul-2022); IntraMuscular; Deltoid Left.; 0.5 milliLiter(s); VIS (VIS Published: 09-May-2013, VIS Presented: 26-Oct-2020);   Tdap; 02-Oct-2021 19:12; Herminia Robertson (RN); [employer]; [id number] (Exp. Date: 15-Jul-2023); IntraMuscular; Deltoid Left.; 0.5 milliLiter(s); VIS (VIS Published: 09-May-2013, VIS Presented: 02-Oct-2021);

## 2023-07-23 NOTE — DISCHARGE NOTE PROVIDER - CARE PROVIDER_API CALL
CURTIS, SUNDEE  [address] [place] [address]  Phone: ([phone]  Fax: ()-  Follow Up Time: 1 week

## 2023-07-23 NOTE — DISCHARGE NOTE PROVIDER - HOSPITAL COURSE
HPI:  Patient is 83-year-old male, history of dementia, HTN, Hypercholesterolemia, HFpEF, CAD s/p CABG x4, hx/o lymphoma with remote history chemo in remission at least 10 yrs, presenting with cough, fever and SOB.     Patient was admitted to medical surgery floor with covid 19 pneumonia with hypoxia with the following hospital course:     COVID 19 pneumonia b/l with hypoxia  - hypoxia resolved. s/p O2 NC   - CXR revealed bilateral opacities L>R.   - RVP (+) for COVID  - started on Remdesivir but later held due to ROYAL  - Dexamethasone 6mg daily completed 8 days as hypoxia resolved.     - ID consult - recs appreciated  - ECHO ef 54% dilation arotic root 3.8 cm   - PT consulted, recs STR.     Steroid induced psychosis (resolved)   - seroquel 12.5 mg HS on while on steroids, does not need at discharge.   - s/p dexamethasone completed 8 days.       Constipation, Resolved.   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 cont toprol xl     Acute kidney injury on ckd 3a  --S/p IVF  - Creatinine improved to 1.4/1.5  which is baseline.   - held Remdesivir d/t renal dysfunction.   - Renal US (-ve)  - Restarted lasix 20 mg po daily.      HTN(improved),     - change lisinopril to enalapril increased to 20 mg po daily.   - cont toprol xl   - PRN hydralazine      Chronic D-CHF  - resumed lasix 20 mg daily (home on 40 mg po daily)    - cont toprol xl 50 and enalapril    Patient's overall condition improved and he is being discharged in stable condition to STR.

## 2023-07-23 NOTE — DISCHARGE NOTE PROVIDER - NSDCCPCAREPLAN_GEN_ALL_CORE_FT
PRINCIPAL DISCHARGE DIAGNOSIS  Diagnosis: Pneumonia due to COVID-19 virus  Assessment and Plan of Treatment: COVID 19 pneumonia b/l with hypoxia  - hypoxia resolved. s/p O2 NC   -Chest X-Ray shows bilateral opacities L>R.   - RVP (+) for COVID  - off abx.   - Remdesivir  held due to ROYAL  - Dexamethasone 6mg daily x 8 days, dc'd steroids, hypoxia resolved.    - ID consult - recs appreciated  -ECHO ef 54% dilation arotic root 3.8 cm   - PT consulted, recs STR.      SECONDARY DISCHARGE DIAGNOSES  Diagnosis: Steroid-induced psychosis with complication  Assessment and Plan of Treatment: Steroid induced psychosis (resolved)   - seroquel 12.5 mg HS, does not need seroquel at discharge.   - cont dexamethasone until o2 weaned off.  s/p 8 days.    Diagnosis: Dilated aortic root  Assessment and Plan of Treatment: Dilated Aortic root 3.8 cm   -change lisinopril 10 mg to enalapril 10 mg po daily. cont toprol xl       Diagnosis: Acute renal failure superimposed on stage 3 chronic kidney disease  Assessment and Plan of Treatment: Acute kidney injury on ckd 3a  --S/p IVF  - Creatinine improved to 1.4/1.5  which is baseline.   - hold Remdesivir d/t renal dysfunction.   - Renal US (-)  - Restarted lasix 20 mg po daily.    Diagnosis: Chronic diastolic congestive heart failure  Assessment and Plan of Treatment: Chronic D-CHF  - resumed lasix 20 mg daily (home on 40 mg po daily)    - cont toprol xl 50 and enalapril

## 2023-07-25 NOTE — ED ADULT NURSE NOTE - SUICIDE SCREENING QUESTION 1
End Of Shift Note  [address] [place] [address] CVICU  Summary of shift: rapid response/code, pt syncopal episode in bathroom, some compressions done, woke on own, monitor does not show asystole, bradycardia, st changes, trop 19, 19, 22, 27, Cath team called in, MVD found balloon pump placed, quintero placed, precedex started due to anxiety.  Transfer started for ST V's    Vitals:    Vitals:    07/25/23 0615 07/25/23 0630 07/25/23 0645 07/25/23 0700   BP:  101/64     Pulse: 75 76 82 74   Resp: 16 23 15 18   Temp:       TempSrc:       SpO2:  95% 97% 93%   Weight:       Height:            I&O:   Intake/Output Summary (Last 24 hours) at 7/25/2023 0741  Last data filed at 7/25/2023 0734  Gross per 24 hour   Intake 1418.29 ml   Output 780 ml   Net 638.29 ml       Resp Status: Rm air, Lungs clear, dim    Ventilator Settings:     / / /     Critical Care IV infusions:   heparin (PORCINE) Infusion      dexmedetomidine (PRECEDEX) IV infusion 0.1 mcg/kg/hr (07/25/23 0734)    dextrose      sodium chloride 10 mL/hr at 07/25/23 0734    sodium chloride 50 mL/hr at 07/25/23 0734        LDA:   Peripheral IV 07/24/23 Right Antecubital (Active)   Number of days: 0       Peripheral IV 07/25/23 Posterior;Right Hand (Active)   Number of days: 0       Urinary Catheter 07/25/23 Quintero (Active)   Number of days: 0       Arterial Sheath 07/25/23 Right (Active)   Number of days: 0       Intra Aortic Balloon Pump 07/25/23 Right (Active)   Number of days: 0       Incision 09/30/14 Neck Right (Active)   Number of days: 3219       Incision 10/05/14 Neck Right (Active)   Number of days: 6869 No

## 2023-07-26 ENCOUNTER — TRANSCRIPTION ENCOUNTER (OUTPATIENT)
Age: 83
End: 2023-07-26

## 2023-07-28 DIAGNOSIS — E87.20 ACIDOSIS, UNSPECIFIED: ICD-10-CM

## 2023-07-28 DIAGNOSIS — I50.32 CHRONIC DIASTOLIC (CONGESTIVE) HEART FAILURE: ICD-10-CM

## 2023-07-28 DIAGNOSIS — I25.10 ATHEROSCLEROTIC HEART DISEASE OF NATIVE CORONARY ARTERY WITHOUT ANGINA PECTORIS: ICD-10-CM

## 2023-07-28 DIAGNOSIS — I13.0 HYPERTENSIVE HEART AND CHRONIC KIDNEY DISEASE WITH HEART FAILURE AND STAGE 1 THROUGH STAGE 4 CHRONIC KIDNEY DISEASE, OR UNSPECIFIED CHRONIC KIDNEY DISEASE: ICD-10-CM

## 2023-07-28 DIAGNOSIS — F03.90 UNSPECIFIED DEMENTIA WITHOUT BEHAVIORAL DISTURBANCE: ICD-10-CM

## 2023-07-28 DIAGNOSIS — T38.0X5A ADVERSE EFFECT OF GLUCOCORTICOIDS AND SYNTHETIC ANALOGUES, INITIAL ENCOUNTER: ICD-10-CM

## 2023-07-28 DIAGNOSIS — Z79.82 LONG TERM (CURRENT) USE OF ASPIRIN: ICD-10-CM
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
## 2023-07-28 DIAGNOSIS — Z95.1 PRESENCE OF AORTOCORONARY BYPASS GRAFT: ICD-10-CM

## 2023-07-28 DIAGNOSIS — Z79.02 LONG TERM (CURRENT) USE OF ANTITHROMBOTICS/ANTIPLATELETS: ICD-10-CM

## 2023-07-28 DIAGNOSIS — U07.1 COVID-19: ICD-10-CM

## 2023-07-28 DIAGNOSIS — R09.02 HYPOXEMIA: ICD-10-CM

## 2023-07-28 DIAGNOSIS — E66.9 OBESITY, UNSPECIFIED: ICD-10-CM

## 2023-07-28 DIAGNOSIS — J12.82 PNEUMONIA DUE TO CORONAVIRUS DISEASE 2019: ICD-10-CM

## 2023-07-28 DIAGNOSIS — K59.00 CONSTIPATION, UNSPECIFIED: ICD-10-CM

## 2023-07-28 DIAGNOSIS — K21.9 GASTRO-ESOPHAGEAL REFLUX DISEASE WITHOUT ESOPHAGITIS: ICD-10-CM

## 2023-07-28 DIAGNOSIS — N18.31 CHRONIC KIDNEY DISEASE, STAGE 3A: ICD-10-CM

## 2023-07-28 DIAGNOSIS — I95.9 HYPOTENSION, UNSPECIFIED: ICD-10-CM

## 2023-07-28 DIAGNOSIS — N17.9 ACUTE KIDNEY FAILURE, UNSPECIFIED: ICD-10-CM

## 2023-07-28 DIAGNOSIS — C85.90 NON-HODGKIN LYMPHOMA, UNSPECIFIED, UNSPECIFIED SITE: ICD-10-CM

## 2023-07-28 DIAGNOSIS — F19.959 OTHER PSYCHOACTIVE SUBSTANCE USE, UNSPECIFIED WITH PSYCHOACTIVE SUBSTANCE-INDUCED PSYCHOTIC DISORDER, UNSPECIFIED: ICD-10-CM

## 2023-08-02 ENCOUNTER — TRANSCRIPTION ENCOUNTER (OUTPATIENT)
Age: 83
End: 2023-08-02

## 2023-08-09 ENCOUNTER — TRANSCRIPTION ENCOUNTER (OUTPATIENT)
Age: 83
End: 2023-08-09

## 2023-08-18 ENCOUNTER — TRANSCRIPTION ENCOUNTER (OUTPATIENT)
Age: 83
End: 2023-08-18

## 2023-09-27 ENCOUNTER — APPOINTMENT (OUTPATIENT)
Dept: CARDIOLOGY | Facility: CLINIC | Age: 83
End: 2023-09-27
Payer: MEDICARE

## 2023-09-27 VITALS
HEART RATE: 67 BPM | BODY MASS INDEX: 32.61 KG/M2 | SYSTOLIC BLOOD PRESSURE: 136 MMHG | WEIGHT: 191 LBS | DIASTOLIC BLOOD PRESSURE: 90 MMHG | HEIGHT: 64 IN

## 2023-09-27 PROCEDURE — 99214 OFFICE O/P EST MOD 30 MIN: CPT

## 2023-09-27 PROCEDURE — 93000 ELECTROCARDIOGRAM COMPLETE: CPT

## 2023-09-27 RX ORDER — HYDROCHLOROTHIAZIDE 25 MG/1
25 TABLET ORAL DAILY
Qty: 90 | Refills: 3 | Status: DISCONTINUED | COMMUNITY
Start: 2023-06-22 | End: 2023-09-27

## 2024-01-10 ENCOUNTER — RESULT CHARGE (OUTPATIENT)
Age: 84
End: 2024-01-10

## 2024-01-10 ENCOUNTER — APPOINTMENT (OUTPATIENT)
Dept: CARDIOLOGY | Facility: CLINIC | Age: 84
End: 2024-01-10
Payer: MEDICARE

## 2024-01-10 VITALS
HEART RATE: 61 BPM | HEIGHT: 64 IN | BODY MASS INDEX: 33.63 KG/M2 | SYSTOLIC BLOOD PRESSURE: 140 MMHG | DIASTOLIC BLOOD PRESSURE: 80 MMHG | WEIGHT: 197 LBS

## 2024-01-10 PROCEDURE — 93000 ELECTROCARDIOGRAM COMPLETE: CPT

## 2024-01-10 PROCEDURE — 99214 OFFICE O/P EST MOD 30 MIN: CPT

## 2024-01-10 RX ORDER — CLOPIDOGREL BISULFATE 75 MG/1
75 TABLET, FILM COATED ORAL
Refills: 0 | Status: DISCONTINUED | COMMUNITY
End: 2024-01-10

## 2024-01-10 RX ORDER — DONEPEZIL HYDROCHLORIDE 10 MG/1
10 TABLET, ORALLY DISINTEGRATING ORAL DAILY
Refills: 0 | Status: ACTIVE | COMMUNITY

## 2024-01-10 RX ORDER — METOPROLOL SUCCINATE 50 MG/1
50 TABLET, EXTENDED RELEASE ORAL
Refills: 0 | Status: ACTIVE | COMMUNITY

## 2024-01-10 RX ORDER — DONEPEZIL HYDROCHLORIDE 10 MG/1
10 TABLET ORAL
Refills: 0 | Status: DISCONTINUED | COMMUNITY
End: 2024-01-10

## 2024-01-10 RX ORDER — LISINOPRIL 10 MG/1
10 TABLET ORAL
Refills: 0 | Status: COMPLETED | COMMUNITY
End: 2024-01-10

## 2024-01-13 NOTE — REASON FOR VISIT
[Cardiac Failure] : cardiac failure [Hyperlipidemia] : hyperlipidemia [Hypertension] : hypertension [Coronary Artery Disease] : coronary artery disease [FreeTextEntry1] : Mr. NOEMY GRUBBS is a 83 year old man with PMHx of CAD s/p CABG (LIMA-LAD (occluded), SVG-RCA (occluded), RCA , LCx ) and PCI to LAD/DI 20, HTN, HLD and chronic HFpEF who is presenting for follow up and to establish care with me. He previously followed with Dr Bernal and then Dr Quiros. He has no major concerns today. He has no dyspnea, SOB, PND or chest pain. However he has a slight cough with laying down. He also has slight LE swelling at the end of the day.   TTE 7/10/23 Summary:  1. Normal global left ventricular systolic function.  2. LV Ejection Fraction by Cadet's Method with a biplane EF of 54 %.  3. Normal right ventricular size and function.  4. Normal left atrial size.  5. Normal right atrial size.  6. There is moderate aortic root calcification.  7. Sclerotic aortic valve with normal opening.  8. Mild mitral annular calcification.  9. Trace mitral valve regurgitation. 10. Dilatation of the aortic root, measures 3.8 cm. 11. Adequate TR velocity was not obtained to accurately assess RVSP. 12. There is no evidence of pericardial effusion.  [facility] 2020 LEFT HEART CATHETERIZATION Left main: Mild disease LAD:  Severe diffuse disease. Prox LAD 70%, Mid LAD 90% Dia% prox D1 Left Circumflex: Total occlusion 100% () OM: Can't be visualized Ramus Intermedius: Mild disease, mild in-stent restenosis Right Coronary Artery: Total occlusion 100% () prox RCA RPDA: Can't be visualized PLS: Can't be visualized Graft to RPDA: total occlusion LIMA to LAD: total occlusion INTERVENTION SPECIMEN REMOVED: Not applicable IMPLANTS: 3xDES stents to D1, prox LAD and mid LAD

## 2024-01-13 NOTE — ASSESSMENT
[FreeTextEntry1] : Mr. NOEMY GRUBBS is a 83 year old man with PMHx of CAD s/p CABG (LIMA-LAD (occluded), SVG-RCA (occluded), RCA , LCx ) and PCI to LAD/DI 8/31/20, HTN, HLD and chronic HFpEF who is presenting for follow up and to establish care with me.   -Reviewed history, previous TTEs (independently) -Order TTE to rule out structural changes given new cough and LE swelling -Order CMP. CBC, lipids, A1c, proBNP -Continue metoprolol ER 50 daily enalapril 20 daily for well controlled BP given age -Continue lasix 80 daily. Advised to take BID if he develops 2+ lb weight gain -Continue ASA and lipitor 80 daily -Advised to raise legs at the end of the day and use compression stockings  Time in encounter, including face to face visit and time reviewing chart: 38  minutes

## 2024-01-16 ENCOUNTER — APPOINTMENT (OUTPATIENT)
Dept: CARDIOLOGY | Facility: CLINIC | Age: 84
End: 2024-01-16
Payer: MEDICARE

## 2024-01-16 PROCEDURE — 93306 TTE W/DOPPLER COMPLETE: CPT

## 2024-01-17 ENCOUNTER — APPOINTMENT (OUTPATIENT)
Dept: CARDIOLOGY | Facility: CLINIC | Age: 84
End: 2024-01-17

## 2024-01-22 ENCOUNTER — EMERGENCY (EMERGENCY)
Facility: HOSPITAL | Age: 84
LOS: 0 days | Discharge: HOME | End: 2024-01-23
Attending: EMERGENCY MEDICINE
Payer: MEDICARE

## 2024-01-22 ENCOUNTER — EMERGENCY (EMERGENCY)
Facility: HOSPITAL | Age: 84
LOS: 0 days | Discharge: HOME | End: 2024-01-22
Attending: EMERGENCY MEDICINE
Payer: MEDICARE

## 2024-01-22 VITALS
RESPIRATION RATE: 18 BRPM | WEIGHT: 190.04 LBS | DIASTOLIC BLOOD PRESSURE: 83 MMHG | SYSTOLIC BLOOD PRESSURE: 150 MMHG | TEMPERATURE: 98 F | HEIGHT: 66 IN | OXYGEN SATURATION: 97 % | HEART RATE: 63 BPM

## 2024-01-22 VITALS
RESPIRATION RATE: 18 BRPM | DIASTOLIC BLOOD PRESSURE: 60 MMHG | HEART RATE: 59 BPM | SYSTOLIC BLOOD PRESSURE: 115 MMHG | OXYGEN SATURATION: 96 %

## 2024-01-22 VITALS
RESPIRATION RATE: 18 BRPM | HEIGHT: 66 IN | DIASTOLIC BLOOD PRESSURE: 88 MMHG | TEMPERATURE: 97 F | OXYGEN SATURATION: 97 % | HEART RATE: 61 BPM | WEIGHT: 195.99 LBS | SYSTOLIC BLOOD PRESSURE: 196 MMHG

## 2024-01-22 DIAGNOSIS — Z79.02 LONG TERM (CURRENT) USE OF ANTITHROMBOTICS/ANTIPLATELETS: ICD-10-CM

## 2024-01-22 DIAGNOSIS — Z79.82 LONG TERM (CURRENT) USE OF ASPIRIN: ICD-10-CM

## 2024-01-22 DIAGNOSIS — I10 ESSENTIAL (PRIMARY) HYPERTENSION: ICD-10-CM

## 2024-01-22 DIAGNOSIS — R04.0 EPISTAXIS: ICD-10-CM

## 2024-01-22 DIAGNOSIS — Z95.1 PRESENCE OF AORTOCORONARY BYPASS GRAFT: Chronic | ICD-10-CM

## 2024-01-22 DIAGNOSIS — F03.90 UNSPECIFIED DEMENTIA, UNSPECIFIED SEVERITY, WITHOUT BEHAVIORAL DISTURBANCE, PSYCHOTIC DISTURBANCE, MOOD DISTURBANCE, AND ANXIETY: ICD-10-CM

## 2024-01-22 PROCEDURE — 30903 CONTROL OF NOSEBLEED: CPT | Mod: RT

## 2024-01-22 PROCEDURE — 99283 EMERGENCY DEPT VISIT LOW MDM: CPT | Mod: FS

## 2024-01-22 PROCEDURE — 99282 EMERGENCY DEPT VISIT SF MDM: CPT

## 2024-01-22 PROCEDURE — 99284 EMERGENCY DEPT VISIT MOD MDM: CPT | Mod: FS,25

## 2024-01-22 RX ORDER — TRANEXAMIC ACID 100 MG/ML
5 INJECTION, SOLUTION INTRAVENOUS ONCE
Refills: 0 | Status: COMPLETED | OUTPATIENT
Start: 2024-01-22 | End: 2024-01-22

## 2024-01-22 RX ADMIN — TRANEXAMIC ACID 5 MILLILITER(S): 100 INJECTION, SOLUTION INTRAVENOUS at 21:39

## 2024-01-22 NOTE — ED PROVIDER NOTE - OBJECTIVE STATEMENT
83-year-old male past medical history of hypertension, dementia on daily aspirin comes emergency room for right-sided nosebleed.  As per daughter notes has been bleeding for the last hour but stopped on emergency room arrival.  No trauma

## 2024-01-22 NOTE — ED ADULT NURSE NOTE - NSFALLHARMRISKINTERV_ED_ALL_ED

## 2024-01-22 NOTE — ED PROVIDER NOTE - CARE PROVIDER_API CALL
Antoni Bae  Otolaryngology  [address] [place] [id number]  Phone: [phone]  Fax: [phone]  Follow Up Time:

## 2024-01-22 NOTE — ED PROVIDER NOTE - NSFOLLOWUPINSTRUCTIONS_ED_ALL_ED_FT
Follow up with your primary care doctor and ENT in 1-2 days     NASAL PACKING MUST BE REMOVED IN 48-72 HOURS    Our Emergency Department Referral Coordinators will be reaching out to you in the next 24-48 hours from 9:00am to 5:00pm with a follow up appointment. Please expect a phone call from the hospital in that time frame. If you do not receive a call or if you have any questions or concerns, you can reach them at [phone]      Nosebleed    WHAT YOU NEED TO KNOW:    A nosebleed, or epistaxis, occurs when one or more of the blood vessels in your nose break. You may have dark or bright red blood from one or both nostrils. A nosebleed is most commonly caused by dry air or picking your nose. A direct blow to your nose, irritation from a cold or allergies, or a foreign object can also cause a nosebleed.     DISCHARGE INSTRUCTIONS:    Return to the emergency department if:     Your nasal packing is soaked with blood.      Your nose is still bleeding after 20 minutes, even after you pinch it.       You have a foul-smelling discharge coming out of your nose.      You feel so weak and dizzy that you have trouble standing up.      You have trouble breathing or talking.     Contact your healthcare provider if:     You have a fever and are vomiting.      You have pain in and around your nose that is getting worse even after you take pain medicines.      Your nasal pack is loose.      You have questions or concerns about your condition or care.    First aid:     Sit up and lean forward. This will help prevent you from swallowing blood. Spit blood and saliva into a bowl.       Apply pressure to your nose. Use 2 fingers to pinch your nose shut for 10 to 15 minutes. This will help stop the bleeding. Breathe through your mouth.            Apply ice on the bridge of your nose to decrease swelling and bleeding. Use a cold pack or put crushed ice in a plastic bag. Cover it with a towel to protect your skin.      Pack your nose with a cotton ball, tissue, tampon, or gauze bandage to stop the bleeding.    Medicines:     Medicines applied to a small piece of cotton and placed in your nose. Medicine may also be sprayed in or applied directly to your nose. You may need medicine to prevent an infection. If bleeding is severe, medicine may be injected into a blood vessel in your nose.       Take your medicine as directed. Contact your healthcare provider if you think your medicine is not helping or if you have side effects. Tell him of her if you are allergic to any medicine. Keep a list of the medicines, vitamins, and herbs you take. Include the amounts, and when and why you take them. Bring the list or the pill bottles to follow-up visits. Carry your medicine list with you in case of an emergency.    Prevent another nosebleed:     Keep your nose moist. Put a small amount of petroleum jelly inside your nostrils as needed. Use a saline (saltwater) nasal spray. Do not put anything else inside your nose unless your healthcare provider says it is okay. Do not use oil-based lubricants if you use oxygen therapy. They may be flammable.      Use a cool mist humidifier to increase air moisture in your home. This will help your nose stay moist.       Do not pick or blow your nose for at least a week. You can irritate or damage your nose if you pick it. Blowing your nose too hard may cause the bleeding to start again. Do not bend over or strain as this can cause the bleeding to start again.      Avoid irritants such as tobacco smoke or chemical sprays such as .    Follow up with your healthcare provider as directed: Any packing in your nose should be removed within 2 to 3 days. Write down your questions so you remember to ask them during your visits.        © Copyright [employer] 2019 All illustrations and images included in CareNotes are the copyrighted property of [employer] or [employer].

## 2024-01-22 NOTE — ED PROVIDER NOTE - PHYSICAL EXAMINATION
Physical Exam    Vital Signs: I have reviewed the initial vital signs.  Constitutional: well-nourished, appears stated age, no acute distress  Eyes: Conjunctiva pink, Sclera clear, PERRLA, EOMI.  Nose: Dried blood to right nostril, no source ID. no active bleeding.   Cardiovascular: S1 and S2, regular rate, regular rhythm, well-perfused extremities, radial pulses equal and 2+  Respiratory: unlabored respiratory effort, clear to auscultation bilaterally no wheezing, rales and rhonchi  Gastrointestinal: soft, non-tender abdomen, no pulsatile mass, normal bowl sounds  Musculoskeletal: supple neck, no lower extremity edema, no midline tenderness  Integumentary: warm, dry, no rash  Neurologic: awake, alert, cranial nerves II-XII grossly intact, extremities’ motor and sensory functions grossly intact  Psychiatric: appropriate mood, appropriate affect

## 2024-01-22 NOTE — ED PROVIDER NOTE - PATIENT PORTAL LINK FT
You can access the FollowMyHealth Patient Portal offered by [facility] by registering at the following website: [email or web address]. By joining [employer]’s FollowMyHealth portal, you will also be able to view your health information using other applications (apps) compatible with our system.

## 2024-01-22 NOTE — ED PROVIDER NOTE - NSICDXFAMILYHX_GEN_ALL_CORE_FT
Contacted pt to reschedule missed intake appt; pt rescheduled for 3/10 @8am     FAMILY HISTORY:  No pertinent family history in first degree relatives

## 2024-01-22 NOTE — ED ADULT NURSE NOTE - BREATH SOUNDS, LEFT
Bedside report given to Danton Cockayne, RN. Infant pink without signs of distress. Infant left attended. clear

## 2024-01-22 NOTE — ED ADULT NURSE REASSESSMENT NOTE - NS ED NURSE REASSESS COMMENT FT1
pt given boxed lunch, able to eat with no complaints
Pt remains aaox3 in NAD, nose bleeding controlled at this time. Pt tolerating rhino rocket well.

## 2024-01-22 NOTE — ED PROVIDER NOTE - PHYSICAL EXAMINATION
Physical Exam    Vital Signs: I have reviewed the initial vital signs.  Constitutional: well-nourished, appears stated age, no acute distress  Eyes: Conjunctiva pink, Sclera clear, PERRLA, EOMI.  Nose: + active bleeding from right nostril, unable to see source, no posterior throat bleeding   Cardiovascular: S1 and S2, regular rate, regular rhythm, well-perfused extremities, radial pulses equal and 2+  Respiratory: unlabored respiratory effort, clear to auscultation bilaterally no wheezing, rales and rhonchi  Gastrointestinal: soft, non-tender abdomen, no pulsatile mass, normal bowl sounds  Musculoskeletal: supple neck, no lower extremity edema, no midline tenderness  Integumentary: warm, dry, no rash  Neurologic: awake, alert, cranial nerves II-XII grossly intact, extremities’ motor and sensory functions grossly intact  Psychiatric: appropriate mood, appropriate affect

## 2024-01-22 NOTE — ED PROVIDER NOTE - OBJECTIVE STATEMENT
83 year old male past medical history of dementia, Hypertension on ASA comes to emergency room for nose bleed. patient was just in emergency room for nose bleed but after observation sent home, while on the way home started to bleed and returned to emergency room. no injury no fever/chills.

## 2024-01-22 NOTE — ED PROVIDER NOTE - NSFOLLOWUPINSTRUCTIONS_ED_ALL_ED_FT
Follow up with your primary care doctor and ENT doctor in 1-2 days    Our Emergency Department Referral Coordinators will be reaching out to you in the next 24-48 hours from 9:00am to 5:00pm with a follow up appointment. Please expect a phone call from the hospital in that time frame. If you do not receive a call or if you have any questions or concerns, you can reach them at [phone]    Nosebleed    WHAT YOU NEED TO KNOW:    A nosebleed, or epistaxis, occurs when one or more of the blood vessels in your nose break. You may have dark or bright red blood from one or both nostrils. A nosebleed is most commonly caused by dry air or picking your nose. A direct blow to your nose, irritation from a cold or allergies, or a foreign object can also cause a nosebleed.     DISCHARGE INSTRUCTIONS:    Return to the emergency department if:     Your nasal packing is soaked with blood.      Your nose is still bleeding after 20 minutes, even after you pinch it.       You have a foul-smelling discharge coming out of your nose.      You feel so weak and dizzy that you have trouble standing up.      You have trouble breathing or talking.     Contact your healthcare provider if:     You have a fever and are vomiting.      You have pain in and around your nose that is getting worse even after you take pain medicines.      Your nasal pack is loose.      You have questions or concerns about your condition or care.    First aid:     Sit up and lean forward. This will help prevent you from swallowing blood. Spit blood and saliva into a bowl.       Apply pressure to your nose. Use 2 fingers to pinch your nose shut for 10 to 15 minutes. This will help stop the bleeding. Breathe through your mouth.            Apply ice on the bridge of your nose to decrease swelling and bleeding. Use a cold pack or put crushed ice in a plastic bag. Cover it with a towel to protect your skin.      Pack your nose with a cotton ball, tissue, tampon, or gauze bandage to stop the bleeding.    Medicines:     Medicines applied to a small piece of cotton and placed in your nose. Medicine may also be sprayed in or applied directly to your nose. You may need medicine to prevent an infection. If bleeding is severe, medicine may be injected into a blood vessel in your nose.       Take your medicine as directed. Contact your healthcare provider if you think your medicine is not helping or if you have side effects. Tell him of her if you are allergic to any medicine. Keep a list of the medicines, vitamins, and herbs you take. Include the amounts, and when and why you take them. Bring the list or the pill bottles to follow-up visits. Carry your medicine list with you in case of an emergency.    Prevent another nosebleed:     Keep your nose moist. Put a small amount of petroleum jelly inside your nostrils as needed. Use a saline (saltwater) nasal spray. Do not put anything else inside your nose unless your healthcare provider says it is okay. Do not use oil-based lubricants if you use oxygen therapy. They may be flammable.      Use a cool mist humidifier to increase air moisture in your home. This will help your nose stay moist.       Do not pick or blow your nose for at least a week. You can irritate or damage your nose if you pick it. Blowing your nose too hard may cause the bleeding to start again. Do not bend over or strain as this can cause the bleeding to start again.      Avoid irritants such as tobacco smoke or chemical sprays such as .    Follow up with your healthcare provider as directed: Any packing in your nose should be removed within 2 to 3 days. Write down your questions so you remember to ask them during your visits.        © Copyright [employer] 2019 All illustrations and images included in CareNotes are the copyrighted property of [employer] or [employer].

## 2024-01-22 NOTE — ED PROVIDER NOTE - CARE PROVIDER_API CALL
Antoni Bae  Otolaryngology  [address] [place] [id number]  Phone: [phone]  Fax: [phone]  Follow Up Time: 1-3 Days

## 2024-01-22 NOTE — ED PROVIDER NOTE - CLINICAL SUMMARY MEDICAL DECISION MAKING FREE TEXT BOX
83-year-old male on antiplatelet seen in the ED tonight for epistaxis from right nare that resolved on its own.  Patient was discharged concerning now for sided bleeding.  No posterior bleeding rapid Rhino applied patient observed bleeding controlled DC to follow-up with ENT.

## 2024-01-22 NOTE — ED PROVIDER NOTE - PATIENT PORTAL LINK FT
You can access the FollowMyHealth Patient Portal offered by [facility] by registering at the following website: [email or web address]. By joining [employer]’s FollowMyHealth portal, you will also be able to view your health information using other applications (apps) compatible with our system.

## 2024-01-22 NOTE — ED ADULT NURSE NOTE - NSFALLHARMRISKINTERV_ED_ALL_ED

## 2024-01-24 NOTE — OCCUPATIONAL THERAPY INITIAL EVALUATION ADULT - GENERAL OBSERVATIONS, REHAB EVAL
10:34-10:55 chart reviewed, ok to treat by Occupational Therapist as confirmed by RN, Pt received supine in bed with HOB elevated +bandage on nose +ecchymosis es eyes in NAD. Pt in agreement with OT TX
23-Jan-2024

## 2024-01-25 ENCOUNTER — APPOINTMENT (OUTPATIENT)
Dept: OTOLARYNGOLOGY | Facility: CLINIC | Age: 84
End: 2024-01-25
Payer: MEDICARE

## 2024-01-25 DIAGNOSIS — R04.0 EPISTAXIS: ICD-10-CM

## 2024-01-25 PROCEDURE — 99204 OFFICE O/P NEW MOD 45 MIN: CPT | Mod: 25

## 2024-01-25 PROCEDURE — 31238 NSL/SINS NDSC SRG NSL HEMRRG: CPT | Mod: RT

## 2024-01-25 RX ORDER — LORATADINE 5 MG/5 ML
0.05 SOLUTION, ORAL ORAL TWICE DAILY
Qty: 1 | Refills: 2 | Status: ACTIVE | COMMUNITY
Start: 2024-01-25 | End: 1900-01-01

## 2024-01-25 RX ORDER — BACITRACIN 500 [IU]/G
500 OINTMENT TOPICAL 3 TIMES DAILY
Qty: 28.4 | Refills: 2 | Status: ACTIVE | COMMUNITY
Start: 2024-01-25 | End: 1900-01-01

## 2024-03-29 NOTE — ED ADULT NURSE NOTE - NSSEPSISSUSPECTED_ED_A_ED
Care Coordination - Discharge Planning    I met with Rosario and Katherin to talk about IV ertapenem administration. [facility] referral was sent and patient has coverage. I informed them of this and asked if they are comfortable with IV administration at home. Katherin verbalized that she is on board with this home administration. I reached out to [facility] liaisons, and they will touch base with Katherin. I informed Katherin to be on the lookout for a phone call from [facility].    Marva Briceno  BMT Nurse Coordinator  Phone: [phone]  Pager: [phone]     No

## 2024-03-29 NOTE — ED ADULT TRIAGE NOTE - NSWEIGHTCALCTOOLDRUG_GEN_A_CORE
Admit H&P    Patient Name: Liliam Romero  : 2002  MRN: [id number]  Date of Service: 3/29/2024  Referring Provider: Belinda Kirk     ID: 21 y.o.  at 25w6d    Chief complaint: diarrhea, leaking fluid, lost mucus plug    HPI:  Pt and partner had sex in past 3 days and pt thinks that she lost her mucus plug, but has also felt like she has been leaking fluid from her vagina today.  Pt has had diarrhea off and on with several stools today.  No fever and no chills.  No SOB and no cp.  No n/v.  Has been drinking lots of fluid.  Denies vaginal bleeding.  No contractions.  No current SI/HI.  No headache.  No contractions. No burning when she pees.    Pregnancy course significant for:    PNC +THC at new ob visit      The following portions of the patients history were reviewed and updated as appropriate: current medications, allergies, past medical history, past surgical history, past family history, past social history, and problem list.       REVIEW OF SYSTEMS  leaking fluid from the vagina  Patient reports good fetal movement.  She denies any vaginal bleeding, leakage of fluid, or contractions.  She denies headache, visual changes, or right upper quadrant pain.  All other systems were reviewed and were negative.    Prenatal Labs  Lab Results   Component Value Date    HGB 13.2 2023    HEPBSAG Non-Reactive 2023    ABO B 2023    RH Positive 2023    ABSCRN Negative 2023    [id number] Non-Reactive 2023    HEPCVIRUSABY Non-Reactive 2023    URINECX No growth 2023       OB HISTORY    OB History          1    Para   0    Term   0       0    AB   0    Living   0         SAB   0    IAB   0    Ectopic   0    Molar   0    Multiple   0    Live Births   0          Obstetric Comments   FOB - Arun  Fob #1 - Pregnancy #1              PAST MEDICAL HISTORY    Past Medical History:   Diagnosis Date    ADHD     Anxiety     Depression     OCD (obsessive  compulsive disorder)     PTSD (post-traumatic stress disorder)     Skin rash     PERSISTENT    Trauma      PAST SURGICAL HISTORY     has no past surgical history on file.  CURRENT MEDICATIONS    No current facility-administered medications on file prior to encounter.     Current Outpatient Medications on File Prior to Encounter   Medication Sig Dispense Refill    famotidine (Pepcid) 20 MG tablet Take 1 tablet by mouth 2 (Two) Times a Day. 60 tablet 3    Prenatal Vit-Fe Fumarate-FA (prenatal vitamin 27-0.8) 27-0.8 MG tablet tablet Take  by mouth Daily.      docusate sodium (Colace) 100 MG capsule Take 1 capsule by mouth 2 (Two) Times a Day As Needed for Constipation. 60 capsule 1     ALLERGIES    Patient has no known allergies.  SOCIAL HISTORY    Social History     Tobacco Use   Smoking Status Former    Current packs/day: 0.00    Types: Cigarettes    Quit date:     Years since quittin.2    Passive exposure: Past   Smokeless Tobacco Never   Tobacco Comments    Vaping      Social History     Substance and Sexual Activity   Alcohol Use Never     Social History     Substance and Sexual Activity   Drug Use Yes    Types: Marijuana     FAMILY HISTORY  The patient has a family history of    PHYSICAL EXAMINATION    Vital Signs Range for the last 24 hours  Temperature: Temp:  [98.2 °F (36.8 °C)] 98.2 °F (36.8 °C)   Temp Source: Temp src: Oral   BP: BP: (113)/(73) 113/73   Pulse: Heart Rate:  [96] 96   Respirations: Resp:  [20] 20   Weight:       Constitutional:  Well developed, well nourished, no acute distress, poor hygiene, obese  HEENT: Grossly normal.  Respiratory: Unlabored, normal breath sounds.   Cardiovascular:  Normal rate and rhythm  Abdomen:  Soft, gravid, nontender.  Uterus: Soft, nontender. Fundus appropriate for dates.no CVAT  Neurologic:  Alert & oriented x 4,  no focal deficits noted.   Psychiatric:  Speech and behavior appropriate.   Extremities: no cyanosis, clubbing or edema, no evidence of  DVT.      External Fetal Monitoring:  Baseline 150s - appropriate for GA  Fetal Heart Rate Assessment   Method: Fetal HR Assessment Method: external   Beats/min: Fetal HR (beats/min): 145   Baseline: Fetal HR Baseline: normal range   Varibility: Fetal HR Variability: moderate (amplitude range 6 to 25 bpm)   Accels: Fetal HR Accelerations: absent   Decels: Fetal HR Decelerations: absent   Tracing Category:       Uterine Assessment   Method: Method: external tocotransducer   Frequency (min):     Ctx Count in 10 min:     Duration:     Intensity: Contraction Intensity: no contractions   Intensity by IUPC:     Resting Tone: Uterine Resting Tone: soft by palpation   Resting Tone by IUPC:     [place] Units:       Cervix: Exam by:  deferred   Dilation:     Effacement:     Station:         Labs:      Lab Results (last 24 hours)       Procedure Component Value Units Date/Time    POC Amnisure [[id number]]  (Normal) Collected: 24    Specimen: Amniotic Fluid Updated: 24     Amnisure Negative    Urinalysis With Microscopic If Indicated (No Culture) - Straight Cath [[id number]]  (Abnormal) Collected: 24    Specimen: Urine from Straight Cath Updated: 24     Color, UA Yellow     Appearance, UA Clear     pH, UA 7.0     Specific Gravity, UA 1.026     Glucose, UA Negative     Ketones, UA Trace     Bilirubin, UA Negative     Blood, UA Negative     Protein, UA Negative     Leuk Esterase, UA Negative     Nitrite, UA Negative     Urobilinogen, UA 0.2 E.U./dL    Narrative:      Urine microscopic not indicated.            ASSESSMENT/PLAN:  21 y.o. female  at 25w6d with leaking fluid, possibly lost mucus plug, diarrhea    Urine studies negative  Stool studies - pt unable to give sample  POC amnisure negative     Approximately 25 minutes minutes floor time was spent in care of this patient, of which greater than 50% was spent in face-to-face consultation and coordination of care.    Zoe BRAND  MD Elie  3/29/2024  19:26 EDT    used

## 2024-04-07 ENCOUNTER — INPATIENT (INPATIENT)
Facility: HOSPITAL | Age: 84
LOS: 2 days | Discharge: HOME HEALTH | DRG: 204 | End: 2024-04-10
Attending: STUDENT IN AN ORGANIZED HEALTH CARE EDUCATION/TRAINING PROGRAM | Admitting: INTERNAL MEDICINE
Payer: MEDICARE

## 2024-04-07 VITALS
SYSTOLIC BLOOD PRESSURE: 150 MMHG | OXYGEN SATURATION: 97 % | DIASTOLIC BLOOD PRESSURE: 66 MMHG | HEART RATE: 66 BPM | RESPIRATION RATE: 20 BRPM | TEMPERATURE: 99 F | WEIGHT: 199.96 LBS

## 2024-04-07 DIAGNOSIS — Z95.1 PRESENCE OF AORTOCORONARY BYPASS GRAFT: Chronic | ICD-10-CM

## 2024-04-07 DIAGNOSIS — I10 ESSENTIAL (PRIMARY) HYPERTENSION: ICD-10-CM

## 2024-04-07 DIAGNOSIS — R26.2 DIFFICULTY IN WALKING, NOT ELSEWHERE CLASSIFIED: ICD-10-CM

## 2024-04-07 DIAGNOSIS — R73.03 PREDIABETES: ICD-10-CM

## 2024-04-07 DIAGNOSIS — E78.5 HYPERLIPIDEMIA, UNSPECIFIED: ICD-10-CM

## 2024-04-07 DIAGNOSIS — R06.02 SHORTNESS OF BREATH: ICD-10-CM

## 2024-04-07 DIAGNOSIS — N17.9 ACUTE KIDNEY FAILURE, UNSPECIFIED: ICD-10-CM

## 2024-04-07 DIAGNOSIS — R06.09 OTHER FORMS OF DYSPNEA: ICD-10-CM

## 2024-04-07 DIAGNOSIS — F03.90 UNSPECIFIED DEMENTIA, UNSPECIFIED SEVERITY, WITHOUT BEHAVIORAL DISTURBANCE, PSYCHOTIC DISTURBANCE, MOOD DISTURBANCE, AND ANXIETY: ICD-10-CM

## 2024-04-07 LAB
ALBUMIN SERPL ELPH-MCNC: 4.3 G/DL — SIGNIFICANT CHANGE UP (ref 3.5–5.2)
ALP SERPL-CCNC: 104 U/L — SIGNIFICANT CHANGE UP (ref 30–115)
ALT FLD-CCNC: 23 U/L — SIGNIFICANT CHANGE UP (ref 0–41)
ANION GAP SERPL CALC-SCNC: 12 MMOL/L — SIGNIFICANT CHANGE UP (ref 7–14)
APTT BLD: 33.4 SEC — SIGNIFICANT CHANGE UP (ref 27–39.2)
AST SERPL-CCNC: 26 U/L — SIGNIFICANT CHANGE UP (ref 0–41)
BASE EXCESS BLDV CALC-SCNC: 4.7 MMOL/L — HIGH (ref -2–3)
BASOPHILS # BLD AUTO: 0.04 K/UL — SIGNIFICANT CHANGE UP (ref 0–0.2)
BASOPHILS NFR BLD AUTO: 0.5 % — SIGNIFICANT CHANGE UP (ref 0–1)
BILIRUB SERPL-MCNC: 0.8 MG/DL — SIGNIFICANT CHANGE UP (ref 0.2–1.2)
BUN SERPL-MCNC: 25 MG/DL — HIGH (ref 10–20)
CA-I SERPL-SCNC: 1.18 MMOL/L — SIGNIFICANT CHANGE UP (ref 1.15–1.33)
CALCIUM SERPL-MCNC: 9.6 MG/DL — SIGNIFICANT CHANGE UP (ref 8.4–10.5)
CHLORIDE SERPL-SCNC: 100 MMOL/L — SIGNIFICANT CHANGE UP (ref 98–110)
CO2 SERPL-SCNC: 27 MMOL/L — SIGNIFICANT CHANGE UP (ref 17–32)
CREAT SERPL-MCNC: 1.9 MG/DL — HIGH (ref 0.7–1.5)
D DIMER BLD IA.RAPID-MCNC: 286 NG/ML DDU — HIGH
EGFR: 35 ML/MIN/1.73M2 — LOW
EOSINOPHIL # BLD AUTO: 0.12 K/UL — SIGNIFICANT CHANGE UP (ref 0–0.7)
EOSINOPHIL NFR BLD AUTO: 1.4 % — SIGNIFICANT CHANGE UP (ref 0–8)
FLUAV AG NPH QL: SIGNIFICANT CHANGE UP
FLUBV AG NPH QL: SIGNIFICANT CHANGE UP
GAS PNL BLDV: 135 MMOL/L — LOW (ref 136–145)
GAS PNL BLDV: SIGNIFICANT CHANGE UP
GLUCOSE BLDC GLUCOMTR-MCNC: 101 MG/DL — HIGH (ref 70–99)
GLUCOSE SERPL-MCNC: 96 MG/DL — SIGNIFICANT CHANGE UP (ref 70–99)
HCO3 BLDV-SCNC: 28 MMOL/L — SIGNIFICANT CHANGE UP (ref 22–29)
HCT VFR BLD CALC: 36.3 % — LOW (ref 42–52)
HCT VFR BLDA CALC: 39 % — SIGNIFICANT CHANGE UP (ref 39–51)
HGB BLD CALC-MCNC: 12.9 G/DL — SIGNIFICANT CHANGE UP (ref 12.6–17.4)
HGB BLD-MCNC: 12.4 G/DL — LOW (ref 14–18)
IMM GRANULOCYTES NFR BLD AUTO: 0.2 % — SIGNIFICANT CHANGE UP (ref 0.1–0.3)
INR BLD: 1.14 RATIO — SIGNIFICANT CHANGE UP (ref 0.65–1.3)
LACTATE BLDV-MCNC: 1.5 MMOL/L — SIGNIFICANT CHANGE UP (ref 0.5–2)
LYMPHOCYTES # BLD AUTO: 1.42 K/UL — SIGNIFICANT CHANGE UP (ref 1.2–3.4)
LYMPHOCYTES # BLD AUTO: 16.3 % — LOW (ref 20.5–51.1)
MAGNESIUM SERPL-MCNC: 2.3 MG/DL — SIGNIFICANT CHANGE UP (ref 1.8–2.4)
MCHC RBC-ENTMCNC: 30.2 PG — SIGNIFICANT CHANGE UP (ref 27–31)
MCHC RBC-ENTMCNC: 34.2 G/DL — SIGNIFICANT CHANGE UP (ref 32–37)
MCV RBC AUTO: 88.5 FL — SIGNIFICANT CHANGE UP (ref 80–94)
MONOCYTES # BLD AUTO: 1.07 K/UL — HIGH (ref 0.1–0.6)
MONOCYTES NFR BLD AUTO: 12.3 % — HIGH (ref 1.7–9.3)
NEUTROPHILS # BLD AUTO: 6.05 K/UL — SIGNIFICANT CHANGE UP (ref 1.4–6.5)
NEUTROPHILS NFR BLD AUTO: 69.3 % — SIGNIFICANT CHANGE UP (ref 42.2–75.2)
NRBC # BLD: 0 /100 WBCS — SIGNIFICANT CHANGE UP (ref 0–0)
NT-PROBNP SERPL-SCNC: 190 PG/ML — SIGNIFICANT CHANGE UP (ref 0–300)
PCO2 BLDV: 37 MMHG — LOW (ref 42–55)
PH BLDV: 7.49 — HIGH (ref 7.32–7.43)
PLATELET # BLD AUTO: 182 K/UL — SIGNIFICANT CHANGE UP (ref 130–400)
PMV BLD: 12 FL — HIGH (ref 7.4–10.4)
PO2 BLDV: 62 MMHG — HIGH (ref 25–45)
POTASSIUM BLDV-SCNC: 4.4 MMOL/L — SIGNIFICANT CHANGE UP (ref 3.5–5.1)
POTASSIUM SERPL-MCNC: 4.8 MMOL/L — SIGNIFICANT CHANGE UP (ref 3.5–5)
POTASSIUM SERPL-SCNC: 4.8 MMOL/L — SIGNIFICANT CHANGE UP (ref 3.5–5)
PROT SERPL-MCNC: 7.9 G/DL — SIGNIFICANT CHANGE UP (ref 6–8)
PROTHROM AB SERPL-ACNC: 13 SEC — HIGH (ref 9.95–12.87)
RBC # BLD: 4.1 M/UL — LOW (ref 4.7–6.1)
RBC # FLD: 13.2 % — SIGNIFICANT CHANGE UP (ref 11.5–14.5)
RSV RNA NPH QL NAA+NON-PROBE: SIGNIFICANT CHANGE UP
SAO2 % BLDV: 93.3 % — HIGH (ref 67–88)
SARS-COV-2 RNA SPEC QL NAA+PROBE: SIGNIFICANT CHANGE UP
SODIUM SERPL-SCNC: 139 MMOL/L — SIGNIFICANT CHANGE UP (ref 135–146)
TROPONIN T, HIGH SENSITIVITY RESULT: 27 NG/L — HIGH (ref 6–21)
TROPONIN T, HIGH SENSITIVITY RESULT: 29 NG/L — HIGH (ref 6–21)
TROPONIN T, HIGH SENSITIVITY RESULT: 29 NG/L — HIGH (ref 6–21)
WBC # BLD: 8.72 K/UL — SIGNIFICANT CHANGE UP (ref 4.8–10.8)
WBC # FLD AUTO: 8.72 K/UL — SIGNIFICANT CHANGE UP (ref 4.8–10.8)

## 2024-04-07 PROCEDURE — 82962 GLUCOSE BLOOD TEST: CPT

## 2024-04-07 PROCEDURE — 97162 PT EVAL MOD COMPLEX 30 MIN: CPT | Mod: GP

## 2024-04-07 PROCEDURE — 85027 COMPLETE CBC AUTOMATED: CPT

## 2024-04-07 PROCEDURE — 36415 COLL VENOUS BLD VENIPUNCTURE: CPT

## 2024-04-07 PROCEDURE — 84484 ASSAY OF TROPONIN QUANT: CPT

## 2024-04-07 PROCEDURE — 83735 ASSAY OF MAGNESIUM: CPT

## 2024-04-07 PROCEDURE — 97116 GAIT TRAINING THERAPY: CPT | Mod: GP

## 2024-04-07 PROCEDURE — 71045 X-RAY EXAM CHEST 1 VIEW: CPT | Mod: 26

## 2024-04-07 PROCEDURE — 99222 1ST HOSP IP/OBS MODERATE 55: CPT

## 2024-04-07 PROCEDURE — 83036 HEMOGLOBIN GLYCOSYLATED A1C: CPT

## 2024-04-07 PROCEDURE — 97110 THERAPEUTIC EXERCISES: CPT | Mod: GP

## 2024-04-07 PROCEDURE — 80048 BASIC METABOLIC PNL TOTAL CA: CPT

## 2024-04-07 PROCEDURE — 99285 EMERGENCY DEPT VISIT HI MDM: CPT | Mod: FS

## 2024-04-07 RX ORDER — ASPIRIN/CALCIUM CARB/MAGNESIUM 324 MG
81 TABLET ORAL DAILY
Refills: 0 | Status: DISCONTINUED | OUTPATIENT
Start: 2024-04-07 | End: 2024-04-10

## 2024-04-07 RX ORDER — FAMOTIDINE 10 MG/ML
20 INJECTION INTRAVENOUS DAILY
Refills: 0 | Status: DISCONTINUED | OUTPATIENT
Start: 2024-04-07 | End: 2024-04-10

## 2024-04-07 RX ORDER — HEPARIN SODIUM 5000 [USP'U]/ML
5000 INJECTION INTRAVENOUS; SUBCUTANEOUS EVERY 8 HOURS
Refills: 0 | Status: DISCONTINUED | OUTPATIENT
Start: 2024-04-07 | End: 2024-04-10

## 2024-04-07 RX ORDER — POLYETHYLENE GLYCOL 3350 17 G/17G
17 POWDER, FOR SOLUTION ORAL DAILY
Refills: 0 | Status: DISCONTINUED | OUTPATIENT
Start: 2024-04-07 | End: 2024-04-10

## 2024-04-07 RX ORDER — GUAIFENESIN/DEXTROMETHORPHAN 600MG-30MG
10 TABLET, EXTENDED RELEASE 12 HR ORAL EVERY 4 HOURS
Refills: 0 | Status: DISCONTINUED | OUTPATIENT
Start: 2024-04-07 | End: 2024-04-10

## 2024-04-07 RX ORDER — MEMANTINE HYDROCHLORIDE 10 MG/1
10 TABLET ORAL DAILY
Refills: 0 | Status: DISCONTINUED | OUTPATIENT
Start: 2024-04-07 | End: 2024-04-10

## 2024-04-07 RX ORDER — INSULIN LISPRO 100/ML
VIAL (ML) SUBCUTANEOUS
Refills: 0 | Status: DISCONTINUED | OUTPATIENT
Start: 2024-04-07 | End: 2024-04-10

## 2024-04-07 RX ORDER — ATORVASTATIN CALCIUM 80 MG/1
40 TABLET, FILM COATED ORAL AT BEDTIME
Refills: 0 | Status: DISCONTINUED | OUTPATIENT
Start: 2024-04-07 | End: 2024-04-10

## 2024-04-07 RX ORDER — SENNA PLUS 8.6 MG/1
2 TABLET ORAL
Refills: 0 | DISCHARGE

## 2024-04-07 RX ORDER — FAMOTIDINE 10 MG/ML
20 INJECTION INTRAVENOUS
Refills: 0 | Status: DISCONTINUED | OUTPATIENT
Start: 2024-04-07 | End: 2024-04-07

## 2024-04-07 RX ORDER — SODIUM CHLORIDE 9 MG/ML
1000 INJECTION INTRAMUSCULAR; INTRAVENOUS; SUBCUTANEOUS
Refills: 0 | Status: DISCONTINUED | OUTPATIENT
Start: 2024-04-07 | End: 2024-04-10

## 2024-04-07 RX ORDER — SODIUM CHLORIDE 9 MG/ML
1000 INJECTION INTRAMUSCULAR; INTRAVENOUS; SUBCUTANEOUS
Refills: 0 | Status: DISCONTINUED | OUTPATIENT
Start: 2024-04-07 | End: 2024-04-07

## 2024-04-07 RX ORDER — DONEPEZIL HYDROCHLORIDE 10 MG/1
10 TABLET, FILM COATED ORAL AT BEDTIME
Refills: 0 | Status: DISCONTINUED | OUTPATIENT
Start: 2024-04-07 | End: 2024-04-10

## 2024-04-07 RX ORDER — METOPROLOL TARTRATE 50 MG
50 TABLET ORAL DAILY
Refills: 0 | Status: DISCONTINUED | OUTPATIENT
Start: 2024-04-07 | End: 2024-04-10

## 2024-04-07 RX ORDER — SENNA PLUS 8.6 MG/1
2 TABLET ORAL AT BEDTIME
Refills: 0 | Status: DISCONTINUED | OUTPATIENT
Start: 2024-04-07 | End: 2024-04-10

## 2024-04-07 RX ADMIN — SODIUM CHLORIDE 75 MILLILITER(S): 9 INJECTION INTRAMUSCULAR; INTRAVENOUS; SUBCUTANEOUS at 14:07

## 2024-04-07 RX ADMIN — HEPARIN SODIUM 5000 UNIT(S): 5000 INJECTION INTRAVENOUS; SUBCUTANEOUS at 14:07

## 2024-04-07 RX ADMIN — SODIUM CHLORIDE 75 MILLILITER(S): 9 INJECTION INTRAMUSCULAR; INTRAVENOUS; SUBCUTANEOUS at 21:45

## 2024-04-07 RX ADMIN — HEPARIN SODIUM 5000 UNIT(S): 5000 INJECTION INTRAVENOUS; SUBCUTANEOUS at 21:45

## 2024-04-07 RX ADMIN — SENNA PLUS 2 TABLET(S): 8.6 TABLET ORAL at 21:45

## 2024-04-07 RX ADMIN — POLYETHYLENE GLYCOL 3350 17 GRAM(S): 17 POWDER, FOR SOLUTION ORAL at 14:07

## 2024-04-07 RX ADMIN — Medication 10 MILLILITER(S): at 19:21

## 2024-04-07 RX ADMIN — ATORVASTATIN CALCIUM 40 MILLIGRAM(S): 80 TABLET, FILM COATED ORAL at 21:45

## 2024-04-07 RX ADMIN — DONEPEZIL HYDROCHLORIDE 10 MILLIGRAM(S): 10 TABLET, FILM COATED ORAL at 21:45

## 2024-04-07 NOTE — H&P ADULT - NS ATTEND AMEND GEN_ALL_CORE FT
Pt seen and examined. 83M w/ h/o dementia, HTN, HLD, brought in by daughter due to occasional cough and difficulty ambulating over the last 2 weeks. she feels he has gotten weaker in his lower extremities.     On exam:   Gen: NAD, resting in bed  HEENT: Normocephalic, atraumatic  CV: Regular rate & regular rhythm  Lungs: CTABL no wheeze  Abdomen: Soft, NTND+ BS present  Ext: Warm, well perfused, no edema  Neuro: non focal, awake, CN II-XII intact, A&Ox2-3  Skin: no visible rash, no erythema  Psych: no SI, HI, Hallucination     Plan as above.

## 2024-04-07 NOTE — ED PROVIDER NOTE - CLINICAL SUMMARY MEDICAL DECISION MAKING FREE TEXT BOX
83-year-old male past medical history of hypertension, dyslipidemia, prediabetes, dementia presents to the emergency department for worsening shortness of breath and decreased exertional tolerance at home.  Symptoms been going on for the past few days and patient has been coughing.  Additional history obtained from patient's daughter and patient was acutely short of breath with minimal exertion.  Patient denies any fever.  Patient has chronic leg swelling.  Patient denies any active chest pain.  Patient denies any body aches, abdominal pain.  Patient has no history of DVT or PE.    On exam, vital signs were reviewed.  Patient acutely dyspneic walking from EMS stretcher 3 steps to the stretcher.  O2 sat on room air is unremarkable but with a few steps goes to 92%.  EKG sinus at 60.  No acute ST changes.  IV placed and labs sent, COVID panel sent, chest x-ray done.  Initial troponin elevated and was repeated after 2 hours.  Patient is low risk for PE given Wells score and D-dimer sent and age-adjusted D-dimer is normal.  Repeat troponin shows no significant delta.  Chest x-ray does not show pneumonia and venous colitis reviewed.  Patient is an unsafe discharge at this time and concern for dyspnea on exertion and possible anginal equivalent.  Will admit to low risk telemetry for cardiology eval and formal echo, further testing and monitoring.    ED work up reviewed and results and plan of care discussed with patient. Patient requires admission for further work up, monitoring, and management. Need for admission discussed with patient.

## 2024-04-07 NOTE — H&P ADULT - HISTORY OF PRESENT ILLNESS
84 y/o male PMH:  Dementia, HTN, HLD, Pre-Diabetes; as per daughter brought to ER due to intermittent cough: non productive, decreasing ability to ambulate with his walker at home, - ADL's getting progressively more difficult due to worsening dementia.   - daughter claims worsening pedal edema although on exam legs are not edematous.  - this history was obtained from the daughter who is not a great historian overall.  - last bowel movement 2 days ago; small amount

## 2024-04-07 NOTE — ED ADULT TRIAGE NOTE - INTERNATIONAL TRAVEL
No
This was a shared visit with the VICKIE. I reviewed and verified the documentation and independently performed the documented:

## 2024-04-07 NOTE — ED ADULT NURSE NOTE - NSFALLRISKINTERV_ED_ALL_ED

## 2024-04-07 NOTE — H&P ADULT - PROBLEM SELECTOR PLAN 1
Admit to low risk Tele  Troponin:  29  to  27,  trend Troponin x 1 more  ECHO to acces LV function  Cardiology consult  VTE prophylaxis Admit to low risk Tele    CXR no change from prior film  D- dimer negative  Troponin:  29  to  27,  trend Troponin x 1 more  ECHO to acces LV function  Cardiology consult  VTE prophylaxis

## 2024-04-07 NOTE — CONSULT NOTE ADULT - NS ATTEND AMEND GEN_ALL_CORE FT
82 y/o male with PMH of Dementia, HTN, HLD, Pre-Diabetes; as per daughter brought to ER due to intermittent cough, productive, and decreasing ability to ambulate. By exam no overt chf. Troponin not indictive of mi. Can consider out patient echo and adenocard thallium

## 2024-04-07 NOTE — PATIENT PROFILE ADULT - FALL HARM RISK - HARM RISK INTERVENTIONS

## 2024-04-07 NOTE — H&P ADULT - PROBLEM SELECTOR PLAN 4
continue Metoprolol  hold Enalapril  DASH diet  follow BP trend continue Metoprolol  hold Enalapril: patient reports cough that could be due to ACE inhibitor that he takes  DASH diet  follow BP trend
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
## 2024-04-07 NOTE — ED PROVIDER NOTE - NSICDXPASTMEDICALHX_GEN_ALL_CORE_FT
PAST MEDICAL HISTORY:  Chronic GERD     Coronary artery disease involving coronary bypass graft of native heart without angina pectoris     Dementia     HLD (hyperlipidemia)     HTN (hypertension)     Prediabetes

## 2024-04-07 NOTE — ED PROVIDER NOTE - OBJECTIVE STATEMENT
83 year old male past medical history of Dementia, HTN, HLD, Pre-Diabetes; as per daughter brought to ER due to shortness of breath and worsening non productive, decreasing ability to ambulate with his walker at home due to weakness and shortness of breath. patient states he has been up the last few nights coughing and today daughter states he couldn't walk without getting out of breath so she called 911. no chest pain.  leg swelling, states unchanged as history of heart failure.

## 2024-04-07 NOTE — ED PROVIDER NOTE - ATTENDING APP SHARED VISIT CONTRIBUTION OF CARE
I personally evaluated patient. I agree with the findings and plan with all documentation on chart except as documented  in my note.    83-year-old male past medical history of hypertension, dyslipidemia, prediabetes, dementia presents to the emergency department for worsening shortness of breath and decreased exertional tolerance at home.  Symptoms been going on for the past few days and patient has been coughing.  Additional history obtained from patient's daughter and patient was acutely short of breath with minimal exertion.  Patient denies any fever.  Patient has chronic leg swelling.  Patient denies any active chest pain.  Patient denies any body aches, abdominal pain.  Patient has no history of DVT or PE.    On exam, vital signs were reviewed.  Patient acutely dyspneic walking from EMS stretcher 3 steps to the stretcher.  O2 sat on room air is unremarkable but with a few steps goes to 92%.  EKG sinus at 60.  No acute ST changes.  IV placed and labs sent, COVID panel sent, chest x-ray done.  Initial troponin elevated and was repeated after 2 hours.  Patient is low risk for PE given Wells score and D-dimer sent and age-adjusted D-dimer is normal.  Repeat troponin shows no significant delta.  Chest x-ray does not show pneumonia and venous colitis reviewed.  Patient is an unsafe discharge at this time and concern for dyspnea on exertion and possible anginal equivalent.  Will admit to low risk telemetry for cardiology eval and formal echo, further testing and monitoring.    ED work up reviewed and results and plan of care discussed with patient. Patient requires admission for further work up, monitoring, and management. Need for admission discussed with patient.

## 2024-04-07 NOTE — ED PROVIDER NOTE - DIFFERENTIAL DIAGNOSIS
The differential diagnosis for patients clinical presentation includes but is not limited to:  ACS, MI, aortic dissection, pneumothorax, pneumonia, MSK, pulmonary embolism Differential Diagnosis

## 2024-04-07 NOTE — ED PROVIDER NOTE - PHYSICAL EXAMINATION
Physical Exam    Vital Signs: I have reviewed the initial vital signs.  Constitutional: no acute distress  Eyes: Conjunctiva pink, Sclera clear, PERRLA, EOMI.  Cardiovascular: S1 and S2, regular rate, regular rhythm, well-perfused extremities, radial pulses equal and 2+  Respiratory: unlabored respiratory effort, clear to auscultation bilaterally no wheezing, rales and rhonchi  Gastrointestinal: soft, non-tender abdomen, no pulsatile mass, normal bowl sounds  Musculoskeletal: supple neck, + lower extremity edema, no midline tenderness  Integumentary: warm, dry, no rash  Neurologic: awake, alert, extremities’ motor and sensory functions grossly intact  Psychiatric: appropriate mood, appropriate affect

## 2024-04-07 NOTE — CONSULT NOTE ADULT - ASSESSMENT
82 y/o male with PMH of Dementia, HTN, HLD, Pre-Diabetes; as per daughter brought to ER due to intermittent cough, productive, and decreasing ability to ambulate      Impression:  #MCBRIDE  #HTN, HLD, DM      Pt denies any CP, SOB, palpitations at present  Cxr: No radiographic evidence of acute cardiopulmonary disease  pBNP 190  Euvolemic on exam   ECG: NS, non ischemic  Trop mildly elevated and trending down 29-->27. Elevated possibly 2/2 to demand  ECHO 7/10/23: EF 54%, Normal global LVSF        Plan:  Cont w/ cardiac home meds  Check HgA1C, lipid profile  Monitor lytes  Consider TTE and ischemic workup as outpatient

## 2024-04-07 NOTE — H&P ADULT - NSHPPHYSICALEXAM_GEN_ALL_CORE
Vital Signs Last 24 Hrs    T(F): 98.8 (04-07-24 @ 08:50), Max: 98.8 (04-07-24 @ 08:50)  HR: 66 (04-07-24 @ 08:50) (66 - 66)  BP: 150/66 (04-07-24 @ 08:50)  RR: 20 (04-07-24 @ 08:50) (20 - 20)  SpO2: 97% (04-07-24 @ 08:50) (97% - 97%) Vital Signs Last 24 Hrs    T(F): 98.8 (04-07-24 @ 08:50), Max: 98.8 (04-07-24 @ 08:50)  HR: 66 (04-07-24 @ 08:50) (66 - 66)  BP: 150/66 (04-07-24 @ 08:50)  RR: 20 (04-07-24 @ 08:50) (20 - 20)  SpO2: 97% (04-07-24 @ 08:50) (97% - 97%)    PHYSICAL EXAM:      Constitutional: NAD, Awake and responds to questions but confused overall    Eyes: PERRLA    Respiratory: +air entry, no rales, no rhonchi, no wheezes    Cardiovascular: +S1 and S2, regular rate and rhythm    Gastrointestinal: +BS, **distended, firm,  but non-tender    Extremities:  no edema, no calf tenderness    Vascular: +dorsal pedis and radial pulses, no extremity cyanosis    Neurological: sensation intact, ROM equal B/L, CN II-XII intact    Skin: no rashes, normal turgor

## 2024-04-07 NOTE — CONSULT NOTE ADULT - SUBJECTIVE AND OBJECTIVE BOX
HPI:                 82 y/o male PMH:  Dementia, HTN, HLD, Pre-Diabetes; as per daughter brought to ER due to intermittent cough: non productive, decreasing ability to ambulate with his walker at home, - ADL's getting progressively more difficult due to worsening dementia.   - daughter claims worsening pedal edema although on exam legs are not edematous.  - this history was obtained from the daughter who is not a great historian overall.  - last bowel movement 2 days ago; small amount  (07 Apr 2024 12:52)        HPI-Cardiology   Pt with the above Hx and HPI, evaluated at bedside. Pt presented for eval of worsening cough with productive sputum and progressive worsening generalized weakness associated with MCBRIDE. Pt poor historian, and full HPI and Hx obtained from Pt's daughter at bedside. Pt denies any CP, SOB, palpitations. Radiology tests and hospital records, were reviewed, as well as previous notes on this patient.        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        FAMILY HISTORY:  FAMILY HISTORY:  No pertinent family history in first degree relatives        ALLERGIES:  No Known Allergies      MEDICATIONS:  MEDICATIONS  (STANDING):  aspirin  chewable 81 milliGRAM(s) Oral daily  atorvastatin 40 milliGRAM(s) Oral at bedtime  donepezil 10 milliGRAM(s) Oral at bedtime  famotidine    Tablet 20 milliGRAM(s) Oral daily  heparin   Injectable 5000 Unit(s) SubCutaneous every 8 hours  insulin lispro (ADMELOG) corrective regimen sliding scale   SubCutaneous three times a day before meals  memantine 10 milliGRAM(s) Oral daily  metoprolol succinate ER 50 milliGRAM(s) Oral daily  polyethylene glycol 3350 17 Gram(s) Oral daily  senna 2 Tablet(s) Oral at bedtime  sodium chloride 0.9%. 1000 milliLiter(s) (75 mL/Hr) IV Continuous <Continuous>    MEDICATIONS  (PRN):      HOME MEDICATIONS:  Home Medications:  aspirin 81 mg oral tablet, chewable: 1 tab(s) orally once a day (19 Jun 2021 09:18)  atorvastatin 40 mg oral tablet: 1 tab(s) orally once a day (at bedtime) (19 Jun 2021 09:18)  donepezil 10 mg oral tablet: 1 tab(s) orally once a day (at bedtime) (19 Jun 2021 09:18)  famotidine 40 mg oral tablet: 1 tab(s) orally once a day (19 Jun 2021 09:18)  Lasix 40 mg oral tablet: 1 tab(s) orally once a day (07 Apr 2024 14:34)  memantine 10 mg oral tablet: 1 tab(s) orally once a day (19 Jun 2021 09:18)  metoprolol succinate 50 mg oral tablet, extended release: 1 tab(s) orally once a day (19 Jun 2021 09:18)  Protonix 20 mg oral delayed release tablet: 1 tab(s) orally once a day (07 Apr 2024 14:35)  Senna 8.6 mg oral tablet: 2 tab(s) orally once a day (at bedtime) (07 Apr 2024 14:36)      VITALS:   T(F): 98.8 (04-07 @ 08:50), Max: 98.8 (04-07 @ 08:50)  HR: 66 (04-07 @ 08:50) (66 - 66)  BP: 150/66 (04-07 @ 08:50) (150/66 - 150/66)  BP(mean): --  RR: 20 (04-07 @ 08:50) (20 - 20)  SpO2: 97% (04-07 @ 08:50) (97% - 97%)          REVIEW OF SYSTEMS:  See HPI        PHYSICAL EXAM:  NEURO: patient is awake , alert and oriented  GEN: Not in acute distress  NECK: no thyroid enlargement, no JVD  LUNGS: Clear to auscultation bilaterally   CARDIOVASCULAR: S1/S2 present, RRR , no murmurs or rubs, no carotid bruits,  + PP bilaterally  ABD: Soft, non-tender, non-distended, +BS  EXT: No MAR  SKIN: Intact        LABS:                        12.4   8.72  )-----------( 182      ( 07 Apr 2024 09:45 )             36.3     04-07    139  |  100  |  25<H>  ----------------------------<  96  4.8   |  27  |  1.9<H>    Ca    9.6      07 Apr 2024 09:45  Mg     2.3     04-07    TPro  7.9  /  Alb  4.3  /  TBili  0.8  /  DBili  x   /  AST  26  /  ALT  23  /  AlkPhos  104  04-07    PT/INR - ( 07 Apr 2024 09:45 )   PT: 13.00 sec;   INR: 1.14 ratio         PTT - ( 07 Apr 2024 09:45 )  PTT:33.4 sec              RADIOLOGY:  -CXR:  < from: Xray Chest 1 View- PORTABLE-Urgent (04.07.24 @ 09:15) >    Impression:    No radiographic evidence of acute cardiopulmonary disease.    Grossly without difference.    --- End of Report ---      < end of copied text >              < from: TTE Echo Complete w/ Contrast w/ Doppler (07.10.23 @ 13:21) >    Summary:   1. Normal global left ventricular systolic function.   2. LV Ejection Fraction by Cadet's Method with a biplane EF of 54 %.   3. Normal right ventricular size and function.   4. Normal left atrial size.   5. Normal right atrial size.   6. There is moderate aortic root calcification.   7. Sclerotic aortic valve with normal opening.   8. Mild mitral annular calcification.   9. Trace mitral valve regurgitation.  10. Dilatation of the aortic root, measures 3.8 cm.  11. Adequate TR velocity was not obtained to accurately assess RVSP.  12. There is no evidence of pericardial effusion.    < end of copied text >      ECG:  < from: 12 Lead ECG (04.07.24 @ 08:58) >   Normal sinus rhythm      Confirmed by UNRULY ALVA MD ([id number]) on 4/7/2024 9:43:06 AM    < end of copied text >

## 2024-04-07 NOTE — H&P ADULT - NSHPLABSRESULTS_GEN_ALL_CORE
12.4   8.72  )-----------( 182      ( 07 Apr 2024 09:45 )             36.3       04-07    139  |  100  |  25<H>  ----------------------------<  96  4.8   |  27  |  1.9<H>    Ca    9.6      07 Apr 2024 09:45  Mg     2.3     04-07    TPro  7.9  /  Alb  4.3  /  TBili  0.8  /  DBili  x   /  AST  26  /  ALT  23  /  AlkPhos  104  04-07    D-Dimer Assay, Quantitative: 286    Troponin T, High Sensitivity Result: 29:    Troponin T, High Sensitivity Result: 27    Flu With COVID-19 By PAPO (04.07.24 @ 10:08)   SARS-CoV-2 Result: NotDetec  Influenza A Result: NotDetec  Influenza B Result: NotDetec  Resp Syn Virus Result: NotDetecFlu With COVID-19 By PAPO (04.07.24 @ 10:08)   SARS-CoV-2 Result: NotDetec  Influenza A Result: NotDetec  Influenza B Result: NotDetec  Resp Syn Virus Result: NotDetec        Urinalysis Basic - ( 07 Apr 2024 09:45 )    Color: x / Appearance: x / SG: x / pH: x  Gluc: 96 mg/dL / Ketone: x  / Bili: x / Urobili: x   Blood: x / Protein: x / Nitrite: x   Leuk Esterase: x / RBC: x / WBC x   Sq Epi: x / Non Sq Epi: x / Bacteria: x    PT/INR - ( 07 Apr 2024 09:45 )   PT: 13.00 sec;   INR: 1.14 ratio         PTT - ( 07 Apr 2024 09:45 )  PTT:33.4 sec     Xray Chest 1 View- PORTABLE-Urgent (04.07.24 @ 09:15) >      Impression:    No radiographic evidence of acute cardiopulmonary disease.    Grossly without difference.;  compared to July 2023         TTE Echo Complete w/ Contrast w/ Doppler (07.10.23    Summary:   1. Normal global left ventricular systolic function.   2. LV Ejection Fraction by Cadet's Method with a biplane EF of 54 %.   3. Normal right ventricular size and function.   4. Normal left atrial size.   5. Normal right atrial size.   6. There is moderate aortic root calcification.   7. Sclerotic aortic valve with normal opening.   8. Mild mitral annular calcification.   9. Trace mitral valve regurgitation.  10. Dilatation of the aortic root, measures 3.8 cm.  11. Adequate TR velocity was not obtained to accurately assess RVSP.  12. There is no evidence of pericardial effusion. 12.4   8.72  )-----------( 182      ( 07 Apr 2024 09:45 )             36.3       04-07    139  |  100  |  25<H>  ----------------------------<  96       base Creat:  1.4  4.8   |  27  |  1.9<H>    Ca    9.6      07 Apr 2024 09:45  Mg     2.3     04-07    TPro  7.9  /  Alb  4.3  /  TBili  0.8  /  DBili  x   /  AST  26  /  ALT  23  /  AlkPhos  104  04-07    D-Dimer Assay, Quantitative: 286    Troponin T, High Sensitivity Result: 29:    Troponin T, High Sensitivity Result: 27    Flu With COVID-19 By PAPO (04.07.24 @ 10:08)   SARS-CoV-2 Result: NotDetec  Influenza A Result: NotDetec  Influenza B Result: NotDetec  Resp Syn Virus Result: NotDetecFlu With COVID-19 By PAPO (04.07.24 @ 10:08)   SARS-CoV-2 Result: NotDetec  Influenza A Result: NotDetec  Influenza B Result: NotDetec  Resp Syn Virus Result: NotDetec        Urinalysis Basic - ( 07 Apr 2024 09:45 )    Color: x / Appearance: x / SG: x / pH: x  Gluc: 96 mg/dL / Ketone: x  / Bili: x / Urobili: x   Blood: x / Protein: x / Nitrite: x   Leuk Esterase: x / RBC: x / WBC x   Sq Epi: x / Non Sq Epi: x / Bacteria: x    PT/INR - ( 07 Apr 2024 09:45 )   PT: 13.00 sec;   INR: 1.14 ratio         PTT - ( 07 Apr 2024 09:45 )  PTT:33.4 sec     Xray Chest 1 View- PORTABLE-Urgent (04.07.24 @ 09:15) >      Impression:    No radiographic evidence of acute cardiopulmonary disease.    Grossly without difference.;  compared to July 2023         TTE Echo Complete w/ Contrast w/ Doppler (07.10.23    Summary:   1. Normal global left ventricular systolic function.   2. LV Ejection Fraction by Cadet's Method with a biplane EF of 54 %.   3. Normal right ventricular size and function.   4. Normal left atrial size.   5. Normal right atrial size.   6. There is moderate aortic root calcification.   7. Sclerotic aortic valve with normal opening.   8. Mild mitral annular calcification.   9. Trace mitral valve regurgitation.  10. Dilatation of the aortic root, measures 3.8 cm.  11. Adequate TR velocity was not obtained to accurately assess RVSP.  12. There is no evidence of pericardial effusion.

## 2024-04-08 LAB
A1C WITH ESTIMATED AVERAGE GLUCOSE RESULT: 6.2 % — HIGH (ref 4–5.6)
ANION GAP SERPL CALC-SCNC: 8 MMOL/L — SIGNIFICANT CHANGE UP (ref 7–14)
BUN SERPL-MCNC: 26 MG/DL — HIGH (ref 10–20)
CALCIUM SERPL-MCNC: 8.5 MG/DL — SIGNIFICANT CHANGE UP (ref 8.4–10.5)
CHLORIDE SERPL-SCNC: 104 MMOL/L — SIGNIFICANT CHANGE UP (ref 98–110)
CO2 SERPL-SCNC: 24 MMOL/L — SIGNIFICANT CHANGE UP (ref 17–32)
CREAT SERPL-MCNC: 1.8 MG/DL — HIGH (ref 0.7–1.5)
EGFR: 37 ML/MIN/1.73M2 — LOW
ESTIMATED AVERAGE GLUCOSE: 131 MG/DL — HIGH (ref 68–114)
GLUCOSE BLDC GLUCOMTR-MCNC: 111 MG/DL — HIGH (ref 70–99)
GLUCOSE SERPL-MCNC: 99 MG/DL — SIGNIFICANT CHANGE UP (ref 70–99)
HCT VFR BLD CALC: 31.2 % — LOW (ref 42–52)
HGB BLD-MCNC: 10.7 G/DL — LOW (ref 14–18)
MAGNESIUM SERPL-MCNC: 2.2 MG/DL — SIGNIFICANT CHANGE UP (ref 1.8–2.4)
MCHC RBC-ENTMCNC: 30.4 PG — SIGNIFICANT CHANGE UP (ref 27–31)
MCHC RBC-ENTMCNC: 34.3 G/DL — SIGNIFICANT CHANGE UP (ref 32–37)
MCV RBC AUTO: 88.6 FL — SIGNIFICANT CHANGE UP (ref 80–94)
NRBC # BLD: 0 /100 WBCS — SIGNIFICANT CHANGE UP (ref 0–0)
PLATELET # BLD AUTO: 160 K/UL — SIGNIFICANT CHANGE UP (ref 130–400)
PMV BLD: 11.9 FL — HIGH (ref 7.4–10.4)
POTASSIUM SERPL-MCNC: 4.5 MMOL/L — SIGNIFICANT CHANGE UP (ref 3.5–5)
POTASSIUM SERPL-SCNC: 4.5 MMOL/L — SIGNIFICANT CHANGE UP (ref 3.5–5)
RBC # BLD: 3.52 M/UL — LOW (ref 4.7–6.1)
RBC # FLD: 13.2 % — SIGNIFICANT CHANGE UP (ref 11.5–14.5)
SODIUM SERPL-SCNC: 136 MMOL/L — SIGNIFICANT CHANGE UP (ref 135–146)
WBC # BLD: 6.77 K/UL — SIGNIFICANT CHANGE UP (ref 4.8–10.8)
WBC # FLD AUTO: 6.77 K/UL — SIGNIFICANT CHANGE UP (ref 4.8–10.8)

## 2024-04-08 PROCEDURE — 99222 1ST HOSP IP/OBS MODERATE 55: CPT

## 2024-04-08 PROCEDURE — 99232 SBSQ HOSP IP/OBS MODERATE 35: CPT

## 2024-04-08 RX ADMIN — ATORVASTATIN CALCIUM 40 MILLIGRAM(S): 80 TABLET, FILM COATED ORAL at 22:55

## 2024-04-08 RX ADMIN — HEPARIN SODIUM 5000 UNIT(S): 5000 INJECTION INTRAVENOUS; SUBCUTANEOUS at 05:37

## 2024-04-08 RX ADMIN — POLYETHYLENE GLYCOL 3350 17 GRAM(S): 17 POWDER, FOR SOLUTION ORAL at 12:14

## 2024-04-08 RX ADMIN — DONEPEZIL HYDROCHLORIDE 10 MILLIGRAM(S): 10 TABLET, FILM COATED ORAL at 22:55

## 2024-04-08 RX ADMIN — MEMANTINE HYDROCHLORIDE 10 MILLIGRAM(S): 10 TABLET ORAL at 12:14

## 2024-04-08 RX ADMIN — Medication 81 MILLIGRAM(S): at 12:14

## 2024-04-08 RX ADMIN — HEPARIN SODIUM 5000 UNIT(S): 5000 INJECTION INTRAVENOUS; SUBCUTANEOUS at 12:14

## 2024-04-08 RX ADMIN — Medication 50 MILLIGRAM(S): at 05:38

## 2024-04-08 RX ADMIN — SENNA PLUS 2 TABLET(S): 8.6 TABLET ORAL at 22:55

## 2024-04-08 RX ADMIN — FAMOTIDINE 20 MILLIGRAM(S): 10 INJECTION INTRAVENOUS at 12:14

## 2024-04-08 RX ADMIN — HEPARIN SODIUM 5000 UNIT(S): 5000 INJECTION INTRAVENOUS; SUBCUTANEOUS at 22:55

## 2024-04-08 NOTE — PHYSICAL THERAPY INITIAL EVALUATION ADULT - ADDITIONAL COMMENTS
As per pt ,He lives with her daughter in a private house. There is no Step to enter. He  was supervision with functional mobility and ADLs with RW  PTA. Unable to obtain PLOF from daughter after multiple phone calls.

## 2024-04-08 NOTE — PHYSICAL THERAPY INITIAL EVALUATION ADULT - GENERAL OBSERVATIONS, REHAB EVAL
9:55-10:20 am, Chart reviewed, pt available for PT, RN notified.  Pt denies pain and is willing to participate with PT.  Pt encountered in bed+IV attached+Condom catheter with NAD.

## 2024-04-08 NOTE — PHYSICAL THERAPY INITIAL EVALUATION ADULT - PERTINENT HX OF CURRENT PROBLEM, REHAB EVAL
Pt is 84 y/o male PMH:  Dementia, HTN, HLD, Pre-Diabetes; as per daughter brought to ER due to intermittent cough: non productive, decreasing ability to ambulate with his walker at home, - ADL's getting progressively more difficult due to worsening dementia.  Daughter claims worsening pedal edema although on exam legs are not edematous. this history was obtained from the daughter who is not a great historian overall.

## 2024-04-08 NOTE — PROGRESS NOTE ADULT - SUBJECTIVE AND OBJECTIVE BOX
NOEMY GRUBBS  83y  Male      Patient is a 83y old  Male who presents with a chief complaint of Dyspnea on Exertion (07 Apr 2024 17:49)      INTERVAL HPI/OVERNIGHT EVENTS:  Pt seen and examined. He denies any chest pain or SOB. Has occasional cough.        Vital Signs Last 24 Hrs  T(C): 37.3 (08 Apr 2024 04:15), Max: 37.3 (08 Apr 2024 04:15)  T(F): 99.1 (08 Apr 2024 04:15), Max: 99.1 (08 Apr 2024 04:15)  HR: 68 (08 Apr 2024 04:15) (67 - 89)  BP: 135/62 (08 Apr 2024 04:15) (135/62 - 156/70)  BP(mean): --  RR: 17 (08 Apr 2024 04:15) (17 - 18)  SpO2: 95% (08 Apr 2024 04:15) (95% - 98%)    Parameters below as of 08 Apr 2024 04:15  Patient On (Oxygen Delivery Method): room air        PHYSICAL EXAM:  Gen: NAD, resting in bed  HEENT: Normocephalic, atraumatic  Neck: supple, no lymphadenopathy  CV: Regular rate & regular rhythm  Lungs: CTABL no wheeze  Abdomen: Soft, NTND+ BS present  Ext: Warm, well perfused no CCE  Neuro: non focal, awake, CN II-XII intact   Skin: no rash, no erythema  Psych: no SI, HI, Hallucination     Consultant(s) Notes Reviewed:  [x ] YES  [ ] NO  Care Discussed with Consultants/Other Providers [ x] YES  [ ] NO    LABS:                        10.7   6.77  )-----------( 160      ( 08 Apr 2024 07:09 )             31.2     04-08    136  |  104  |  26<H>  ----------------------------<  99  4.5   |  24  |  1.8<H>    Ca    8.5      08 Apr 2024 07:09  Mg     2.2     04-08    TPro  7.9  /  Alb  4.3  /  TBili  0.8  /  DBili  x   /  AST  26  /  ALT  23  /  AlkPhos  104  04-07    PT/INR - ( 07 Apr 2024 09:45 )   PT: 13.00 sec;   INR: 1.14 ratio         PTT - ( 07 Apr 2024 09:45 )  PTT:33.4 sec         CAPILLARY BLOOD GLUCOSE      POCT Blood Glucose.: 111 mg/dL (08 Apr 2024 08:03)  POCT Blood Glucose.: 101 mg/dL (07 Apr 2024 17:29)      RADIOLOGY & ADDITIONAL TESTS:    Imaging Personally Reviewed:  [ ] YES  [ ] NO    ASSESSMENT AND PLAN: NOEMY GRUBBS  83y  Male      Patient is a 83y old  Male who presents with a chief complaint of Dyspnea on Exertion (07 Apr 2024 17:49)      INTERVAL HPI/OVERNIGHT EVENTS:  Pt seen and examined. He denies any chest pain or SOB. Has occasional cough.        Vital Signs Last 24 Hrs  T(C): 37.3 (08 Apr 2024 04:15), Max: 37.3 (08 Apr 2024 04:15)  T(F): 99.1 (08 Apr 2024 04:15), Max: 99.1 (08 Apr 2024 04:15)  HR: 68 (08 Apr 2024 04:15) (67 - 89)  BP: 135/62 (08 Apr 2024 04:15) (135/62 - 156/70)  BP(mean): --  RR: 17 (08 Apr 2024 04:15) (17 - 18)  SpO2: 95% (08 Apr 2024 04:15) (95% - 98%)    Parameters below as of 08 Apr 2024 04:15  Patient On (Oxygen Delivery Method): room air        PHYSICAL EXAM:  Gen: NAD, resting in bed  HEENT: Normocephalic, atraumatic  Neck: supple, no lymphadenopathy  CV: Regular rate & regular rhythm  Lungs: CTABL no wheeze  Abdomen: Soft, NTND+ BS present  Ext: Warm, well perfused no CCE  Neuro: non focal, awake, CN II-XII intact   Skin: no rash, no erythema  Psych: no SI, HI, Hallucination     Consultant(s) Notes Reviewed:  [x ] YES  [ ] NO  Care Discussed with Consultants/Other Providers [ x] YES  [ ] NO    LABS:                        10.7   6.77  )-----------( 160      ( 08 Apr 2024 07:09 )             31.2     04-08    136  |  104  |  26<H>  ----------------------------<  99  4.5   |  24  |  1.8<H>    Ca    8.5      08 Apr 2024 07:09  Mg     2.2     04-08    TPro  7.9  /  Alb  4.3  /  TBili  0.8  /  DBili  x   /  AST  26  /  ALT  23  /  AlkPhos  104  04-07    PT/INR - ( 07 Apr 2024 09:45 )   PT: 13.00 sec;   INR: 1.14 ratio         PTT - ( 07 Apr 2024 09:45 )  PTT:33.4 sec         CAPILLARY BLOOD GLUCOSE      POCT Blood Glucose.: 111 mg/dL (08 Apr 2024 08:03)  POCT Blood Glucose.: 101 mg/dL (07 Apr 2024 17:29)      RADIOLOGY & ADDITIONAL TESTS:    Imaging Personally Reviewed:  [ ] YES  [ ] NO    ASSESSMENT AND PLAN:  24 y/o male admitted due dyspnea on exertion, worsening ability to ambulate    #Dyspnea on exertion  Mild trop elevation 29,27,29  Age-adjusted D-dimer negative  Seen by Cardiology--rec outpatient echo and adenocard thallium    #Ambulatory dysfunction  PT evaluation    #ROYAL  Baseline Cr 1.4  IV hydration with NS at 75/hr x 24h    #HTN  Cont metoprolol  hold elanapril d/t ROYAL    #Dementia  Cont dementia meds    #HLD  cont statin    #Prediabetes  CHO consistent diet  FS with sliding scale     DVT ppx: heparin sq NOEMY GRUBBS  83y  Male      Patient is a 83y old  Male who presents with a chief complaint of Dyspnea on Exertion (07 Apr 2024 17:49)      INTERVAL HPI/OVERNIGHT EVENTS:  Pt seen and examined. He denies any chest pain or SOB. Has occasional cough.        Vital Signs Last 24 Hrs  T(C): 37.3 (08 Apr 2024 04:15), Max: 37.3 (08 Apr 2024 04:15)  T(F): 99.1 (08 Apr 2024 04:15), Max: 99.1 (08 Apr 2024 04:15)  HR: 68 (08 Apr 2024 04:15) (67 - 89)  BP: 135/62 (08 Apr 2024 04:15) (135/62 - 156/70)  BP(mean): --  RR: 17 (08 Apr 2024 04:15) (17 - 18)  SpO2: 95% (08 Apr 2024 04:15) (95% - 98%)    Parameters below as of 08 Apr 2024 04:15  Patient On (Oxygen Delivery Method): room air        PHYSICAL EXAM:  Gen: NAD, resting in bed  HEENT: Normocephalic, atraumatic  Neck: supple, no lymphadenopathy  CV: Regular rate & regular rhythm  Lungs: CTABL no wheeze  Abdomen: Soft, NTND+ BS present  Ext: Warm, well perfused no CCE  Neuro: non focal, awake, CN II-XII intact   Skin: no rash, no erythema  Psych: no SI, HI, Hallucination     Consultant(s) Notes Reviewed:  [x ] YES  [ ] NO  Care Discussed with Consultants/Other Providers [ x] YES  [ ] NO    LABS:                        10.7   6.77  )-----------( 160      ( 08 Apr 2024 07:09 )             31.2     04-08    136  |  104  |  26<H>  ----------------------------<  99  4.5   |  24  |  1.8<H>    Ca    8.5      08 Apr 2024 07:09  Mg     2.2     04-08    TPro  7.9  /  Alb  4.3  /  TBili  0.8  /  DBili  x   /  AST  26  /  ALT  23  /  AlkPhos  104  04-07    PT/INR - ( 07 Apr 2024 09:45 )   PT: 13.00 sec;   INR: 1.14 ratio         PTT - ( 07 Apr 2024 09:45 )  PTT:33.4 sec         CAPILLARY BLOOD GLUCOSE      POCT Blood Glucose.: 111 mg/dL (08 Apr 2024 08:03)  POCT Blood Glucose.: 101 mg/dL (07 Apr 2024 17:29)      ASSESSMENT AND PLAN:  24 y/o male admitted due dyspnea on exertion, worsening ability to ambulate    #Dyspnea on exertion  Mild trop elevation 29,27,29  Age-adjusted D-dimer negative  Seen by Cardiology--rec outpatient echo and adenocard thallium    #Ambulatory dysfunction  PT evaluation    #ROYAL  Baseline Cr 1.4  IV hydration with NS at 75/hr x 24h    #HTN  Cont metoprolol  hold elanapril d/t ROYAL    #Dementia  Cont dementia meds    #HLD  cont statin    #Prediabetes  CHO consistent diet  FS with sliding scale     DVT ppx: heparin sq

## 2024-04-09 ENCOUNTER — TRANSCRIPTION ENCOUNTER (OUTPATIENT)
Age: 84
End: 2024-04-09

## 2024-04-09 LAB
ANION GAP SERPL CALC-SCNC: 10 MMOL/L — SIGNIFICANT CHANGE UP (ref 7–14)
BUN SERPL-MCNC: 23 MG/DL — HIGH (ref 10–20)
CALCIUM SERPL-MCNC: 8.7 MG/DL — SIGNIFICANT CHANGE UP (ref 8.4–10.5)
CHLORIDE SERPL-SCNC: 106 MMOL/L — SIGNIFICANT CHANGE UP (ref 98–110)
CO2 SERPL-SCNC: 23 MMOL/L — SIGNIFICANT CHANGE UP (ref 17–32)
CREAT SERPL-MCNC: 1.8 MG/DL — HIGH (ref 0.7–1.5)
EGFR: 37 ML/MIN/1.73M2 — LOW
GLUCOSE SERPL-MCNC: 108 MG/DL — HIGH (ref 70–99)
POTASSIUM SERPL-MCNC: 4.2 MMOL/L — SIGNIFICANT CHANGE UP (ref 3.5–5)
POTASSIUM SERPL-SCNC: 4.2 MMOL/L — SIGNIFICANT CHANGE UP (ref 3.5–5)
SODIUM SERPL-SCNC: 139 MMOL/L — SIGNIFICANT CHANGE UP (ref 135–146)

## 2024-04-09 PROCEDURE — 99239 HOSP IP/OBS DSCHRG MGMT >30: CPT

## 2024-04-09 RX ORDER — PANTOPRAZOLE SODIUM 20 MG/1
1 TABLET, DELAYED RELEASE ORAL
Refills: 0 | DISCHARGE

## 2024-04-09 RX ORDER — FUROSEMIDE 40 MG
1 TABLET ORAL
Refills: 0 | DISCHARGE

## 2024-04-09 RX ADMIN — DONEPEZIL HYDROCHLORIDE 10 MILLIGRAM(S): 10 TABLET, FILM COATED ORAL at 21:24

## 2024-04-09 RX ADMIN — ATORVASTATIN CALCIUM 40 MILLIGRAM(S): 80 TABLET, FILM COATED ORAL at 21:23

## 2024-04-09 RX ADMIN — Medication 50 MILLIGRAM(S): at 06:45

## 2024-04-09 RX ADMIN — SENNA PLUS 2 TABLET(S): 8.6 TABLET ORAL at 21:23

## 2024-04-09 RX ADMIN — Medication 81 MILLIGRAM(S): at 11:10

## 2024-04-09 RX ADMIN — HEPARIN SODIUM 5000 UNIT(S): 5000 INJECTION INTRAVENOUS; SUBCUTANEOUS at 13:46

## 2024-04-09 RX ADMIN — HEPARIN SODIUM 5000 UNIT(S): 5000 INJECTION INTRAVENOUS; SUBCUTANEOUS at 06:45

## 2024-04-09 RX ADMIN — POLYETHYLENE GLYCOL 3350 17 GRAM(S): 17 POWDER, FOR SOLUTION ORAL at 11:18

## 2024-04-09 RX ADMIN — FAMOTIDINE 20 MILLIGRAM(S): 10 INJECTION INTRAVENOUS at 11:10

## 2024-04-09 RX ADMIN — HEPARIN SODIUM 5000 UNIT(S): 5000 INJECTION INTRAVENOUS; SUBCUTANEOUS at 21:24

## 2024-04-09 RX ADMIN — Medication 1: at 16:56

## 2024-04-09 RX ADMIN — Medication 10 MILLILITER(S): at 11:13

## 2024-04-09 RX ADMIN — MEMANTINE HYDROCHLORIDE 10 MILLIGRAM(S): 10 TABLET ORAL at 11:10

## 2024-04-09 NOTE — DISCHARGE NOTE PROVIDER - NSDCMRMEDTOKEN_GEN_ALL_CORE_FT
aspirin 81 mg oral tablet, chewable: 1 tab(s) orally once a day  atorvastatin 40 mg oral tablet: 1 tab(s) orally once a day (at bedtime)  donepezil 10 mg oral tablet: 1 tab(s) orally once a day (at bedtime)  famotidine 40 mg oral tablet: 1 tab(s) orally once a day  memantine 10 mg oral tablet: 1 tab(s) orally once a day  metoprolol succinate 50 mg oral tablet, extended release: 1 tab(s) orally once a day  Senna 8.6 mg oral tablet: 2 tab(s) orally once a day (at bedtime)

## 2024-04-09 NOTE — DISCHARGE NOTE NURSING/CASE MANAGEMENT/SOCIAL WORK - PATIENT PORTAL LINK FT
You can access the FollowMyHealth Patient Portal offered by [facility] by registering at the following website: [email or web address]. By joining [employer]’s FollowMyHealth portal, you will also be able to view your health information using other applications (apps) compatible with our system.

## 2024-04-09 NOTE — CHART NOTE - NSCHARTNOTEFT_GEN_A_CORE
Informed by RN that there is no set up for patient to be brought into his home or even an ambulance for transportation. Asked RN to contact AND (or possibly ) to assist but may need to cancel discharge if arrangements cannot be made Informed by RN that there is no set up for patient to be brought into his home (daughter even told RN she didn't have a plan to physically bring patient in from the car into the house) or even an ambulance for transportation. Asked RN to contact ADN (or possibly ) to assist but may need to cancel discharge if arrangements cannot be made. On follow up conversation, daughter requested that discharge be postponed until tomorrow

## 2024-04-09 NOTE — DISCHARGE NOTE NURSING/CASE MANAGEMENT/SOCIAL WORK - NSDCVIVACCINE_GEN_ALL_CORE_FT
Tdap; 26-Oct-2020 14:23; Ngoc Naylor (RN); [employer]; [id number] (Exp. Date: 31-Jul-2022); IntraMuscular; Deltoid Left.; 0.5 milliLiter(s); VIS (VIS Published: 09-May-2013, VIS Presented: 26-Oct-2020);   Tdap; 02-Oct-2021 19:12; Herminia Robertson (RN); [employer]; [id number] (Exp. Date: 15-Jul-2023); IntraMuscular; Deltoid Left.; 0.5 milliLiter(s); VIS (VIS Published: 09-May-2013, VIS Presented: 02-Oct-2021);

## 2024-04-09 NOTE — DISCHARGE NOTE PROVIDER - NSDCCPCAREPLAN_GEN_ALL_CORE_FT
PRINCIPAL DISCHARGE DIAGNOSIS  Diagnosis: Shortness of breath  Assessment and Plan of Treatment: You were admitted to the hospital due to dypnea on exertion.  Follow up with primary physician. Hold Lasix until seen by PCP      SECONDARY DISCHARGE DIAGNOSES  Diagnosis: Elevated troponin  Assessment and Plan of Treatment: Mild elevations in troponin were seen during admission.  Follow up with PCP and cardiology     PRINCIPAL DISCHARGE DIAGNOSIS  Diagnosis: Shortness of breath  Assessment and Plan of Treatment: You were admitted to the hospital due to dypnea on exertion.   Hold Lasix until seen by PCP, repeat BMP in 3 days, follow up with your pcp      SECONDARY DISCHARGE DIAGNOSES  Diagnosis: Elevated troponin  Assessment and Plan of Treatment: Mild elevations in troponin were seen during admission. You were monitored on telemetry , seen by cardiology follow up outpt for ECHO and adenocard thallium     PRINCIPAL DISCHARGE DIAGNOSIS  Diagnosis: Dyspnea on exertion  Assessment and Plan of Treatment: Mild elevations in troponin were seen during admission. You were monitored on telemetry , seen by cardiology follow up outpt for ECHO and adenocard thallium      SECONDARY DISCHARGE DIAGNOSES  Diagnosis: Elevated troponin  Assessment and Plan of Treatment: you wre monitored on telemetry , no acute events    Diagnosis: ROYAL (acute kidney injury)  Assessment and Plan of Treatment: you were treated with IV fluids, cr improved, lasix and enalapril held , follow up with your pcp .   repeat BMP in 3 days by your pcp.

## 2024-04-09 NOTE — DISCHARGE NOTE NURSING/CASE MANAGEMENT/SOCIAL WORK - NURSING SECTION COMPLETE
Patient/Caregiver provided printed discharge information.
0 (no pain/absence of nonverbal indicators of pain)

## 2024-04-09 NOTE — DISCHARGE NOTE PROVIDER - CARE PROVIDERS DIRECT ADDRESSES
[email or web address]

## 2024-04-09 NOTE — DISCHARGE NOTE NURSING/CASE MANAGEMENT/SOCIAL WORK - NSDCPEFALRISK_GEN_ALL_CORE
For information on Fall & Injury Prevention, visit: [email or web address] OR  [email or web address] OR  https://www.cdc.gov/steadi/patient.html

## 2024-04-09 NOTE — DISCHARGE NOTE PROVIDER - CARE PROVIDER_API CALL
Asad Cho  Cardiovascular Disease  [address] [place] [id number]  Phone: [phone]  Fax: [phone]  Follow Up Time: 2 weeks    NAING, SUNDEE  [address] [place] [address]  Phone: ()-  Fax: ()-  Established Patient  Follow Up Time: 1 week

## 2024-04-09 NOTE — DISCHARGE NOTE PROVIDER - HOSPITAL COURSE
History and Physical    Patient Name:  Blake Moya    :  1945    Chief Complaint:   Dyspnea     History of Present Illness:   Blake Moya presents to [facility] with worsening dyspnea and cough for several days. Cough is productive. Dyspnea worse with exertion. No fever or chills. No peripheral edema. No calf pain. No chest pain. No N/V/abd pain/D/C/urinary issues. Pt is a smoker. ABGs show pCO2. Pt became hypoxic in ER requiring non rebreather. DD elevated. CTA chest pending. Echo pending. CXR shows new patchy infiltrate in the left costophrenic angle, perhaps  developing pneumonia in the lingula. Underlying lung emphysema with bilateral apical fibrotic changes. PMH CAD, CVA, CHF, COPD, GERD. Past Medical History:   has a past medical history of Abdominal pain, Anxiety, Arthritis, CAD in native artery, Cerebrovascular disease, CHF (congestive heart failure) ([facility] Constipation, COPD (chronic obstructive pulmonary disease) ([facility] Depression, Emphysema, GERD (gastroesophageal reflux disease), Myocardial infarct, old, Palliative care patient, Pneumonia, and Tobacco abuse. Surgical History:   has a past surgical history that includes Hysterectomy; Cholecystectomy; Appendectomy; Abdominal exploration surgery; Cardiac catheterization (06/25/10); Cardiac catheterization (12  MDL); Colonoscopy (10/2014); Upper gastrointestinal endoscopy (10/1/14); and fracture surgery (Left, 1966). Social History:   reports that she has been smoking cigarettes. She started smoking about 42 years ago. She has a 9.00 pack-year smoking history. She has never used smokeless tobacco. She reports that she does not drink alcohol or use drugs. Family History:  family history includes Cancer in her brother and sister; Coronary Art Dis in an other family member; Diabetes in her brother and sister; Hypertension in an other family member; Seizures in her child; Stroke in her brother and sister. Medications:  Prior to Admission medications    Medication Sig Start Date End Date Taking? Authorizing Provider   acetaminophen (TYLENOL) 500 MG tablet Take 1,000 mg by mouth every 6 hours as needed for Pain   Yes Historical Provider, MD   furosemide (LASIX) 20 MG tablet Take 20 mg by mouth daily   Yes Historical Provider, MD   fluticasone-umeclidin-vilant (TRELEGY ELLIPTA) 100-62.5-25 MCG/INH AEPB Inhale 1 puff into the lungs daily   Yes Historical Provider, MD   DULoxetine (CYMBALTA) 60 MG extended release capsule Take 1 capsule by mouth 2 times daily  Patient taking differently: Take 60 mg by mouth daily  10/12/20  Yes Belia AntisNAVEEN   memantine [facility]) 10 MG tablet Take 1 tablet by mouth 2 times daily 10/7/20  Yes Belia AntisNAVEEN   isosorbide mononitrate (IMDUR) 30 MG extended release tablet Take 1 tablet by mouth daily 10/7/20  Yes NAVEEN Kline   umeclidinium-vilanterol (ANORO ELLIPTA) 62.5-25 MCG/INH AEPB inhaler Inhale 1 puff into the lungs daily IN place of Trelegy 8/26/20  Yes NAVEEN Dior   clonazePAM (KLONOPIN) 0.5 MG tablet Take 1 tablet by mouth 3 times daily as needed for Anxiety for up to 30 days.  6/16/20 10/28/20 Yes NAVEEN Dior   carvedilol (COREG) 3.125 MG tablet Take 1 tablet by mouth 2 times daily (with meals) 6/5/20  Yes NAVEEN Kline   pantoprazole (PROTONIX) 40 MG tablet Take 1 tablet by mouth every morning (before breakfast)  Patient taking differently: Take 20 mg by mouth daily  5/15/20  Yes NAVEEN Dior   traZODone (DESYREL) 150 MG tablet Take 1 tablet by mouth nightly 5/15/20  Yes NAVEEN Dior   Fesoterodine Fumarate ER (TOVIAZ) 8 MG TB24 Take 1 tablet by mouth daily 5/15/20  Yes NAVEEN Dior   donepezil (ARICEPT) 5 MG tablet Take 1 tablet by mouth nightly 4/17/20  Yes NAVEEN Dior   olopatadine (PATADAY) 0.2 % SOLN ophthalmic solution Place 1 drop into both eyes daily 3/18/20  Yes MPV 10.1     BMP:   Recent Labs     10/28/20  1835 10/28/20  1952     --    K 4.1 4.0   CL 96*  --    CO2 35*  --    BUN 29*  --    CREATININE 0.9  --    GLUCOSE 121*  --    CALCIUM 9.5  --      ABGs: No results for input(s): PO2, PCO2, PH, HCO3, BE, O2SAT in the last 72 hours. INR:   Recent Labs     10/28/20  1835   INR 1.04   PROTIME 13.6     BNP:  No results found for: BNP  TSH:   Lab Results   Component Value Date    TSH 0.784 10/29/2020     Cardiac Injury Profile:   Lab Results   Component Value Date    TROPONINI <0.01 10/28/2020     Lipid Profile: No components found for: CHLPL  Lab Results   Component Value Date    TRIG 238 (H) 02/21/2017     Lab Results   Component Value Date    HDL 32 (L) 02/21/2017     Lab Results   Component Value Date    LDLCALC 92 02/21/2017     No results found for: LABVLDL  Hemoglobin A1C: No results found for: LABA1C  No results found for: EAG      Assessment/Plan:  · Acute on chronic hypoxic resp failure requiring non rebreather - CTA chest and echo pending   · LLL PNA  - cultures, ab, mucinex, speech eval  · COPD AE- steroids, nebs   · Anemia      CAD in native artery     Moderate episode of recurrent major depressive disorder ([facility]                  I have reviewed my findings and recommendations in detail with Sofia Lofton.     Jacqueline Wynne MD     Admission level 2 84 y/o male PMHx dementia, HLD, pre-diabetes, admitted to internal medicine unit due dyspnea on exertion, worsening ability to ambulate.  Initially brought to ER for productive, intermittent cough and inability to ambulate.    #Dyspnea on exertion  - Routine monitoring, vitals, and lab work performed   - Troponin levels monitored due to mild elevation   - Age-adjusted D-dimer negative  - Seen by Cardiology--consider outpatient echo and adenocard thallium, follow up   - CXR: no acute findings of cardiopulmonary disease   - Negative COVID, flu, RSV    #Ambulatory dysfunction  - PT evaluation: impairments found with decreased strength and limitations to self care.      #ROYAL  - Baseline Cr 1.4  - IV hydration with NS saline administered to patient, discontinued     #HTN  - continued metoprolol during admission.  Enalapril discontinued due to ROYAL  - Follow up with cardiology    #Dementia  - continue medications outpatient (Donepezil)    #HLD  - Continue atorvastatin outpatient     #Prediabetes  - CHO consistent diet  - finger stick glucose with sliding scale in place for patient during admission     DVT ppx  - heparin sq provided      82 y/o male PMHx dementia, HLD, pre-diabetes, admitted to internal medicine unit due dyspnea on exertion, worsening ability to ambulate.  Initially brought to ER for productive, intermittent cough and inability to ambulate.    #Dyspnea on exertion  - Routine monitoring, vitals, and lab work performed   - Troponin levels monitored due to mild elevation   - Age-adjusted D-dimer negative  - Seen by Cardiology--consider outpatient echo and adenocard thallium, follow up   - CXR: no acute findings of cardiopulmonary disease   - Negative COVID, flu, RSV    #Ambulatory dysfunction  - PT evaluation: impairments found with decreased strength and limitations to self care.      #ROYAL  - Baseline Cr 1.4  - IV hydration with NS saline administered to patient, discontinued , cr improved now lateral 1.8    #HTN  - continued metoprolol during admission.  Enalapril discontinued due to ROYAL  - Follow up with cardiology    #Dementia  - continue medications outpatient (Donepezil)    #HLD  - Continue atorvastatin outpatient     #Prediabetes  - CHO consistent diet  - finger stick glucose with sliding scale in place for patient during admission     DVT ppx  - heparin sq provided      82 y/o male PMHx dementia, HLD, pre-diabetes, admitted to internal medicine unit due dyspnea on exertion, worsening ability to ambulate.  Initially brought to ER for productive, intermittent cough and inability to ambulate.    #Dyspnea on exertion  - Routine monitoring, vitals, and lab work performed   - Troponin levels monitored due to mild elevation   - Age-adjusted D-dimer negative  - Seen by Cardiology--consider outpatient echo and adenocard thallium, follow up   - CXR: no acute findings of cardiopulmonary disease   - Negative COVID, flu, RSV    #Ambulatory dysfunction  - PT evaluation: impairments found with decreased strength and limitations to self care.      #ROYAL  - Baseline Cr 1.4-2.0  - Presented 1.9  - IV hydration with NS saline administered to patient, Cr improved now lateral 1.8    #HTN  - continued metoprolol during admission.  Enalapril discontinued due to ROYAL  - Follow up with cardiology    #Dementia  - continue medications outpatient (Donepezil)    #HLD  - Continue atorvastatin outpatient     #Prediabetes  - CHO consistent diet  - finger stick glucose with sliding scale in place for patient during admission     DVT ppx  - heparin sq provided

## 2024-04-09 NOTE — DISCHARGE NOTE PROVIDER - NSDCFUSCHEDAPPT_GEN_ALL_CORE_FT
01/26/24      Armin Muro  [address]  [facility] [address]         To Whom It May Concern:    Patient was evaluated in the clinic.             Sincerely,         Ulysses S Boco, MD  [facility]   [address] [place] [address]  Phone: [phone]                 Tom Sears Physician [facility]  CARDIOLOGY [address] [place] [address]  Scheduled Appointment: 05/13/2024

## 2024-04-09 NOTE — DISCHARGE NOTE PROVIDER - ATTENDING DISCHARGE PHYSICAL EXAMINATION:
Gen: NAD, resting in bed  HEENT: Normocephalic, atraumatic  CV: Regular rate & regular rhythm  Lungs: CTABL no wheeze  Abdomen: Soft, NTND+ BS present  Ext: Warm, well perfused  Neuro: non focal, awake, CN II-XII intact, disoriented (secondary to dementia)  Skin: no visible rash, no erythema  Psych: no SI, HI, Hallucination

## 2024-04-10 VITALS
SYSTOLIC BLOOD PRESSURE: 153 MMHG | TEMPERATURE: 98 F | DIASTOLIC BLOOD PRESSURE: 65 MMHG | RESPIRATION RATE: 18 BRPM | OXYGEN SATURATION: 97 % | HEART RATE: 58 BPM

## 2024-04-10 PROCEDURE — 99232 SBSQ HOSP IP/OBS MODERATE 35: CPT

## 2024-04-10 RX ADMIN — Medication 50 MILLIGRAM(S): at 05:24

## 2024-04-10 RX ADMIN — HEPARIN SODIUM 5000 UNIT(S): 5000 INJECTION INTRAVENOUS; SUBCUTANEOUS at 05:24

## 2024-04-10 NOTE — PROGRESS NOTE ADULT - SUBJECTIVE AND OBJECTIVE BOX
Patient is a 83y old  Male who presents with a chief complaint of Dyspnea on Exertion (09 Apr 2024 14:55)      MEDICATIONS  (STANDING):  aspirin  chewable 81 milliGRAM(s) Oral daily  atorvastatin 40 milliGRAM(s) Oral at bedtime  donepezil 10 milliGRAM(s) Oral at bedtime  famotidine    Tablet 20 milliGRAM(s) Oral daily  heparin   Injectable 5000 Unit(s) SubCutaneous every 8 hours  insulin lispro (ADMELOG) corrective regimen sliding scale   SubCutaneous three times a day before meals  memantine 10 milliGRAM(s) Oral daily  metoprolol succinate ER 50 milliGRAM(s) Oral daily  polyethylene glycol 3350 17 Gram(s) Oral daily  senna 2 Tablet(s) Oral at bedtime  sodium chloride 0.9%. 1000 milliLiter(s) (75 mL/Hr) IV Continuous <Continuous>    MEDICATIONS  (PRN):  guaifenesin/dextromethorphan Oral Liquid 10 milliLiter(s) Oral every 4 hours PRN Cough      CAPILLARY BLOOD GLUCOSE      POCT Blood Glucose.: 105 mg/dL (10 Apr 2024 07:34)  POCT Blood Glucose.: 120 mg/dL (09 Apr 2024 20:42)  POCT Blood Glucose.: 174 mg/dL (09 Apr 2024 16:13)    I&O's Summary    09 Apr 2024 07:01  -  10 Apr 2024 07:00  --------------------------------------------------------  IN: 0 mL / OUT: 350 mL / NET: -350 mL        PHYSICAL EXAM:  Vital Signs Last 24 Hrs  T(C): 36.6 (10 Apr 2024 05:16), Max: 36.6 (10 Apr 2024 05:16)  T(F): 97.9 (10 Apr 2024 05:16), Max: 97.9 (10 Apr 2024 05:16)  HR: 58 (10 Apr 2024 05:16) (56 - 58)  BP: 153/65 (10 Apr 2024 05:16) (123/59 - 153/65)  BP(mean): --  RR: 18 (10 Apr 2024 05:16) (18 - 18)  SpO2: 97% (10 Apr 2024 05:16) (97% - 97%)    Parameters below as of 10 Apr 2024 05:16  Patient On (Oxygen Delivery Method): room air        GENERAL: No acute distress, well-developed  HEAD:  Atraumatic, Normocephalic  EYES: EOMI, PERRLA, conjunctiva and sclera clear  NECK: Supple, no lymphadenopathy, no JVD  CHEST/LUNG: CTAB; No wheezes, rales, or rhonchi  HEART: Regular rate and rhythm; No murmurs, rubs, or gallops  ABDOMEN: Soft, non-tender, non-distended; normal bowel sounds,   EXTREMITIES:  2+ peripheral pulses b/l, No clubbing, cyanosis, or edema  NEUROLOGY: A&O x 3, no focal deficits  SKIN: No rashes or lesions    LABS:    04-09    139  |  106  |  23<H>  ----------------------------<  108<H>  4.2   |  23  |  1.8<H>    Ca    8.7      09 Apr 2024 11:50        Urinalysis [facility] - ( 09 Apr 2024 11:50 )    Color: x / Appearance: x / SG: x / pH: x  Gluc: 108 mg/dL / Ketone: x  / Bili: x / Urobili: x   Blood: x / Protein: x / Nitrite: x   Leuk Esterase: x / RBC: x / WBC x   Sq Epi: x / Non Sq Epi: x / Bacteria: x          RADIOLOGY & ADDITIONAL TESTS:  Results Reviewed:   Imaging Personally Reviewed:  Electrocardiogram Personally Reviewed:    COORDINATION OF CARE:  Care Discussed with Consultants/Other Providers [Y/N]:  Prior or Outpatient Records Reviewed [Y/N]:   Patient is a 83y old  Male who presents with a chief complaint of Dyspnea on Exertion (09 Apr 2024 14:55)    MEDICATIONS  (STANDING):  aspirin  chewable 81 milliGRAM(s) Oral daily  atorvastatin 40 milliGRAM(s) Oral at bedtime  donepezil 10 milliGRAM(s) Oral at bedtime  famotidine    Tablet 20 milliGRAM(s) Oral daily  heparin   Injectable 5000 Unit(s) SubCutaneous every 8 hours  insulin lispro (ADMELOG) corrective regimen sliding scale   SubCutaneous three times a day before meals  memantine 10 milliGRAM(s) Oral daily  metoprolol succinate ER 50 milliGRAM(s) Oral daily  polyethylene glycol 3350 17 Gram(s) Oral daily  senna 2 Tablet(s) Oral at bedtime  sodium chloride 0.9%. 1000 milliLiter(s) (75 mL/Hr) IV Continuous <Continuous>    MEDICATIONS  (PRN):  guaifenesin/dextromethorphan Oral Liquid 10 milliLiter(s) Oral every 4 hours PRN Cough      CAPILLARY BLOOD GLUCOSE  POCT Blood Glucose.: 105 mg/dL (10 Apr 2024 07:34)  POCT Blood Glucose.: 120 mg/dL (09 Apr 2024 20:42)  POCT Blood Glucose.: 174 mg/dL (09 Apr 2024 16:13)    I&O's Summary    09 Apr 2024 07:01  -  10 Apr 2024 07:00  --------------------------------------------------------  IN: 0 mL / OUT: 350 mL / NET: -350 mL        PHYSICAL EXAM:  Vital Signs Last 24 Hrs  T(C): 36.6 (10 Apr 2024 05:16), Max: 36.6 (10 Apr 2024 05:16)  T(F): 97.9 (10 Apr 2024 05:16), Max: 97.9 (10 Apr 2024 05:16)  HR: 58 (10 Apr 2024 05:16) (56 - 58)  BP: 153/65 (10 Apr 2024 05:16) (123/59 - 153/65)  BP(mean): --  RR: 18 (10 Apr 2024 05:16) (18 - 18)  SpO2: 97% (10 Apr 2024 05:16) (97% - 97%)    Parameters below as of 10 Apr 2024 05:16  Patient On (Oxygen Delivery Method): room air      GENERAL: No acute distress, well-developed  HEAD:  Atraumatic, Normocephalic  EYES: EOMI, PERRLA, conjunctiva and sclera clear  NECK: Supple, no lymphadenopathy, no JVD  CHEST/LUNG: CTAB; No wheezes, rales, or rhonchi  HEART: Regular rate and rhythm; No murmurs, rubs, or gallops  ABDOMEN: Soft, non-tender, non-distended; normal bowel sounds,   EXTREMITIES:  2+ peripheral pulses b/l, No clubbing, cyanosis, or edema  NEUROLOGY: A&O x2- 3, no focal deficits  SKIN: No rashes or lesions    LABS:    04-09    139  |  106  |  23<H>  ----------------------------<  108<H>  4.2   |  23  |  1.8<H>    Ca    8.7      09 Apr 2024 11:50      Urinalysis [facility] - ( 09 Apr 2024 11:50 )    Color: x / Appearance: x / SG: x / pH: x  Gluc: 108 mg/dL / Ketone: x  / Bili: x / Urobili: x   Blood: x / Protein: x / Nitrite: x   Leuk Esterase: x / RBC: x / WBC x   Sq Epi: x / Non Sq Epi: x / Bacteria: x         RHC showed elevated right sided filling pressures with severe pulmonary hypertension with systemic PA pressures, slightly elevated PCWP and preserved cardiac output.   - Followed with Dr. Rodriguez at [facility] for PH but he recently left the practice. Needs a pulmonary hypertension doctor near her home in [place].  - Continue tadalafil 40 mg daily   - continue Ambrisentan 10mg qd  - follow Pulm recs  - Transplant reporting that patient is currently not a candidate following eval, given this, also not a candidate for IV Flolan per Pulm

## 2024-04-10 NOTE — PROGRESS NOTE ADULT - ASSESSMENT
84 y/o male admitted due dyspnea on exertion, worsening ability to ambulate    Dyspnea on exertion: improved   Mild trop elevation 29>>27>>29  Age-adjusted D-dimer negative  Saturating well on RA   Seen by Cardiology--rec outpatient echo and Adenocard thallium    Acute Kidney Inlikely Pre-renal (Baseline Cr 1.4)   Presented with Scr 1.9>>improved to 1.8 on IV Fluids   -Repeat with PCP within a week     Hypertension: Cont metoprolol  hold enalapril secondary to  ROYAL  - need PCP evaluation prior to re-starting     Dementia: Cont dementia meds  Prediabetes: CHO consistent diet  FS with sliding scale, keep below 180  HLD: cont statin

## 2024-04-12 LAB
CULTURE RESULTS: SIGNIFICANT CHANGE UP
CULTURE RESULTS: SIGNIFICANT CHANGE UP
SPECIMEN SOURCE: SIGNIFICANT CHANGE UP
SPECIMEN SOURCE: SIGNIFICANT CHANGE UP

## 2024-04-17 DIAGNOSIS — N17.9 ACUTE KIDNEY FAILURE, UNSPECIFIED: ICD-10-CM

## 2024-04-17 DIAGNOSIS — E78.5 HYPERLIPIDEMIA, UNSPECIFIED: ICD-10-CM

## 2024-04-17 DIAGNOSIS — R26.2 DIFFICULTY IN WALKING, NOT ELSEWHERE CLASSIFIED: ICD-10-CM

## 2024-04-17 DIAGNOSIS — Z79.82 LONG TERM (CURRENT) USE OF ASPIRIN: ICD-10-CM

## 2024-04-17 DIAGNOSIS — Z11.52 ENCOUNTER FOR SCREENING FOR COVID-19: ICD-10-CM

## 2024-04-17 DIAGNOSIS — R79.89 OTHER SPECIFIED ABNORMAL FINDINGS OF BLOOD CHEMISTRY: ICD-10-CM

## 2024-04-17 DIAGNOSIS — R73.03 PREDIABETES: ICD-10-CM

## 2024-04-17 DIAGNOSIS — Z95.1 PRESENCE OF AORTOCORONARY BYPASS GRAFT: ICD-10-CM

## 2024-04-17 DIAGNOSIS — F03.90 UNSPECIFIED DEMENTIA, UNSPECIFIED SEVERITY, WITHOUT BEHAVIORAL DISTURBANCE, PSYCHOTIC DISTURBANCE, MOOD DISTURBANCE, AND ANXIETY: ICD-10-CM

## 2024-04-17 DIAGNOSIS — K21.9 GASTRO-ESOPHAGEAL REFLUX DISEASE WITHOUT ESOPHAGITIS: ICD-10-CM

## 2024-04-17 DIAGNOSIS — I10 ESSENTIAL (PRIMARY) HYPERTENSION: ICD-10-CM

## 2024-04-17 DIAGNOSIS — R06.09 OTHER FORMS OF DYSPNEA: ICD-10-CM

## 2024-04-28 NOTE — PHYSICAL THERAPY INITIAL EVALUATION ADULT - LEVEL OF INDEPENDENCE: STAND/SIT, REHAB EVAL
S: pt presents stating weight loss for 3 months and also here for a depakote level. Pt is rambling.   moderate assist (50% patients effort)

## 2024-05-30 ENCOUNTER — APPOINTMENT (OUTPATIENT)
Dept: CARDIOLOGY | Facility: CLINIC | Age: 84
End: 2024-05-30
Payer: MEDICARE

## 2024-05-30 ENCOUNTER — RESULT CHARGE (OUTPATIENT)
Age: 84
End: 2024-05-30

## 2024-05-30 VITALS
HEIGHT: 64 IN | SYSTOLIC BLOOD PRESSURE: 137 MMHG | HEART RATE: 58 BPM | BODY MASS INDEX: 32.1 KG/M2 | DIASTOLIC BLOOD PRESSURE: 78 MMHG | WEIGHT: 188 LBS

## 2024-05-30 DIAGNOSIS — E78.00 PURE HYPERCHOLESTEROLEMIA, UNSPECIFIED: ICD-10-CM

## 2024-05-30 DIAGNOSIS — Z95.1 PRESENCE OF AORTOCORONARY BYPASS GRAFT: ICD-10-CM

## 2024-05-30 DIAGNOSIS — I50.9 HEART FAILURE, UNSPECIFIED: ICD-10-CM

## 2024-05-30 DIAGNOSIS — I10 ESSENTIAL (PRIMARY) HYPERTENSION: ICD-10-CM

## 2024-05-30 DIAGNOSIS — I70.90 UNSPECIFIED ATHEROSCLEROSIS: ICD-10-CM

## 2024-05-30 PROBLEM — E78.5 HYPERLIPIDEMIA, UNSPECIFIED: Chronic | Status: ACTIVE | Noted: 2024-04-07

## 2024-05-30 PROBLEM — R73.03 PREDIABETES: Chronic | Status: ACTIVE | Noted: 2024-04-07

## 2024-05-30 PROCEDURE — 93000 ELECTROCARDIOGRAM COMPLETE: CPT

## 2024-05-30 PROCEDURE — G2211 COMPLEX E/M VISIT ADD ON: CPT

## 2024-05-30 PROCEDURE — 99214 OFFICE O/P EST MOD 30 MIN: CPT

## 2024-05-30 RX ORDER — ENALAPRIL MALEATE 20 MG/1
20 TABLET ORAL DAILY
Qty: 30 | Refills: 3 | Status: DISCONTINUED | COMMUNITY
Start: 2023-09-28 | End: 2024-05-30

## 2024-05-30 RX ORDER — ASPIRIN 81 MG
81 TABLET, DELAYED RELEASE (ENTERIC COATED) ORAL
Refills: 0 | Status: ACTIVE | COMMUNITY

## 2024-05-30 RX ORDER — FUROSEMIDE 40 MG/1
40 TABLET ORAL DAILY
Qty: 180 | Refills: 3 | Status: COMPLETED | COMMUNITY
Start: 2022-05-04 | End: 2024-05-30

## 2024-05-30 RX ORDER — PANTOPRAZOLE 20 MG/1
20 TABLET, DELAYED RELEASE ORAL
Refills: 0 | Status: DISCONTINUED | COMMUNITY
End: 2024-05-30

## 2024-05-30 RX ORDER — ATORVASTATIN CALCIUM 40 MG/1
40 TABLET, FILM COATED ORAL DAILY
Qty: 90 | Refills: 3 | Status: ACTIVE | COMMUNITY

## 2024-05-30 RX ORDER — FUROSEMIDE 40 MG/1
40 TABLET ORAL DAILY
Qty: 30 | Refills: 5 | Status: ACTIVE | COMMUNITY

## 2024-05-30 NOTE — ASSESSMENT
[FreeTextEntry1] : Mr. NOEMY GRUBBS is a 84 year old man with PMHx of CAD s/p CABG (LIMA-LAD (occluded), SVG-RCA (occluded), RCA , LCx ) and PCI to LAD/DI 8/31/20, HTN, HLD and chronic HFpEF who is presenting for follow up.   -Reviewed history, previous TTEs (independently) -Continue metoprolol ER 50 daily for CAD -Off ACE due to cough -Continue lasix 80 daily. Advised to take BID if he develops 2+ lb weight gain -Continue ASA and lipitor 80 daily -Advised to raise legs at the end of the day and use compression stockings  Time in encounter, including face to face visit and time reviewing chart: 33  minutes  Tom Sears MD, [facility] [address], [address] Office: [phone]

## 2024-05-30 NOTE — REASON FOR VISIT
[Cardiac Failure] : cardiac failure [Hyperlipidemia] : hyperlipidemia [Hypertension] : hypertension [Coronary Artery Disease] : coronary artery disease [FreeTextEntry1] : Mr. NOEMY GRUBBS is a 84 year old man with PMHx of CAD s/p CABG (LIMA-LAD (occluded), SVG-RCA (occluded), RCA , LCx ) and PCI to LAD/DI 20, HTN, HLD and chronic HFpEF who is presenting for follow up.  He has no major concerns today. He has no dyspnea, SOB, PND or chest pain. He was recently admitted to [facility] for a cough and ROYAL. His enalapril was stopped, and his cough has improved.   TTE 24 CONCLUSIONS: 1. Left ventricular cavity is normal. Left ventricular wall thickness is normal. Left ventricular systolic function is normal with an ejection fraction of 56 % by Cadet's method of disks. 2. The entire lateral wall is hypokinetic. 3. Normal left ventricular diastolic function, with normal filling pressure. 4. Normal right ventricular cavity size, wall thickness, and normal systolic function. 5. No significant valvular disease. 6. No echocardiographic evidence of pulmonary hypertension. 7. Compared to the transthoracic echocardiogram performed on 2022, there have been no significant interval changes.   [facility] 2020 LEFT HEART CATHETERIZATION Left main: Mild disease LAD:  Severe diffuse disease. Prox LAD 70%, Mid LAD 90% Dia% prox D1 Left Circumflex: Total occlusion 100% () OM: Can't be visualized Ramus Intermedius: Mild disease, mild in-stent restenosis Right Coronary Artery: Total occlusion 100% () prox RCA RPDA: Can't be visualized PLS: Can't be visualized Graft to RPDA: total occlusion LIMA to LAD: total occlusion INTERVENTION SPECIMEN REMOVED: Not applicable IMPLANTS: 3xDES stents to D1, prox LAD and mid LAD

## 2024-08-26 NOTE — DISCHARGE NOTE PROVIDER - DISCHARGE DATE
"Spoke with pt's spouse Mima, who reports patient is still having difficulty with urinary symptoms. He continues to report a weak stream, with nocturia being his primary complaint. She states his symptoms are less bothersome during the day.     They did a trial of anticholinergic, which did not improve his symptoms. She states he \"is miserable at night.\" PVR has historically been very low.    RN will reach out to Dr. Carcamo for recommendations on next steps and return call to pt's wife with this info.     FERCHO Le  Care Coordinator- Urology   [phone]   "
09-Apr-2024

## 2024-09-22 ENCOUNTER — EMERGENCY (EMERGENCY)
Facility: HOSPITAL | Age: 84
LOS: 0 days | Discharge: HOME | End: 2024-09-22
Attending: EMERGENCY MEDICINE
Payer: MEDICARE

## 2024-09-22 VITALS
SYSTOLIC BLOOD PRESSURE: 118 MMHG | DIASTOLIC BLOOD PRESSURE: 70 MMHG | RESPIRATION RATE: 20 BRPM | HEART RATE: 82 BPM | OXYGEN SATURATION: 96 %

## 2024-09-22 VITALS
DIASTOLIC BLOOD PRESSURE: 87 MMHG | WEIGHT: 190.04 LBS | OXYGEN SATURATION: 96 % | HEART RATE: 61 BPM | RESPIRATION RATE: 20 BRPM | TEMPERATURE: 98 F | SYSTOLIC BLOOD PRESSURE: 154 MMHG

## 2024-09-22 DIAGNOSIS — R91.1 SOLITARY PULMONARY NODULE: ICD-10-CM

## 2024-09-22 DIAGNOSIS — E78.00 PURE HYPERCHOLESTEROLEMIA, UNSPECIFIED: ICD-10-CM

## 2024-09-22 DIAGNOSIS — R53.1 WEAKNESS: ICD-10-CM

## 2024-09-22 DIAGNOSIS — I10 ESSENTIAL (PRIMARY) HYPERTENSION: ICD-10-CM

## 2024-09-22 DIAGNOSIS — Z95.1 PRESENCE OF AORTOCORONARY BYPASS GRAFT: Chronic | ICD-10-CM

## 2024-09-22 DIAGNOSIS — K80.20 CALCULUS OF GALLBLADDER WITHOUT CHOLECYSTITIS WITHOUT OBSTRUCTION: ICD-10-CM

## 2024-09-22 DIAGNOSIS — E11.9 TYPE 2 DIABETES MELLITUS WITHOUT COMPLICATIONS: ICD-10-CM

## 2024-09-22 DIAGNOSIS — I25.10 ATHEROSCLEROTIC HEART DISEASE OF NATIVE CORONARY ARTERY WITHOUT ANGINA PECTORIS: ICD-10-CM

## 2024-09-22 DIAGNOSIS — R05.9 COUGH, UNSPECIFIED: ICD-10-CM

## 2024-09-22 DIAGNOSIS — Z95.1 PRESENCE OF AORTOCORONARY BYPASS GRAFT: ICD-10-CM

## 2024-09-22 DIAGNOSIS — K06.8 OTHER SPECIFIED DISORDERS OF GINGIVA AND EDENTULOUS ALVEOLAR RIDGE: ICD-10-CM

## 2024-09-22 LAB
ALBUMIN SERPL ELPH-MCNC: 3.9 G/DL — SIGNIFICANT CHANGE UP (ref 3.5–5.2)
ALP SERPL-CCNC: 91 U/L — SIGNIFICANT CHANGE UP (ref 30–115)
ALT FLD-CCNC: 17 U/L — SIGNIFICANT CHANGE UP (ref 0–41)
ANION GAP SERPL CALC-SCNC: 11 MMOL/L — SIGNIFICANT CHANGE UP (ref 7–14)
AST SERPL-CCNC: 24 U/L — SIGNIFICANT CHANGE UP (ref 0–41)
BASOPHILS # BLD AUTO: 0.02 K/UL — SIGNIFICANT CHANGE UP (ref 0–0.2)
BASOPHILS NFR BLD AUTO: 0.2 % — SIGNIFICANT CHANGE UP (ref 0–1)
BILIRUB SERPL-MCNC: 1.1 MG/DL — SIGNIFICANT CHANGE UP (ref 0.2–1.2)
BUN SERPL-MCNC: 23 MG/DL — HIGH (ref 10–20)
CALCIUM SERPL-MCNC: 9.1 MG/DL — SIGNIFICANT CHANGE UP (ref 8.4–10.5)
CHLORIDE SERPL-SCNC: 100 MMOL/L — SIGNIFICANT CHANGE UP (ref 98–110)
CO2 SERPL-SCNC: 28 MMOL/L — SIGNIFICANT CHANGE UP (ref 17–32)
CREAT SERPL-MCNC: 1.9 MG/DL — HIGH (ref 0.7–1.5)
EGFR: 35 ML/MIN/1.73M2 — LOW
EOSINOPHIL # BLD AUTO: 0.07 K/UL — SIGNIFICANT CHANGE UP (ref 0–0.7)
EOSINOPHIL NFR BLD AUTO: 0.7 % — SIGNIFICANT CHANGE UP (ref 0–8)
FLUAV AG NPH QL: SIGNIFICANT CHANGE UP
FLUBV AG NPH QL: SIGNIFICANT CHANGE UP
GLUCOSE SERPL-MCNC: 115 MG/DL — HIGH (ref 70–99)
HCT VFR BLD CALC: 38.3 % — LOW (ref 42–52)
HGB BLD-MCNC: 12.8 G/DL — LOW (ref 14–18)
IMM GRANULOCYTES NFR BLD AUTO: 0.4 % — HIGH (ref 0.1–0.3)
LACTATE SERPL-SCNC: 1.6 MMOL/L — SIGNIFICANT CHANGE UP (ref 0.7–2)
LIDOCAIN IGE QN: 33 U/L — SIGNIFICANT CHANGE UP (ref 7–60)
LYMPHOCYTES # BLD AUTO: 1.47 K/UL — SIGNIFICANT CHANGE UP (ref 1.2–3.4)
LYMPHOCYTES # BLD AUTO: 14.5 % — LOW (ref 20.5–51.1)
MCHC RBC-ENTMCNC: 29.4 PG — SIGNIFICANT CHANGE UP (ref 27–31)
MCHC RBC-ENTMCNC: 33.4 G/DL — SIGNIFICANT CHANGE UP (ref 32–37)
MCV RBC AUTO: 88 FL — SIGNIFICANT CHANGE UP (ref 80–94)
MONOCYTES # BLD AUTO: 1.43 K/UL — HIGH (ref 0.1–0.6)
MONOCYTES NFR BLD AUTO: 14.1 % — HIGH (ref 1.7–9.3)
NEUTROPHILS # BLD AUTO: 7.14 K/UL — HIGH (ref 1.4–6.5)
NEUTROPHILS NFR BLD AUTO: 70.1 % — SIGNIFICANT CHANGE UP (ref 42.2–75.2)
NRBC # BLD: 0 /100 WBCS — SIGNIFICANT CHANGE UP (ref 0–0)
NT-PROBNP SERPL-SCNC: 314 PG/ML — HIGH (ref 0–300)
PLATELET # BLD AUTO: 193 K/UL — SIGNIFICANT CHANGE UP (ref 130–400)
PMV BLD: 11.5 FL — HIGH (ref 7.4–10.4)
POTASSIUM SERPL-MCNC: 4.5 MMOL/L — SIGNIFICANT CHANGE UP (ref 3.5–5)
POTASSIUM SERPL-SCNC: 4.5 MMOL/L — SIGNIFICANT CHANGE UP (ref 3.5–5)
PROT SERPL-MCNC: 7.6 G/DL — SIGNIFICANT CHANGE UP (ref 6–8)
RBC # BLD: 4.35 M/UL — LOW (ref 4.7–6.1)
RBC # FLD: 13.4 % — SIGNIFICANT CHANGE UP (ref 11.5–14.5)
RSV RNA NPH QL NAA+NON-PROBE: SIGNIFICANT CHANGE UP
SARS-COV-2 RNA SPEC QL NAA+PROBE: SIGNIFICANT CHANGE UP
SODIUM SERPL-SCNC: 139 MMOL/L — SIGNIFICANT CHANGE UP (ref 135–146)
TROPONIN T, HIGH SENSITIVITY RESULT: 29 NG/L — HIGH (ref 6–21)
TROPONIN T, HIGH SENSITIVITY RESULT: 30 NG/L — HIGH (ref 6–21)
WBC # BLD: 10.17 K/UL — SIGNIFICANT CHANGE UP (ref 4.8–10.8)
WBC # FLD AUTO: 10.17 K/UL — SIGNIFICANT CHANGE UP (ref 4.8–10.8)

## 2024-09-22 PROCEDURE — 80053 COMPREHEN METABOLIC PANEL: CPT

## 2024-09-22 PROCEDURE — 85025 COMPLETE CBC W/AUTO DIFF WBC: CPT

## 2024-09-22 PROCEDURE — 71045 X-RAY EXAM CHEST 1 VIEW: CPT | Mod: 26

## 2024-09-22 PROCEDURE — 74177 CT ABD & PELVIS W/CONTRAST: CPT | Mod: 26,MC

## 2024-09-22 PROCEDURE — 36415 COLL VENOUS BLD VENIPUNCTURE: CPT

## 2024-09-22 PROCEDURE — 71045 X-RAY EXAM CHEST 1 VIEW: CPT

## 2024-09-22 PROCEDURE — 83690 ASSAY OF LIPASE: CPT

## 2024-09-22 PROCEDURE — 93005 ELECTROCARDIOGRAM TRACING: CPT

## 2024-09-22 PROCEDURE — 76705 ECHO EXAM OF ABDOMEN: CPT | Mod: 26

## 2024-09-22 PROCEDURE — 0241U: CPT

## 2024-09-22 PROCEDURE — 99285 EMERGENCY DEPT VISIT HI MDM: CPT | Mod: 25

## 2024-09-22 PROCEDURE — 84484 ASSAY OF TROPONIN QUANT: CPT

## 2024-09-22 PROCEDURE — 83605 ASSAY OF LACTIC ACID: CPT

## 2024-09-22 PROCEDURE — 83880 ASSAY OF NATRIURETIC PEPTIDE: CPT

## 2024-09-22 PROCEDURE — 74177 CT ABD & PELVIS W/CONTRAST: CPT | Mod: MC

## 2024-09-22 PROCEDURE — 76705 ECHO EXAM OF ABDOMEN: CPT

## 2024-09-22 PROCEDURE — 93010 ELECTROCARDIOGRAM REPORT: CPT

## 2024-09-22 PROCEDURE — 99285 EMERGENCY DEPT VISIT HI MDM: CPT | Mod: FS

## 2024-09-22 RX ORDER — CLINDAMYCIN PHOSPHATE 150 MG/ML
1 VIAL (ML) INJECTION
Qty: 21 | Refills: 0
Start: 2024-09-22 | End: 2024-09-28

## 2024-09-22 NOTE — ED PROVIDER NOTE - CLINICAL SUMMARY MEDICAL DECISION MAKING FREE TEXT BOX
84-year-old male history of diabetes hypertension dyslipidemia CAD with bypass presents for eval of cough weakness as well as  intermittent abdominal pain and right lower jaw swelling.  Agree with above exam in addition there is swelling to the right lower jaw likely related to dental infection.  Here screening labs demonstrate a troponin of 2 9 repeat troponin is 30 with EKG sinus with PVCs. (pt has no chest pain) Chest x-ray unremarkable with no evidence of fluid overload.  CT abd pelvis done due to the vague abdominal discomfort which showed nonspecific biliary ductal dilation however patient has normal LFTs and no upper abd pain.  Right upper quadrant ultrasound confirms gallstones but no CBD dilation.  Patient has no abdominal pain and was able to eat food in ED.  Patient observed ambulating in the ED. I discussed with daughter all lab and imaging results at bedside will discharge with antibiotics for presumed dental infection.

## 2024-09-22 NOTE — ED PROVIDER NOTE - NSFOLLOWUPINSTRUCTIONS_ED_ALL_ED_FT
Our Emergency Department Referral Coordinators will be reaching out to you in the next 24-48 hours from 9:00am to 5:00pm to schedule a follow up appointment. Please expect a phone call from the hospital in that time frame. If you do not receive a call or if you have any questions or concerns, you can reach them at   [phone].      Weakness    Weakness is a lack of strength. It may be felt all over the body (generalized) or in one specific part of the body (focal). Some causes of weakness can be serious. You may need further medical evaluation, especially if you are elderly or you have a history of immunosuppression (such as chemotherapy or HIV), kidney disease, heart disease, or diabetes.     CAUSES  Weakness can be caused by many different things, including:    Infection.  Physical exhaustion.  Internal bleeding or other blood loss that results in a lack of red blood cells (anemia).  Dehydration. This cause is more common in elderly people.  Side effects or electrolyte abnormalities from medicines, such as pain medicines or sedatives.  Emotional distress, anxiety, or depression.  Circulation problems, especially severe peripheral arterial disease.  Heart disease, such as rapid atrial fibrillation, bradycardia, or heart failure.  Nervous system disorders, such as Guillain-Barré syndrome, multiple sclerosis, or stroke.    DIAGNOSIS  To find the cause of your weakness, your caregiver will take your history and perform a physical exam. Lab tests or X-rays may also be ordered, if needed.    TREATMENT  Treatment of weakness depends on the cause of your symptoms and can vary greatly.    HOME CARE INSTRUCTIONS  Rest as needed.  Eat a well-balanced diet.  Try to get some exercise every day.  Only take over-the-counter or prescription medicines as directed by your caregiver.    SEEK MEDICAL CARE IF:  Your weakness seems to be getting worse or spreads to other parts of your body.  You develop new aches or pains.    SEEK IMMEDIATE MEDICAL CARE IF:  You cannot perform your normal daily activities, such as getting dressed and feeding yourself.  You cannot walk up and down stairs, or you feel exhausted when you do so.  You have shortness of breath or chest pain.  You have difficulty moving parts of your body.   You have weakness in only one area of the body or on only one side of the body.  You have a fever.  You have trouble speaking or swallowing.  You cannot control your bladder or bowel movements.  You have black or bloody vomit or stools.    MAKE SURE YOU:  Understand these instructions.  Will watch your condition.  Will get help right away if you are not doing well or get worse.    ADDITIONAL NOTES AND INSTRUCTIONS    Please follow up with your Primary MD in 24-48 hr.  Seek immediate medical care for any new/worsening signs or symptoms.     Gallstones    Cholelithiasis (also called gallstones) is a form of gallbladder disease in which stones form in your gallbladder. The gallbladder is an organ that stores bile made in the liver, which helps digest fats. Gallstones begin as small crystals and slowly grow into stones. Gallstone pain occurs when the gallbladder spasms and a gallstone is blocking the duct. Pain can also occur when a stone passes out of the duct. Symptoms may include nausea, vomiting, abdominal pain, or yellowing of the skin. Only take over-the-counter or prescription medicines for pain, discomfort, or fever as directed by your health care provider.   Follow a low-fat diet until seen again by your health care provider.     SEEK IMMEDIATE MEDICAL CARE IF YOU HAVE THE FOLLOWING SYMPTOMS: increased pain not controlled with medication, fever, persistent vomiting, altered mental status.    Dental Pain    Dental pain (toothache) may be caused by many things including tooth decay (cavities or caries), abscess or infection, injury, or the reason may be unknown. Your pain may only occur when you are chewing, are exposed to hot or cold temperature, are eating or drinking sugary foods or beverages, or your pain may be constant. If you were prescribed an antibiotic medicine, finish all of it even if you start to feel better. Rinsing your mouth with salt water or applying ice to the painful area of your face may help with the pain.    SEEK IMMEDIATE MEDICAL CARE IF YOU HAVE THE FOLLOWING SYMPTOMS: unable to open mouth, trouble breathing or swallowing, fever, or swelling of the face, neck or jaw.

## 2024-09-22 NOTE — ED ADULT NURSE NOTE - NSFALLRISKINTERV_ED_ALL_ED

## 2024-09-22 NOTE — ED PROVIDER NOTE - OBJECTIVE STATEMENT
History from patient and daughter  84-year-old male history of diabetes, high blood pressure, high cholesterol, cardiac bypass complaining of leg swelling since yesterday. Per daughter, patient has been coughing this past week and started having leg swelling yesterday.  Today he was very weak.  Daughter also reports that patient has had intermittent abdominal pains and decreased bowel movements.  No fevers, chest pains or shortness of breath.

## 2024-09-22 NOTE — ED PROVIDER NOTE - PATIENT PORTAL LINK FT
You can access the FollowMyHealth Patient Portal offered by [facility] by registering at the following website: [email or web address]. By joining [facility]’s FollowMyHealth portal, you will also be able to view your health information using other applications (apps) compatible with our system.

## 2024-09-22 NOTE — ED PROVIDER NOTE - PROVIDER TOKENS
FREE:[LAST:[your PMD],PHONE:[(   )    -],FAX:[(   )    -],FOLLOWUP:[1-3 Days]],FREE:[LAST:[your dentist],PHONE:[(   )    -],FAX:[(   )    -],FOLLOWUP:[1-3 Days]],PROVIDER:[TOKEN:[[id number]],FOLLOWUP:[1-3 Days]]

## 2024-09-22 NOTE — ED PROVIDER NOTE - WR ORDER STATUS 1
Please follow up with offered appt time of Wednesday at 11:30 am.  If not able to come please see what works and let me know.    Performed Resulted

## 2024-09-22 NOTE — ED PROVIDER NOTE - CARE PROVIDERS DIRECT ADDRESSES
,DirectAddress_Unknown,DirectAddress_Unknown,[email or web address]

## 2024-09-22 NOTE — ED PROVIDER NOTE - PHYSICAL EXAMINATION
Gen: Alert, NAD, well appearing  Head: NC, AT, PERRL, EOMI, normal lids/conjunctiva  ENT: normal hearing  Neck: +supple, no tenderness/meningismus,  Pulm: Bilateral BS, normal resp effort, no wheeze/stridor/retractions  CV: RRR  Abd: soft, +mild RUQ discomfort. No guarding or rebound  Mskel:  + b/l LE edema. No erythema/cyanosis  Skin: no rash, warm/dry  Neuro: AAOx3, no sensory/motor deficits

## 2024-09-22 NOTE — ED PROVIDER NOTE - NSFOLLOWUPCLINICS_GEN_ALL_ED_FT
[facility]  Dental  [address] [place] [address]  Phone: [phone]  Fax:   Follow Up Time: 1-3 Days

## 2024-09-22 NOTE — ED PROVIDER NOTE - PROGRESS NOTE DETAILS
Patient ambulated in ED.  Patient and daughter advised of gallstones and pulmonary nodule and need for f/u  Patient also c/o swelling to right upper gums. Will prescribe abx and refer to dental

## 2024-09-22 NOTE — ED PROVIDER NOTE - EKG/XRAY ADDITIONAL INFORMATION
Independent interpretation of the chest  film performed by: Dr. Jin Coleman: [facility]  ekg - sinus with pvc, laf

## 2024-09-22 NOTE — ED PROVIDER NOTE - NSDCPRINTRESULTS_ED_ALL_ED
Left message for patient advising them of results and recommendations below per Dr. Dye.  Advised patient to call the office at [phone] if they have any questions.      Patient requests all Lab, Cardiology, and Radiology Results on their Discharge Instructions

## 2024-09-22 NOTE — ED PROVIDER NOTE - CARE PLAN
Principal Discharge DX:	Weakness  Secondary Diagnosis:	Gallstone  Secondary Diagnosis:	Swelling of gums  Secondary Diagnosis:	Pulmonary nodule   1

## 2024-09-22 NOTE — ED PROVIDER NOTE - CARE PROVIDER_API CALL
your PMD,   Phone: (   )    -  Fax: (   )    -  Follow Up Time: 1-3 Days    your dentist,   Phone: (   )    -  Fax: (   )    -  Follow Up Time: 1-3 Days    Preeti Noel  Gastroenterology  [facility] [place] [address]  Phone: [phone]  Fax: [phone]  Follow Up Time: 1-3 Days

## 2024-09-24 NOTE — CHART NOTE - NSCHARTNOTEFT_GEN_A_CORE
Left v/m, 9/23. Left v/m, 9/24. Patient did not respond (gastro and dental referral).
Diabetic ulcer of left foot

## 2024-11-05 NOTE — PATIENT PROFILE ADULT - NSASFALLATTEMPTOOB_GEN_A_NUR
"Physical Therapy Daily Treatment Note  [address], [address], [facility] [address]      Patient: Lesly Bill   : 1978  Referring practitioner: KATY Gregory*  Date of Initial Visit: Type: THERAPY  Noted: 10/24/2024  Today's Date: 2024  Patient seen for 2 sessions       Visit Diagnoses:    ICD-10-CM ICD-9-CM   1. Vertigo  R42 780.4       Subjective   Patient reports she isn't as \"stumbly\" when she does the exercises.  Hasn't really noticed a huge difference in dance class participation. She tried to take her migraine medication but was stressed so it was difficult to say if it helped.  her pain level is 0/10 upon arrival.      Objective   See Exercise, Manual, and Modality Logs for complete treatment.       Assessment & Plan       Assessment  Assessment details: Patient's symptoms remained rather stable since last session.  Made the habituation activities much closer to her specific aggravators and will trial two weeks.  Follow up and reassess.           Timed:         Manual Therapy:    0     mins  76857;     Therapeutic Exercise:    0     mins  45012;     Neuromuscular Janine:    25    mins  32896;    Therapeutic Activity:     0     mins  12677;     Gait Trainin     mins  90176;     Ultrasound:     0     mins  86482;    Ionto                               0    mins   44856  Self Care                       0     mins   78964  Canalith Repos    0     mins 87472      Un-Timed:  Electrical Stimulation:    0     mins  85997 ( );  Dry Needling     0     mins self-pay  Traction     0     mins 67824      Timed Treatment:   25   mins   Total Treatment:     25   mins    León Burns, PT  [place] License: [id number]      " yes

## 2024-11-22 ENCOUNTER — NON-APPOINTMENT (OUTPATIENT)
Age: 84
End: 2024-11-22

## 2024-11-22 ENCOUNTER — APPOINTMENT (OUTPATIENT)
Dept: CARDIOLOGY | Facility: CLINIC | Age: 84
End: 2024-11-22
Payer: MEDICARE

## 2024-11-22 VITALS
DIASTOLIC BLOOD PRESSURE: 68 MMHG | WEIGHT: 192 LBS | HEART RATE: 65 BPM | SYSTOLIC BLOOD PRESSURE: 106 MMHG | BODY MASS INDEX: 32.78 KG/M2 | HEIGHT: 64 IN

## 2024-11-22 DIAGNOSIS — I50.9 HEART FAILURE, UNSPECIFIED: ICD-10-CM

## 2024-11-22 DIAGNOSIS — I50.32 CHRONIC DIASTOLIC (CONGESTIVE) HEART FAILURE: ICD-10-CM

## 2024-11-22 DIAGNOSIS — I70.90 UNSPECIFIED ATHEROSCLEROSIS: ICD-10-CM

## 2024-11-22 DIAGNOSIS — E78.00 PURE HYPERCHOLESTEROLEMIA, UNSPECIFIED: ICD-10-CM

## 2024-11-22 DIAGNOSIS — I25.10 ATHEROSCLEROTIC HEART DISEASE OF NATIVE CORONARY ARTERY W/OUT ANGINA PECTORIS: ICD-10-CM

## 2024-11-22 DIAGNOSIS — I10 ESSENTIAL (PRIMARY) HYPERTENSION: ICD-10-CM

## 2024-11-22 DIAGNOSIS — Z95.1 PRESENCE OF AORTOCORONARY BYPASS GRAFT: ICD-10-CM

## 2024-11-22 PROCEDURE — 99214 OFFICE O/P EST MOD 30 MIN: CPT

## 2024-11-22 PROCEDURE — G2211 COMPLEX E/M VISIT ADD ON: CPT

## 2024-11-22 PROCEDURE — 93000 ELECTROCARDIOGRAM COMPLETE: CPT

## 2024-11-22 RX ORDER — FUROSEMIDE 40 MG/1
40 TABLET ORAL DAILY
Refills: 0 | Status: ACTIVE | COMMUNITY

## 2024-12-09 ENCOUNTER — INPATIENT (INPATIENT)
Facility: HOSPITAL | Age: 84
LOS: 1 days | Discharge: HOME HEALTH | DRG: 556 | End: 2024-12-11
Attending: HOSPITALIST | Admitting: INTERNAL MEDICINE
Payer: MEDICARE

## 2024-12-09 VITALS
HEART RATE: 66 BPM | RESPIRATION RATE: 17 BRPM | OXYGEN SATURATION: 96 % | WEIGHT: 192.9 LBS | SYSTOLIC BLOOD PRESSURE: 144 MMHG | DIASTOLIC BLOOD PRESSURE: 83 MMHG | TEMPERATURE: 98 F

## 2024-12-09 DIAGNOSIS — Z95.1 PRESENCE OF AORTOCORONARY BYPASS GRAFT: Chronic | ICD-10-CM

## 2024-12-09 LAB
ALBUMIN SERPL ELPH-MCNC: 4 G/DL — SIGNIFICANT CHANGE UP (ref 3.5–5.2)
ALP SERPL-CCNC: 100 U/L — SIGNIFICANT CHANGE UP (ref 30–115)
ALT FLD-CCNC: 21 U/L — SIGNIFICANT CHANGE UP (ref 0–41)
ANION GAP SERPL CALC-SCNC: 9 MMOL/L — SIGNIFICANT CHANGE UP (ref 7–14)
AST SERPL-CCNC: 19 U/L — SIGNIFICANT CHANGE UP (ref 0–41)
BASOPHILS # BLD AUTO: 0.03 K/UL — SIGNIFICANT CHANGE UP (ref 0–0.2)
BASOPHILS NFR BLD AUTO: 0.3 % — SIGNIFICANT CHANGE UP (ref 0–1)
BILIRUB DIRECT SERPL-MCNC: <0.2 MG/DL — SIGNIFICANT CHANGE UP (ref 0–0.3)
BILIRUB INDIRECT FLD-MCNC: >0.2 MG/DL — SIGNIFICANT CHANGE UP (ref 0.2–1.2)
BILIRUB SERPL-MCNC: 0.4 MG/DL — SIGNIFICANT CHANGE UP (ref 0.2–1.2)
BUN SERPL-MCNC: 25 MG/DL — HIGH (ref 10–20)
CALCIUM SERPL-MCNC: 9.2 MG/DL — SIGNIFICANT CHANGE UP (ref 8.4–10.5)
CHLORIDE SERPL-SCNC: 103 MMOL/L — SIGNIFICANT CHANGE UP (ref 98–110)
CK SERPL-CCNC: 167 U/L — SIGNIFICANT CHANGE UP (ref 0–225)
CO2 SERPL-SCNC: 27 MMOL/L — SIGNIFICANT CHANGE UP (ref 17–32)
CREAT SERPL-MCNC: 1.8 MG/DL — HIGH (ref 0.7–1.5)
EGFR: 37 ML/MIN/1.73M2 — LOW
EOSINOPHIL # BLD AUTO: 0.1 K/UL — SIGNIFICANT CHANGE UP (ref 0–0.7)
EOSINOPHIL NFR BLD AUTO: 0.9 % — SIGNIFICANT CHANGE UP (ref 0–8)
GLUCOSE SERPL-MCNC: 113 MG/DL — HIGH (ref 70–99)
HCT VFR BLD CALC: 37.8 % — LOW (ref 42–52)
HGB BLD-MCNC: 12.4 G/DL — LOW (ref 14–18)
IMM GRANULOCYTES NFR BLD AUTO: 0.4 % — HIGH (ref 0.1–0.3)
LACTATE SERPL-SCNC: 1.6 MMOL/L — SIGNIFICANT CHANGE UP (ref 0.7–2)
LIDOCAIN IGE QN: 42 U/L — SIGNIFICANT CHANGE UP (ref 7–60)
LYMPHOCYTES # BLD AUTO: 1.06 K/UL — LOW (ref 1.2–3.4)
LYMPHOCYTES # BLD AUTO: 9.9 % — LOW (ref 20.5–51.1)
MCHC RBC-ENTMCNC: 29.5 PG — SIGNIFICANT CHANGE UP (ref 27–31)
MCHC RBC-ENTMCNC: 32.8 G/DL — SIGNIFICANT CHANGE UP (ref 32–37)
MCV RBC AUTO: 90 FL — SIGNIFICANT CHANGE UP (ref 80–94)
MONOCYTES # BLD AUTO: 1.11 K/UL — HIGH (ref 0.1–0.6)
MONOCYTES NFR BLD AUTO: 10.3 % — HIGH (ref 1.7–9.3)
NEUTROPHILS # BLD AUTO: 8.41 K/UL — HIGH (ref 1.4–6.5)
NEUTROPHILS NFR BLD AUTO: 78.2 % — HIGH (ref 42.2–75.2)
NRBC # BLD: 0 /100 WBCS — SIGNIFICANT CHANGE UP (ref 0–0)
PLATELET # BLD AUTO: 182 K/UL — SIGNIFICANT CHANGE UP (ref 130–400)
PMV BLD: 11.6 FL — HIGH (ref 7.4–10.4)
POTASSIUM SERPL-MCNC: 4.7 MMOL/L — SIGNIFICANT CHANGE UP (ref 3.5–5)
POTASSIUM SERPL-SCNC: 4.7 MMOL/L — SIGNIFICANT CHANGE UP (ref 3.5–5)
PROT SERPL-MCNC: 7.3 G/DL — SIGNIFICANT CHANGE UP (ref 6–8)
RBC # BLD: 4.2 M/UL — LOW (ref 4.7–6.1)
RBC # FLD: 13.6 % — SIGNIFICANT CHANGE UP (ref 11.5–14.5)
SODIUM SERPL-SCNC: 139 MMOL/L — SIGNIFICANT CHANGE UP (ref 135–146)
WBC # BLD: 10.75 K/UL — SIGNIFICANT CHANGE UP (ref 4.8–10.8)
WBC # FLD AUTO: 10.75 K/UL — SIGNIFICANT CHANGE UP (ref 4.8–10.8)

## 2024-12-09 PROCEDURE — 93010 ELECTROCARDIOGRAM REPORT: CPT

## 2024-12-09 PROCEDURE — 93970 EXTREMITY STUDY: CPT | Mod: 26

## 2024-12-09 PROCEDURE — 99285 EMERGENCY DEPT VISIT HI MDM: CPT | Mod: FS

## 2024-12-09 PROCEDURE — 70450 CT HEAD/BRAIN W/O DYE: CPT | Mod: 26,MC

## 2024-12-09 PROCEDURE — 74177 CT ABD & PELVIS W/CONTRAST: CPT | Mod: 26,MC

## 2024-12-09 PROCEDURE — 71260 CT THORAX DX C+: CPT | Mod: 26,MC

## 2024-12-09 PROCEDURE — 71045 X-RAY EXAM CHEST 1 VIEW: CPT | Mod: 26

## 2024-12-09 PROCEDURE — 72125 CT NECK SPINE W/O DYE: CPT | Mod: 26,MC

## 2024-12-09 NOTE — ED ADULT TRIAGE NOTE - CHIEF COMPLAINT QUOTE
pt lost balance after going to bathroom and slid down . as per ems daughter is concerned about fathers ambulation

## 2024-12-10 DIAGNOSIS — R53.1 WEAKNESS: ICD-10-CM

## 2024-12-10 LAB
A1C WITH ESTIMATED AVERAGE GLUCOSE RESULT: 6.4 % — HIGH (ref 4–5.6)
ALBUMIN SERPL ELPH-MCNC: 4 G/DL — SIGNIFICANT CHANGE UP (ref 3.5–5.2)
ALP SERPL-CCNC: 100 U/L — SIGNIFICANT CHANGE UP (ref 30–115)
ALT FLD-CCNC: 21 U/L — SIGNIFICANT CHANGE UP (ref 0–41)
ANION GAP SERPL CALC-SCNC: 10 MMOL/L — SIGNIFICANT CHANGE UP (ref 7–14)
APPEARANCE UR: CLEAR — SIGNIFICANT CHANGE UP
AST SERPL-CCNC: 20 U/L — SIGNIFICANT CHANGE UP (ref 0–41)
BASOPHILS # BLD AUTO: 0.03 K/UL — SIGNIFICANT CHANGE UP (ref 0–0.2)
BASOPHILS NFR BLD AUTO: 0.3 % — SIGNIFICANT CHANGE UP (ref 0–1)
BILIRUB SERPL-MCNC: 0.7 MG/DL — SIGNIFICANT CHANGE UP (ref 0.2–1.2)
BILIRUB UR-MCNC: NEGATIVE — SIGNIFICANT CHANGE UP
BUN SERPL-MCNC: 23 MG/DL — HIGH (ref 10–20)
CALCIUM SERPL-MCNC: 9.3 MG/DL — SIGNIFICANT CHANGE UP (ref 8.4–10.5)
CHLORIDE SERPL-SCNC: 102 MMOL/L — SIGNIFICANT CHANGE UP (ref 98–110)
CO2 SERPL-SCNC: 28 MMOL/L — SIGNIFICANT CHANGE UP (ref 17–32)
COLOR SPEC: YELLOW — SIGNIFICANT CHANGE UP
CREAT SERPL-MCNC: 1.8 MG/DL — HIGH (ref 0.7–1.5)
DIFF PNL FLD: NEGATIVE — SIGNIFICANT CHANGE UP
EGFR: 37 ML/MIN/1.73M2 — LOW
EOSINOPHIL # BLD AUTO: 0.09 K/UL — SIGNIFICANT CHANGE UP (ref 0–0.7)
EOSINOPHIL NFR BLD AUTO: 0.9 % — SIGNIFICANT CHANGE UP (ref 0–8)
ESTIMATED AVERAGE GLUCOSE: 137 MG/DL — HIGH (ref 68–114)
GLUCOSE SERPL-MCNC: 95 MG/DL — SIGNIFICANT CHANGE UP (ref 70–99)
GLUCOSE UR QL: NEGATIVE MG/DL — SIGNIFICANT CHANGE UP
HCT VFR BLD CALC: 38.3 % — LOW (ref 42–52)
HGB BLD-MCNC: 12.5 G/DL — LOW (ref 14–18)
IMM GRANULOCYTES NFR BLD AUTO: 0.3 % — SIGNIFICANT CHANGE UP (ref 0.1–0.3)
KETONES UR-MCNC: NEGATIVE MG/DL — SIGNIFICANT CHANGE UP
LEUKOCYTE ESTERASE UR-ACNC: NEGATIVE — SIGNIFICANT CHANGE UP
LYMPHOCYTES # BLD AUTO: 1.54 K/UL — SIGNIFICANT CHANGE UP (ref 1.2–3.4)
LYMPHOCYTES # BLD AUTO: 16.2 % — LOW (ref 20.5–51.1)
MCHC RBC-ENTMCNC: 29.5 PG — SIGNIFICANT CHANGE UP (ref 27–31)
MCHC RBC-ENTMCNC: 32.6 G/DL — SIGNIFICANT CHANGE UP (ref 32–37)
MCV RBC AUTO: 90.3 FL — SIGNIFICANT CHANGE UP (ref 80–94)
MONOCYTES # BLD AUTO: 1.13 K/UL — HIGH (ref 0.1–0.6)
MONOCYTES NFR BLD AUTO: 11.9 % — HIGH (ref 1.7–9.3)
NEUTROPHILS # BLD AUTO: 6.71 K/UL — HIGH (ref 1.4–6.5)
NEUTROPHILS NFR BLD AUTO: 70.4 % — SIGNIFICANT CHANGE UP (ref 42.2–75.2)
NITRITE UR-MCNC: NEGATIVE — SIGNIFICANT CHANGE UP
NRBC # BLD: 0 /100 WBCS — SIGNIFICANT CHANGE UP (ref 0–0)
PH UR: 8 — SIGNIFICANT CHANGE UP (ref 5–8)
PLATELET # BLD AUTO: 188 K/UL — SIGNIFICANT CHANGE UP (ref 130–400)
PMV BLD: 11.3 FL — HIGH (ref 7.4–10.4)
POTASSIUM SERPL-MCNC: 4.5 MMOL/L — SIGNIFICANT CHANGE UP (ref 3.5–5)
POTASSIUM SERPL-SCNC: 4.5 MMOL/L — SIGNIFICANT CHANGE UP (ref 3.5–5)
PROT SERPL-MCNC: 7.5 G/DL — SIGNIFICANT CHANGE UP (ref 6–8)
PROT UR-MCNC: SIGNIFICANT CHANGE UP MG/DL
RBC # BLD: 4.24 M/UL — LOW (ref 4.7–6.1)
RBC # FLD: 13.6 % — SIGNIFICANT CHANGE UP (ref 11.5–14.5)
SODIUM SERPL-SCNC: 140 MMOL/L — SIGNIFICANT CHANGE UP (ref 135–146)
SP GR SPEC: >1.03 — HIGH (ref 1–1.03)
UROBILINOGEN FLD QL: 0.2 MG/DL — SIGNIFICANT CHANGE UP (ref 0.2–1)
WBC # BLD: 9.53 K/UL — SIGNIFICANT CHANGE UP (ref 4.8–10.8)
WBC # FLD AUTO: 9.53 K/UL — SIGNIFICANT CHANGE UP (ref 4.8–10.8)

## 2024-12-10 PROCEDURE — 85025 COMPLETE CBC W/AUTO DIFF WBC: CPT

## 2024-12-10 PROCEDURE — 80053 COMPREHEN METABOLIC PANEL: CPT

## 2024-12-10 PROCEDURE — 97110 THERAPEUTIC EXERCISES: CPT | Mod: GP

## 2024-12-10 PROCEDURE — 92610 EVALUATE SWALLOWING FUNCTION: CPT | Mod: GN

## 2024-12-10 PROCEDURE — 97116 GAIT TRAINING THERAPY: CPT | Mod: GP

## 2024-12-10 PROCEDURE — 36415 COLL VENOUS BLD VENIPUNCTURE: CPT

## 2024-12-10 PROCEDURE — 83036 HEMOGLOBIN GLYCOSYLATED A1C: CPT

## 2024-12-10 PROCEDURE — 99232 SBSQ HOSP IP/OBS MODERATE 35: CPT

## 2024-12-10 PROCEDURE — 97162 PT EVAL MOD COMPLEX 30 MIN: CPT | Mod: GP

## 2024-12-10 RX ORDER — GLUCOSAMINE SULFATE DIPOT CHLR 500 MG
1 CAPSULE ORAL
Refills: 0 | DISCHARGE

## 2024-12-10 RX ORDER — ACETAMINOPHEN 500MG 500 MG/1
650 TABLET, COATED ORAL EVERY 6 HOURS
Refills: 0 | Status: DISCONTINUED | OUTPATIENT
Start: 2024-12-10 | End: 2024-12-11

## 2024-12-10 RX ORDER — MAGNESIUM, ALUMINUM HYDROXIDE 200-225/5
30 SUSPENSION, ORAL (FINAL DOSE FORM) ORAL EVERY 4 HOURS
Refills: 0 | Status: DISCONTINUED | OUTPATIENT
Start: 2024-12-10 | End: 2024-12-11

## 2024-12-10 RX ORDER — 0.9 % SODIUM CHLORIDE 0.9 %
500 INTRAVENOUS SOLUTION INTRAVENOUS
Refills: 0 | Status: DISCONTINUED | OUTPATIENT
Start: 2024-12-10 | End: 2024-12-11

## 2024-12-10 RX ORDER — GLUCOSAMINE SULFATE DIPOT CHLR 500 MG
1 CAPSULE ORAL DAILY
Refills: 0 | Status: DISCONTINUED | OUTPATIENT
Start: 2024-12-10 | End: 2024-12-11

## 2024-12-10 RX ORDER — HEPARIN SODIUM,PORCINE 1000/ML
5000 VIAL (ML) INJECTION EVERY 8 HOURS
Refills: 0 | Status: DISCONTINUED | OUTPATIENT
Start: 2024-12-10 | End: 2024-12-11

## 2024-12-10 RX ORDER — INFLUENZA VIRUS VACCINE 15; 15; 15; 15 UG/.5ML; UG/.5ML; UG/.5ML; UG/.5ML
0.5 SUSPENSION INTRAMUSCULAR ONCE
Refills: 0 | Status: DISCONTINUED | OUTPATIENT
Start: 2024-12-10 | End: 2024-12-11

## 2024-12-10 RX ORDER — METOPROLOL TARTRATE 100 MG/1
50 TABLET, FILM COATED ORAL DAILY
Refills: 0 | Status: DISCONTINUED | OUTPATIENT
Start: 2024-12-10 | End: 2024-12-11

## 2024-12-10 RX ORDER — ONDANSETRON HYDROCHLORIDE 4 MG/1
4 TABLET, FILM COATED ORAL EVERY 8 HOURS
Refills: 0 | Status: DISCONTINUED | OUTPATIENT
Start: 2024-12-10 | End: 2024-12-11

## 2024-12-10 RX ORDER — FAMOTIDINE 20 MG/1
40 TABLET, FILM COATED ORAL DAILY
Refills: 0 | Status: DISCONTINUED | OUTPATIENT
Start: 2024-12-10 | End: 2024-12-11

## 2024-12-10 RX ORDER — ACETAMINOPHEN, DIPHENHYDRAMINE HCL, PHENYLEPHRINE HCL 325; 25; 5 MG/1; MG/1; MG/1
3 TABLET ORAL AT BEDTIME
Refills: 0 | Status: DISCONTINUED | OUTPATIENT
Start: 2024-12-10 | End: 2024-12-11

## 2024-12-10 RX ORDER — DONEPEZIL HYDROCHLORIDE 5 MG/1
10 TABLET, FILM COATED ORAL AT BEDTIME
Refills: 0 | Status: DISCONTINUED | OUTPATIENT
Start: 2024-12-10 | End: 2024-12-11

## 2024-12-10 RX ORDER — MEMANTINE HYDROCHLORIDE 14 MG/1
10 CAPSULE, EXTENDED RELEASE ORAL DAILY
Refills: 0 | Status: DISCONTINUED | OUTPATIENT
Start: 2024-12-10 | End: 2024-12-11

## 2024-12-10 RX ADMIN — METOPROLOL TARTRATE 50 MILLIGRAM(S): 100 TABLET, FILM COATED ORAL at 08:37

## 2024-12-10 RX ADMIN — Medication 75 MILLILITER(S): at 21:23

## 2024-12-10 RX ADMIN — DONEPEZIL HYDROCHLORIDE 10 MILLIGRAM(S): 5 TABLET, FILM COATED ORAL at 21:22

## 2024-12-10 RX ADMIN — Medication 80 MILLIGRAM(S): at 21:23

## 2024-12-10 RX ADMIN — Medication 5000 UNIT(S): at 21:22

## 2024-12-10 RX ADMIN — Medication 75 MILLILITER(S): at 05:15

## 2024-12-10 RX ADMIN — FAMOTIDINE 40 MILLIGRAM(S): 20 TABLET, FILM COATED ORAL at 11:23

## 2024-12-10 RX ADMIN — Medication 81 MILLIGRAM(S): at 11:23

## 2024-12-10 RX ADMIN — MEMANTINE HYDROCHLORIDE 10 MILLIGRAM(S): 14 CAPSULE, EXTENDED RELEASE ORAL at 11:23

## 2024-12-10 RX ADMIN — Medication 1 MILLIGRAM(S): at 11:23

## 2024-12-10 RX ADMIN — Medication 5000 UNIT(S): at 13:23

## 2024-12-10 NOTE — PROGRESS NOTE ADULT - SUBJECTIVE AND OBJECTIVE BOX
HUMERANOEMY  84y, Male  Allergy: No Known Allergies      CHIEF COMPLAINT:     HPI:  85 y/o male pmhx HTN, HLD, GERD, Dementia, presents with c/o S/P Fall.  Pt is a poor historian, HPI provided by daughter Emilee.  Daughter states she retuned home from work and found pt on the flood in the bathroom.    EMS was called and presented pt to ED for evaluation.  Presently, pt is in NAD.      Home meds provided by daughter.   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          SOCIAL HISTORY  Social History:  Ambulates with walker at home but becoming more difficult lately (07 Apr 2024 12:52)      Home Medications:  aspirin 81 mg oral tablet, chewable: 1 tab(s) orally once a day (19 Jun 2021 09:18)  atorvastatin 40 mg oral tablet: 1 tab(s) orally once a day (at bedtime) (19 Jun 2021 09:18)  donepezil 10 mg oral tablet: 1 tab(s) orally once a day (at bedtime) (19 Jun 2021 09:18)  famotidine 40 mg oral tablet: 1 tab(s) orally once a day (19 Jun 2021 09:18)  folic acid 1 mg oral tablet: 1 tab(s) orally once a day (10 Dec 2024 02:15)  memantine 10 mg oral tablet: 1 tab(s) orally once a day (19 Jun 2021 09:18)  metoprolol succinate 50 mg oral tablet, extended release: 1 tab(s) orally once a day (19 Jun 2021 09:18)  Senna 8.6 mg oral tablet: 2 tab(s) orally once a day (at bedtime) (07 Apr 2024 14:36)      ROS  General: Denies fevers, chills, nightsweats, weight loss  HEENT: Denies headache, rhinorrhea, sore throat, eye pain  CV: Denies CP, palpitations  PULM: Denies SOB, cough  GI: Denies abdominal pain, diarrhea  : Denies dysuria, hematuria  MSK: Denies arthralgias  SKIN: Denies rash   NEURO: Denies paresthesias, weakness  PSYCH: Denies depression    VITALS:  T(F): 97.9, Max: 98.3 (12-09-24 @ 20:24)  HR: 66  BP: 120/63  RR: 16Vital Signs Last 24 Hrs  T(C): 36.6 (10 Dec 2024 12:00), Max: 36.8 (09 Dec 2024 20:24)  T(F): 97.9 (10 Dec 2024 12:00), Max: 98.3 (09 Dec 2024 20:24)  HR: 66 (10 Dec 2024 12:00) (56 - 66)  BP: 120/63 (10 Dec 2024 12:00) (120/63 - 144/83)  BP(mean): --  RR: 16 (10 Dec 2024 12:00) (16 - 17)  SpO2: 93% (10 Dec 2024 12:00) (93% - 97%)    Parameters below as of 10 Dec 2024 12:00  Patient On (Oxygen Delivery Method): room air        PHYSICAL EXAM:  Gen: NAD, resting in bed  HEENT: Normocephalic, atraumatic  Neck: supple, no lymphadenopathy  CV: Regular rate & regular rhythm  Lungs: CTABL no wheeze  Abdomen: Soft, NTND+ BS present  Ext: Warm, well perfused no CCE      TESTS & MEASUREMENTS:                        12.5   9.53  )-----------( 188      ( 10 Dec 2024 05:39 )             38.3     12-10    140  |  102  |  23[H]  ----------------------------<  95  4.5   |  28  |  1.8[H]    Ca    9.3      10 Dec 2024 05:39    TPro  7.5  /  Alb  4.0  /  TBili  0.7  /  DBili  x   /  AST  20  /  ALT  21  /  AlkPhos  100  12-10      LIVER FUNCTIONS - ( 10 Dec 2024 05:39 )  Alb: 4.0 g/dL / Pro: 7.5 g/dL / ALK PHOS: 100 U/L / ALT: 21 U/L / AST: 20 U/L / GGT: x           Urinalysis Basic - ( 10 Dec 2024 05:39 )    Color: x / Appearance: x / SG: x / pH: x  Gluc: 95 mg/dL / Ketone: x  / Bili: x / Urobili: x   Blood: x / Protein: x / Nitrite: x   Leuk Esterase: x / RBC: x / WBC x   Sq Epi: x / Non Sq Epi: x / Bacteria: x          Lactate, Blood: 1.6 mmol/L (12-09-24 @ 20:51)    QRS axis to [] ° and NSR at a rate of [] BPM. There was no atrial enlargement. There was no ventricular hypertrophy. There were no ST-T changes and all intervals were normal.      INFECTIOUS DISEASES TESTING      RADIOLOGY & ADDITIONAL TESTS:  I have personally reviewed the last Chest xray  CXR  Xray Chest 1 View- PORTABLE-Urgent:   ACC: [id number] EXAM:  XR CHEST PORTABLE URGENT 1V   ORDERED BY: RAQUEL LAL     PROCEDURE DATE:  12/09/2024          INTERPRETATION:  CLINICAL HISTORY: Trauma    COMPARISON: 9/22/2024.    TECHNIQUE: Frontal    FINDINGS:    Cardiac/mediastinum/hilum: Stable.    Lung parenchyma/Pleura: Within normal limits    Skeleton/soft tissues: Stable.      IMPRESSION:    No radiographic evidence of acute cardiopulmonary disease.    --- End of Report ---            ROSALINE BRIDGES MD; Attending Radiologist  This document has been electronically signed. Dec 10 2024 11:48AM (12-09-24 @ 21:17)      CT  CT Abdomen and Pelvis w/ IV Cont:   ACC: [id number] EXAM:  CT CHEST IC   ORDERED BY: RAQUEL LAL     ACC: [id number] EXAM:  CT ABDOMEN AND PELVIS IC   ORDERED BY: RAQUEL LAL     PROCEDURE DATE:  12/09/2024          INTERPRETATION:  CLINICAL INFORMATION: Trauma. Fall. WBC 10.17  PMHx of    diabetes, high blood pressure, high cholesterol, cardiac bypass    COMPARISON: CT scan of the abdomen and pelvis dated 9/22/2024    CONTRAST/COMPLICATIONS:  IV Contrast:   The patient received 90 ml of Omnipaque 350 with 10 ml   discarded.  Oral Contrast:  None  Complications:  None reported at time of study completion    PROCEDURE:  CT of the Chest, Abdomen and Pelvis was performed.  Sagittal and coronal reformats were performed.    FINDINGS:  CHEST:    TUBES AND LINES: None.  LUNGSAND LARGE AIRWAYS: The central tracheobronchial tree is patent.   There are mild dependent atelectatic changes at the lung bases, otherwise   the lungs are clear. There is no pneumothorax.  PLEURA: There is no pleural effusion.  VESSELS: There is no evidence of central pulmonary embolism. Normal   caliber aorta and pulmonary artery. Aortic calcifications are noted.   There is no evidence of aortic aneurysm or dissection.  HEART: Post sternotomy and CABG. The heart size is normal. Coronary   artery calcifications are noted. No pericardial effusion.  MEDIASTINUM AND SON: There are no suspicious mediastinal, hilar or   axillary lymph nodes. The visualized portion of the thyroid gland is   unremarkable.  CHEST WALL AND LOWER NECK: Unremarkable    ABDOMEN AND PELVIS:    LIVER: The liver is normal in size with no evidence of solid mass.   Subcentimeter hypodensity in the left hepatic lobe too small for further   characterization. The portal vein is patent. The hepatic veins are   opacified.  BILE DUCTS: Normal caliber.  GALLBLADDER: Cholelithiasis.  SPLEEN: Within normal limits.  PANCREAS: The pancreas is normal in size and configuration. No evidence   of mass or pancreatitis.  ADRENALS: Within normal limits.  KIDNEYS/URETERS: There is symmetric renal enhancement. No evidence of   hydronephrosis, calcified stones, or solid mass.    BLADDER: Unremarkable.  REPRODUCTIVE ORGANS: Prostatic enlargement.    BOWEL: Colonic diverticula. No evidence of bowel obstruction, colitis, or   inflammatory process. Normal caliber appendix.  PERITONEUM/RETROPERITONEUM: No ascites. No free intraperitoneal air.  VESSELS: Aortoiliac calcifications are noted with no evidence of   abdominal aortic aneurysm.  LYMPH NODES: No abdominal or pelvic adenopathy.  ABDOMINAL WALL: Unremarkable.  BONES: Degenerative changes of the spine. No evidence of acute fracture.    IMPRESSION:  No evidence of thoracic, abdominal or pelvic mass or acute inflammatory   process.    No evidence of thoracic, abdominal or pelvic visceral injury, laceration,   or hematoma.    No evidence of acute fracture.    --- End of Report ---            UNRULY CANALES MD; Attending Interventional Radiologist  This document has been electronically signed. Dec  9 2024 10:31PM (12-09-24 @ 21:42)  CT Chest w/ IV Cont:   ACC: [id number] EXAM:  CT CHEST IC   ORDERED BY: RAQUEL LAL     ACC: [id number] EXAM:  CT ABDOMEN AND PELVIS IC   ORDERED BY: RAQUEL LAL     PROCEDURE DATE:  12/09/2024          INTERPRETATION:  CLINICAL INFORMATION: Trauma. Fall. WBC 10.17  PMHx of    diabetes, high blood pressure, high cholesterol, cardiac bypass    COMPARISON: CT scan of the abdomen and pelvis dated 9/22/2024    CONTRAST/COMPLICATIONS:  IV Contrast:   The patient received 90 ml of Omnipaque 350 with 10 ml   discarded.  Oral Contrast:  None  Complications:  None reported at time of study completion    PROCEDURE:  CT of the Chest, Abdomen and Pelvis was performed.  Sagittal and coronal reformats were performed.    FINDINGS:  CHEST:    TUBES AND LINES: None.  LUNGSAND LARGE AIRWAYS: The central tracheobronchial tree is patent.   There are mild dependent atelectatic changes at the lung bases, otherwise   the lungs are clear. There is no pneumothorax.  PLEURA: There is no pleural effusion.  VESSELS: There is no evidence of central pulmonary embolism. Normal   caliber aorta and pulmonary artery. Aortic calcifications are noted.   There is no evidence of aortic aneurysm or dissection.  HEART: Post sternotomy and CABG. The heart size is normal. Coronary   artery calcifications are noted. No pericardial effusion.  MEDIASTINUM AND SON: There are no suspicious mediastinal, hilar or   axillary lymph nodes. The visualized portion of the thyroid gland is   unremarkable.  CHEST WALL AND LOWER NECK: Unremarkable    ABDOMEN AND PELVIS:    LIVER: The liver is normal in size with no evidence of solid mass.   Subcentimeter hypodensity in the left hepatic lobe too small for further   characterization. The portal vein is patent. The hepatic veins are   opacified.  BILE DUCTS: Normal caliber.  GALLBLADDER: Cholelithiasis.  SPLEEN: Within normal limits.  PANCREAS: The pancreas is normal in size and configuration. No evidence   of mass or pancreatitis.  ADRENALS: Within normal limits.  KIDNEYS/URETERS: There is symmetric renal enhancement. No evidence of   hydronephrosis, calcified stones, or solid mass.    BLADDER: Unremarkable.  REPRODUCTIVE ORGANS: Prostatic enlargement.    BOWEL: Colonic diverticula. No evidence of bowel obstruction, colitis, or   inflammatory process. Normal caliber appendix.  PERITONEUM/RETROPERITONEUM: No ascites. No free intraperitoneal air.  VESSELS: Aortoiliac calcifications are noted with no evidence of   abdominal aortic aneurysm.  LYMPH NODES: No abdominal or pelvic adenopathy.  ABDOMINAL WALL: Unremarkable.  BONES: Degenerative changes of the spine. No evidence of acute fracture.    IMPRESSION:  No evidence of thoracic, abdominal or pelvic mass or acute inflammatory   process.    No evidence of thoracic, abdominal or pelvic visceral injury, laceration,   or hematoma.    No evidence of acute fracture.    --- End of Report ---            UNRULY CANALES MD; Attending Interventional Radiologist  This document has been electronically signed. Dec  9 2024 10:31PM (12-09-24 @ 21:42)      CARDIOLOGY TESTING  12 Lead ECG:   Ventricular Rate 61 BPM    Atrial Rate 61 BPM    P-R Interval 168 ms    QRS Duration 94 ms    Q-T Interval 432 ms    QTC Calculation(Bazett) 434 ms    P Axis 50 degrees    R Axis -40 degrees    T Axis 47 degrees    Diagnosis Line Normal sinus rhythm  Left axis deviation  Minimal voltage criteria for LVH, may be normal variant  Abnormal ECG    Confirmed by UNRULY ALVA MD ([id number]) on 12/10/2024 7:01:14 AM (12-09-24 @ 21:07)      MEDICATIONS  (STANDING):  aspirin  chewable 81 milliGRAM(s) Oral daily  atorvastatin 80 milliGRAM(s) Oral at bedtime  donepezil 10 milliGRAM(s) Oral at bedtime  famotidine    Tablet 40 milliGRAM(s) Oral daily  folic acid 1 milliGRAM(s) Oral daily  heparin   Injectable 5000 Unit(s) SubCutaneous every 8 hours  influenza  Vaccine (HIGH DOSE) 0.5 milliLiter(s) IntraMuscular once  memantine 10 milliGRAM(s) Oral daily  metoprolol succinate ER 50 milliGRAM(s) Oral daily  sodium chloride 0.45%. 500 milliLiter(s) (75 mL/Hr) IV Continuous <Continuous>    MEDICATIONS  (PRN):  acetaminophen     Tablet .. 650 milliGRAM(s) Oral every 6 hours PRN Temp greater or equal to 38C (100.4F), Mild Pain (1 - 3)  aluminum hydroxide/magnesium hydroxide/simethicone Suspension 30 milliLiter(s) Oral every 4 hours PRN Dyspepsia  melatonin 3 milliGRAM(s) Oral at bedtime PRN Insomnia  ondansetron Injectable 4 milliGRAM(s) IV Push every 8 hours PRN Nausea and/or Vomiting      ANTIBIOTICS:      All available historical data has been reviewed    ASSESSMENT  84y M admitted with Weakness        PROBLEMS

## 2024-12-10 NOTE — H&P ADULT - ASSESSMENT
83 y/o male pmhx HTN, HLD, GERD, Dementia, presents with c/o S/P Fall.  Pt is a poor historian, HPI provided by daughter Emilee.  Daughter states she retuned home from work and found pt on the flood in the bathroom.    EMS was called and presented pt to ED for evaluation.  Presently, pt is in NAD.   85 y/o male pmhx HTN, HLD, GERD, Dementia, presents with c/o S/P Fall.  Pt is a poor historian, HPI provided by daughter Emilee.  Daughter states she retuned home from work and found pt on the flood in the bathroom.    EMS was called and presented pt to ED for evaluation.  Presently, pt is in NAD.        # S/P Fall  - PT  - RTF      # HTN  - vs  - Orthostatic BP  - c/w home med    # HLD  - c/w home med    # GERD  - c/w home med    # Dementia  - c/w home med    # DM  - f/u HbgA1[facility]  - not on meds

## 2024-12-10 NOTE — ED PROVIDER NOTE - CLINICAL SUMMARY MEDICAL DECISION MAKING FREE TEXT BOX
84yM diabetes, Dementia, high blood pressure, high cholesterol, cardiac bypass   Use a walker due to arthritis and leg swelling morning.  Will home he lives with after she found him kneeling over the tube as described above.  He says it was about 30 minutes unable to get up.  daughter admits to Increasing difficulty with gait over the past few weeks. Labs resulted at the time of my review were noted to be within acceptable parameters per  previous  ( CKD),  CT pan scan no acute trauma  Pt admitted to PT [facility].

## 2024-12-10 NOTE — ED PROVIDER NOTE - OBJECTIVE STATEMENT
84 year old male past medical history of demenita, Hypertension, HLD, GERD comes to emergency room for fall. As per daughter she found patient in bathroom slumped over tub after fall. daughter states he was laying there for ~2hours before they found him. Pt was unable to stand due to weakness and they called 911. patient has been having increasing weakness and trouble walking over the last week. As per patient he was "trying to fix a leak and got down on his knees to fix the tub and when he tried to stand fell and landed on tub and couldn't get back up," patient denies pain. patient also as per daughter having increaisng leg swelling b/l over the last few weeks.

## 2024-12-10 NOTE — H&P ADULT - HISTORY OF PRESENT ILLNESS
83 y/o male  83 y/o male pmhx HTN, HLD, GERD, Dementia, presents with c/o S/P Fall.  Pt is a poor historian, HPI provided by daughter Emilee.  Daughter states she retuned home from work and found pt on the flood in the bathroom.    EMS was called and presented pt to ED for evaluation.  Presently, pt is in NAD.      Home meds provided by daughter.

## 2024-12-10 NOTE — PHYSICAL THERAPY INITIAL EVALUATION ADULT - GENERAL OBSERVATIONS, REHAB EVAL
13:35 - 14:00 Chart reviewed. Order received.  Patient is ok to be  seen for PT,confirmed with RN. pt encountered  Semi green in the bed denies pain, and agrees to participate in session, +  Dimas CHRITSENSEN, +JANESSA Yao.

## 2024-12-10 NOTE — PHYSICAL THERAPY INITIAL EVALUATION ADULT - TRANSFER SAFETY CONCERNS NOTED: SIT/STAND, REHAB EVAL
losing balance/decreased weight-shifting ability losing balance/decreased step length/decreased weight-shifting ability

## 2024-12-10 NOTE — PHYSICAL THERAPY INITIAL EVALUATION ADULT - ADDITIONAL COMMENTS
pt is 83 y/o M , lives with daughter in  , information obtain from the patient , has no steps to enter or once inside the house  , pt was independent using RW with bed mobility , transfer , ambulation ,stair negotiation and basic ADLS.

## 2024-12-10 NOTE — PROGRESS NOTE ADULT - ASSESSMENT
#debility - unable to get up form floor, no trauma  - PT/rehab  - recommend SNF, however spoke with daughter - does not want snf - case mangement to follow up     #dizziness upon standing - negative orthostatics post ivfs, possble orthostatics upon admission , multiple dementia meds that can contribute    #dementia at baseline - no behavioral issues    #HTN - bp stable    # CAD - no chest pain, cont home meds    #CKDIII at [facility] - avoid nephro toxics    prepare for dc on 12/11  home with service vs snf
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
## 2024-12-10 NOTE — PATIENT PROFILE ADULT - FALL HARM RISK - HARM RISK INTERVENTIONS

## 2024-12-10 NOTE — PRE-OP CHECKLIST - NOTHING BY MOUTH SINCE
09-Dec-2024 11:59
Has The Growth Been Previously Biopsied?: has been previously biopsied
Body Location Override (Optional): Right scapula
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
## 2024-12-10 NOTE — ED PROVIDER NOTE - CADM POA URETHRAL CATHETER
No [Least Pain Intensity: 0/10] : 0/10 [80: Normal activity with effort; some signs or symptoms of disease.] : 80: Normal activity with effort; some signs or symptoms of disease.  [Maximal Pain Intensity: 4/10] : 4/10 [Pain Description/Quality: ___] : Pain description/quality: [unfilled] [Pain Duration: ___] : Pain duration: [unfilled] [Pain Location: ___] : Pain Location: [unfilled] [Pain Interferes with ADLs] : Pain does not interfere with activities of daily living

## 2024-12-10 NOTE — ED PROVIDER NOTE - PHYSICAL EXAMINATION
Physical Exam    Vital Signs: I have reviewed the initial vital signs.  Constitutional:   no acute distress  Eyes: Conjunctiva pink, Sclera clear, PERRLA, EOMI.  Cardiovascular: S1 and S2, regular rate, regular rhythm, well-perfused extremities, radial pulses equal and 2+  Respiratory: unlabored respiratory effort, clear to auscultation bilaterally no wheezing, rales and rhonchi  Gastrointestinal: soft, non-tender abdomen, no pulsatile mass, normal bowl sounds  Musculoskeletal: supple neck, + b/l lower extremity edema, no midline tenderness  Integumentary: warm, dry, no rash  Neurologic: awake, alert, cranial nerves II-XII grossly intact, extremities’ motor and sensory functions grossly intact  Psychiatric: appropriate mood, appropriate affect

## 2024-12-11 ENCOUNTER — TRANSCRIPTION ENCOUNTER (OUTPATIENT)
Age: 84
End: 2024-12-11

## 2024-12-11 VITALS
RESPIRATION RATE: 19 BRPM | SYSTOLIC BLOOD PRESSURE: 150 MMHG | TEMPERATURE: 96 F | HEART RATE: 64 BPM | DIASTOLIC BLOOD PRESSURE: 73 MMHG

## 2024-12-11 PROCEDURE — 99239 HOSP IP/OBS DSCHRG MGMT >30: CPT

## 2024-12-11 RX ADMIN — Medication 5000 UNIT(S): at 13:11

## 2024-12-11 RX ADMIN — Medication 1 MILLIGRAM(S): at 11:20

## 2024-12-11 RX ADMIN — Medication 5000 UNIT(S): at 05:19

## 2024-12-11 RX ADMIN — Medication 81 MILLIGRAM(S): at 11:20

## 2024-12-11 RX ADMIN — FAMOTIDINE 40 MILLIGRAM(S): 20 TABLET, FILM COATED ORAL at 11:20

## 2024-12-11 RX ADMIN — MEMANTINE HYDROCHLORIDE 10 MILLIGRAM(S): 14 CAPSULE, EXTENDED RELEASE ORAL at 11:20

## 2024-12-11 NOTE — DISCHARGE NOTE NURSING/CASE MANAGEMENT/SOCIAL WORK - FINANCIAL ASSISTANCE
[facility] provides services at a reduced cost to those who are determined to be eligible through [facility]’s financial assistance program. Information regarding [facility]’s financial assistance program can be found by going to [email or web address] or by calling [phone].

## 2024-12-11 NOTE — DISCHARGE NOTE PROVIDER - CARE PROVIDER_API CALL
CURTIS, SUNDEE  [address] [facility] [place] [address]  Phone: ([phone]  Fax: ()-  Follow Up Time:

## 2024-12-11 NOTE — DISCHARGE NOTE PROVIDER - NSFTFHOMEHTHYNRD_GEN_ALL_CORE
PT with adrenal insuffiency. fever tmax 102.7. no vomiting. Pt is alert awake, and appropriate, in no acute distress, o2 sat 100% on room air clear lungs b/l, no increased work of breathing, apical pulse auscultated
Yes

## 2024-12-11 NOTE — SWALLOW BEDSIDE ASSESSMENT ADULT - SLP PERTINENT HISTORY OF CURRENT PROBLEM
83 y/o male pmhx HTN, HLD, GERD, Dementia, presents with c/o S/P Fall.  Pt is a poor historian, HPI provided by daughter Emilee.  Daughter states she retuned home from work and found pt on the flood in the bathroom.

## 2024-12-11 NOTE — DISCHARGE NOTE NURSING/CASE MANAGEMENT/SOCIAL WORK - PATIENT PORTAL LINK FT
You can access the FollowMyHealth Patient Portal offered by [facility] by registering at the following website: [email or web address]. By joining [employer]’s FollowMyHealth portal, you will also be able to view your health information using other applications (apps) compatible with our system.

## 2024-12-11 NOTE — DISCHARGE NOTE PROVIDER - NSDCCPCAREPLAN_GEN_ALL_CORE_FT
PRINCIPAL DISCHARGE DIAGNOSIS  Diagnosis: Weakness  Assessment and Plan of Treatment: debility due to dementia and deconditioning - recommend physicial therapy and assistance at home for safety - patient is high risk to fall - please take precautions and ambulate with assistance . follow up with primary doctor if dizziness continues - may need to adjust medicaitons that can cause dizziness or BP to drop      SECONDARY DISCHARGE DIAGNOSES  Diagnosis: Fall  Assessment and Plan of Treatment:

## 2024-12-11 NOTE — SWALLOW BEDSIDE ASSESSMENT ADULT - SLP GENERAL OBSERVATIONS
Pt. received OOBTC awake and alert. Confused suspected baseline. Oriented to person and place. Denies difficulty swallowing.

## 2024-12-11 NOTE — DISCHARGE NOTE NURSING/CASE MANAGEMENT/SOCIAL WORK - NSDCVIVACCINE_GEN_ALL_CORE_FT
Tdap; 26-Oct-2020 14:23; Ngoc Naylor (RN); [employer]; [id number] (Exp. Date: 31-Jul-2022); IntraMuscular; Deltoid Left.; 0.5 milliLiter(s); VIS (VIS Published: 09-May-2013, VIS Presented: 26-Oct-2020);   Tdap; 02-Oct-2021 19:12; Herminia Robertson (RN); [employer]; [id number] (Exp. Date: 15-Jul-2023); IntraMuscular; Deltoid Left.; 0.5 milliLiter(s); VIS (VIS Published: 09-May-2013, VIS Presented: 02-Oct-2021);

## 2024-12-11 NOTE — DISCHARGE NOTE PROVIDER - HOSPITAL COURSE
HPI:  83 y/o male pmhx HTN, HLD, GERD, Dementia, presents with c/o S/P Fall.  Pt is a poor historian, HPI provided by daughter Emilee.  Daughter states she retuned home from work and found pt on the flood in the bathroom.    EMS was called and presented pt to ED for evaluation.  Presently, pt is in NAD.      Home meds provided by daughter.   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    #debility - unable to get up form floor, no trauma  - PT/rehab  - recommend SNF, however spoke with daughter - does not want snf - case mangement discussed as well - aware of risk of fall - precuations given - home pt/ visiting nurse set up - referral for HHA, daughter with patient at home  and she will try to get extra help at home    #dizziness upon standing resolved- negative orthostatics post ivfs, possble orthostatics upon admission , multiple dementia meds that can contribute    #dementia at baseline - no behavioral issues    #HTN - bp stable    # CAD - no chest pain, cont home meds    #CKDIII at [facility] - avoid nephro toxics

## 2024-12-11 NOTE — SWALLOW BEDSIDE ASSESSMENT ADULT - COMMENTS
Per RN reported choughing/choking on PO meds x1. Suspected one time incidence prior to this pt. has been tolerating PO diet w/o difficulty.

## 2024-12-12 LAB
CULTURE RESULTS: SIGNIFICANT CHANGE UP
SPECIMEN SOURCE: SIGNIFICANT CHANGE UP
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
## 2024-12-23 DIAGNOSIS — Z79.82 LONG TERM (CURRENT) USE OF ASPIRIN: ICD-10-CM

## 2024-12-23 DIAGNOSIS — Z95.1 PRESENCE OF AORTOCORONARY BYPASS GRAFT: ICD-10-CM

## 2024-12-23 DIAGNOSIS — E78.5 HYPERLIPIDEMIA, UNSPECIFIED: ICD-10-CM

## 2024-12-23 DIAGNOSIS — I12.9 HYPERTENSIVE CHRONIC KIDNEY DISEASE WITH STAGE 1 THROUGH STAGE 4 CHRONIC KIDNEY DISEASE, OR UNSPECIFIED CHRONIC KIDNEY DISEASE: ICD-10-CM

## 2024-12-23 DIAGNOSIS — R73.03 PREDIABETES: ICD-10-CM
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
## 2024-12-23 DIAGNOSIS — I25.10 ATHEROSCLEROTIC HEART DISEASE OF NATIVE CORONARY ARTERY WITHOUT ANGINA PECTORIS: ICD-10-CM

## 2024-12-23 DIAGNOSIS — R53.81 OTHER MALAISE: ICD-10-CM

## 2024-12-23 DIAGNOSIS — K21.9 GASTRO-ESOPHAGEAL REFLUX DISEASE WITHOUT ESOPHAGITIS: ICD-10-CM

## 2024-12-23 DIAGNOSIS — R42 DIZZINESS AND GIDDINESS: ICD-10-CM

## 2024-12-23 DIAGNOSIS — N18.30 CHRONIC KIDNEY DISEASE, STAGE 3 UNSPECIFIED: ICD-10-CM

## 2024-12-23 DIAGNOSIS — M62.81 MUSCLE WEAKNESS (GENERALIZED): ICD-10-CM

## 2024-12-23 DIAGNOSIS — Z79.899 OTHER LONG TERM (CURRENT) DRUG THERAPY: ICD-10-CM

## 2024-12-23 DIAGNOSIS — K57.30 DIVERTICULOSIS OF LARGE INTESTINE WITHOUT PERFORATION OR ABSCESS WITHOUT BLEEDING: ICD-10-CM

## 2025-02-07 NOTE — ED ADULT NURSE NOTE - NSFALLRSKHARMRISK_ED_ALL_ED
02 07 2025 called the patient times 2 (01 21 2025 and 01 28 2025 at ) to schedule an appointment with one of our providers, Dr. Freeman patient, left message on machine both times, no response.  I mailed the patient a letter asking them to call the office back to schedule this appointment.  KS   yes

## 2025-03-04 ENCOUNTER — INPATIENT (INPATIENT)
Facility: HOSPITAL | Age: 85
LOS: 2 days | Discharge: HOME HEALTH | DRG: 389 | End: 2025-03-07
Attending: INTERNAL MEDICINE | Admitting: INTERNAL MEDICINE
Payer: MEDICARE

## 2025-03-04 VITALS
OXYGEN SATURATION: 96 % | TEMPERATURE: 98 F | WEIGHT: 190.04 LBS | DIASTOLIC BLOOD PRESSURE: 77 MMHG | RESPIRATION RATE: 18 BRPM | HEART RATE: 67 BPM | SYSTOLIC BLOOD PRESSURE: 134 MMHG

## 2025-03-04 DIAGNOSIS — Z95.1 PRESENCE OF AORTOCORONARY BYPASS GRAFT: Chronic | ICD-10-CM

## 2025-03-04 DIAGNOSIS — K52.89 OTHER SPECIFIED NONINFECTIVE GASTROENTERITIS AND COLITIS: ICD-10-CM

## 2025-03-04 LAB
ALBUMIN SERPL ELPH-MCNC: 4.2 G/DL — SIGNIFICANT CHANGE UP (ref 3.5–5.2)
ALP SERPL-CCNC: 100 U/L — SIGNIFICANT CHANGE UP (ref 30–115)
ALT FLD-CCNC: 19 U/L — SIGNIFICANT CHANGE UP (ref 0–41)
ANION GAP SERPL CALC-SCNC: 12 MMOL/L — SIGNIFICANT CHANGE UP (ref 7–14)
AST SERPL-CCNC: 22 U/L — SIGNIFICANT CHANGE UP (ref 0–41)
BASOPHILS # BLD AUTO: 0.02 K/UL — SIGNIFICANT CHANGE UP (ref 0–0.2)
BASOPHILS NFR BLD AUTO: 0.2 % — SIGNIFICANT CHANGE UP (ref 0–1)
BILIRUB DIRECT SERPL-MCNC: 0.2 MG/DL — SIGNIFICANT CHANGE UP (ref 0–0.3)
BILIRUB INDIRECT FLD-MCNC: 0.5 MG/DL — SIGNIFICANT CHANGE UP (ref 0.2–1.2)
BILIRUB SERPL-MCNC: 0.7 MG/DL — SIGNIFICANT CHANGE UP (ref 0.2–1.2)
BUN SERPL-MCNC: 23 MG/DL — HIGH (ref 10–20)
CALCIUM SERPL-MCNC: 8.9 MG/DL — SIGNIFICANT CHANGE UP (ref 8.4–10.5)
CHLORIDE SERPL-SCNC: 100 MMOL/L — SIGNIFICANT CHANGE UP (ref 98–110)
CO2 SERPL-SCNC: 24 MMOL/L — SIGNIFICANT CHANGE UP (ref 17–32)
CREAT SERPL-MCNC: 1.6 MG/DL — HIGH (ref 0.7–1.5)
EGFR: 42 ML/MIN/1.73M2 — LOW
EGFR: 42 ML/MIN/1.73M2 — LOW
EOSINOPHIL # BLD AUTO: 0.11 K/UL — SIGNIFICANT CHANGE UP (ref 0–0.7)
EOSINOPHIL NFR BLD AUTO: 1.3 % — SIGNIFICANT CHANGE UP (ref 0–8)
GAS PNL BLDV: SIGNIFICANT CHANGE UP
GLUCOSE BLDC GLUCOMTR-MCNC: 108 MG/DL — HIGH (ref 70–99)
GLUCOSE SERPL-MCNC: 104 MG/DL — HIGH (ref 70–99)
HCT VFR BLD CALC: 38.9 % — LOW (ref 42–52)
HGB BLD-MCNC: 13.1 G/DL — LOW (ref 14–18)
IMM GRANULOCYTES NFR BLD AUTO: 0.2 % — SIGNIFICANT CHANGE UP (ref 0.1–0.3)
LIDOCAIN IGE QN: 46 U/L — SIGNIFICANT CHANGE UP (ref 7–60)
LYMPHOCYTES # BLD AUTO: 1.31 K/UL — SIGNIFICANT CHANGE UP (ref 1.2–3.4)
LYMPHOCYTES # BLD AUTO: 15 % — LOW (ref 20.5–51.1)
MCHC RBC-ENTMCNC: 29.4 PG — SIGNIFICANT CHANGE UP (ref 27–31)
MCHC RBC-ENTMCNC: 33.7 G/DL — SIGNIFICANT CHANGE UP (ref 32–37)
MCV RBC AUTO: 87.2 FL — SIGNIFICANT CHANGE UP (ref 80–94)
MONOCYTES # BLD AUTO: 1.1 K/UL — HIGH (ref 0.1–0.6)
MONOCYTES NFR BLD AUTO: 12.6 % — HIGH (ref 1.7–9.3)
NEUTROPHILS # BLD AUTO: 6.16 K/UL — SIGNIFICANT CHANGE UP (ref 1.4–6.5)
NEUTROPHILS NFR BLD AUTO: 70.7 % — SIGNIFICANT CHANGE UP (ref 42.2–75.2)
NRBC BLD AUTO-RTO: 0 /100 WBCS — SIGNIFICANT CHANGE UP (ref 0–0)
NT-PROBNP SERPL-SCNC: 214 PG/ML — SIGNIFICANT CHANGE UP (ref 0–300)
PLATELET # BLD AUTO: 198 K/UL — SIGNIFICANT CHANGE UP (ref 130–400)
PMV BLD: 11.1 FL — HIGH (ref 7.4–10.4)
POTASSIUM SERPL-MCNC: 4.6 MMOL/L — SIGNIFICANT CHANGE UP (ref 3.5–5)
POTASSIUM SERPL-SCNC: 4.6 MMOL/L — SIGNIFICANT CHANGE UP (ref 3.5–5)
PROT SERPL-MCNC: 7.7 G/DL — SIGNIFICANT CHANGE UP (ref 6–8)
RBC # BLD: 4.46 M/UL — LOW (ref 4.7–6.1)
RBC # FLD: 13.2 % — SIGNIFICANT CHANGE UP (ref 11.5–14.5)
SODIUM SERPL-SCNC: 136 MMOL/L — SIGNIFICANT CHANGE UP (ref 135–146)
WBC # BLD: 8.72 K/UL — SIGNIFICANT CHANGE UP (ref 4.8–10.8)
WBC # FLD AUTO: 8.72 K/UL — SIGNIFICANT CHANGE UP (ref 4.8–10.8)

## 2025-03-04 PROCEDURE — 74177 CT ABD & PELVIS W/CONTRAST: CPT | Mod: 26

## 2025-03-04 PROCEDURE — 36415 COLL VENOUS BLD VENIPUNCTURE: CPT

## 2025-03-04 PROCEDURE — 80053 COMPREHEN METABOLIC PANEL: CPT

## 2025-03-04 PROCEDURE — 99285 EMERGENCY DEPT VISIT HI MDM: CPT

## 2025-03-04 PROCEDURE — 83036 HEMOGLOBIN GLYCOSYLATED A1C: CPT

## 2025-03-04 PROCEDURE — 74018 RADEX ABDOMEN 1 VIEW: CPT

## 2025-03-04 PROCEDURE — 76705 ECHO EXAM OF ABDOMEN: CPT | Mod: 26

## 2025-03-04 PROCEDURE — 82962 GLUCOSE BLOOD TEST: CPT

## 2025-03-04 PROCEDURE — 71045 X-RAY EXAM CHEST 1 VIEW: CPT | Mod: 26

## 2025-03-04 PROCEDURE — 85025 COMPLETE CBC W/AUTO DIFF WBC: CPT

## 2025-03-04 PROCEDURE — 99222 1ST HOSP IP/OBS MODERATE 55: CPT

## 2025-03-04 RX ORDER — FUROSEMIDE 10 MG/ML
40 INJECTION INTRAMUSCULAR; INTRAVENOUS ONCE
Refills: 0 | Status: DISCONTINUED | OUTPATIENT
Start: 2025-03-04 | End: 2025-03-04

## 2025-03-04 RX ORDER — PIPERACILLIN-TAZO-DEXTROSE,ISO 3.375G/5
3.38 IV SOLUTION, PIGGYBACK PREMIX FROZEN(ML) INTRAVENOUS ONCE
Refills: 0 | Status: COMPLETED | OUTPATIENT
Start: 2025-03-04 | End: 2025-03-04

## 2025-03-04 RX ORDER — MEMANTINE HYDROCHLORIDE 21 MG/1
10 CAPSULE, EXTENDED RELEASE ORAL DAILY
Refills: 0 | Status: DISCONTINUED | OUTPATIENT
Start: 2025-03-04 | End: 2025-03-07

## 2025-03-04 RX ORDER — SODIUM CHLORIDE 9 G/1000ML
1000 INJECTION, SOLUTION INTRAVENOUS
Refills: 0 | Status: DISCONTINUED | OUTPATIENT
Start: 2025-03-04 | End: 2025-03-05

## 2025-03-04 RX ORDER — MELATONIN 5 MG
3 TABLET ORAL AT BEDTIME
Refills: 0 | Status: DISCONTINUED | OUTPATIENT
Start: 2025-03-04 | End: 2025-03-07

## 2025-03-04 RX ORDER — FOLIC ACID 1 MG/1
1 TABLET ORAL DAILY
Refills: 0 | Status: DISCONTINUED | OUTPATIENT
Start: 2025-03-04 | End: 2025-03-07

## 2025-03-04 RX ORDER — DONEPEZIL HYDROCHLORIDE 5 MG/1
10 TABLET ORAL AT BEDTIME
Refills: 0 | Status: DISCONTINUED | OUTPATIENT
Start: 2025-03-04 | End: 2025-03-07

## 2025-03-04 RX ORDER — DEXTROSE 50 % IN WATER 50 %
12.5 SYRINGE (ML) INTRAVENOUS ONCE
Refills: 0 | Status: DISCONTINUED | OUTPATIENT
Start: 2025-03-04 | End: 2025-03-07

## 2025-03-04 RX ORDER — MAGNESIUM, ALUMINUM HYDROXIDE 200-200 MG
30 TABLET,CHEWABLE ORAL EVERY 4 HOURS
Refills: 0 | Status: DISCONTINUED | OUTPATIENT
Start: 2025-03-04 | End: 2025-03-07

## 2025-03-04 RX ORDER — SENNA 187 MG
2 TABLET ORAL AT BEDTIME
Refills: 0 | Status: DISCONTINUED | OUTPATIENT
Start: 2025-03-04 | End: 2025-03-07

## 2025-03-04 RX ORDER — DEXTROSE 50 % IN WATER 50 %
15 SYRINGE (ML) INTRAVENOUS ONCE
Refills: 0 | Status: DISCONTINUED | OUTPATIENT
Start: 2025-03-04 | End: 2025-03-07

## 2025-03-04 RX ORDER — BISACODYL 5 MG
10 TABLET, DELAYED RELEASE (ENTERIC COATED) ORAL DAILY
Refills: 0 | Status: DISCONTINUED | OUTPATIENT
Start: 2025-03-04 | End: 2025-03-07

## 2025-03-04 RX ORDER — PIPERACILLIN-TAZO-DEXTROSE,ISO 3.375G/5
3.38 IV SOLUTION, PIGGYBACK PREMIX FROZEN(ML) INTRAVENOUS EVERY 8 HOURS
Refills: 0 | Status: DISCONTINUED | OUTPATIENT
Start: 2025-03-04 | End: 2025-03-05

## 2025-03-04 RX ORDER — MINERAL OIL
30 OIL (ML) MISCELLANEOUS DAILY
Refills: 0 | Status: COMPLETED | OUTPATIENT
Start: 2025-03-04 | End: 2025-03-07

## 2025-03-04 RX ORDER — ATORVASTATIN CALCIUM 80 MG/1
80 TABLET, FILM COATED ORAL AT BEDTIME
Refills: 0 | Status: DISCONTINUED | OUTPATIENT
Start: 2025-03-04 | End: 2025-03-07

## 2025-03-04 RX ORDER — SODIUM CHLORIDE 9 G/1000ML
1000 INJECTION, SOLUTION INTRAVENOUS
Refills: 0 | Status: DISCONTINUED | OUTPATIENT
Start: 2025-03-04 | End: 2025-03-07

## 2025-03-04 RX ORDER — ACETAMINOPHEN 500 MG/5ML
650 LIQUID (ML) ORAL EVERY 6 HOURS
Refills: 0 | Status: DISCONTINUED | OUTPATIENT
Start: 2025-03-04 | End: 2025-03-07

## 2025-03-04 RX ORDER — INFLUENZA A VIRUS A/IDAHO/07/2018 (H1N1) ANTIGEN (MDCK CELL DERIVED, PROPIOLACTONE INACTIVATED, INFLUENZA A VIRUS A/INDIANA/08/2018 (H3N2) ANTIGEN (MDCK CELL DERIVED, PROPIOLACTONE INACTIVATED), INFLUENZA B VIRUS B/SINGAPORE/INFTT-16-0610/2016 ANTIGEN (MDCK CELL DERIVED, PROPIOLACTONE INACTIVATED), INFLUENZA B VIRUS B/IOWA/06/2017 ANTIGEN (MDCK CELL DERIVED, PROPIOLACTONE INACTIVATED) 15; 15; 15; 15 UG/.5ML; UG/.5ML; UG/.5ML; UG/.5ML
0.5 INJECTION, SUSPENSION INTRAMUSCULAR ONCE
Refills: 0 | Status: DISCONTINUED | OUTPATIENT
Start: 2025-03-04 | End: 2025-03-07

## 2025-03-04 RX ORDER — INSULIN LISPRO 100 U/ML
INJECTION, SOLUTION INTRAVENOUS; SUBCUTANEOUS
Refills: 0 | Status: DISCONTINUED | OUTPATIENT
Start: 2025-03-04 | End: 2025-03-07

## 2025-03-04 RX ORDER — DEXTROSE 50 % IN WATER 50 %
25 SYRINGE (ML) INTRAVENOUS ONCE
Refills: 0 | Status: DISCONTINUED | OUTPATIENT
Start: 2025-03-04 | End: 2025-03-07

## 2025-03-04 RX ORDER — ONDANSETRON HCL/PF 4 MG/2 ML
4 VIAL (ML) INJECTION EVERY 8 HOURS
Refills: 0 | Status: DISCONTINUED | OUTPATIENT
Start: 2025-03-04 | End: 2025-03-07

## 2025-03-04 RX ORDER — ASPIRIN 325 MG
81 TABLET ORAL DAILY
Refills: 0 | Status: DISCONTINUED | OUTPATIENT
Start: 2025-03-04 | End: 2025-03-07

## 2025-03-04 RX ORDER — METOPROLOL SUCCINATE 50 MG/1
50 TABLET, EXTENDED RELEASE ORAL DAILY
Refills: 0 | Status: DISCONTINUED | OUTPATIENT
Start: 2025-03-04 | End: 2025-03-07

## 2025-03-04 RX ADMIN — Medication 25 GRAM(S): at 22:19

## 2025-03-04 RX ADMIN — Medication 200 GRAM(S): at 17:06

## 2025-03-04 RX ADMIN — ATORVASTATIN CALCIUM 80 MILLIGRAM(S): 80 TABLET, FILM COATED ORAL at 22:20

## 2025-03-04 RX ADMIN — DONEPEZIL HYDROCHLORIDE 10 MILLIGRAM(S): 5 TABLET ORAL at 22:20

## 2025-03-04 RX ADMIN — SODIUM CHLORIDE 75 MILLILITER(S): 9 INJECTION, SOLUTION INTRAVENOUS at 22:19

## 2025-03-04 RX ADMIN — Medication 2 TABLET(S): at 22:20

## 2025-03-04 NOTE — ED ADULT NURSE NOTE - NSFALLHARMRISKINTERV_ED_ALL_ED
Assistance OOB with selected safe patient handling equipment if applicable/Assistance with ambulation/Communicate risk of Fall with Harm to all staff, patient, and family/Monitor gait and stability/Provide visual cue: red socks, yellow wristband, yellow gown, etc/Reinforce activity limits and safety measures with patient and family/Bed in lowest position, wheels locked, appropriate side rails in place/Call bell, personal items and telephone in reach/Instruct patient to call for assistance before getting out of bed/chair/stretcher/Non-slip footwear applied when patient is off stretcher/[place] to call system/Physically safe environment - no spills, clutter or unnecessary equipment/Purposeful Proactive Rounding/Room/bathroom lighting operational, light cord in reach

## 2025-03-04 NOTE — ED PROVIDER NOTE - CLINICAL SUMMARY MEDICAL DECISION MAKING FREE TEXT BOX
84-year-old male past med history hyperlipidemia, dementia, hypertension, GERD, CHF presents with worsening abdominal pain and distention.  Labs were ordered and reviewed.  Imaging was ordered and reviewed by me.  Appropriate medications for patient's presenting complaints were ordered and effects were reassessed.  Patient's records (prior hospital, ED visit, and/or nursing home notes if available) were reviewed.  Additional history was obtained from family at bedside.  Disimpaction performed.  Escalation to admission/observation was considered.  Patient requires admission for stercoral colitis.

## 2025-03-04 NOTE — ED PROVIDER NOTE - PRO INTERPRETER NEED 2
Pt's wife stopped by  to schedule and speak with clinical staff. Writer advised wife clinical staff are not available. Wife wrote letter for clinical staff about pt and herself (see separate encounter). Letter in out guide folder/mailbox by PSR desk.    PSR's - if patient's wife calls back, please advise pt she will be contacted once RN and PCP are back in office Tuesday.     English

## 2025-03-04 NOTE — ED PROVIDER NOTE - PROGRESS NOTE DETAILS
Patient signed out to Dr. Ray pending imaging and reassessment. Royb Medina, DO: ct with stercoral colitis. ordered pip-tazo. rest of ct resulted noted > will obtain RUQ us. Rectal fecal disimpaction done.

## 2025-03-04 NOTE — H&P ADULT - NS ATTEND AMEND GEN_ALL_CORE FT
As above, old records reviewed . Not sure of indication for Zosyn started in ER but will defer decision to continue this (or not) to day team As above, old records reviewed . Not sure of indication for Zosyn started in ER but will defer decision to continue this (or not) to day team along with decision to further investigate gall bladder and right renal abnormalities seen on imaging as out  patient (MRI?)

## 2025-03-04 NOTE — H&P ADULT - ASSESSMENT
84-year-old male with past medical history of hypertension, hyperlipidemia, GERD, dementia, CAD status post CABG, ?chf on furosemide, presents for increasing abdominal distention and constipation over the past day.  Per states patient had a bowel movement yesterday although less than usual.  Has noticed increasing size in his abdomen.  In addition has been noticing increasing gradual bilateral leg swelling over the past several months.  Patient denies any chest pain, shortness of breath, nausea/vomiting, lightheadedness, diaphoresis, abdominal pain, urinary symptoms.      # Stercoral Colitis  - S/P Fecal Disimpaction (in ED)  - Bowel regimen  - GI consult     84-year-old male with past medical history of hypertension, hyperlipidemia, GERD, dementia, CAD status post CABG, CHF on furosemide, presents for increasing abdominal distention and constipation over the past day.   Daughter states patient had a bowel movement yesterday although less than usual.  Has noticed increasing size in his abdomen.  In addition has been noticing increasing gradual bilateral leg swelling over the past several months.  Patient denies any chest pain, shortness of breath, nausea/vomiting, lightheadedness, diaphoresis, abdominal pain, urinary symptoms.  Daughter states pt is low activity level, typically from living room to bedroom.        # Stercoral Colitis  - S/P Fecal Disimpaction (in ED)  - Bowel regimen  - GI consult       84-year-old male with past medical history of DM, hypertension, hyperlipidemia, GERD, dementia, CAD status post CABG, CHF on furosemide, presents for increasing abdominal distention and constipation over the past day.   Daughter states patient had a bowel movement yesterday although less than usual.  Has noticed increasing size in his abdomen.  In addition has been noticing increasing gradual bilateral leg swelling over the past several months.  Patient denies any chest pain, shortness of breath, nausea/vomiting, lightheadedness, diaphoresis, abdominal pain, urinary symptoms.  Daughter states pt is low activity level, typically from living room to bedroom.        # Stercoral Colitis  - S/P Fecal Disimpaction (in ED)  - Bowel regimen  - GI consult      # HTN  - vs  - c/w home med    # Dementia  - c/w home med    # GERD  - c/w home med    # DM  - FS with SS

## 2025-03-04 NOTE — H&P ADULT - NSICDXPASTMEDICALHX_GEN_ALL_CORE_FT
PAST MEDICAL HISTORY:  Chronic GERD     Coronary artery disease involving coronary bypass graft of native heart without angina pectoris     Dementia     HLD (hyperlipidemia)     HTN (hypertension)     Prediabetes      PAST MEDICAL HISTORY:  Chronic GERD     Coronary artery disease involving coronary bypass graft of native heart without angina pectoris     Dementia     Diabetes     HLD (hyperlipidemia)     HTN (hypertension)     Prediabetes

## 2025-03-04 NOTE — ED PROVIDER NOTE - OBJECTIVE STATEMENT
84-year-old male with past medical history of hypertension, hyperlipidemia, GERD, dementia, CAD status post CABG, ?chf on furosemide, presents for increasing abdominal distention and constipation over the past day.  Per states patient had a bowel movement yesterday although less than usual.  Has noticed increasing size in his abdomen.  In addition has been noticing increasing gradual bilateral leg swelling over the past several months.  Patient denies any chest pain, shortness of breath, nausea/vomiting, lightheadedness, diaphoresis, abdominal pain, urinary symptoms.

## 2025-03-04 NOTE — H&P ADULT - HISTORY OF PRESENT ILLNESS
84-year-old male with past medical history of hypertension, hyperlipidemia, GERD, dementia, CAD status post CABG, ?chf on furosemide, presents for increasing abdominal distention and constipation over the past day.  Per states patient had a bowel movement yesterday although less than usual.  Has noticed increasing size in his abdomen.  In addition has been noticing increasing gradual bilateral leg swelling over the past several months.  Patient denies any chest pain, shortness of breath, nausea/vomiting, lightheadedness, diaphoresis, abdominal pain, urinary symptoms. 84-year-old male with past medical history of hypertension, hyperlipidemia, GERD, dementia, CAD status post CABG, ?chf on furosemide, presents for increasing abdominal distention and constipation over the past day.  Per states patient had a bowel movement yesterday although less than usual.  Has noticed increasing size in his abdomen.  In addition has been noticing increasing gradual bilateral leg swelling over the past several months.  Patient denies any chest pain, shortness of breath, nausea/vomiting, lightheadedness, diaphoresis, abdominal pain, urinary symptoms.    Home meds provided by pt.

## 2025-03-04 NOTE — PATIENT PROFILE ADULT - FALL HARM RISK - HARM RISK INTERVENTIONS

## 2025-03-04 NOTE — ED PROVIDER NOTE - ATTENDING CONTRIBUTION TO CARE
84-year-old male past med history hyperlipidemia, dementia, hypertension, GERD, CHF presents with worsening abdominal pain and distention.  Reports that for last 1 to 2 days has been having worsening pain to his abdomen.  States that he has been more constipated than usual.  Last normal movement was a few days ago.  Was able to move some stool yesterday but was small and hard.  Denies any nausea vomiting.  No fevers or chills.  No urinary symptoms.  No chest pain or shortness of breath.    CONSTITUTIONAL: Well-developed; well-nourished; in no acute distress.   SKIN: warm, dry  HEAD: Normocephalic; atraumatic.  EYES: PERRL, EOMI, no conjunctival erythema  ENT: No nasal discharge; airway clear.  NECK: Supple; non tender.  CARD: S1, S2 normal;  Regular rate and rhythm.   RESP: No wheezes, rales or rhonchi.  ABD: soft non tender, + abdominal distension, no rebound or guarding  EXT: Normal ROM.  5/5 strength in all 4 extremities   LYMPH: No acute cervical adenopathy.  NEURO: Alert, oriented, grossly unremarkable. neurovascularly intact  PSYCH: Cooperative, appropriate.

## 2025-03-04 NOTE — ED ADULT NURSE NOTE - NSFALLLASTSIX_ED_ALL_ED
No.
 86-year-old man.    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    NKDA.    PMH:  Hypertension.  Hypercholesterolemia.  History of cataracts.

## 2025-03-04 NOTE — ED PROVIDER NOTE - PHYSICAL EXAMINATION
Initial vital signs reviewed.  General: NAD, nontoxic appearing.  HENT: AT/NC.  Eyes: non-injected conjunctivae b/l. no scleral icterus.  Neck: supple.   CV: RRR, no murmurs. 2+ distal pulses x4.  Pulm: nonlabored work of breathing, diffuse mild wheezing.  Abd: soft, distended, nontender.  MSK: no joint deformity. 2+ pitting edema b/l.  Skin: warm, dry, well-perfused.  Neuro: A&Ox3.  Psych: appropriate mood and affect.

## 2025-03-05 ENCOUNTER — TRANSCRIPTION ENCOUNTER (OUTPATIENT)
Age: 85
End: 2025-03-05

## 2025-03-05 LAB
A1C WITH ESTIMATED AVERAGE GLUCOSE RESULT: 6.2 % — HIGH (ref 4–5.6)
ALBUMIN SERPL ELPH-MCNC: 3.8 G/DL — SIGNIFICANT CHANGE UP (ref 3.5–5.2)
ALP SERPL-CCNC: 97 U/L — SIGNIFICANT CHANGE UP (ref 30–115)
ALT FLD-CCNC: 19 U/L — SIGNIFICANT CHANGE UP (ref 0–41)
ANION GAP SERPL CALC-SCNC: 14 MMOL/L — SIGNIFICANT CHANGE UP (ref 7–14)
AST SERPL-CCNC: 26 U/L — SIGNIFICANT CHANGE UP (ref 0–41)
BASOPHILS # BLD AUTO: 0.03 K/UL — SIGNIFICANT CHANGE UP (ref 0–0.2)
BASOPHILS NFR BLD AUTO: 0.3 % — SIGNIFICANT CHANGE UP (ref 0–1)
BILIRUB SERPL-MCNC: 1.1 MG/DL — SIGNIFICANT CHANGE UP (ref 0.2–1.2)
BUN SERPL-MCNC: 20 MG/DL — SIGNIFICANT CHANGE UP (ref 10–20)
CALCIUM SERPL-MCNC: 9.1 MG/DL — SIGNIFICANT CHANGE UP (ref 8.4–10.5)
CHLORIDE SERPL-SCNC: 100 MMOL/L — SIGNIFICANT CHANGE UP (ref 98–110)
CO2 SERPL-SCNC: 25 MMOL/L — SIGNIFICANT CHANGE UP (ref 17–32)
CREAT SERPL-MCNC: 1.7 MG/DL — HIGH (ref 0.7–1.5)
EGFR: 40 ML/MIN/1.73M2 — LOW
EGFR: 40 ML/MIN/1.73M2 — LOW
EOSINOPHIL # BLD AUTO: 0.08 K/UL — SIGNIFICANT CHANGE UP (ref 0–0.7)
EOSINOPHIL NFR BLD AUTO: 0.9 % — SIGNIFICANT CHANGE UP (ref 0–8)
ESTIMATED AVERAGE GLUCOSE: 131 MG/DL — HIGH (ref 68–114)
GLUCOSE BLDC GLUCOMTR-MCNC: 109 MG/DL — HIGH (ref 70–99)
GLUCOSE BLDC GLUCOMTR-MCNC: 120 MG/DL — HIGH (ref 70–99)
GLUCOSE BLDC GLUCOMTR-MCNC: 125 MG/DL — HIGH (ref 70–99)
GLUCOSE BLDC GLUCOMTR-MCNC: 145 MG/DL — HIGH (ref 70–99)
GLUCOSE SERPL-MCNC: 99 MG/DL — SIGNIFICANT CHANGE UP (ref 70–99)
HCT VFR BLD CALC: 38.2 % — LOW (ref 42–52)
HGB BLD-MCNC: 12.5 G/DL — LOW (ref 14–18)
IMM GRANULOCYTES NFR BLD AUTO: 0.4 % — HIGH (ref 0.1–0.3)
LYMPHOCYTES # BLD AUTO: 0.99 K/UL — LOW (ref 1.2–3.4)
LYMPHOCYTES # BLD AUTO: 11 % — LOW (ref 20.5–51.1)
MCHC RBC-ENTMCNC: 29.3 PG — SIGNIFICANT CHANGE UP (ref 27–31)
MCHC RBC-ENTMCNC: 32.7 G/DL — SIGNIFICANT CHANGE UP (ref 32–37)
MCV RBC AUTO: 89.5 FL — SIGNIFICANT CHANGE UP (ref 80–94)
MONOCYTES # BLD AUTO: 1.04 K/UL — HIGH (ref 0.1–0.6)
MONOCYTES NFR BLD AUTO: 11.5 % — HIGH (ref 1.7–9.3)
NEUTROPHILS # BLD AUTO: 6.83 K/UL — HIGH (ref 1.4–6.5)
NEUTROPHILS NFR BLD AUTO: 75.9 % — HIGH (ref 42.2–75.2)
NRBC BLD AUTO-RTO: 0 /100 WBCS — SIGNIFICANT CHANGE UP (ref 0–0)
PLATELET # BLD AUTO: 140 K/UL — SIGNIFICANT CHANGE UP (ref 130–400)
PMV BLD: 11.4 FL — HIGH (ref 7.4–10.4)
POTASSIUM SERPL-MCNC: 4.8 MMOL/L — SIGNIFICANT CHANGE UP (ref 3.5–5)
POTASSIUM SERPL-SCNC: 4.8 MMOL/L — SIGNIFICANT CHANGE UP (ref 3.5–5)
PROT SERPL-MCNC: 7.1 G/DL — SIGNIFICANT CHANGE UP (ref 6–8)
RBC # BLD: 4.27 M/UL — LOW (ref 4.7–6.1)
RBC # FLD: 13.1 % — SIGNIFICANT CHANGE UP (ref 11.5–14.5)
SODIUM SERPL-SCNC: 139 MMOL/L — SIGNIFICANT CHANGE UP (ref 135–146)
WBC # BLD: 9.01 K/UL — SIGNIFICANT CHANGE UP (ref 4.8–10.8)
WBC # FLD AUTO: 9.01 K/UL — SIGNIFICANT CHANGE UP (ref 4.8–10.8)

## 2025-03-05 PROCEDURE — 74018 RADEX ABDOMEN 1 VIEW: CPT | Mod: 26

## 2025-03-05 PROCEDURE — 99223 1ST HOSP IP/OBS HIGH 75: CPT

## 2025-03-05 PROCEDURE — 99232 SBSQ HOSP IP/OBS MODERATE 35: CPT

## 2025-03-05 RX ORDER — METOPROLOL SUCCINATE 50 MG/1
2 TABLET, EXTENDED RELEASE ORAL
Qty: 30 | Refills: 0 | DISCHARGE
Start: 2025-03-05 | End: 2025-04-03

## 2025-03-05 RX ORDER — METOPROLOL SUCCINATE 50 MG/1
1 TABLET, EXTENDED RELEASE ORAL
Qty: 30 | Refills: 0
Start: 2025-03-05 | End: 2025-04-03

## 2025-03-05 RX ADMIN — FOLIC ACID 1 MILLIGRAM(S): 1 TABLET ORAL at 11:53

## 2025-03-05 RX ADMIN — Medication 1 APPLICATION(S): at 05:43

## 2025-03-05 RX ADMIN — Medication 2 TABLET(S): at 21:13

## 2025-03-05 RX ADMIN — ATORVASTATIN CALCIUM 80 MILLIGRAM(S): 80 TABLET, FILM COATED ORAL at 21:13

## 2025-03-05 RX ADMIN — Medication 81 MILLIGRAM(S): at 11:53

## 2025-03-05 RX ADMIN — Medication 25 GRAM(S): at 05:45

## 2025-03-05 RX ADMIN — Medication 20 MILLIGRAM(S): at 11:53

## 2025-03-05 RX ADMIN — MEMANTINE HYDROCHLORIDE 10 MILLIGRAM(S): 21 CAPSULE, EXTENDED RELEASE ORAL at 11:53

## 2025-03-05 RX ADMIN — DONEPEZIL HYDROCHLORIDE 10 MILLIGRAM(S): 5 TABLET ORAL at 21:12

## 2025-03-05 NOTE — CONSULT NOTE ADULT - SUBJECTIVE AND OBJECTIVE BOX
Chief complaint/Reason for consult: fecal impaction    HPI:  84-year-old male with past medical history of hypertension, hyperlipidemia, GERD, dementia, CAD status post CABG, ?chf on furosemide, presents for increasing abdominal distention and constipation over the past day.  Per states patient had a bowel movement yesterday although less than usual.  Has noticed increasing size in his abdomen.  In addition has been noticing increasing gradual bilateral leg swelling over the past several months.  Patient denies any chest pain, shortness of breath, nausea/vomiting, lightheadedness, diaphoresis, abdominal pain, urinary symptoms.    Home meds provided by pt.   (04 Mar 2025 20:41)    GI updates: 84yMale [facility] HTN, hyperlipidemia, GERD, dementia, CHF on furosemide presents for abdominal distention and constipation. Patient was disimpacted in the ER.  No hematemesis, melena, blood in stool reported. patient alert and oriented to person and place not time.      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 No alcohol. No illegal drug use.    Allergies:   No Known Allergies  Intolerances    MEDICATIONS: Home Medications:  aspirin 81 mg oral tablet, chewable: 1 tab(s) orally once a day (19 Jun 2021 09:18)  atorvastatin 40 mg oral tablet: 1 tab(s) orally once a day (at bedtime) (19 Jun 2021 09:18)  donepezil 10 mg oral tablet: 1 tab(s) orally once a day (at bedtime) (19 Jun 2021 09:18)  famotidine 40 mg oral tablet: 1 tab(s) orally once a day (19 Jun 2021 09:18)  folic acid 1 mg oral tablet: 1 tab(s) orally once a day (10 Dec 2024 02:15)  memantine 10 mg oral tablet: 1 tab(s) orally once a day (19 Jun 2021 09:18)  metoprolol succinate 50 mg oral tablet, extended release: 1 tab(s) orally once a day (19 Jun 2021 09:18)  Senna 8.6 mg oral tablet: 2 tab(s) orally once a day (at bedtime) (07 Apr 2024 14:36)    MEDICATIONS  (STANDING):  aspirin  chewable 81 milliGRAM(s) Oral daily  atorvastatin 80 milliGRAM(s) Oral at bedtime  bisacodyl Suppository 10 milliGRAM(s) Rectal daily  chlorhexidine 2% Cloths 1 Application(s) Topical <User Schedule>  dextrose 5%. 1000 milliLiter(s) (50 mL/Hr) IV Continuous <Continuous>  dextrose 50% Injectable 25 Gram(s) IV Push once  dextrose 50% Injectable 12.5 Gram(s) IV Push once  donepezil 10 milliGRAM(s) Oral at bedtime  famotidine    Tablet 20 milliGRAM(s) Oral daily  folic acid 1 milliGRAM(s) Oral daily  influenza  Vaccine (HIGH DOSE) 0.5 milliLiter(s) IntraMuscular once  insulin lispro (ADMELOG) corrective regimen sliding scale   SubCutaneous three times a day before meals  memantine 10 milliGRAM(s) Oral daily  metoprolol succinate ER 50 milliGRAM(s) Oral daily  mineral oil 30 milliLiter(s) Oral daily  senna 2 Tablet(s) Oral at bedtime    MEDICATIONS  (PRN):  acetaminophen     Tablet .. 650 milliGRAM(s) Oral every 6 hours PRN Temp greater or equal to 38C (100.4F), Mild Pain (1 - 3)  aluminum hydroxide/magnesium hydroxide/simethicone Suspension 30 milliLiter(s) Oral every 4 hours PRN Dyspepsia  dextrose Oral Gel 15 Gram(s) Oral once PRN Blood Glucose LESS THAN 70 milliGRAM(s)/deciliter  melatonin 3 milliGRAM(s) Oral at bedtime PRN Insomnia  ondansetron Injectable 4 milliGRAM(s) IV Push every 8 hours PRN Nausea and/or Vomiting        REVIEW OF SYSTEMS  unobtainable limited ros Patient denies nausea, vomiting, hematemesis, melena, blood in stool, diarrhea, abdominal pain       VITALS:   T(F): 97.8 (03-05-25 @ 04:57), Max: 98.2 (03-04-25 @ 21:54)  HR: 54 (03-05-25 @ 10:43) (54 - 67)  BP: 136/68 (03-05-25 @ 10:43) (111/72 - 153/75)  RR: 16 (03-05-25 @ 04:57) (16 - 18)  SpO2: 97% (03-05-25 @ 04:57) (95% - 97%)    PHYSICAL EXAM:  GENERAL: AAOx2 to person and place not time, no acute distress.  HEAD:  Atraumatic, Normocephalic  EYES: conjunctiva and sclera clear  NECK: Supple, No thyromegaly   CHEST/LUNG: Clear to auscultation bilaterally; No wheeze, rhonchi, or rales  HEART: Regular rate and rhythm; normal S1, S2, No murmurs.  ABDOMEN: Soft, nontender, nondistended; Bowel sounds present  NEUROLOGY: No asterixis or tremor  SKIN: Intact, no jaundice          LABS:  03-05    139  |  100  |  20  ----------------------------<  99  4.8   |  25  |  1.7[H]    Ca    9.1      05 Mar 2025 08:00    TPro  7.1  /  Alb  3.8  /  TBili  1.1  /  DBili  x   /  AST  26  /  ALT  19  /  AlkPhos  97  03-05                          12.5   9.01  )-----------( 140      ( 05 Mar 2025 08:00 )             38.2     LIVER FUNCTIONS - ( 05 Mar 2025 08:00 )  Alb: 3.8 g/dL / Pro: 7.1 g/dL / ALK PHOS: 97 U/L / ALT: 19 U/L / AST: 26 U/L / GGT: x               IMAGING:    < from: CT Abdomen and Pelvis w/ IV Cont (03.04.25 @ 15:40) >    ACC: [id number] EXAM:  CT ABDOMEN AND PELVIS IC   ORDERED BY: LUZ MARINA GOMEZ     PROCEDURE DATE:  03/04/2025          INTERPRETATION:  CLINICAL STATEMENT: Abdominal distention    TECHNIQUE: Contiguous axial CT images were obtained from the lower chest   to the pubic symphysis .  95 cc administered of Omnipaque 350 (5 cc   discarded).  Oral contrast was not given.  Reformatted images in the   coronal and sagittal planes were acquired.    COMPARISON CT: 12/9/2024    Study is limited by motion artifact      FINDINGS:    LOWER CHEST: Atelectasis. There is a stable 7 mm nodule in the right   lower lobe in image 19 of series 301. Follow-up as per Fleischner Society   guidelines..    HEPATOBILIARY: Gallbladder wall nodular thickening on image 34 series   301. Further evaluation with right upper quadrant ultrasound is   recommended. Subcentimeter hepatic hypodensities, too small to   characterize.    SPLEEN: Unremarkable..    PANCREAS: Unremarkable..    ADRENAL GLANDS: Nonspecific nodularity left adrenal gland. The right   adrenal gland is unremarkable.    KIDNEYS: There is an indeterminate right mid pole nonspecific hypodensity   in image 54 series 301 measuring 1.5 to 1.5 cm, possibly related to   motion artifact. Renal neoplasm is not excluded. An outpatient repeat CT   renal mass protocol is recommended for further evaluation. There are   bilateral subcentimeter renal hypodensities, too small to characterize.   There is no hydronephrosis.    ABDOMINOPELVIC NODES: Unremarkable...    PELVICORGANS: Enlarged prostate    PERITONEUM/MESENTERY/BOWEL: Bilateral fat-containing inguinal hernias..   There is diverticulosis.. The appendix is unremarkable. There is wall   thickening of the rectum with fecal impaction, findings which can be seen   in stercoral colitis. There is no free air or obstruction..    BONES: Status post sternotomy with postsurgical change. Degenerative   change...    IMPRESSION:    Wall thickening the rectum with fecal impaction, findings which can be   seen in stercoral colitis.    Gallbladder wall nodular thickening and further evaluation with right   upper quadrant ultrasound is recommended.     Indeterminate right mid pole nonspecific renal hypodensity measuring 1.5   cm, possibly related to motion artifact. Renal neoplasm is not excluded.   Outpatient repeat CT renal mass protocol is recommended for further   evaluation.    Stable right lower lobe 7 mm pulmonary nodule. Follow-up as per   Fleischner Society guidelines.    --- End of Report ---            ROSALINE BRIDGES MD; Attending Radiologist  This document has been electronically signed. Mar  4 2025  4:22PM    < end of copied text >    < from: US Abdomen Upper Quadrant Right (03.04.25 @ 17:32) >    ACC: [id number] EXAM:  US ABDOMEN RT UPR QUADRANT   ORDERED BY: JERRY CENTENO     PROCEDURE DATE:  03/04/2025          INTERPRETATION:  CLINICAL INFORMATION: RUQ    COMPARISON: Earlier same day CT    TECHNIQUE: Sonography of the right upper quadrant.    FINDINGS:  Liver: Poorly evaluated by sonography.  Bile ducts: Normal caliber. Common bile duct measures 5 mm.  Gallbladder: Suboptimal visualization of the gallbladder. Cholelithiasis.   Apparent nodular thickening described on CT difficult to assess by   sonography, unclear if corresponding to the gallstones or representing   true nodularity. Negative sonographic Barlow's sign.  Pancreas: Poorly visualized.  Right kidney: 9.1 cm. No hydronephrosis.  Ascites: None.    IMPRESSION:  Suboptimalvisualization of the gallbladder. Cholelithiasis. Apparent   nodular thickening described on CT difficult to assess by sonography,   unclear if corresponding to the gallstones or representing true   nodularity. Recommend evaluation with outpatient contrast-enhanced MRI.    No sonographic evidence of acute cholecystitis.    Indeterminate right renal mid pole hypodensity described on CT is not   well evaluated on this ultrasound. This can also be assessed at the time   of outpatient MRI.    --- Endof Report ---            FRANK BURGOS MD; Attending Radiologist  This document has been electronically signed. Mar  4 2025  8:06PM    < end of copied text >     Chief complaint/Reason for consult: fecal impaction    HPI:  84-year-old male with past medical history of hypertension, hyperlipidemia, GERD, dementia, CAD status post CABG, ?chf on furosemide, presents for increasing abdominal distention and constipation over the past day.  Per states patient had a bowel movement yesterday although less than usual.  Has noticed increasing size in his abdomen.  In addition has been noticing increasing gradual bilateral leg swelling over the past several months.  Patient denies any chest pain, shortness of breath, nausea/vomiting, lightheadedness, diaphoresis, abdominal pain, urinary symptoms.    Home meds provided by pt.   (04 Mar 2025 20:41)    GI updates: 84y[facility] HTN, hyperlipidemia, GERD, dementia, CHF on furosemide presents for abdominal distention and constipation. Patient was disimpacted in the ER.  No hematemesis, melena, blood in stool reported. patient alert and oriented to person and place not time. Patient with 3 large bms today.      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 No alcohol. No illegal drug use.    Allergies:   No Known Allergies  Intolerances    MEDICATIONS: Home Medications:  aspirin 81 mg oral tablet, chewable: 1 tab(s) orally once a day (19 Jun 2021 09:18)  atorvastatin 40 mg oral tablet: 1 tab(s) orally once a day (at bedtime) (19 Jun 2021 09:18)  donepezil 10 mg oral tablet: 1 tab(s) orally once a day (at bedtime) (19 Jun 2021 09:18)  famotidine 40 mg oral tablet: 1 tab(s) orally once a day (19 Jun 2021 09:18)  folic acid 1 mg oral tablet: 1 tab(s) orally once a day (10 Dec 2024 02:15)  memantine 10 mg oral tablet: 1 tab(s) orally once a day (19 Jun 2021 09:18)  metoprolol succinate 50 mg oral tablet, extended release: 1 tab(s) orally once a day (19 Jun 2021 09:18)  Senna 8.6 mg oral tablet: 2 tab(s) orally once a day (at bedtime) (07 Apr 2024 14:36)    MEDICATIONS  (STANDING):  aspirin  chewable 81 milliGRAM(s) Oral daily  atorvastatin 80 milliGRAM(s) Oral at bedtime  bisacodyl Suppository 10 milliGRAM(s) Rectal daily  chlorhexidine 2% Cloths 1 Application(s) Topical <User Schedule>  dextrose 5%. 1000 milliLiter(s) (50 mL/Hr) IV Continuous <Continuous>  dextrose 50% Injectable 25 Gram(s) IV Push once  dextrose 50% Injectable 12.5 Gram(s) IV Push once  donepezil 10 milliGRAM(s) Oral at bedtime  famotidine    Tablet 20 milliGRAM(s) Oral daily  folic acid 1 milliGRAM(s) Oral daily  influenza  Vaccine (HIGH DOSE) 0.5 milliLiter(s) IntraMuscular once  insulin lispro (ADMELOG) corrective regimen sliding scale   SubCutaneous three times a day before meals  memantine 10 milliGRAM(s) Oral daily  metoprolol succinate ER 50 milliGRAM(s) Oral daily  mineral oil 30 milliLiter(s) Oral daily  senna 2 Tablet(s) Oral at bedtime    MEDICATIONS  (PRN):  acetaminophen     Tablet .. 650 milliGRAM(s) Oral every 6 hours PRN Temp greater or equal to 38C (100.4F), Mild Pain (1 - 3)  aluminum hydroxide/magnesium hydroxide/simethicone Suspension 30 milliLiter(s) Oral every 4 hours PRN Dyspepsia  dextrose Oral Gel 15 Gram(s) Oral once PRN Blood Glucose LESS THAN 70 milliGRAM(s)/deciliter  melatonin 3 milliGRAM(s) Oral at bedtime PRN Insomnia  ondansetron Injectable 4 milliGRAM(s) IV Push every 8 hours PRN Nausea and/or Vomiting        REVIEW OF SYSTEMS  unobtainable limited ros Patient denies nausea, vomiting, hematemesis, melena, blood in stool, diarrhea, abdominal pain       VITALS:   T(F): 97.8 (03-05-25 @ 04:57), Max: 98.2 (03-04-25 @ 21:54)  HR: 54 (03-05-25 @ 10:43) (54 - 67)  BP: 136/68 (03-05-25 @ 10:43) (111/72 - 153/75)  RR: 16 (03-05-25 @ 04:57) (16 - 18)  SpO2: 97% (03-05-25 @ 04:57) (95% - 97%)    PHYSICAL EXAM:  GENERAL: AAOx2 to person and place not time, no acute distress.  HEAD:  Atraumatic, Normocephalic  EYES: conjunctiva and sclera clear  NECK: Supple, No thyromegaly   CHEST/LUNG: Clear to auscultation bilaterally; No wheeze, rhonchi, or rales  HEART: Regular rate and rhythm; normal S1, S2, No murmurs.  ABDOMEN: Soft, nontender, +distended; Bowel sounds present  NEUROLOGY: No asterixis or tremor  SKIN: Intact, no jaundice          LABS:  03-05    139  |  100  |  20  ----------------------------<  99  4.8   |  25  |  1.7[H]    Ca    9.1      05 Mar 2025 08:00    TPro  7.1  /  Alb  3.8  /  TBili  1.1  /  DBili  x   /  AST  26  /  ALT  19  /  AlkPhos  97  03-05                          12.5   9.01  )-----------( 140      ( 05 Mar 2025 08:00 )             38.2     LIVER FUNCTIONS - ( 05 Mar 2025 08:00 )  Alb: 3.8 g/dL / Pro: 7.1 g/dL / ALK PHOS: 97 U/L / ALT: 19 U/L / AST: 26 U/L / GGT: x               IMAGING:    < from: CT Abdomen and Pelvis w/ IV Cont (03.04.25 @ 15:40) >    ACC: [id number] EXAM:  CT ABDOMEN AND PELVIS IC   ORDERED BY: LUZ MARINA GOMEZ     PROCEDURE DATE:  03/04/2025          INTERPRETATION:  CLINICAL STATEMENT: Abdominal distention    TECHNIQUE: Contiguous axial CT images were obtained from the lower chest   to the pubic symphysis .  95 cc administered of Omnipaque 350 (5 cc   discarded).  Oral contrast was not given.  Reformatted images in the   coronal and sagittal planes were acquired.    COMPARISON CT: 12/9/2024    Study is limited by motion artifact      FINDINGS:    LOWER CHEST: Atelectasis. There is a stable 7 mm nodule in the right   lower lobe in image 19 of series 301. Follow-up as per Fleischner Society   guidelines..    HEPATOBILIARY: Gallbladder wall nodular thickening on image 34 series   301. Further evaluation with right upper quadrant ultrasound is   recommended. Subcentimeter hepatic hypodensities, too small to   characterize.    SPLEEN: Unremarkable..    PANCREAS: Unremarkable..    ADRENAL GLANDS: Nonspecific nodularity left adrenal gland. The right   adrenal gland is unremarkable.    KIDNEYS: There is an indeterminate right mid pole nonspecific hypodensity   in image 54 series 301 measuring 1.5 to 1.5 cm, possibly related to   motion artifact. Renal neoplasm is not excluded. An outpatient repeat CT   renal mass protocol is recommended for further evaluation. There are   bilateral subcentimeter renal hypodensities, too small to characterize.   There is no hydronephrosis.    ABDOMINOPELVIC NODES: Unremarkable...    PELVICORGANS: Enlarged prostate    PERITONEUM/MESENTERY/BOWEL: Bilateral fat-containing inguinal hernias..   There is diverticulosis.. The appendix is unremarkable. There is wall   thickening of the rectum with fecal impaction, findings which can be seen   in stercoral colitis. There is no free air or obstruction..    BONES: Status post sternotomy with postsurgical change. Degenerative   change...    IMPRESSION:    Wall thickening the rectum with fecal impaction, findings which can be   seen in stercoral colitis.    Gallbladder wall nodular thickening and further evaluation with right   upper quadrant ultrasound is recommended.     Indeterminate right mid pole nonspecific renal hypodensity measuring 1.5   cm, possibly related to motion artifact. Renal neoplasm is not excluded.   Outpatient repeat CT renal mass protocol is recommended for further   evaluation.    Stable right lower lobe 7 mm pulmonary nodule. Follow-up as per   Fleischner Society guidelines.    --- End of Report ---            ROSALINE BRIDGES MD; Attending Radiologist  This document has been electronically signed. Mar  4 2025  4:22PM    < end of copied text >    < from: US Abdomen Upper Quadrant Right (03.04.25 @ 17:32) >    ACC: [id number] EXAM:  US ABDOMEN RT UPR QUADRANT   ORDERED BY: JERRY CENTENO     PROCEDURE DATE:  03/04/2025          INTERPRETATION:  CLINICAL INFORMATION: RUQ    COMPARISON: Earlier same day CT    TECHNIQUE: Sonography of the right upper quadrant.    FINDINGS:  Liver: Poorly evaluated by sonography.  Bile ducts: Normal caliber. Common bile duct measures 5 mm.  Gallbladder: Suboptimal visualization of the gallbladder. Cholelithiasis.   Apparent nodular thickening described on CT difficult to assess by   sonography, unclear if corresponding to the gallstones or representing   true nodularity. Negative sonographic Barlow's sign.  Pancreas: Poorly visualized.  Right kidney: 9.1 cm. No hydronephrosis.  Ascites: None.    IMPRESSION:  Suboptimalvisualization of the gallbladder. Cholelithiasis. Apparent   nodular thickening described on CT difficult to assess by sonography,   unclear if corresponding to the gallstones or representing true   nodularity. Recommend evaluation with outpatient contrast-enhanced MRI.    No sonographic evidence of acute cholecystitis.    Indeterminate right renal mid pole hypodensity described on CT is not   well evaluated on this ultrasound. This can also be assessed at the time   of outpatient MRI.    --- Endof Report ---            FRANK BURGOS MD; Attending Radiologist  This document has been electronically signed. Mar  4 2025  8:06PM    < end of copied text >

## 2025-03-05 NOTE — DISCHARGE NOTE PROVIDER - NSDCCPCAREPLAN_GEN_ALL_CORE_FT
PRINCIPAL DISCHARGE DIAGNOSIS  Diagnosis: Constipation  Assessment and Plan of Treatment: Continue bowel regimen and follow with your PMD      SECONDARY DISCHARGE DIAGNOSES  Diagnosis: Renal lesion  Assessment and Plan of Treatment: There is a renal lesion that was seen on CT scan here.  There is no further work up you need in the hospital, but your primary care doctor should arrange a CT or MRI as an outpatient for follow up.  It is important to follow as these lesions can sometimes be cancerous in nature.

## 2025-03-05 NOTE — CONSULT NOTE ADULT - SUBJECTIVE AND OBJECTIVE BOX
NEPHROLOGY CONSULTATION NOTE    84-year-old male with past medical history of hypertension, hyperlipidemia, GERD, dementia, CAD status post CABG, ?chf on furosemide, presents for increasing abdominal distention and constipation over the past day.  Per states patient had a bowel movement yesterday although less than usual.  Has noticed increasing size in his abdomen.  In addition has been noticing increasing gradual bilateral leg swelling over the past several months.  Patient denies any chest pain, shortness of breath, nausea/vomiting, lightheadedness, diaphoresis, abdominal pain, urinary symptoms.    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    PHYSICAL EXAM:  Constitutional: frail  HEENT: anicteric sclera, oropharynx clear, poor dentition   Neck: No JVD  Respiratory: CTAB, no wheezes, rales or rhonchi  Cardiovascular: S1, S2, RRR  Gastrointestinal: BS+, soft, NT + distention  Extremities: No cyanosis or clubbing. trace peripheral edema  Neurological: confused  : No CVA tenderness. No doll.   Skin: No rashes     Hospital Medications:   MEDICATIONS  (STANDING):  aspirin  chewable 81 milliGRAM(s) Oral daily  atorvastatin 80 milliGRAM(s) Oral at bedtime  bisacodyl Suppository 10 milliGRAM(s) Rectal daily  chlorhexidine 2% Cloths 1 Application(s) Topical <User Schedule>  dextrose 5%. 1000 milliLiter(s) (50 mL/Hr) IV Continuous <Continuous>  dextrose 50% Injectable 25 Gram(s) IV Push once  dextrose 50% Injectable 12.5 Gram(s) IV Push once  donepezil 10 milliGRAM(s) Oral at bedtime  famotidine    Tablet 20 milliGRAM(s) Oral daily  folic acid 1 milliGRAM(s) Oral daily  influenza  Vaccine (HIGH DOSE) 0.5 milliLiter(s) IntraMuscular once  insulin lispro (ADMELOG) corrective regimen sliding scale   SubCutaneous three times a day before meals  lactated ringers. 1000 milliLiter(s) (75 mL/Hr) IV Continuous <Continuous>  memantine 10 milliGRAM(s) Oral daily  metoprolol succinate ER 50 milliGRAM(s) Oral daily  mineral oil 30 milliLiter(s) Oral daily  piperacillin/tazobactam IVPB.. 3.375 Gram(s) IV Intermittent every 8 hours  senna 2 Tablet(s) Oral at bedtime        VITALS:  T(F): 97.8 (03-05-25 @ 04:57), Max: 98.2 (03-04-25 @ 21:54)  HR: 57 (03-05-25 @ 04:57)  BP: 153/75 (03-05-25 @ 04:57)  RR: 16 (03-05-25 @ 04:57)  SpO2: 97% (03-05-25 @ 04:57)  Wt(kg): --    Height (cm): 167.6 (03-04 @ 21:54)  Weight (kg): 86.3 (03-04 @ 21:54)  BMI (kg/m2): 30.7 (03-04 @ 21:54)  BSA (m2): 1.96 (03-04 @ 21:54)    LABS:  03-04    136  |  100  |  23[H]  ----------------------------<  104[H]  4.6   |  24  |  1.6[H]    Ca    8.9      04 Mar 2025 13:38    TPro  7.7  /  Alb  4.2  /  TBili  0.7  /  DBili  0.2  /  AST  22  /  ALT  19  /  AlkPhos  100  03-04                          13.1   8.72  )-----------( 198      ( 04 Mar 2025 13:38 )             38.9       Urine Studies:  Urinalysis Basic - ( 04 Mar 2025 13:38 )    Color:  / Appearance:  / SG:  / pH:   Gluc: 104 mg/dL / Ketone:   / Bili:  / Urobili:    Blood:  / Protein:  / Nitrite:    Leuk Esterase:  / RBC:  / WBC    Sq Epi:  / Non Sq Epi:  / Bacteria:           RADIOLOGY & ADDITIONAL STUDIES:

## 2025-03-05 NOTE — DISCHARGE NOTE PROVIDER - NSDCFUSCHEDAPPT_GEN_ALL_CORE_FT
Tom Sears Physician [facility]  CARDIOLOGY [address] [place] [address]  Scheduled Appointment: 03/24/2025

## 2025-03-05 NOTE — CONSULT NOTE ADULT - NS ATTEND AMEND GEN_ALL_CORE FT
85yo male with fecal impaction and possible stercoral colitis s/p disimpaction. CT and US showing gallbladder nodularity. Abd softly distended, nontender. Continue enemas, follow up kub, serial abd exams. Resume bowel regimen pending xray and if having spontaneous bms. Recommend MRI to further evaluate gallbladder.

## 2025-03-05 NOTE — CONSULT NOTE ADULT - ASSESSMENT
84yMale pmh HTN, hyperlipidemia, GERD, dementia, CHF on furosemide presents for abdominal distention and constipation. Patient was disimpacted in the ER.  No hematemesis, melena, blood in stool reported. patient alert and oriented to person and place not time.      #constipation  #Wall thickening the rectum with fecal impaction, findings which can be   seen in stercoral colitis.  s/p ER Disimpaction 3/4/25  Rec  -tap water enemas q4h  -once patient has spontaneous bms senna BID and Miralax BID  -ambulate as tolerated  -outpatient Colonoscopy if benefits outweigh risks Follow up with our [facility] located at [address] [place] [address]. Phone Number: [phone]     CT-Gallbladder wall nodular thickening and further evaluation with right   upper quadrant ultrasound is recommended.  U/S-Suboptimalvisualization of the gallbladder. Cholelithiasis. Apparent   nodular thickening described on CT difficult to assess by sonography,   unclear if corresponding to the gallstones or representing true   nodularity.   Recommend evaluation with outpatient contrast-enhanced MRI.    #Indeterminate right mid pole nonspecific renal hypodensity measuring 1.5   cm, possibly related to motion artifact. Renal neoplasm is not excluded.   Outpatient repeat CT renal mass protocol is recommended for further   evaluation.  Rec  - Care as per primary team     #Stable right lower lobe 7 mm pulmonary nodule.   Rec  Follow-up as per   Fleischner Society guidelines. 84yMale pmh HTN, hyperlipidemia, GERD, dementia, CHF on furosemide presents for abdominal distention and constipation. Patient was disimpacted in the ER.  No hematemesis, melena, blood in stool reported. patient alert and oriented to person and place not time.      #constipation  #Wall thickening the rectum with fecal impaction, findings which can be   seen in stercoral colitis.  s/p ER Disimpaction 3/4/25, s/p 3 bms  Rec  -tap water enemas q4h  -once patient has spontaneous bms senna BID and Miralax BID  -ambulate as tolerated  -followup KUB  -outpatient Colonoscopy if benefits outweigh risks Follow up with our [facility] located at [address] [place] [address]. Phone Number: [phone]     CT-Gallbladder wall nodular thickening and further evaluation with right   upper quadrant ultrasound is recommended.  U/S-Suboptimalvisualization of the gallbladder. Cholelithiasis. Apparent   nodular thickening described on CT difficult to assess by sonography,   unclear if corresponding to the gallstones or representing true   nodularity.   Recommend evaluation with outpatient contrast-enhanced MRI.    #Indeterminate right mid pole nonspecific renal hypodensity measuring 1.5   cm, possibly related to motion artifact. Renal neoplasm is not excluded.   Outpatient repeat CT renal mass protocol is recommended for further   evaluation.  Rec  - Care as per primary team     #Stable right lower lobe 7 mm pulmonary nodule.   Rec  Follow-up as per   Fleischner Society guidelines.

## 2025-03-05 NOTE — DISCHARGE NOTE PROVIDER - HOSPITAL COURSE
84-year-old male with past medical history of DM, hypertension, hyperlipidemia, GERD, dementia, CAD status post CABG, CHF on furosemide, presents for increasing abdominal distention and constipation over the past day.   Daughter states patient had a bowel movement yesterday although less than usual.  Has noticed increasing size in his abdomen.  In addition has been noticing increasing gradual bilateral leg swelling over the past several months.  Patient denies any chest pain, shortness of breath, nausea/vomiting, lightheadedness, diaphoresis, abdominal pain, urinary symptoms.  Daughter states pt is low activity level, typically from living room to bedroom.        # constipation  - Had BM, feels better  - S/P Fecal Disimpaction (in ED)  - Bowel regimen    #indeterminant right mid pole renal hypodensity (cannot r/o neoplasm) on CT + iv contrast  -outpatient CT/MRI    # HTN  - vs  - lowered metoprolol to 25 mg daily     # Dementia  - c/w home med    # GERD  - c/w home med    # DM  - FS with SS   84-year-old male with past medical history of DM, hypertension, hyperlipidemia, GERD, dementia, CAD status post CABG, CHF on furosemide, presents for increasing abdominal distention and constipation over the past day.   Daughter states patient had a bowel movement yesterday although less than usual.  Has noticed increasing size in his abdomen.  In addition has been noticing increasing gradual bilateral leg swelling over the past several months.  Patient denies any chest pain, shortness of breath, nausea/vomiting, lightheadedness, diaphoresis, abdominal pain, urinary symptoms.  Daughter states pt is low activity level, typically from living room to bedroom.        # constipation  - Had several BM, feels better  - S/P Fecal Disimpaction (in ED)  - Bowel regimen on DC    #indeterminant right mid pole renal hypodensity (cannot r/o neoplasm) on CT + iv contrast  -outpatient CT/MRI    # HTN  - vs  - lowered metoprolol to 25 mg daily     # Dementia  - c/w home med    # GERD  - c/w home med    # DM  - FS with SS

## 2025-03-05 NOTE — DISCHARGE NOTE PROVIDER - NSDCMRMEDTOKEN_GEN_ALL_CORE_FT
aspirin 81 mg oral tablet, chewable: 1 tab(s) orally once a day  atorvastatin 40 mg oral tablet: 1 tab(s) orally once a day (at bedtime)  donepezil 10 mg oral tablet: 1 tab(s) orally once a day (at bedtime)  famotidine 40 mg oral tablet: 1 tab(s) orally once a day  folic acid 1 mg oral tablet: 1 tab(s) orally once a day  memantine 10 mg oral tablet: 1 tab(s) orally once a day  Metoprolol Succinate ER 25 mg oral tablet, extended release: 1 tab(s) orally once a day  Senna 8.6 mg oral tablet: 2 tab(s) orally once a day (at bedtime)

## 2025-03-05 NOTE — DISCHARGE NOTE PROVIDER - NSDCCAREPROVSEEN_GEN_ALL_CORE_FT
Eve Castrejon with [facility] left message. States that pt was discharged from hospital for COPD exacerbation. Saw pt for home health pt has rhonchi in lungs. Asking if you can squeeze her in before the holidays?
Pt needs to be scheduled for an appt.
William Caldwell

## 2025-03-05 NOTE — CONSULT NOTE ADULT - ASSESSMENT
chronic kidney disease stage 3 without proteinuria, pt known to me   baseline Cr "upper 1's"  s/p IV contrast in ED  indeterminant right mid pole renal hypodensity (cannot r/o neoplasm) on CT + iv contrast  stercoral colitis, s/p manual disimpaction in ED  dementia   CAD / HTN / DM    plan:    IVF given overnight post iv contrast, DC IVF today  hold outpt lasix today, resume soon  trend renal function s/p iv contrast  outpt CT or MRI renal mass protocol  consider lowering metoprolol due to bradycardia   bowel regimen

## 2025-03-05 NOTE — DISCHARGE NOTE PROVIDER - CARE PROVIDER_API CALL
Gerardo Winn  Nephrology  [address] [place] [address]  Phone: [phone]  Fax: [phone]  Follow Up Time:     NAING, SUNDEE [address] [place] [address]  Phone: ()-  Fax: ()-  Follow Up Time:    Yes

## 2025-03-05 NOTE — DISCHARGE NOTE PROVIDER - NSDCQMPCI_CARD_ALL_CORE
Subjective:       Shellie Ham is a 3 y o  female    Immunization History   Administered Date(s) Administered    DTaP 12/14/2016    DTaP / Hep B / IPV 2015, 02/01/2016    DTaP / HiB / IPV 2015    Hep B, adult 2015    Hepatitis A 07/27/2016, 09/27/2017    HiB 2015, 02/01/2016, 07/27/2016    MMR 07/27/2016    Pneumococcal Conjugate 13-Valent 2015, 2015, 02/01/2016, 12/14/2016    Rotavirus 2015, 2015    Varicella 07/27/2016     The following portions of the patient's history were reviewed and updated as appropriate: allergies, current medications, past family history, past medical history, past social history, past surgical history and problem list     Chief complaint:  Chief Complaint   Patient presents with    Well Child     3 yr well       Current Issues:  none  Well Child Assessment:  History was provided by the mother  Brandon López lives with her mother and sister  Interval problems do not include recent illness or recent injury  Nutrition  Types of intake include cow's milk, eggs, fruits, vegetables, non-nutritional, meats and junk food  Junk food includes fast food, desserts, candy and chips  Elimination  Elimination problems do not include constipation, diarrhea or urinary symptoms  Behavioral  Behavioral issues do not include stubbornness, throwing tantrums or waking up at night  Sleep  The patient sleeps in her parents' bed  Child falls asleep while on own  Average sleep duration is 10 hours  There are no sleep problems  Safety  Home is child-proofed? yes  There is smoking in the home (mother)  Home has working smoke alarms? yes  Home has working carbon monoxide alarms? yes  There is an appropriate car seat in use  Screening  Immunizations are up-to-date  There are no risk factors for hearing loss  There are no risk factors for anemia  There are no risk factors for tuberculosis  There are no risk factors for apnea     Social  The caregiver enjoys the child  Childcare is provided at child's home  The childcare provider is a parent  Sibling interactions are good  Developmental 24 Months Appropriate     Questions Responses    Copies parent's actions, e g  while doing housework Yes    Comment: Yes on 4/30/2018 (Age - 3yrs)     Can put one small (< 2") block on top of another without it falling Yes    Comment: Yes on 4/30/2018 (Age - 3yrs)     Appropriately uses at least 3 words other than 'sameer' and 'mama' Yes    Comment: Yes on 4/30/2018 (Age - 3yrs)     Can take > 4 steps backwards without losing balance, e g  when pulling a toy Yes    Comment: Yes on 4/30/2018 (Age - 3yrs)     Can take off clothes, including pants and pullover shirts Yes    Comment: Yes on 4/30/2018 (Age - 3yrs)     Can walk up steps by self without holding onto the next stair Yes    Comment: Yes on 4/30/2018 (Age - 3yrs)     Can point to at least 1 part of body when asked, without prompting Yes    Comment: Yes on 4/30/2018 (Age - 3yrs)     Feeds with spoon or fork without spilling much Yes    Comment: Yes on 4/30/2018 (Age - 3yrs)     Helps to  toys or carry dishes when asked Yes    Comment: Yes on 4/30/2018 (Age - 3yrs)     Can kick a small ball (e g  tennis ball) forward without support Yes    Comment: Yes on 4/30/2018 (Age - 3yrs)            M-CHAT Flowsheet      Most Recent Value   M-CHAT  P               Objective:        Growth parameters are noted and are appropriate for age  Wt Readings from Last 1 Encounters:   04/30/18 14 6 kg (32 lb 2 oz) (75 %, Z= 0 68)*     * Growth percentiles are based on CDC 2-20 Years data  Ht Readings from Last 1 Encounters:   04/30/18 3' 0 25" (0 921 m) (49 %, Z= -0 02)*     * Growth percentiles are based on CDC 2-20 Years data  Vitals:    04/30/18 0916   Weight: 14 6 kg (32 lb 2 oz)   Height: 3' 0 25" (0 921 m)       Physical Exam   Constitutional: She appears well-developed  No distress  HENT:   Head: Normocephalic  Right Ear: Tympanic membrane normal    Left Ear: Tympanic membrane normal    Mouth/Throat: Dentition is normal  Oropharynx is clear  Eyes: Conjunctivae are normal  Pupils are equal, round, and reactive to light  Right eye exhibits no discharge  Left eye exhibits no discharge  Neck: Normal range of motion  Cardiovascular: Normal rate, regular rhythm, S1 normal and S2 normal     No murmur heard  Pulmonary/Chest: Effort normal and breath sounds normal    Abdominal: Soft  Bowel sounds are normal  There is no hepatosplenomegaly  There is no tenderness  Musculoskeletal: Normal range of motion  Lymphadenopathy:     She has no cervical adenopathy  Neurological: She is alert  She displays normal reflexes  She exhibits normal muscle tone  Skin: Capillary refill takes less than 2 seconds  No rash noted  Ref Range & Units 10/28/17 10:02 AM 7/29/16  3:07 PM    Lead 0 - 4 ug/dL 1  <1CM            3/28/2018 - Hb - 12 2g/dl     Assessment:      Healthy 3 y o  female Child  1  Health check for child over 34 days old            Plan:          1  Anticipatory guidance: Gave handout on well-child issues at this age  2  Screening tests:    a  Lead level: not applicable      b  Hb or HCT: not indicated     3  Immunizations today: none    4  Follow-up visit in 6 months for next well child visit, or sooner as needed  No

## 2025-03-06 LAB
ALBUMIN SERPL ELPH-MCNC: 3.6 G/DL — SIGNIFICANT CHANGE UP (ref 3.5–5.2)
ALP SERPL-CCNC: 90 U/L — SIGNIFICANT CHANGE UP (ref 30–115)
ALT FLD-CCNC: 18 U/L — SIGNIFICANT CHANGE UP (ref 0–41)
ANION GAP SERPL CALC-SCNC: 10 MMOL/L — SIGNIFICANT CHANGE UP (ref 7–14)
AST SERPL-CCNC: 22 U/L — SIGNIFICANT CHANGE UP (ref 0–41)
BASOPHILS # BLD AUTO: 0.03 K/UL — SIGNIFICANT CHANGE UP (ref 0–0.2)
BASOPHILS NFR BLD AUTO: 0.4 % — SIGNIFICANT CHANGE UP (ref 0–1)
BILIRUB SERPL-MCNC: 0.6 MG/DL — SIGNIFICANT CHANGE UP (ref 0.2–1.2)
BUN SERPL-MCNC: 18 MG/DL — SIGNIFICANT CHANGE UP (ref 10–20)
CALCIUM SERPL-MCNC: 8.7 MG/DL — SIGNIFICANT CHANGE UP (ref 8.4–10.5)
CHLORIDE SERPL-SCNC: 104 MMOL/L — SIGNIFICANT CHANGE UP (ref 98–110)
CO2 SERPL-SCNC: 25 MMOL/L — SIGNIFICANT CHANGE UP (ref 17–32)
CREAT SERPL-MCNC: 1.7 MG/DL — HIGH (ref 0.7–1.5)
EGFR: 40 ML/MIN/1.73M2 — LOW
EGFR: 40 ML/MIN/1.73M2 — LOW
EOSINOPHIL # BLD AUTO: 0.2 K/UL — SIGNIFICANT CHANGE UP (ref 0–0.7)
EOSINOPHIL NFR BLD AUTO: 2.6 % — SIGNIFICANT CHANGE UP (ref 0–8)
GLUCOSE BLDC GLUCOMTR-MCNC: 103 MG/DL — HIGH (ref 70–99)
GLUCOSE BLDC GLUCOMTR-MCNC: 114 MG/DL — HIGH (ref 70–99)
GLUCOSE BLDC GLUCOMTR-MCNC: 115 MG/DL — HIGH (ref 70–99)
GLUCOSE BLDC GLUCOMTR-MCNC: 94 MG/DL — SIGNIFICANT CHANGE UP (ref 70–99)
GLUCOSE SERPL-MCNC: 102 MG/DL — HIGH (ref 70–99)
HCT VFR BLD CALC: 37.3 % — LOW (ref 42–52)
HGB BLD-MCNC: 12 G/DL — LOW (ref 14–18)
IMM GRANULOCYTES NFR BLD AUTO: 0.4 % — HIGH (ref 0.1–0.3)
LYMPHOCYTES # BLD AUTO: 1.32 K/UL — SIGNIFICANT CHANGE UP (ref 1.2–3.4)
LYMPHOCYTES # BLD AUTO: 16.8 % — LOW (ref 20.5–51.1)
MCHC RBC-ENTMCNC: 29.1 PG — SIGNIFICANT CHANGE UP (ref 27–31)
MCHC RBC-ENTMCNC: 32.2 G/DL — SIGNIFICANT CHANGE UP (ref 32–37)
MCV RBC AUTO: 90.3 FL — SIGNIFICANT CHANGE UP (ref 80–94)
MONOCYTES # BLD AUTO: 1.01 K/UL — HIGH (ref 0.1–0.6)
MONOCYTES NFR BLD AUTO: 12.9 % — HIGH (ref 1.7–9.3)
NEUTROPHILS # BLD AUTO: 5.25 K/UL — SIGNIFICANT CHANGE UP (ref 1.4–6.5)
NEUTROPHILS NFR BLD AUTO: 66.9 % — SIGNIFICANT CHANGE UP (ref 42.2–75.2)
NRBC BLD AUTO-RTO: 0 /100 WBCS — SIGNIFICANT CHANGE UP (ref 0–0)
PLATELET # BLD AUTO: 164 K/UL — SIGNIFICANT CHANGE UP (ref 130–400)
PMV BLD: 11.4 FL — HIGH (ref 7.4–10.4)
POTASSIUM SERPL-MCNC: 4.1 MMOL/L — SIGNIFICANT CHANGE UP (ref 3.5–5)
POTASSIUM SERPL-SCNC: 4.1 MMOL/L — SIGNIFICANT CHANGE UP (ref 3.5–5)
PROT SERPL-MCNC: 6.7 G/DL — SIGNIFICANT CHANGE UP (ref 6–8)
RBC # BLD: 4.13 M/UL — LOW (ref 4.7–6.1)
RBC # FLD: 13 % — SIGNIFICANT CHANGE UP (ref 11.5–14.5)
SODIUM SERPL-SCNC: 139 MMOL/L — SIGNIFICANT CHANGE UP (ref 135–146)
WBC # BLD: 7.84 K/UL — SIGNIFICANT CHANGE UP (ref 4.8–10.8)
WBC # FLD AUTO: 7.84 K/UL — SIGNIFICANT CHANGE UP (ref 4.8–10.8)

## 2025-03-06 PROCEDURE — 74018 RADEX ABDOMEN 1 VIEW: CPT | Mod: 26

## 2025-03-06 PROCEDURE — 99233 SBSQ HOSP IP/OBS HIGH 50: CPT | Mod: FS

## 2025-03-06 PROCEDURE — 99232 SBSQ HOSP IP/OBS MODERATE 35: CPT

## 2025-03-06 RX ADMIN — MEMANTINE HYDROCHLORIDE 10 MILLIGRAM(S): 21 CAPSULE, EXTENDED RELEASE ORAL at 10:14

## 2025-03-06 RX ADMIN — METOPROLOL SUCCINATE 50 MILLIGRAM(S): 50 TABLET, EXTENDED RELEASE ORAL at 05:04

## 2025-03-06 RX ADMIN — Medication 2 TABLET(S): at 22:22

## 2025-03-06 RX ADMIN — ATORVASTATIN CALCIUM 80 MILLIGRAM(S): 80 TABLET, FILM COATED ORAL at 22:23

## 2025-03-06 RX ADMIN — Medication 81 MILLIGRAM(S): at 10:14

## 2025-03-06 RX ADMIN — Medication 30 MILLILITER(S): at 10:20

## 2025-03-06 RX ADMIN — FOLIC ACID 1 MILLIGRAM(S): 1 TABLET ORAL at 10:15

## 2025-03-06 RX ADMIN — Medication 10 MILLIGRAM(S): at 10:20

## 2025-03-06 RX ADMIN — DONEPEZIL HYDROCHLORIDE 10 MILLIGRAM(S): 5 TABLET ORAL at 22:23

## 2025-03-06 RX ADMIN — Medication 20 MILLIGRAM(S): at 10:14

## 2025-03-06 RX ADMIN — Medication 1 APPLICATION(S): at 05:03

## 2025-03-06 NOTE — PROGRESS NOTE ADULT - SUBJECTIVE AND OBJECTIVE BOX
84yMale  Being followed for colonic stool burden  Interval history: Patient denies nausea, vomiting, hematemesis, melena, blood in stool, diarrhea, constipation, abdominal pain. Patient tolerating diet.      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 No alcohol. No illegal drug use.            MEDICATIONS  (STANDING):  aspirin  chewable 81 milliGRAM(s) Oral daily  atorvastatin 80 milliGRAM(s) Oral at bedtime  bisacodyl Suppository 10 milliGRAM(s) Rectal daily  chlorhexidine 2% Cloths 1 Application(s) Topical <User Schedule>  dextrose 5%. 1000 milliLiter(s) (50 mL/Hr) IV Continuous <Continuous>  dextrose 50% Injectable 25 Gram(s) IV Push once  dextrose 50% Injectable 12.5 Gram(s) IV Push once  donepezil 10 milliGRAM(s) Oral at bedtime  famotidine    Tablet 20 milliGRAM(s) Oral daily  folic acid 1 milliGRAM(s) Oral daily  influenza  Vaccine (HIGH DOSE) 0.5 milliLiter(s) IntraMuscular once  insulin lispro (ADMELOG) corrective regimen sliding scale   SubCutaneous three times a day before meals  memantine 10 milliGRAM(s) Oral daily  metoprolol succinate ER 50 milliGRAM(s) Oral daily  mineral oil 30 milliLiter(s) Oral daily  senna 2 Tablet(s) Oral at bedtime    MEDICATIONS  (PRN):  acetaminophen     Tablet .. 650 milliGRAM(s) Oral every 6 hours PRN Temp greater or equal to 38C (100.4F), Mild Pain (1 - 3)  aluminum hydroxide/magnesium hydroxide/simethicone Suspension 30 milliLiter(s) Oral every 4 hours PRN Dyspepsia  dextrose Oral Gel 15 Gram(s) Oral once PRN Blood Glucose LESS THAN 70 milliGRAM(s)/deciliter  melatonin 3 milliGRAM(s) Oral at bedtime PRN Insomnia  ondansetron Injectable 4 milliGRAM(s) IV Push every 8 hours PRN Nausea and/or Vomiting      Allergies:  No Known Allergies  Intolerances          REVIEW OF SYSTEMS:  General:  No weight loss, fevers, or chills.  Eyes:  No reported pain or visual changes  ENT:  No sore throat or runny nose.  NECK: No stiffness   CV:  No chest pain or palpitations.  Resp:  No shortness of breath, cough  GI:  No abdominal pain, nausea, vomiting, dysphagia, diarrhea or constipation. No rectal bleeding, melena, or hematemesis.  Neuro:  No tingling, numbness         VITAL SIGNS:   T(F): 97.9 (03-06-25 @ 12:53), Max: 98.3 (03-05-25 @ 20:06)  HR: 68 (03-06-25 @ 12:53) (64 - 71)  BP: 117/73 (03-06-25 @ 12:53) (117/73 - 130/69)  RR: 16 (03-06-25 @ 12:53) (16 - 18)  SpO2: 96% (03-06-25 @ 12:53) (92% - 96%)    PHYSICAL EXAM:  GENERAL: AAOx2 to person and place not time, no acute distress.  HEAD:  Atraumatic, Normocephalic  EYES: conjunctiva and sclera clear  NECK: Supple, no thyromegaly  CHEST/LUNG: Clear to auscultation bilaterally; No wheeze, rhonchi, or rales  HEART: Regular rate and rhythm; normal S1, S2, No murmurs.  ABDOMEN: Soft, nontender, +distended; Bowel sounds present  NEUROLOGY: No asterixis or tremor.   SKIN: Intact, no jaundice            LABS:                        12.0   7.84  )-----------( 164      ( 06 Mar 2025 06:59 )             37.3     03-06    139  |  104  |  18  ----------------------------<  102[H]  4.1   |  25  |  1.7[H]    Ca    8.7      06 Mar 2025 06:59    TPro  6.7  /  Alb  3.6  /  TBili  0.6  /  DBili  x   /  AST  22  /  ALT  18  /  AlkPhos  90  03-06    LIVER FUNCTIONS - ( 06 Mar 2025 06:59 )  Alb: 3.6 g/dL / Pro: 6.7 g/dL / ALK PHOS: 90 U/L / ALT: 18 U/L / AST: 22 U/L / GGT: x               IMAGING:    < from: CT Abdomen and Pelvis w/ IV Cont (03.04.25 @ 15:40) >    ACC: [id number] EXAM:  CT ABDOMEN AND PELVIS IC   ORDERED BY: LUZ MARINA GOMEZ     PROCEDURE DATE:  03/04/2025          INTERPRETATION:  CLINICAL STATEMENT: Abdominal distention    TECHNIQUE: Contiguous axial CT images were obtained from the lower chest   to the pubic symphysis .  95 cc administered of Omnipaque 350 (5 cc   discarded).  Oral contrast was not given.  Reformatted images in the   coronal and sagittal planes were acquired.    COMPARISON CT: 12/9/2024    Study is limited by motion artifact      FINDINGS:    LOWER CHEST: Atelectasis. There is a stable 7 mm nodule in the right   lower lobe in image 19 of series 301. Follow-up as per Fleischner Society   guidelines..    HEPATOBILIARY: Gallbladder wall nodular thickening on image 34 series   301. Further evaluation with right upper quadrant ultrasound is   recommended. Subcentimeter hepatic hypodensities, too small to   characterize.    SPLEEN: Unremarkable..    PANCREAS: Unremarkable..    ADRENAL GLANDS: Nonspecific nodularity left adrenal gland. The right   adrenal gland is unremarkable.    KIDNEYS: There is an indeterminate right mid pole nonspecific hypodensity   in image 54 series 301 measuring 1.5 to 1.5 cm, possibly related to   motion artifact. Renal neoplasm is not excluded. An outpatient repeat CT   renal mass protocol is recommended for further evaluation. There are   bilateral subcentimeter renal hypodensities, too small to characterize.   There is no hydronephrosis.    ABDOMINOPELVIC NODES: Unremarkable...    PELVICORGANS: Enlarged prostate    PERITONEUM/MESENTERY/BOWEL: Bilateral fat-containing inguinal hernias..   There is diverticulosis.. The appendix is unremarkable. There is wall   thickening of the rectum with fecal impaction, findings which can be seen   in stercoral colitis. There is no free air or obstruction..    BONES: Status post sternotomy with postsurgical change. Degenerative   change...    IMPRESSION:    Wall thickening the rectum with fecal impaction, findings which can be   seen in stercoral colitis.    Gallbladder wall nodular thickening and further evaluation with right   upper quadrant ultrasound is recommended.     Indeterminate right mid pole nonspecific renal hypodensity measuring 1.5   cm, possibly related to motion artifact. Renal neoplasm is not excluded.   Outpatient repeat CT renal mass protocol is recommended for further   evaluation.    Stable right lower lobe 7 mm pulmonary nodule. Follow-up as per   Fleischner Society guidelines.    --- End of Report ---            ROSALINE BRIDGES MD; Attending Radiologist  This document has been electronically signed. Mar  4 2025  4:22PM    < end of copied text >    < from: Xray Kidney Ureter Bladder (03.05.25 @ 16:13) >    ACC: [id number] EXAM:  XR KUB 1 VIEW   ORDERED BY: JODIE MAS     PROCEDURE DATE:  03/05/2025          INTERPRETATION:  History: Constipation    TECHNIQUE: 2 supine views of the abdomen.    COMPARISON: CT abdomen pelvis 3/4/2025.    FINDINGS:    No significant change in moderate colorectal stool burden. Degenerative   changes. Sternotomy wires. Left upper quadrant clips. Residual contrast   in the bladder.    IMPRESSION:    No significant change in moderate colorectal stool burden.    ---End of Report ---          NORBERTO LEGER MD; Resident Radiologist  This document has been electronically signed.  NEEMA LLOYD MD; Attending Radiologist  This document has been electronically signed. Mar  6 2025  2:38AM    < end of copied text >

## 2025-03-06 NOTE — PROGRESS NOTE ADULT - SUBJECTIVE AND OBJECTIVE BOX
NEPHROLOGY FOLLOW UP NOTE    GI input noted  bp's fair  cr stable    PAST MEDICAL & SURGICAL HISTORY:  HTN (hypertension)      Coronary artery disease involving coronary bypass graft of native heart without angina pectoris      Dementia      Chronic GERD      HLD (hyperlipidemia)      Prediabetes      Diabetes      S/P CABG x 4  33 years ago        Allergies:  No Known Allergies    Home Medications Reviewed    SOCIAL HISTORY:  Denies ETOH,Smoking,   FAMILY HISTORY:        REVIEW OF SYSTEMS:  All other review of systems is negative unless indicated above.    PHYSICAL EXAM:  Constitutional: frail  HEENT: anicteric sclera, oropharynx clear, poor dentition   Neck: No JVD  Respiratory: CTAB, no wheezes, rales or rhonchi  Cardiovascular: S1, S2, RRR  Gastrointestinal: BS+, soft, NT + distention  Extremities: No cyanosis or clubbing. trace peripheral edema  Neurological: confused  : No CVA tenderness. No doll.   Skin: No rashes     Hospital Medications:   MEDICATIONS  (STANDING):  aspirin  chewable 81 milliGRAM(s) Oral daily  atorvastatin 80 milliGRAM(s) Oral at bedtime  bisacodyl Suppository 10 milliGRAM(s) Rectal daily  chlorhexidine 2% Cloths 1 Application(s) Topical <User Schedule>  dextrose 5%. 1000 milliLiter(s) (50 mL/Hr) IV Continuous <Continuous>  dextrose 50% Injectable 25 Gram(s) IV Push once  dextrose 50% Injectable 12.5 Gram(s) IV Push once  donepezil 10 milliGRAM(s) Oral at bedtime  famotidine    Tablet 20 milliGRAM(s) Oral daily  folic acid 1 milliGRAM(s) Oral daily  influenza  Vaccine (HIGH DOSE) 0.5 milliLiter(s) IntraMuscular once  insulin lispro (ADMELOG) corrective regimen sliding scale   SubCutaneous three times a day before meals  memantine 10 milliGRAM(s) Oral daily  metoprolol succinate ER 50 milliGRAM(s) Oral daily  mineral oil 30 milliLiter(s) Oral daily  senna 2 Tablet(s) Oral at bedtime        VITALS:  T(F): 97.9 (03-06-25 @ 12:53), Max: 98.3 (03-05-25 @ 20:06)  HR: 68 (03-06-25 @ 12:53)  BP: 117/73 (03-06-25 @ 12:53)  RR: 16 (03-06-25 @ 12:53)  SpO2: 96% (03-06-25 @ 12:53)  Wt(kg): --        LABS:  03-06    139  |  104  |  18  ----------------------------<  102[H]  4.1   |  25  |  1.7[H]    Ca    8.7      06 Mar 2025 06:59    TPro  6.7  /  Alb  3.6  /  TBili  0.6  /  DBili      /  AST  22  /  ALT  18  /  AlkPhos  90  03-06                          12.0   7.84  )-----------( 164      ( 06 Mar 2025 06:59 )             37.3       Urine Studies:  Urinalysis Basic - ( 06 Mar 2025 06:59 )    Color:  / Appearance:  / SG:  / pH:   Gluc: 102 mg/dL / Ketone:   / Bili:  / Urobili:    Blood:  / Protein:  / Nitrite:    Leuk Esterase:  / RBC:  / WBC    Sq Epi:  / Non Sq Epi:  / Bacteria:           RADIOLOGY & ADDITIONAL STUDIES:

## 2025-03-06 NOTE — PROGRESS NOTE ADULT - ASSESSMENT
84-year-old male with past medical history of DM, hypertension, hyperlipidemia, GERD, dementia, CAD status post CABG, CHF on furosemide, presents for increasing abdominal distention and constipation over the past day.   Daughter states patient had a bowel movement yesterday although less than usual.  Has noticed increasing size in his abdomen.  In addition has been noticing increasing gradual bilateral leg swelling over the past several months.  Patient denies any chest pain, shortness of breath, nausea/vomiting, lightheadedness, diaphoresis, abdominal pain, urinary symptoms.  Daughter states pt is low activity level, typically from living room to bedroom.        # constipation  - Had BM, feels better  - S/P Fecal Disimpaction (in ED)  - Bowel regimen  -repeat KUB, GI follow up    #indeterminant right mid pole renal hypodensity (cannot r/o neoplasm) on CT + iv contrast  -outpatient CT/MRI    # HTN  - vs  - lowered metoprolol to 25 mg daily     # Dementia  - c/w home med    # GERD  - c/w home med    # DM  - FS with SS

## 2025-03-06 NOTE — PROGRESS NOTE ADULT - ASSESSMENT
chronic kidney disease stage 3 without proteinuria, pt known to me   baseline Cr "upper 1's"  s/p IV contrast in ED  indeterminant right mid pole renal hypodensity (cannot r/o neoplasm) on CT + iv contrast  stercoral colitis, s/p manual disimpaction in ED  dementia   CAD / HTN / DM    plan:    cont off IVF and outpt lasix  trend renal function s/p iv contrast  outpt CT or MRI renal mass protocol  f/u GI   bowel regimen  full code

## 2025-03-06 NOTE — PROGRESS NOTE ADULT - SUBJECTIVE AND OBJECTIVE BOX
84-year-old male with past medical history of DM, hypertension, hyperlipidemia, GERD, dementia, CAD status post CABG, CHF on furosemide, presents for increasing abdominal distention and constipation over the past day.     Today:  Seen at bedside, no new complaints.  Had large BM this morning.        REVIEW OF SYSTEMS:  No new complaints      MEDICATIONS  (STANDING):  aspirin  chewable 81 milliGRAM(s) Oral daily  atorvastatin 80 milliGRAM(s) Oral at bedtime  bisacodyl Suppository 10 milliGRAM(s) Rectal daily  chlorhexidine 2% Cloths 1 Application(s) Topical <User Schedule>  dextrose 5%. 1000 milliLiter(s) (50 mL/Hr) IV Continuous <Continuous>  dextrose 50% Injectable 25 Gram(s) IV Push once  dextrose 50% Injectable 12.5 Gram(s) IV Push once  donepezil 10 milliGRAM(s) Oral at bedtime  famotidine    Tablet 20 milliGRAM(s) Oral daily  folic acid 1 milliGRAM(s) Oral daily  influenza  Vaccine (HIGH DOSE) 0.5 milliLiter(s) IntraMuscular once  insulin lispro (ADMELOG) corrective regimen sliding scale   SubCutaneous three times a day before meals  memantine 10 milliGRAM(s) Oral daily  metoprolol succinate ER 50 milliGRAM(s) Oral daily  mineral oil 30 milliLiter(s) Oral daily  senna 2 Tablet(s) Oral at bedtime    MEDICATIONS  (PRN):  acetaminophen     Tablet .. 650 milliGRAM(s) Oral every 6 hours PRN Temp greater or equal to 38C (100.4F), Mild Pain (1 - 3)  aluminum hydroxide/magnesium hydroxide/simethicone Suspension 30 milliLiter(s) Oral every 4 hours PRN Dyspepsia  dextrose Oral Gel 15 Gram(s) Oral once PRN Blood Glucose LESS THAN 70 milliGRAM(s)/deciliter  melatonin 3 milliGRAM(s) Oral at bedtime PRN Insomnia  ondansetron Injectable 4 milliGRAM(s) IV Push every 8 hours PRN Nausea and/or Vomiting      Allergies  No Known Allergies          Vital Signs Last 24 Hrs  T(C): 36.6 (06 Mar 2025 12:53), Max: 36.8 (05 Mar 2025 20:06)  T(F): 97.9 (06 Mar 2025 12:53), Max: 98.3 (05 Mar 2025 20:06)  HR: 68 (06 Mar 2025 12:53) (64 - 71)  BP: 117/73 (06 Mar 2025 12:53) (117/73 - 130/69)  RR: 16 (06 Mar 2025 12:53) (16 - 18)  SpO2: 96% (06 Mar 2025 12:53) (92% - 96%)        PHYSICAL EXAM:  GENERAL: NAD, well-groomed, well-developed  HEAD:  Atraumatic, Normocephalic  EYES: EOMI, PERRLA, conjunctiva and sclera clear  NECK: Supple, No JVD, Normal thyroid  NERVOUS SYSTEM:  Alert & Oriented X3  CHEST/LUNG: CTA bilaterally; No rales, rhonchi, wheezing, or rubs  HEART: Regular rate and rhythm; No murmurs, rubs, or gallops  ABDOMEN: distended, NT, BS +      LABS:                        12.0   7.84  )-----------( 164      ( 06 Mar 2025 06:59 )             37.3     03-06    139  |  104  |  18  ----------------------------<  102[H]  4.1   |  25  |  1.7[H]    Ca    8.7      06 Mar 2025 06:59    TPro  6.7  /  Alb  3.6  /  TBili  0.6  /  DBili  x   /  AST  22  /  ALT  18  /  AlkPhos  90  03-06      Urinalysis Basic - ( 06 Mar 2025 06:59 )    Color: x / Appearance: x / SG: x / pH: x  Gluc: 102 mg/dL / Ketone: x  / Bili: x / Urobili: x   Blood: x / Protein: x / Nitrite: x   Leuk Esterase: x / RBC: x / WBC x   Sq Epi: x / Non Sq Epi: x / Bacteria: x

## 2025-03-06 NOTE — PROGRESS NOTE ADULT - ASSESSMENT
84yMale pmh HTN, hyperlipidemia, GERD, dementia, CHF on furosemide presents for abdominal distention and constipation. Patient was disimpacted in the ER.  No hematemesis, melena, blood in stool reported. patient alert and oriented to person and place not time.      #constipation  #Wall thickening the rectum with fecal impaction, findings which can be   seen in stercoral colitis.  s/p ER Disimpaction 3/4/25, s/p 3 bms  Rec  -will see patient with attending later patient still distended   -tap water enemas q4h  -once patient has spontaneous bms senna BID and Miralax BID  -ambulate as tolerated  -followup KUB appreciated  -outpatient Colonoscopy if benefits outweigh risks Follow up with our [facility] located at [address] [place] [address]. Phone Number: [phone]     CT-Gallbladder wall nodular thickening and further evaluation with right   upper quadrant ultrasound is recommended.  U/S-Suboptimalvisualization of the gallbladder. Cholelithiasis. Apparent   nodular thickening described on CT difficult to assess by sonography,   unclear if corresponding to the gallstones or representing true   nodularity.   Recommend evaluation with outpatient contrast-enhanced MRI.    #Indeterminate right mid pole nonspecific renal hypodensity measuring 1.5   cm, possibly related to motion artifact. Renal neoplasm is not excluded.   Outpatient repeat CT renal mass protocol is recommended for further   evaluation.  Rec  - Care as per primary team     #Stable right lower lobe 7 mm pulmonary nodule.   Rec  Follow-up as per   Fleischner Society guidelines.   84yMale pmh HTN, hyperlipidemia, GERD, dementia, CHF on furosemide presents for abdominal distention and constipation. Patient was disimpacted in the ER.  No hematemesis, melena, blood in stool reported. patient alert and oriented to person and place not time.      #constipation  #Wall thickening the rectum with fecal impaction, findings which can be   seen in stercoral colitis.  s/p ER Disimpaction 3/4/25, s/p 3 bms  Rec  -continue aggressive bowel regimen and enemas   -tap water enemas q4h  -once patient has spontaneous bms senna BID and Miralax BID  -ambulate as tolerated  -followup KUB appreciated  -outpatient Colonoscopy if benefits outweigh risks Follow up with our [facility] located at [address] [place] [address]. Phone Number: [phone]     CT-Gallbladder wall nodular thickening and further evaluation with right   upper quadrant ultrasound is recommended.  U/S-Suboptimalvisualization of the gallbladder. Cholelithiasis. Apparent   nodular thickening described on CT difficult to assess by sonography,   unclear if corresponding to the gallstones or representing true   nodularity.   Recommend evaluation with outpatient contrast-enhanced MRI.    #Indeterminate right mid pole nonspecific renal hypodensity measuring 1.5   cm, possibly related to motion artifact. Renal neoplasm is not excluded.   Outpatient repeat CT renal mass protocol is recommended for further   evaluation.  Rec  - Care as per primary team     #Stable right lower lobe 7 mm pulmonary nodule.   Rec  Follow-up as per   Fleischner Society guidelines.

## 2025-03-07 VITALS
TEMPERATURE: 98 F | RESPIRATION RATE: 16 BRPM | DIASTOLIC BLOOD PRESSURE: 58 MMHG | HEART RATE: 52 BPM | SYSTOLIC BLOOD PRESSURE: 99 MMHG | OXYGEN SATURATION: 95 %

## 2025-03-07 LAB
GLUCOSE BLDC GLUCOMTR-MCNC: 110 MG/DL — HIGH (ref 70–99)
GLUCOSE BLDC GLUCOMTR-MCNC: 127 MG/DL — HIGH (ref 70–99)
GLUCOSE BLDC GLUCOMTR-MCNC: 148 MG/DL — HIGH (ref 70–99)

## 2025-03-07 PROCEDURE — 99232 SBSQ HOSP IP/OBS MODERATE 35: CPT | Mod: FS

## 2025-03-07 PROCEDURE — 99239 HOSP IP/OBS DSCHRG MGMT >30: CPT

## 2025-03-07 RX ADMIN — Medication 30 MILLILITER(S): at 10:21

## 2025-03-07 RX ADMIN — MEMANTINE HYDROCHLORIDE 10 MILLIGRAM(S): 21 CAPSULE, EXTENDED RELEASE ORAL at 10:20

## 2025-03-07 RX ADMIN — Medication 10 MILLIGRAM(S): at 10:20

## 2025-03-07 RX ADMIN — METOPROLOL SUCCINATE 50 MILLIGRAM(S): 50 TABLET, EXTENDED RELEASE ORAL at 05:58

## 2025-03-07 RX ADMIN — FOLIC ACID 1 MILLIGRAM(S): 1 TABLET ORAL at 10:20

## 2025-03-07 RX ADMIN — Medication 81 MILLIGRAM(S): at 10:20

## 2025-03-07 RX ADMIN — Medication 20 MILLIGRAM(S): at 10:20

## 2025-03-07 RX ADMIN — Medication 1 APPLICATION(S): at 06:00

## 2025-03-07 NOTE — PROGRESS NOTE ADULT - NS ATTEND AMEND GEN_ALL_CORE FT
Patient seen and examined during the GI rounds, case discussed with the GI PA, plan communicated to the primary team, assessment, recommendations and plan as above.
Patient seen and examined during the GI rounds, case discussed with the GI PA, plan communicated to the primary team, assessment, recommendations and plan as above.

## 2025-03-07 NOTE — PROGRESS NOTE ADULT - ASSESSMENT
chronic kidney disease stage 3 without proteinuria, Cr at baseline  baseline Cr "upper 1's"  s/p IV contrast in ED  indeterminant right mid pole renal hypodensity (cannot r/o neoplasm) on CT + iv contrast  stercoral colitis, s/p manual disimpaction in ED  dementia   CAD / HTN / DM    plan:    outpt CT or MRI renal mass protocol  f/u GI  / bowel regimen / enema  cont metoprolol  routine outpatient follow up my office

## 2025-03-07 NOTE — PROGRESS NOTE ADULT - SUBJECTIVE AND OBJECTIVE BOX
84-year-old male with past medical history of DM, hypertension, hyperlipidemia, GERD, dementia, CAD status post CABG, CHF on furosemide, presents for increasing abdominal distention and constipation over the past day.   Daughter states patient had a bowel movement yesterday although less than usual.  Has noticed increasing size in his abdomen.  In addition has been noticing increasing gradual bilateral leg swelling over the past several months.  Patient denies any chest pain, shortness of breath, nausea/vomiting, lightheadedness, diaphoresis, abdominal pain, urinary symptoms.  Daughter states pt is low activity level, typically from living room to bedroom.      Today:  Seen at bedside, no new complaints.           REVIEW OF SYSTEMS:  No new complaints      MEDICATIONS  (STANDING):  aspirin  chewable 81 milliGRAM(s) Oral daily  atorvastatin 80 milliGRAM(s) Oral at bedtime  bisacodyl Suppository 10 milliGRAM(s) Rectal daily  chlorhexidine 2% Cloths 1 Application(s) Topical <User Schedule>  dextrose 5%. 1000 milliLiter(s) (50 mL/Hr) IV Continuous <Continuous>  dextrose 50% Injectable 25 Gram(s) IV Push once  dextrose 50% Injectable 12.5 Gram(s) IV Push once  donepezil 10 milliGRAM(s) Oral at bedtime  famotidine    Tablet 20 milliGRAM(s) Oral daily  folic acid 1 milliGRAM(s) Oral daily  influenza  Vaccine (HIGH DOSE) 0.5 milliLiter(s) IntraMuscular once  insulin lispro (ADMELOG) corrective regimen sliding scale   SubCutaneous three times a day before meals  memantine 10 milliGRAM(s) Oral daily  metoprolol succinate ER 50 milliGRAM(s) Oral daily  senna 2 Tablet(s) Oral at bedtime    MEDICATIONS  (PRN):  acetaminophen     Tablet .. 650 milliGRAM(s) Oral every 6 hours PRN Temp greater or equal to 38C (100.4F), Mild Pain (1 - 3)  aluminum hydroxide/magnesium hydroxide/simethicone Suspension 30 milliLiter(s) Oral every 4 hours PRN Dyspepsia  dextrose Oral Gel 15 Gram(s) Oral once PRN Blood Glucose LESS THAN 70 milliGRAM(s)/deciliter  melatonin 3 milliGRAM(s) Oral at bedtime PRN Insomnia  ondansetron Injectable 4 milliGRAM(s) IV Push every 8 hours PRN Nausea and/or Vomiting      Allergies  No Known Allergies          Vital Signs Last 24 Hrs  T(C): 36.9 (07 Mar 2025 13:23), Max: 36.9 (07 Mar 2025 13:23)  T(F): 98.4 (07 Mar 2025 13:23), Max: 98.4 (07 Mar 2025 13:23)  HR: 52 (07 Mar 2025 13:23) (52 - 77)  BP: 99/58 (07 Mar 2025 13:23) (99/58 - 167/89)  RR: 16 (07 Mar 2025 13:23) (16 - 18)  SpO2: 95% (07 Mar 2025 13:23) (95% - 96%)        PHYSICAL EXAM:  GENERAL: NAD, well-groomed, well-developed  HEAD:  Atraumatic, Normocephalic  EYES: EOMI, PERRLA, conjunctiva and sclera clear  NECK: Supple, No JVD, Normal thyroid  NERVOUS SYSTEM:  Alert & Oriented X3  CHEST/LUNG: CTA bilaterally; No rales, rhonchi, wheezing, or rubs  HEART: Regular rate and rhythm; No murmurs, rubs, or gallops  ABDOMEN: distended, NT, BS +      LABS:                        12.0   7.84  )-----------( 164      ( 06 Mar 2025 06:59 )             37.3     03-06    139  |  104  |  18  ----------------------------<  102[H]  4.1   |  25  |  1.7[H]    Ca    8.7      06 Mar 2025 06:59    TPro  6.7  /  Alb  3.6  /  TBili  0.6  /  DBili  x   /  AST  22  /  ALT  18  /  AlkPhos  90  03-06      Urinalysis Basic - ( 06 Mar 2025 06:59 )    Color: x / Appearance: x / SG: x / pH: x  Gluc: 102 mg/dL / Ketone: x  / Bili: x / Urobili: x   Blood: x / Protein: x / Nitrite: x   Leuk Esterase: x / RBC: x / WBC x   Sq Epi: x / Non Sq Epi: x / Bacteria: x

## 2025-03-07 NOTE — PROGRESS NOTE ADULT - SUBJECTIVE AND OBJECTIVE BOX
NEPHROLOGY FOLLOW UP NOTE    pt seen and examined this AM   yesterday  poor historian     PAST MEDICAL & SURGICAL HISTORY:  HTN (hypertension)      Coronary artery disease involving coronary bypass graft of native heart without angina pectoris      Dementia      Chronic GERD      HLD (hyperlipidemia)      Prediabetes      Diabetes      S/P CABG x 4  33 years ago        Allergies:  No Known Allergies    Home Medications Reviewed    SOCIAL HISTORY:  Denies ETOH,Smoking,   FAMILY HISTORY:        REVIEW OF SYSTEMS:  All other review of systems is negative unless indicated above.    PHYSICAL EXAM:  Constitutional: frail  HEENT: anicteric sclera, oropharynx clear, poor dentition   Neck: No JVD  Respiratory: CTAB, no wheezes, rales or rhonchi  Cardiovascular: S1, S2, RRR  Gastrointestinal: BS+, soft, NT + distention  Extremities: No cyanosis or clubbing. trace peripheral edema  Neurological: confused  : No CVA tenderness. No doll.   Skin: No rashes     Hospital Medications:   MEDICATIONS  (STANDING):  aspirin  chewable 81 milliGRAM(s) Oral daily  atorvastatin 80 milliGRAM(s) Oral at bedtime  bisacodyl Suppository 10 milliGRAM(s) Rectal daily  chlorhexidine 2% Cloths 1 Application(s) Topical <User Schedule>  dextrose 5%. 1000 milliLiter(s) (50 mL/Hr) IV Continuous <Continuous>  dextrose 50% Injectable 25 Gram(s) IV Push once  dextrose 50% Injectable 12.5 Gram(s) IV Push once  donepezil 10 milliGRAM(s) Oral at bedtime  famotidine    Tablet 20 milliGRAM(s) Oral daily  folic acid 1 milliGRAM(s) Oral daily  influenza  Vaccine (HIGH DOSE) 0.5 milliLiter(s) IntraMuscular once  insulin lispro (ADMELOG) corrective regimen sliding scale   SubCutaneous three times a day before meals  memantine 10 milliGRAM(s) Oral daily  metoprolol succinate ER 50 milliGRAM(s) Oral daily  mineral oil 30 milliLiter(s) Oral daily  senna 2 Tablet(s) Oral at bedtime        VITALS:  T(F): 98.1 (03-07-25 @ 05:54), Max: 98.1 (03-07-25 @ 05:54)  HR: 77 (03-07-25 @ 05:54)  BP: 167/89 (03-07-25 @ 05:54)  RR: 16 (03-07-25 @ 05:54)  SpO2: 96% (03-07-25 @ 05:54)  Wt(kg): --        LABS:  03-06    139  |  104  |  18  ----------------------------<  102[H]  4.1   |  25  |  1.7[H]    Ca    8.7      06 Mar 2025 06:59    TPro  6.7  /  Alb  3.6  /  TBili  0.6  /  DBili      /  AST  22  /  ALT  18  /  AlkPhos  90  03-06                          12.0   7.84  )-----------( 164      ( 06 Mar 2025 06:59 )             37.3       Urine Studies:  Urinalysis Basic - ( 06 Mar 2025 06:59 )    Color:  / Appearance:  / SG:  / pH:   Gluc: 102 mg/dL / Ketone:   / Bili:  / Urobili:    Blood:  / Protein:  / Nitrite:    Leuk Esterase:  / RBC:  / WBC    Sq Epi:  / Non Sq Epi:  / Bacteria:           RADIOLOGY & ADDITIONAL STUDIES:

## 2025-03-07 NOTE — PROGRESS NOTE ADULT - ASSESSMENT
84yMale pmh HTN, hyperlipidemia, GERD, dementia, CHF on furosemide presents for abdominal distention and constipation. Patient was disimpacted in the ER.  No hematemesis, melena, blood in stool reported. patient alert and oriented to person and place not time.      #constipation  #Wall thickening the rectum with fecal impaction, findings which can be   seen in stercoral colitis.  s/p ER Disimpaction 3/4/25, s/p 3 bms  Rec  -continue aggressive bowel regimen and enemas   -tap water enemas q4h  -once patient has spontaneous bms senna BID and Miralax BID  -ambulate as tolerated  -followup KUB appreciated  -outpatient Colonoscopy if benefits outweigh risks Follow up with our [facility] located at [address] [place] [address]. Phone Number: [phone]     CT-Gallbladder wall nodular thickening and further evaluation with right   upper quadrant ultrasound is recommended.  U/S-Suboptimalvisualization of the gallbladder. Cholelithiasis. Apparent   nodular thickening described on CT difficult to assess by sonography,   unclear if corresponding to the gallstones or representing true   nodularity.   Recommend evaluation with outpatient contrast-enhanced MRI.    #Indeterminate right mid pole nonspecific renal hypodensity measuring 1.5   cm, possibly related to motion artifact. Renal neoplasm is not excluded.   Outpatient repeat CT renal mass protocol is recommended for further   evaluation.  Rec  - Care as per primary team     #Stable right lower lobe 7 mm pulmonary nodule.   Rec  Follow-up as per   Fleischner Society guidelines.     84yMale pmh HTN, hyperlipidemia, GERD, dementia, CHF on furosemide presents for abdominal distention and constipation. Patient was disimpacted in the ER.  No hematemesis, melena, blood in stool reported. patient alert and oriented to person and place not time.      #constipation  #Wall thickening the rectum with fecal impaction, findings which can be   seen in stercoral colitis.  s/p ER Disimpaction 3/4/25, s/p 3 bms  Rec  -continue aggressive bowel regimen and enemas   -tap water enemas q4h  -once patient has spontaneous bms senna BID and Miralax BID  -ambulate as tolerated  -followup KUB appreciated  -followup KUBs  -outpatient Colonoscopy if benefits outweigh risks Follow up with our [facility] located at [address] [place] [address]. Phone Number: [phone]     CT-Gallbladder wall nodular thickening and further evaluation with right   upper quadrant ultrasound is recommended.  U/S-Suboptimalvisualization of the gallbladder. Cholelithiasis. Apparent   nodular thickening described on CT difficult to assess by sonography,   unclear if corresponding to the gallstones or representing true   nodularity.   Recommend evaluation with outpatient contrast-enhanced MRI.    #Indeterminate right mid pole nonspecific renal hypodensity measuring 1.5   cm, possibly related to motion artifact. Renal neoplasm is not excluded.   Outpatient repeat CT renal mass protocol is recommended for further   evaluation.  Rec  - Care as per primary team     #Stable right lower lobe 7 mm pulmonary nodule.   Rec  Follow-up as per   Fleischner Society guidelines.     84yMale pmh HTN, hyperlipidemia, GERD, dementia, CHF on furosemide presents for abdominal distention and constipation. Patient was disimpacted in the ER.  No hematemesis, melena, blood in stool reported. patient alert and oriented to person and place not time.      #constipation  #Wall thickening the rectum with fecal impaction, findings which can be   seen in stercoral colitis.  s/p ER Disimpaction 3/4/25, s/p 3 bms  Rec  -continue aggressive bowel regimen and enemas   -tap water enemas q4h  -once patient has spontaneous bms senna BID and Miralax BID  -ambulate as tolerated  -followup KUB appreciated  -Repeat KUB today  - Serial abdominal exam, please obtain CT A/P and consult surgery if developed abdominal pain with worsening distension   - Plan of care discussed with primary team   -outpatient Colonoscopy if benefits outweigh risks Follow up with our [facility] located at [address] [place] [address]. Phone Number: [phone]     CT-Gallbladder wall nodular thickening and further evaluation with right   upper quadrant ultrasound is recommended.  U/S-Suboptimalvisualization of the gallbladder. Cholelithiasis. Apparent   nodular thickening described on CT difficult to assess by sonography,   unclear if corresponding to the gallstones or representing true   nodularity.   Recommend evaluation with outpatient contrast-enhanced MRI.    #Indeterminate right mid pole nonspecific renal hypodensity measuring 1.5   cm, possibly related to motion artifact. Renal neoplasm is not excluded.   Outpatient repeat CT renal mass protocol is recommended for further   evaluation.  Rec  - Care as per primary team     #Stable right lower lobe 7 mm pulmonary nodule.   Rec  Follow-up as per   Fleischner Society guidelines.

## 2025-03-07 NOTE — PROGRESS NOTE ADULT - SUBJECTIVE AND OBJECTIVE BOX
84yMale  Being followed for constipation   Interval history: Patient denies nausea, vomiting, hematemesis, melena, blood in stool, diarrhea, constipation, abdominal pain. Patient having bowel movements.      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 No alcohol. No illegal drug use.            MEDICATIONS  (STANDING):  aspirin  chewable 81 milliGRAM(s) Oral daily  atorvastatin 80 milliGRAM(s) Oral at bedtime  bisacodyl Suppository 10 milliGRAM(s) Rectal daily  chlorhexidine 2% Cloths 1 Application(s) Topical <User Schedule>  dextrose 5%. 1000 milliLiter(s) (50 mL/Hr) IV Continuous <Continuous>  dextrose 50% Injectable 25 Gram(s) IV Push once  dextrose 50% Injectable 12.5 Gram(s) IV Push once  donepezil 10 milliGRAM(s) Oral at bedtime  famotidine    Tablet 20 milliGRAM(s) Oral daily  folic acid 1 milliGRAM(s) Oral daily  influenza  Vaccine (HIGH DOSE) 0.5 milliLiter(s) IntraMuscular once  insulin lispro (ADMELOG) corrective regimen sliding scale   SubCutaneous three times a day before meals  memantine 10 milliGRAM(s) Oral daily  metoprolol succinate ER 50 milliGRAM(s) Oral daily  senna 2 Tablet(s) Oral at bedtime    MEDICATIONS  (PRN):  acetaminophen     Tablet .. 650 milliGRAM(s) Oral every 6 hours PRN Temp greater or equal to 38C (100.4F), Mild Pain (1 - 3)  aluminum hydroxide/magnesium hydroxide/simethicone Suspension 30 milliLiter(s) Oral every 4 hours PRN Dyspepsia  dextrose Oral Gel 15 Gram(s) Oral once PRN Blood Glucose LESS THAN 70 milliGRAM(s)/deciliter  melatonin 3 milliGRAM(s) Oral at bedtime PRN Insomnia  ondansetron Injectable 4 milliGRAM(s) IV Push every 8 hours PRN Nausea and/or Vomiting      Allergies:   No Known Allergies  Intolerances          REVIEW OF SYSTEMS:  General:  No weight loss, fevers, or chills.  Eyes:  No reported pain or visual changes  ENT:  No sore throat or runny nose.  NECK: No stiffness   CV:  No chest pain or palpitations.  Resp:  No shortness of breath, cough  GI:  No abdominal pain, nausea, vomiting, dysphagia, diarrhea or constipation. No rectal bleeding, melena, or hematemesis.  Neuro:  No tingling, numbness         VITAL SIGNS:   T(F): 98.4 (03-07-25 @ 13:23), Max: 98.4 (03-07-25 @ 13:23)  HR: 52 (03-07-25 @ 13:23) (52 - 77)  BP: 99/58 (03-07-25 @ 13:23) (99/58 - 167/89)  RR: 16 (03-07-25 @ 13:23) (16 - 18)  SpO2: 95% (03-07-25 @ 13:23) (95% - 96%)    PHYSICAL EXAM:  GENERAL: AAOx2 to person and place not time, no acute distress.  HEAD:  Atraumatic, Normocephalic  EYES: conjunctiva and sclera clear  NECK: Supple, no thyromegaly  CHEST/LUNG: Clear to auscultation bilaterally; No wheeze, rhonchi, or rales  HEART: Regular rate and rhythm; normal S1, S2, No murmurs.  ABDOMEN: Soft, nontender, nondistended; Bowel sounds present  NEUROLOGY: No asterixis or tremor.   SKIN: Intact, no jaundice            LABS:                        12.0   7.84  )-----------( 164      ( 06 Mar 2025 06:59 )             37.3     03-06    139  |  104  |  18  ----------------------------<  102[H]  4.1   |  25  |  1.7[H]    Ca    8.7      06 Mar 2025 06:59    TPro  6.7  /  Alb  3.6  /  TBili  0.6  /  DBili  x   /  AST  22  /  ALT  18  /  AlkPhos  90  03-06    LIVER FUNCTIONS - ( 06 Mar 2025 06:59 )  Alb: 3.6 g/dL / Pro: 6.7 g/dL / ALK PHOS: 90 U/L / ALT: 18 U/L / AST: 22 U/L / GGT: x               IMAGING:    < from: Xray Kidney Ureter Bladder (03.06.25 @ 18:48) >    ACC: [id number] EXAM:  XR KUB 1 VIEW   ORDERED BY: JODIE MAS     PROCEDURE DATE:  03/06/2025          INTERPRETATION:  Clinical Indication: Constipation.    Technique: Supine views of the abdomen.    Comparison: Abdominal radiograph 3/5/2025.    Findings:    Catheters and tubes: None  Bowel gas pattern: Nonspecific colonic gaseous distention. Moderate to   large rectal stool burden.  Calcifications: Surgical clips are noted in the left upper abdomen.  Skeletal: Degenerative changes.  Other: None.    IMPRESSION:    Nonspecific colonic gaseous distention.    Moderate to large rectal stool burden.    --- End of Report ---          BIBIANA BOB MD; Resident Radiologist  This document has been electronically signed.  CHYNA BARILLAS MD; Attending Radiologist  This document has been electronically signed. Mar  7 2025 10:56AM    < end of copied text >      < from: CT Abdomen and Pelvis w/ IV Cont (03.04.25 @ 15:40) >    ACC: [id number] EXAM:  CT ABDOMEN AND PELVIS IC   ORDERED BY: LUZ MARINA GOMEZ     PROCEDURE DATE:  03/04/2025          INTERPRETATION:  CLINICAL STATEMENT: Abdominal distention    TECHNIQUE: Contiguous axial CT images were obtained from the lower chest   to the pubic symphysis .  95 cc administered of Omnipaque 350 (5 cc   discarded).  Oral contrast was not given.  Reformatted images in the   coronal and sagittal planes were acquired.    COMPARISON CT: 12/9/2024    Study is limited by motion artifact      FINDINGS:    LOWER CHEST: Atelectasis. There is a stable 7 mm nodule in the right   lower lobe in image 19 of series 301. Follow-up as per Fleischner Society   guidelines..    HEPATOBILIARY: Gallbladder wall nodular thickening on image 34 series   301. Further evaluation with right upper quadrant ultrasound is   recommended. Subcentimeter hepatic hypodensities, too small to   characterize.    SPLEEN: Unremarkable..    PANCREAS: Unremarkable..    ADRENAL GLANDS: Nonspecific nodularity left adrenal gland. The right   adrenal gland is unremarkable.    KIDNEYS: There is an indeterminate right mid pole nonspecific hypodensity   in image 54 series 301 measuring 1.5 to 1.5 cm, possibly related to   motion artifact. Renal neoplasm is not excluded. An outpatient repeat CT   renal mass protocol is recommended for further evaluation. There are   bilateral subcentimeter renal hypodensities, too small to characterize.   There is no hydronephrosis.    ABDOMINOPELVIC NODES: Unremarkable...    PELVICORGANS: Enlarged prostate    PERITONEUM/MESENTERY/BOWEL: Bilateral fat-containing inguinal hernias..   There is diverticulosis.. The appendix is unremarkable. There is wall   thickening of the rectum with fecal impaction, findings which can be seen   in stercoral colitis. There is no free air or obstruction..    BONES: Status post sternotomy with postsurgical change. Degenerative   change...    IMPRESSION:    Wall thickening the rectum with fecal impaction, findings which can be   seen in stercoral colitis.    Gallbladder wall nodular thickening and further evaluation with right   upper quadrant ultrasound is recommended.     Indeterminate right mid pole nonspecific renal hypodensity measuring 1.5   cm, possibly related to motion artifact. Renal neoplasm is not excluded.   Outpatient repeat CT renal mass protocol is recommended for further   evaluation.    Stable right lower lobe 7 mm pulmonary nodule. Follow-up as per   Fleischner Society guidelines.    --- End of Report ---            ROSALINE BRIDGES MD; Attending Radiologist  This document has been electronically signed. Mar  4 2025  4:22PM    < end of copied text >         84yMale  Being followed for constipation   Interval history: Patient denies nausea, vomiting, hematemesis, melena, blood in stool, diarrhea, constipation, abdominal pain. Patient having bowel movements.      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 No alcohol. No illegal drug use.            MEDICATIONS  (STANDING):  aspirin  chewable 81 milliGRAM(s) Oral daily  atorvastatin 80 milliGRAM(s) Oral at bedtime  bisacodyl Suppository 10 milliGRAM(s) Rectal daily  chlorhexidine 2% Cloths 1 Application(s) Topical <User Schedule>  dextrose 5%. 1000 milliLiter(s) (50 mL/Hr) IV Continuous <Continuous>  dextrose 50% Injectable 25 Gram(s) IV Push once  dextrose 50% Injectable 12.5 Gram(s) IV Push once  donepezil 10 milliGRAM(s) Oral at bedtime  famotidine    Tablet 20 milliGRAM(s) Oral daily  folic acid 1 milliGRAM(s) Oral daily  influenza  Vaccine (HIGH DOSE) 0.5 milliLiter(s) IntraMuscular once  insulin lispro (ADMELOG) corrective regimen sliding scale   SubCutaneous three times a day before meals  memantine 10 milliGRAM(s) Oral daily  metoprolol succinate ER 50 milliGRAM(s) Oral daily  senna 2 Tablet(s) Oral at bedtime    MEDICATIONS  (PRN):  acetaminophen     Tablet .. 650 milliGRAM(s) Oral every 6 hours PRN Temp greater or equal to 38C (100.4F), Mild Pain (1 - 3)  aluminum hydroxide/magnesium hydroxide/simethicone Suspension 30 milliLiter(s) Oral every 4 hours PRN Dyspepsia  dextrose Oral Gel 15 Gram(s) Oral once PRN Blood Glucose LESS THAN 70 milliGRAM(s)/deciliter  melatonin 3 milliGRAM(s) Oral at bedtime PRN Insomnia  ondansetron Injectable 4 milliGRAM(s) IV Push every 8 hours PRN Nausea and/or Vomiting      Allergies:   No Known Allergies  Intolerances          REVIEW OF SYSTEMS:  General:  No weight loss, fevers, or chills.  Eyes:  No reported pain or visual changes  ENT:  No sore throat or runny nose.  NECK: No stiffness   CV:  No chest pain or palpitations.  Resp:  No shortness of breath, cough  GI:  No abdominal pain, nausea, vomiting, dysphagia, diarrhea or constipation. No rectal bleeding, melena, or hematemesis.  Neuro:  No tingling, numbness         VITAL SIGNS:   T(F): 98.4 (03-07-25 @ 13:23), Max: 98.4 (03-07-25 @ 13:23)  HR: 52 (03-07-25 @ 13:23) (52 - 77)  BP: 99/58 (03-07-25 @ 13:23) (99/58 - 167/89)  RR: 16 (03-07-25 @ 13:23) (16 - 18)  SpO2: 95% (03-07-25 @ 13:23) (95% - 96%)    PHYSICAL EXAM:  GENERAL: AAOx2 to person and place not time, no acute distress.  HEAD:  Atraumatic, Normocephalic  EYES: conjunctiva and sclera clear  NECK: Supple, no thyromegaly  CHEST/LUNG: Clear to auscultation bilaterally; No wheeze, rhonchi, or rales  HEART: Regular rate and rhythm; normal S1, S2, No murmurs.  ABDOMEN: Soft, nontender, +distended; Bowel sounds present  NEUROLOGY: No asterixis or tremor.   SKIN: Intact, no jaundice            LABS:                        12.0   7.84  )-----------( 164      ( 06 Mar 2025 06:59 )             37.3     03-06    139  |  104  |  18  ----------------------------<  102[H]  4.1   |  25  |  1.7[H]    Ca    8.7      06 Mar 2025 06:59    TPro  6.7  /  Alb  3.6  /  TBili  0.6  /  DBili  x   /  AST  22  /  ALT  18  /  AlkPhos  90  03-06    LIVER FUNCTIONS - ( 06 Mar 2025 06:59 )  Alb: 3.6 g/dL / Pro: 6.7 g/dL / ALK PHOS: 90 U/L / ALT: 18 U/L / AST: 22 U/L / GGT: x               IMAGING:    < from: Xray Kidney Ureter Bladder (03.06.25 @ 18:48) >    ACC: [id number] EXAM:  XR KUB 1 VIEW   ORDERED BY: JODIE MAS     PROCEDURE DATE:  03/06/2025          INTERPRETATION:  Clinical Indication: Constipation.    Technique: Supine views of the abdomen.    Comparison: Abdominal radiograph 3/5/2025.    Findings:    Catheters and tubes: None  Bowel gas pattern: Nonspecific colonic gaseous distention. Moderate to   large rectal stool burden.  Calcifications: Surgical clips are noted in the left upper abdomen.  Skeletal: Degenerative changes.  Other: None.    IMPRESSION:    Nonspecific colonic gaseous distention.    Moderate to large rectal stool burden.    --- End of Report ---          BIBIANA BOB MD; Resident Radiologist  This document has been electronically signed.  CHYNA BARILLAS MD; Attending Radiologist  This document has been electronically signed. Mar  7 2025 10:56AM    < end of copied text >      < from: CT Abdomen and Pelvis w/ IV Cont (03.04.25 @ 15:40) >    ACC: [id number] EXAM:  CT ABDOMEN AND PELVIS IC   ORDERED BY: LUZ MARINA GOMEZ     PROCEDURE DATE:  03/04/2025          INTERPRETATION:  CLINICAL STATEMENT: Abdominal distention    TECHNIQUE: Contiguous axial CT images were obtained from the lower chest   to the pubic symphysis .  95 cc administered of Omnipaque 350 (5 cc   discarded).  Oral contrast was not given.  Reformatted images in the   coronal and sagittal planes were acquired.    COMPARISON CT: 12/9/2024    Study is limited by motion artifact      FINDINGS:    LOWER CHEST: Atelectasis. There is a stable 7 mm nodule in the right   lower lobe in image 19 of series 301. Follow-up as per Fleischner Society   guidelines..    HEPATOBILIARY: Gallbladder wall nodular thickening on image 34 series   301. Further evaluation with right upper quadrant ultrasound is   recommended. Subcentimeter hepatic hypodensities, too small to   characterize.    SPLEEN: Unremarkable..    PANCREAS: Unremarkable..    ADRENAL GLANDS: Nonspecific nodularity left adrenal gland. The right   adrenal gland is unremarkable.    KIDNEYS: There is an indeterminate right mid pole nonspecific hypodensity   in image 54 series 301 measuring 1.5 to 1.5 cm, possibly related to   motion artifact. Renal neoplasm is not excluded. An outpatient repeat CT   renal mass protocol is recommended for further evaluation. There are   bilateral subcentimeter renal hypodensities, too small to characterize.   There is no hydronephrosis.    ABDOMINOPELVIC NODES: Unremarkable...    PELVICORGANS: Enlarged prostate    PERITONEUM/MESENTERY/BOWEL: Bilateral fat-containing inguinal hernias..   There is diverticulosis.. The appendix is unremarkable. There is wall   thickening of the rectum with fecal impaction, findings which can be seen   in stercoral colitis. There is no free air or obstruction..    BONES: Status post sternotomy with postsurgical change. Degenerative   change...    IMPRESSION:    Wall thickening the rectum with fecal impaction, findings which can be   seen in stercoral colitis.    Gallbladder wall nodular thickening and further evaluation with right   upper quadrant ultrasound is recommended.     Indeterminate right mid pole nonspecific renal hypodensity measuring 1.5   cm, possibly related to motion artifact. Renal neoplasm is not excluded.   Outpatient repeat CT renal mass protocol is recommended for further   evaluation.    Stable right lower lobe 7 mm pulmonary nodule. Follow-up as per   Fleischner Society guidelines.    --- End of Report ---            ROSALINE BRIDGES MD; Attending Radiologist  This document has been electronically signed. Mar  4 2025  4:22PM    < end of copied text >         84yMale  Being followed for constipation   Interval history: Patient denies nausea, vomiting, hematemesis, melena, blood in stool, diarrhea, constipation, abdominal pain. Patient having bowel movements.      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 No alcohol. No illegal drug use.            MEDICATIONS  (STANDING):  aspirin  chewable 81 milliGRAM(s) Oral daily  atorvastatin 80 milliGRAM(s) Oral at bedtime  bisacodyl Suppository 10 milliGRAM(s) Rectal daily  chlorhexidine 2% Cloths 1 Application(s) Topical <User Schedule>  dextrose 5%. 1000 milliLiter(s) (50 mL/Hr) IV Continuous <Continuous>  dextrose 50% Injectable 25 Gram(s) IV Push once  dextrose 50% Injectable 12.5 Gram(s) IV Push once  donepezil 10 milliGRAM(s) Oral at bedtime  famotidine    Tablet 20 milliGRAM(s) Oral daily  folic acid 1 milliGRAM(s) Oral daily  influenza  Vaccine (HIGH DOSE) 0.5 milliLiter(s) IntraMuscular once  insulin lispro (ADMELOG) corrective regimen sliding scale   SubCutaneous three times a day before meals  memantine 10 milliGRAM(s) Oral daily  metoprolol succinate ER 50 milliGRAM(s) Oral daily  senna 2 Tablet(s) Oral at bedtime    MEDICATIONS  (PRN):  acetaminophen     Tablet .. 650 milliGRAM(s) Oral every 6 hours PRN Temp greater or equal to 38C (100.4F), Mild Pain (1 - 3)  aluminum hydroxide/magnesium hydroxide/simethicone Suspension 30 milliLiter(s) Oral every 4 hours PRN Dyspepsia  dextrose Oral Gel 15 Gram(s) Oral once PRN Blood Glucose LESS THAN 70 milliGRAM(s)/deciliter  melatonin 3 milliGRAM(s) Oral at bedtime PRN Insomnia  ondansetron Injectable 4 milliGRAM(s) IV Push every 8 hours PRN Nausea and/or Vomiting      Allergies:   No Known Allergies  Intolerances          REVIEW OF SYSTEMS:  General:  No weight loss, fevers, or chills.  Eyes:  No reported pain or visual changes  ENT:  No sore throat or runny nose.  NECK: No stiffness   CV:  No chest pain or palpitations.  Resp:  No shortness of breath, cough  GI:  No abdominal pain, nausea, vomiting, dysphagia, diarrhea or constipation. No rectal bleeding, melena, or hematemesis.  Neuro:  No tingling, numbness         VITAL SIGNS:   T(F): 98.4 (03-07-25 @ 13:23), Max: 98.4 (03-07-25 @ 13:23)  HR: 52 (03-07-25 @ 13:23) (52 - 77)  BP: 99/58 (03-07-25 @ 13:23) (99/58 - 167/89)  RR: 16 (03-07-25 @ 13:23) (16 - 18)  SpO2: 95% (03-07-25 @ 13:23) (95% - 96%)    PHYSICAL EXAM:  GENERAL: AAOx2 to person and place not time, no acute distress.  HEAD:  Atraumatic, Normocephalic  EYES: conjunctiva and sclera clear  NECK: Supple, no thyromegaly  CHEST/LUNG: Clear to auscultation bilaterally; No wheeze, rhonchi, or rales  HEART: Regular rate and rhythm; normal S1, S2, No murmurs.  ABDOMEN: Soft, nontender, +distended; Bowel sounds present  NEUROLOGY: No asterixis or tremor.   SKIN: Intact, no jaundice            LABS:                        12.0   7.84  )-----------( 164      ( 06 Mar 2025 06:59 )             37.3     03-06    139  |  104  |  18  ----------------------------<  102[H]  4.1   |  25  |  1.7[H]    Ca    8.7      06 Mar 2025 06:59    TPro  6.7  /  Alb  3.6  /  TBili  0.6  /  DBili  x   /  AST  22  /  ALT  18  /  AlkPhos  90  03-06    LIVER FUNCTIONS - ( 06 Mar 2025 06:59 )  Alb: 3.6 g/dL / Pro: 6.7 g/dL / ALK PHOS: 90 U/L / ALT: 18 U/L / AST: 22 U/L / GGT: x               IMAGING:    < from: Xray Kidney Ureter Bladder (03.06.25 @ 18:48) >    ACC: [id number] EXAM:  XR KUB 1 VIEW   ORDERED BY: JODIE MAS     PROCEDURE DATE:  03/06/2025          INTERPRETATION:  Clinical Indication: Constipation.    Technique: Supine views of the abdomen.    Comparison: Abdominal radiograph 3/5/2025.    Findings:    Catheters and tubes: None  Bowel gas pattern: Nonspecific colonic gaseous distention. Moderate to   large rectal stool burden.  Calcifications: Surgical clips are noted in the left upper abdomen.  Skeletal: Degenerative changes.  Other: None.    IMPRESSION:    Nonspecific colonic gaseous distention.    Moderate to large rectal stool burden.    --- End of Report ---          BIBIANA BOB MD; Resident Radiologist  This document has been electronically signed.  CHYNA BARILLAS MD; Attending Radiologist  This document has been electronically signed. Mar  7 2025 10:56AM    < end of copied text >      < from: CT Abdomen and Pelvis w/ IV Cont (03.04.25 @ 15:40) >    ACC: [id number] EXAM:  CT ABDOMEN AND PELVIS IC   ORDERED BY: LUZ MARINA GOMEZ     PROCEDURE DATE:  03/04/2025          INTERPRETATION:  CLINICAL STATEMENT: Abdominal distention    TECHNIQUE: Contiguous axial CT images were obtained from the lower chest   to the pubic symphysis .  95 cc administered of Omnipaque 350 (5 cc   discarded).  Oral contrast was not given.  Reformatted images in the   coronal and sagittal planes were acquired.    COMPARISON CT: 12/9/2024    Study is limited by motion artifact      FINDINGS:    LOWER CHEST: Atelectasis. There is a stable 7 mm nodule in the right   lower lobe in image 19 of series 301. Follow-up as per Fleischner Society   guidelines..    HEPATOBILIARY: Gallbladder wall nodular thickening on image 34 series   301. Further evaluation with right upper quadrant ultrasound is   recommended. Subcentimeter hepatic hypodensities, too small to   characterize.    SPLEEN: Unremarkable..    PANCREAS: Unremarkable..    ADRENAL GLANDS: Nonspecific nodularity left adrenal gland. The right   adrenal gland is unremarkable.    KIDNEYS: There is an indeterminate right mid pole nonspecific hypodensity   in image 54 series 301 measuring 1.5 to 1.5 cm, possibly related to   motion artifact. Renal neoplasm is not excluded. An outpatient repeat CT   renal mass protocol is recommended for further evaluation. There are   bilateral subcentimeter renal hypodensities, too small to characterize.   There is no hydronephrosis.    ABDOMINOPELVIC NODES: Unremarkable...    PELVICORGANS: Enlarged prostate    PERITONEUM/MESENTERY/BOWEL: Bilateral fat-containing inguinal hernias..   There is diverticulosis.. The appendix is unremarkable. There is wall   thickening of the rectum with fecal impaction, findings which can be seen   in stercoral colitis. There is no free air or obstruction..    BONES: Status post sternotomy with postsurgical change. Degenerative   change...    IMPRESSION:    Wall thickening the rectum with fecal impaction, findings which can be   seen in stercoral colitis.    Gallbladder wall nodular thickening and further evaluation with right   upper quadrant ultrasound is recommended.     Indeterminate right mid pole nonspecific renal hypodensity measuring 1.5   cm, possibly related to motion artifact. Renal neoplasm is not excluded.   Outpatient repeat CT renal mass protocol is recommended for further   evaluation.    Stable right lower lobe 7 mm pulmonary nodule. Follow-up as per   Fleischner Society guidelines.    --- End of Report ---            ROSALINE BRIDGES MD; Attending Radiologist  This document has been electronically signed. Mar  4 2025  4:22PM    < end of copied text >

## 2025-03-07 NOTE — DISCHARGE NOTE NURSING/CASE MANAGEMENT/SOCIAL WORK - FINANCIAL ASSISTANCE
[facility] provides services at a reduced cost to those who are determined to be eligible through [facility]’s financial assistance program. Information regarding [facility]’s financial assistance program can be found by going to [email or web address] or by calling [phone].

## 2025-03-07 NOTE — PROGRESS NOTE ADULT - ASSESSMENT
84-year-old male with past medical history of DM, hypertension, hyperlipidemia, GERD, dementia, CAD status post CABG, CHF on furosemide, presents for increasing abdominal distention and constipation over the past day.   Daughter states patient had a bowel movement yesterday although less than usual.  Has noticed increasing size in his abdomen.  In addition has been noticing increasing gradual bilateral leg swelling over the past several months.  Patient denies any chest pain, shortness of breath, nausea/vomiting, lightheadedness, diaphoresis, abdominal pain, urinary symptoms.  Daughter states pt is low activity level, typically from living room to bedroom.        # constipation  - Had several BM, feels better  - S/P Fecal Disimpaction (in ED)  - Bowel regimen  -repeat KUB, GI follow up appreciated    #indeterminant right mid pole renal hypodensity (cannot r/o neoplasm) on CT + iv contrast  -outpatient CT/MRI    # HTN  - vs  - lowered metoprolol to 25 mg daily     # Dementia  - c/w home med    # GERD  - c/w home med    # DM  - FS with SS

## 2025-03-07 NOTE — DISCHARGE NOTE NURSING/CASE MANAGEMENT/SOCIAL WORK - NSDCVIVACCINE_GEN_ALL_CORE_FT
Tdap; 26-Oct-2020 14:23; Ngoc Naylor (RN); [employer]; [id number] (Exp. Date: 31-Jul-2022); IntraMuscular; Deltoid Left.; 0.5 milliLiter(s); VIS (VIS Published: 09-May-2013, VIS Presented: 26-Oct-2020);   Tdap; 02-Oct-2021 19:12; Herminia Robertson (RN); [employer]; [id number] (Exp. Date: 15-Jul-2023); IntraMuscular; Deltoid Left.; 0.5 milliLiter(s); VIS (VIS Published: 09-May-2013, VIS Presented: 02-Oct-2021);

## 2025-03-07 NOTE — DISCHARGE NOTE NURSING/CASE MANAGEMENT/SOCIAL WORK - PATIENT PORTAL LINK FT
You can access the FollowMyHealth Patient Portal offered by [facility] by registering at the following website: [email or web address]. By joining [employer]’s FollowMyHealth portal, you will also be able to view your health information using other applications (apps) compatible with our system.

## 2025-03-13 DIAGNOSIS — I50.9 HEART FAILURE, UNSPECIFIED: ICD-10-CM

## 2025-03-13 DIAGNOSIS — R00.1 BRADYCARDIA, UNSPECIFIED: ICD-10-CM

## 2025-03-13 DIAGNOSIS — Z79.82 LONG TERM (CURRENT) USE OF ASPIRIN: ICD-10-CM

## 2025-03-13 DIAGNOSIS — K57.90 DIVERTICULOSIS OF INTESTINE, PART UNSPECIFIED, WITHOUT PERFORATION OR ABSCESS WITHOUT BLEEDING: ICD-10-CM

## 2025-03-13 DIAGNOSIS — K52.9 NONINFECTIVE GASTROENTERITIS AND COLITIS, UNSPECIFIED: ICD-10-CM

## 2025-03-13 DIAGNOSIS — K56.41 FECAL IMPACTION: ICD-10-CM

## 2025-03-13 DIAGNOSIS — Z95.1 PRESENCE OF AORTOCORONARY BYPASS GRAFT: ICD-10-CM

## 2025-03-13 DIAGNOSIS — E11.22 TYPE 2 DIABETES MELLITUS WITH DIABETIC CHRONIC KIDNEY DISEASE: ICD-10-CM

## 2025-03-13 DIAGNOSIS — K40.90 UNILATERAL INGUINAL HERNIA, WITHOUT OBSTRUCTION OR GANGRENE, NOT SPECIFIED AS RECURRENT: ICD-10-CM

## 2025-03-13 DIAGNOSIS — I25.10 ATHEROSCLEROTIC HEART DISEASE OF NATIVE CORONARY ARTERY WITHOUT ANGINA PECTORIS: ICD-10-CM

## 2025-03-13 DIAGNOSIS — K21.9 GASTRO-ESOPHAGEAL REFLUX DISEASE WITHOUT ESOPHAGITIS: ICD-10-CM

## 2025-03-13 DIAGNOSIS — N18.30 CHRONIC KIDNEY DISEASE, STAGE 3 UNSPECIFIED: ICD-10-CM

## 2025-03-23 NOTE — DISCHARGE NOTE PROVIDER - NSDCMRMEDTOKEN_GEN_ALL_CORE_FT
Private car aspirin 81 mg oral tablet, chewable: 1 tab(s) orally once a day  atorvastatin 40 mg oral tablet: 1 tab(s) orally once a day (at bedtime)  donepezil 10 mg oral tablet: 1 tab(s) orally once a day (at bedtime)  famotidine 40 mg oral tablet: 1 tab(s) orally once a day  folic acid 1 mg oral tablet: 1 tab(s) orally once a day  memantine 10 mg oral tablet: 1 tab(s) orally once a day  metoprolol succinate 50 mg oral tablet, extended release: 1 tab(s) orally once a day  Senna 8.6 mg oral tablet: 2 tab(s) orally once a day (at bedtime)

## 2025-03-24 ENCOUNTER — APPOINTMENT (OUTPATIENT)
Dept: CARDIOLOGY | Facility: CLINIC | Age: 85
End: 2025-03-24
Payer: MEDICARE

## 2025-03-24 ENCOUNTER — NON-APPOINTMENT (OUTPATIENT)
Age: 85
End: 2025-03-24

## 2025-03-24 ENCOUNTER — RESULT CHARGE (OUTPATIENT)
Age: 85
End: 2025-03-24

## 2025-03-24 VITALS
SYSTOLIC BLOOD PRESSURE: 119 MMHG | WEIGHT: 194 LBS | HEART RATE: 68 BPM | HEIGHT: 64 IN | BODY MASS INDEX: 33.12 KG/M2 | DIASTOLIC BLOOD PRESSURE: 78 MMHG

## 2025-03-24 DIAGNOSIS — I10 ESSENTIAL (PRIMARY) HYPERTENSION: ICD-10-CM

## 2025-03-24 DIAGNOSIS — E78.00 PURE HYPERCHOLESTEROLEMIA, UNSPECIFIED: ICD-10-CM

## 2025-03-24 DIAGNOSIS — R60.9 EDEMA, UNSPECIFIED: ICD-10-CM

## 2025-03-24 DIAGNOSIS — I50.9 HEART FAILURE, UNSPECIFIED: ICD-10-CM

## 2025-03-24 DIAGNOSIS — Z95.1 PRESENCE OF AORTOCORONARY BYPASS GRAFT: ICD-10-CM

## 2025-03-24 DIAGNOSIS — I50.32 CHRONIC DIASTOLIC (CONGESTIVE) HEART FAILURE: ICD-10-CM

## 2025-03-24 DIAGNOSIS — I70.90 UNSPECIFIED ATHEROSCLEROSIS: ICD-10-CM

## 2025-03-24 PROBLEM — E11.9 TYPE 2 DIABETES MELLITUS WITHOUT COMPLICATIONS: Chronic | Status: ACTIVE | Noted: 2025-03-04

## 2025-03-24 PROCEDURE — 93000 ELECTROCARDIOGRAM COMPLETE: CPT

## 2025-03-24 PROCEDURE — G2211 COMPLEX E/M VISIT ADD ON: CPT

## 2025-03-24 PROCEDURE — 99214 OFFICE O/P EST MOD 30 MIN: CPT

## 2025-03-24 RX ORDER — LINAGLIPTIN 5 MG/1
5 TABLET, FILM COATED ORAL
Refills: 0 | Status: ACTIVE | COMMUNITY

## 2025-03-24 RX ORDER — MV-MIN/FOLIC/VIT K/LYCOP/COQ10 200-100MCG
CAPSULE ORAL
Refills: 0 | Status: ACTIVE | COMMUNITY

## 2025-03-24 RX ORDER — CHROMIUM 200 MCG
TABLET ORAL
Refills: 0 | Status: ACTIVE | COMMUNITY

## 2025-03-24 RX ORDER — CYANOCOBALAMIN (VITAMIN B-12) 1000 MCG
TABLET ORAL
Refills: 0 | Status: ACTIVE | COMMUNITY

## 2025-05-10 NOTE — PATIENT PROFILE ADULT - NSPRONUTRITIONRISK_GEN_A_NUR
ED Attending Physician Note      Patient : Dagmar Macario Age: 22 year old Sex: female   MRN: [id number] Encounter Date: 5/9/2025    Participated in patient history, physical, MDM.  I evaluated the patient, discussed with the resident or mid-level as applicable, and agree with the findings and plan as documented in the resident or mid-level's note.  I supervised all resident/mid-level procedures.    History     Chief Complaint   Patient presents with    Abdominal Pain     Left sided, onset 1hr pta, denies N/V/D       Abdominal Pain  The primary symptoms of the illness include abdominal pain.       PAST HISTORY         Past Medical History:   Diagnosis Date    Asthma (CMD)     Elevated blood-pressure reading without diagnosis of hypertension 06/01/2021    Iron deficiency anemia due to chronic blood loss 01/23/2020    Missed period 06/01/2021    Wart 04/14/2021    Past Surgical History:   Procedure Laterality Date    NO PAST SURGERIES          Additional PMH (also as noted in hpi & pmh section):  [] Denies PMH  [] As Above Additional PSH (also as noted in hpi & PSH section) :  [] Denies PSH  [] Surgeries:   [] As above          Social History     Tobacco Use    Smoking status: Never     Passive exposure: Never    Smokeless tobacco: Never   Vaping Use    Vaping status: never used   Substance Use Topics    Alcohol use: Never    Drug use: Never    Family History   Problem Relation Age of Onset    Thyroid Mother     Patient is unaware of any medical problems Father     Patient is unaware of any medical problems Sister     Patient is unaware of any medical problems Sister     Patient is unaware of any medical problems Maternal Grandmother     Patient is unaware of any medical problems Maternal Grandfather     Patient is unaware of any medical problems Paternal Grandmother     Patient is unaware of any medical problems Paternal Grandfather        Additional SH:  [] Denies etoh [] Social etoh[] daily etoh  [] Denies tob []  Smoker   [] Denies illicit substances  [] Cocaine   [] Marijuana   [] Heroin  [] Lives at home  [] Lives in nursing home / care facility  [] Lives in assisted living / group home Additional FH:          Prior to Admission medications    Medication Sig Start Date End Date Taking? Authorizing Provider   norgestimate-ethinyl estradiol, triphasic, (ORTHO TRI-CYCLEN) 0.18/0.215/0.25 MG-35 MCG tablet Take 1 tablet by mouth daily. 12/10/24   Sara Raya CNM   norgestimate-ethinyl estradiol, triphasic, (ORTHO TRI-CYCLEN) 0.18/0.215/0.25 MG-35 MCG tablet Take 1 tablet by mouth daily. 9/10/24 12/3/24  Sara Raya CNM   albuterol 108 (90 Base) MCG/ACT inhaler Inhale 2 puffs into the lungs every 4 hours as needed for Shortness of Breath or Wheezing. 8/1/24   Srikanth Fields MD   CRANBERRY PO     Provider, Outside   norethindrone-eth estradiol & eth estradiol & ferrous fum (Lo Loestrin Fe) 1 MG-10 MCG / 10 MCG tablet Take 1 tablet by mouth daily.  Patient not taking: Reported on 9/10/2024 9/14/23 12/7/23  Sara Raya CNM   ferrous sulfate 325 (65 FE) MG tablet Take 1 tablet by mouth 3 times daily (with meals). 1/23/20   Thaddeus Umaña MD     No Known Allergies      Review of Systems   Gastrointestinal:  Positive for abdominal pain.       Physical Exam     ED Triage Vitals [05/09/25 1933]   ED Triage Vitals Group      Temp 96.6 °F (35.9 °C)      Heart Rate 92      Resp 20      /84      SpO2 100 %      EtCO2 mmHg       Height       Weight       Weight Scale Used       BMI (Calculated)       IBW/kg (Calculated)        Physical Exam    ED Course     Procedures    Lab Results     Results for orders placed or performed during the hospital encounter of 05/09/25   Comprehensive Metabolic Panel    Specimen: Blood, Venous   Result Value Ref Range    Fasting Status      Sodium 138 135 - 145 mmol/L    Potassium 3.3 (L) 3.4 - 5.1 mmol/L    Chloride 108 97 - 110 mmol/L    Carbon Dioxide 25 21 - 32 mmol/L    Anion Gap  8 7 - 19 mmol/L    Glucose 109 (H) 70 - 99 mg/dL    BUN 11 6 - 20 mg/dL    Creatinine 0.81 0.51 - 0.95 mg/dL    Glomerular Filtration Rate >90 >=60    BUN/Cr 14 7 - 25    Calcium 9.2 8.4 - 10.2 mg/dL    Bilirubin, Total 0.3 0.2 - 1.0 mg/dL    GOT/AST 10 <=37 Units/L    GPT/ALT 15 <64 Units/L    Alkaline Phosphatase 101 45 - 117 Units/L    Albumin 3.5 3.4 - 5.0 g/dL    Protein, Total 8.4 (H) 6.4 - 8.2 g/dL    Globulin 4.9 (H) 2.0 - 4.0 g/dL    A/G Ratio 0.7 (L) 1.0 - 2.4   Lipase    Specimen: Blood, Venous   Result Value Ref Range    Lipase 30 15 - 77 Units/L   Urinalysis & Reflex Microscopy With Culture If Indicated    Specimen: Urine clean catch   Result Value Ref Range    COLOR, URINALYSIS Straw     APPEARANCE, URINALYSIS Clear     GLUCOSE, URINALYSIS Negative Negative mg/dL    BILIRUBIN, URINALYSIS Negative Negative    KETONES, URINALYSIS 15 (A) Negative mg/dL    SPECIFIC GRAVITY, URINALYSIS >1.030 (H) 1.005 - 1.030    OCCULT BLOOD, URINALYSIS Small (A) Negative    PH, URINALYSIS 6.0 5.0 - 7.0    PROTEIN, URINALYSIS 30 (A) Negative mg/dL    UROBILINOGEN, URINALYSIS 0.2 0.2, 1.0 mg/dL    NITRITE, URINALYSIS Negative Negative    LEUKOCYTE ESTERASE, URINALYSIS Negative Negative    SQUAMOUS EPITHELIAL, URINALYSIS 1 to 5 None Seen, 1 to 5 /hpf    ERYTHROCYTES, URINALYSIS 1 to 2 None Seen, 1 to 2 /hpf    LEUKOCYTES, URINALYSIS 1 to 5 None Seen, 1 to 5 /hpf    BACTERIA, URINALYSIS Few (A) None Seen /hpf    HYALINE CASTS, URINALYSIS None Seen None Seen, 1 to 5 /lpf    MUCUS Present    Beta HCG Quantitative Pregnancy    Specimen: Blood, Venous   Result Value Ref Range    HCG, Quantitative <3 <=4 mUnits/mL   CBC with Automated Differential (performable only)    Specimen: Blood, Venous   Result Value Ref Range    WBC 11.3 (H) 4.2 - 11.0 K/mcL    RBC 4.76 4.00 - 5.20 mil/mcL    HGB 9.3 (L) 12.0 - 15.5 g/dL    HCT 33.2 (L) 36.0 - 46.5 %    MCV 69.7 (L) 78.0 - 100.0 fl    MCH 19.5 (L) 26.0 - 34.0 pg    MCHC 28.0 (L) 32.0 -  36.5 g/dL    RDW-CV 19.6 (H) 11.0 - 15.0 %    RDW-SD 47.8 39.0 - 50.0 fL     140 - 450 K/mcL    NRBC 0 <=0 /100 WBC    Neutrophil, Percent 81 %    Lymphocytes, Percent 13 %    Mono, Percent 6 %    Eosinophils, Percent 0 %    Basophils, Percent 0 %    Immature Granulocytes 0 %    Absolute Neutrophils 9.1 (H) 1.8 - 7.7 K/mcL    Absolute Lymphocytes 1.4 1.0 - 4.8 K/mcL    Absolute Monocytes 0.7 0.3 - 0.9 K/mcL    Absolute Eosinophils  0.0 0.0 - 0.5 K/mcL    Absolute Basophils 0.0 0.0 - 0.3 K/mcL    Absolute Immature Granulocytes 0.1 0.0 - 0.2 K/mcL       EKG Results         Radiology Results     Imaging Results    None         ED Course as of 05/10/25 0104   Sat May 10, 2025   0056 EKG is sinus 69, nl intervals, nl axis, no ALEXEI or STD. No stemi. [ES]      ED Course User Index  [ES] Vani Kuhn DO       Medical Decision Making  Impression: 22-year-old here with left upper quadrant and epigastric abdominal pain, no lower abdominal tenderness, no vaginal symptoms, no urinary symptoms, unclear etiology possibly gastritis, pancreatitis, less likely diverticulitis, pain initially was 10 out of 10 and currently is a 5 out of 10, will treat with GI cocktail, Pepcid, and if she feels better likely discharge home.    Amount and/or Complexity of Data Reviewed  Labs: ordered.  Radiology: ordered.  ECG/medicine tests: ordered and independent interpretation performed.     Details: Independently reviewed EKG showing normal sinus rhythm, rate of 69, no ST changes         ED Medications     ED Medication Orders (From admission, onward)      Ordered Start     Status Ordering Provider    05/09/25 1934 05/09/25 1935  ondansetron (ZOFRAN ODT) disintegrating tablet 4 mg  ONCE         Last MAR action: Given NORY, RICK S    05/09/25 1934 05/09/25 1935  acetaminophen (TYLENOL) tablet 650 mg  ONCE         Last MAR action: Given NORY, RICK S          Medications   famotidine (PEPCID) tablet 20 mg (has no administration in  time range)   alum-mag hydroxide+simethicone/lidocaine viscous (2:1) (MAGIC MOUTHWASH/GI COCKTAIL) (compounded) oral suspension 15 mL (has no administration in time range)   ondansetron (ZOFRAN ODT) disintegrating tablet 4 mg (4 mg Oral Given 5/9/25 1935)   acetaminophen (TYLENOL) tablet 650 mg (650 mg Oral Given 5/9/25 1935)     Administrations This Visit       acetaminophen (TYLENOL) tablet 650 mg       Admin Date  05/09/2025 Action  Given Dose  650 mg Route  Oral Documented By  Joyce Sinha RN              ondansetron (ZOFRAN ODT) disintegrating tablet 4 mg       Admin Date  05/09/2025 Action  Given Dose  4 mg Route  Oral Documented By  Joyce Sinha RN                     Clinical Impression     ED Diagnosis   1. Abdominal pain, unspecified abdominal location            Disposition        Current Discharge Medication List        Prior to Admission Medications    Details   norgestimate-ethinyl estradiol, triphasic, (ORTHO TRI-CYCLEN) 0.18/0.215/0.25 MG-35 MCG tablet Take 1 tablet by mouth daily.  Qty: 84 tablet, Refills: 3      albuterol 108 (90 Base) MCG/ACT inhaler Inhale 2 puffs into the lungs every 4 hours as needed for Shortness of Breath or Wheezing.  Qty: 1 each, Refills: 1      CRANBERRY PO       norethindrone-eth estradiol & eth estradiol & ferrous fum (Lo Loestrin Fe) 1 MG-10 MCG / 10 MCG tablet Take 1 tablet by mouth daily.  Qty: 84 tablet, Refills: 3      ferrous sulfate 325 (65 FE) MG tablet Take 1 tablet by mouth 3 times daily (with meals).  Qty: 90 tablet, Refills: 3             Current Discharge Medication List          There is no disposition no dispo time  There is no comment      Iveth Howe MD   5/10/2025 1:02 AM                        Iveth Howe MD  05/10/25 0105     No indicators present

## 2025-05-23 NOTE — PATIENT PROFILE ADULT - OVER THE PAST TWO WEEKS HAVE YOU FELT DOWN, DEPRESSED OR HOPELESS?
Has been very sad for you very sad for you since he is gone I A few is going yeah you know okay  
no

## 2025-06-27 ENCOUNTER — INPATIENT (INPATIENT)
Facility: HOSPITAL | Age: 85
LOS: 3 days | Discharge: HOME | DRG: 864 | End: 2025-07-01
Attending: STUDENT IN AN ORGANIZED HEALTH CARE EDUCATION/TRAINING PROGRAM | Admitting: STUDENT IN AN ORGANIZED HEALTH CARE EDUCATION/TRAINING PROGRAM
Payer: MEDICARE

## 2025-06-27 DIAGNOSIS — Z95.1 PRESENCE OF AORTOCORONARY BYPASS GRAFT: Chronic | ICD-10-CM

## 2025-06-27 PROCEDURE — 99285 EMERGENCY DEPT VISIT HI MDM: CPT | Mod: FS

## 2025-06-27 NOTE — ED ADULT TRIAGE NOTE - INTERNATIONAL TRAVEL
"/83   Pulse 83   Temp 37.3 °C (99.2 °F) (Temporal)   Resp 16   Ht 1.6 m (5' 3\")   Wt 57.8 kg (127 lb 6.8 oz)   LMP 06/10/2025 (Approximate)   SpO2 99%   BMI 22.57 kg/m²   Chief Complaint   Patient presents with    Vaginal Discharge     Thinking she has BV or something else  has Hx of such  about 2 weeks ago had same  was given 7 day course med which it helped   has returned having vag bleeding and \"feels like I have a thousand knife cut's down there\"       Comes in by herself     " No

## 2025-06-28 VITALS
HEART RATE: 74 BPM | DIASTOLIC BLOOD PRESSURE: 68 MMHG | OXYGEN SATURATION: 98 % | WEIGHT: 199.08 LBS | SYSTOLIC BLOOD PRESSURE: 146 MMHG | RESPIRATION RATE: 18 BRPM | HEIGHT: 66 IN | TEMPERATURE: 100 F

## 2025-06-28 DIAGNOSIS — R50.9 FEVER, UNSPECIFIED: ICD-10-CM

## 2025-06-28 LAB
ALBUMIN SERPL ELPH-MCNC: 4.3 G/DL — SIGNIFICANT CHANGE UP (ref 3.5–5.2)
ALP SERPL-CCNC: 105 U/L — SIGNIFICANT CHANGE UP (ref 30–115)
ALT FLD-CCNC: 17 U/L — SIGNIFICANT CHANGE UP (ref 0–41)
AMMONIA BLD-MCNC: 17 UMOL/L — SIGNIFICANT CHANGE UP (ref 11–55)
ANION GAP SERPL CALC-SCNC: 13 MMOL/L — SIGNIFICANT CHANGE UP (ref 7–14)
APPEARANCE UR: CLEAR — SIGNIFICANT CHANGE UP
APTT BLD: 32.7 SEC — SIGNIFICANT CHANGE UP (ref 27–39.2)
AST SERPL-CCNC: 20 U/L — SIGNIFICANT CHANGE UP (ref 0–41)
BASOPHILS # BLD AUTO: 0.02 K/UL — SIGNIFICANT CHANGE UP (ref 0–0.2)
BASOPHILS NFR BLD AUTO: 0.2 % — SIGNIFICANT CHANGE UP (ref 0–2)
BILIRUB SERPL-MCNC: 0.7 MG/DL — SIGNIFICANT CHANGE UP (ref 0.2–1.2)
BILIRUB UR-MCNC: NEGATIVE — SIGNIFICANT CHANGE UP
BUN SERPL-MCNC: 19 MG/DL — SIGNIFICANT CHANGE UP (ref 10–20)
CALCIUM SERPL-MCNC: 9.4 MG/DL — SIGNIFICANT CHANGE UP (ref 8.4–10.5)
CHLORIDE SERPL-SCNC: 98 MMOL/L — SIGNIFICANT CHANGE UP (ref 98–110)
CO2 SERPL-SCNC: 25 MMOL/L — SIGNIFICANT CHANGE UP (ref 17–32)
COLOR SPEC: YELLOW — SIGNIFICANT CHANGE UP
CREAT SERPL-MCNC: 1.6 MG/DL — HIGH (ref 0.7–1.5)
DIFF PNL FLD: ABNORMAL
EGFR: 42 ML/MIN/1.73M2 — LOW
EGFR: 42 ML/MIN/1.73M2 — LOW
EOSINOPHIL # BLD AUTO: 0.06 K/UL — SIGNIFICANT CHANGE UP (ref 0–0.5)
EOSINOPHIL NFR BLD AUTO: 0.6 % — SIGNIFICANT CHANGE UP (ref 0–6)
FLUAV AG NPH QL: SIGNIFICANT CHANGE UP
FLUBV AG NPH QL: SIGNIFICANT CHANGE UP
GAS PNL BLDV: SIGNIFICANT CHANGE UP
GLUCOSE BLDC GLUCOMTR-MCNC: 106 MG/DL — HIGH (ref 70–99)
GLUCOSE BLDC GLUCOMTR-MCNC: 109 MG/DL — HIGH (ref 70–99)
GLUCOSE BLDC GLUCOMTR-MCNC: 111 MG/DL — HIGH (ref 70–99)
GLUCOSE SERPL-MCNC: 114 MG/DL — HIGH (ref 70–99)
GLUCOSE UR QL: NEGATIVE MG/DL — SIGNIFICANT CHANGE UP
HCT VFR BLD CALC: 40.3 % — SIGNIFICANT CHANGE UP (ref 39–50)
HGB BLD-MCNC: 13.1 G/DL — SIGNIFICANT CHANGE UP (ref 13–17)
IMM GRANULOCYTES # BLD AUTO: 0.02 K/UL — SIGNIFICANT CHANGE UP (ref 0–0.07)
IMM GRANULOCYTES NFR BLD AUTO: 0.2 % — SIGNIFICANT CHANGE UP (ref 0–0.9)
INR BLD: 1.06 RATIO — SIGNIFICANT CHANGE UP (ref 0.65–1.3)
KETONES UR QL: NEGATIVE MG/DL — SIGNIFICANT CHANGE UP
LACTATE SERPL-SCNC: 1.9 MMOL/L — SIGNIFICANT CHANGE UP (ref 0.7–2)
LEUKOCYTE ESTERASE UR-ACNC: NEGATIVE — SIGNIFICANT CHANGE UP
LYMPHOCYTES # BLD AUTO: 0.75 K/UL — LOW (ref 1–3.3)
LYMPHOCYTES NFR BLD AUTO: 7.8 % — LOW (ref 13–44)
MCHC RBC-ENTMCNC: 28.6 PG — SIGNIFICANT CHANGE UP (ref 27–34)
MCHC RBC-ENTMCNC: 32.5 G/DL — SIGNIFICANT CHANGE UP (ref 32–36)
MCV RBC AUTO: 88 FL — SIGNIFICANT CHANGE UP (ref 80–100)
MONOCYTES # BLD AUTO: 0.93 K/UL — HIGH (ref 0–0.9)
MONOCYTES NFR BLD AUTO: 9.7 % — SIGNIFICANT CHANGE UP (ref 2–14)
NEUTROPHILS # BLD AUTO: 7.83 K/UL — HIGH (ref 1.8–7.4)
NEUTROPHILS NFR BLD AUTO: 81.5 % — HIGH (ref 43–77)
NITRITE UR-MCNC: NEGATIVE — SIGNIFICANT CHANGE UP
NRBC # BLD AUTO: 0 K/UL — SIGNIFICANT CHANGE UP (ref 0–0)
NRBC # FLD: 0 K/UL — SIGNIFICANT CHANGE UP (ref 0–0)
NRBC BLD AUTO-RTO: 0 /100 WBCS — SIGNIFICANT CHANGE UP (ref 0–0)
PH UR: 7 — SIGNIFICANT CHANGE UP (ref 5–8)
PLATELET # BLD AUTO: 162 K/UL — SIGNIFICANT CHANGE UP (ref 150–400)
PMV BLD: 11.3 FL — SIGNIFICANT CHANGE UP (ref 7–13)
POTASSIUM SERPL-MCNC: 4.6 MMOL/L — SIGNIFICANT CHANGE UP (ref 3.5–5)
POTASSIUM SERPL-SCNC: 4.6 MMOL/L — SIGNIFICANT CHANGE UP (ref 3.5–5)
PROT SERPL-MCNC: 7.9 G/DL — SIGNIFICANT CHANGE UP (ref 6–8)
PROT UR-MCNC: SIGNIFICANT CHANGE UP MG/DL
PROTHROM AB SERPL-ACNC: 12.5 SEC — SIGNIFICANT CHANGE UP (ref 9.95–12.87)
RBC # BLD: 4.58 M/UL — SIGNIFICANT CHANGE UP (ref 4.2–5.8)
RBC # FLD: 13.8 % — SIGNIFICANT CHANGE UP (ref 10.3–14.5)
RSV RNA NPH QL NAA+NON-PROBE: SIGNIFICANT CHANGE UP
SARS-COV-2 RNA SPEC QL NAA+PROBE: SIGNIFICANT CHANGE UP
SODIUM SERPL-SCNC: 136 MMOL/L — SIGNIFICANT CHANGE UP (ref 135–146)
SOURCE RESPIRATORY: SIGNIFICANT CHANGE UP
SP GR SPEC: >1.03 — HIGH (ref 1–1.03)
UROBILINOGEN FLD QL: 0.2 MG/DL — SIGNIFICANT CHANGE UP (ref 0.2–1)
WBC # BLD: 9.61 K/UL — SIGNIFICANT CHANGE UP (ref 3.8–10.5)
WBC # FLD AUTO: 9.61 K/UL — SIGNIFICANT CHANGE UP (ref 3.8–10.5)

## 2025-06-28 PROCEDURE — 80053 COMPREHEN METABOLIC PANEL: CPT

## 2025-06-28 PROCEDURE — 83735 ASSAY OF MAGNESIUM: CPT

## 2025-06-28 PROCEDURE — 97162 PT EVAL MOD COMPLEX 30 MIN: CPT | Mod: GP

## 2025-06-28 PROCEDURE — 99223 1ST HOSP IP/OBS HIGH 75: CPT | Mod: FS,AI

## 2025-06-28 PROCEDURE — 76770 US EXAM ABDO BACK WALL COMP: CPT | Mod: 26

## 2025-06-28 PROCEDURE — 93010 ELECTROCARDIOGRAM REPORT: CPT

## 2025-06-28 PROCEDURE — 70450 CT HEAD/BRAIN W/O DYE: CPT | Mod: 26

## 2025-06-28 PROCEDURE — 85025 COMPLETE CBC W/AUTO DIFF WBC: CPT

## 2025-06-28 PROCEDURE — 70487 CT MAXILLOFACIAL W/DYE: CPT | Mod: 26

## 2025-06-28 PROCEDURE — 93970 EXTREMITY STUDY: CPT | Mod: 26

## 2025-06-28 PROCEDURE — 86850 RBC ANTIBODY SCREEN: CPT

## 2025-06-28 PROCEDURE — 70487 CT MAXILLOFACIAL W/DYE: CPT

## 2025-06-28 PROCEDURE — 86900 BLOOD TYPING SEROLOGIC ABO: CPT

## 2025-06-28 PROCEDURE — 71045 X-RAY EXAM CHEST 1 VIEW: CPT | Mod: 26

## 2025-06-28 PROCEDURE — 74177 CT ABD & PELVIS W/CONTRAST: CPT | Mod: 26

## 2025-06-28 PROCEDURE — 97110 THERAPEUTIC EXERCISES: CPT | Mod: GP

## 2025-06-28 PROCEDURE — 93306 TTE W/DOPPLER COMPLETE: CPT

## 2025-06-28 PROCEDURE — 97116 GAIT TRAINING THERAPY: CPT | Mod: GP

## 2025-06-28 PROCEDURE — 83036 HEMOGLOBIN GLYCOSYLATED A1C: CPT

## 2025-06-28 PROCEDURE — 76770 US EXAM ABDO BACK WALL COMP: CPT

## 2025-06-28 PROCEDURE — 82962 GLUCOSE BLOOD TEST: CPT

## 2025-06-28 PROCEDURE — 0225U NFCT DS DNA&RNA 21 SARSCOV2: CPT

## 2025-06-28 PROCEDURE — 93970 EXTREMITY STUDY: CPT

## 2025-06-28 PROCEDURE — 36415 COLL VENOUS BLD VENIPUNCTURE: CPT

## 2025-06-28 PROCEDURE — 86901 BLOOD TYPING SEROLOGIC RH(D): CPT

## 2025-06-28 RX ORDER — INSULIN LISPRO 100 U/ML
INJECTION, SOLUTION INTRAVENOUS; SUBCUTANEOUS
Refills: 0 | Status: DISCONTINUED | OUTPATIENT
Start: 2025-06-28 | End: 2025-07-01

## 2025-06-28 RX ORDER — INSULIN LISPRO 100 U/ML
INJECTION, SOLUTION INTRAVENOUS; SUBCUTANEOUS AT BEDTIME
Refills: 0 | Status: DISCONTINUED | OUTPATIENT
Start: 2025-06-28 | End: 2025-07-01

## 2025-06-28 RX ORDER — GLUCAGON 3 MG/1
1 POWDER NASAL ONCE
Refills: 0 | Status: DISCONTINUED | OUTPATIENT
Start: 2025-06-28 | End: 2025-07-01

## 2025-06-28 RX ORDER — DEXTROSE 50 % IN WATER 50 %
25 SYRINGE (ML) INTRAVENOUS ONCE
Refills: 0 | Status: DISCONTINUED | OUTPATIENT
Start: 2025-06-28 | End: 2025-07-01

## 2025-06-28 RX ORDER — DONEPEZIL HYDROCHLORIDE 5 MG/1
10 TABLET ORAL AT BEDTIME
Refills: 0 | Status: DISCONTINUED | OUTPATIENT
Start: 2025-06-28 | End: 2025-07-01

## 2025-06-28 RX ORDER — ASPIRIN 325 MG
81 TABLET ORAL DAILY
Refills: 0 | Status: DISCONTINUED | OUTPATIENT
Start: 2025-06-28 | End: 2025-07-01

## 2025-06-28 RX ORDER — SODIUM CHLORIDE 9 G/1000ML
1000 INJECTION, SOLUTION INTRAVENOUS
Refills: 0 | Status: DISCONTINUED | OUTPATIENT
Start: 2025-06-28 | End: 2025-07-01

## 2025-06-28 RX ORDER — ATORVASTATIN CALCIUM 80 MG/1
80 TABLET, FILM COATED ORAL AT BEDTIME
Refills: 0 | Status: DISCONTINUED | OUTPATIENT
Start: 2025-06-28 | End: 2025-07-01

## 2025-06-28 RX ORDER — FUROSEMIDE 10 MG/ML
40 INJECTION INTRAMUSCULAR; INTRAVENOUS DAILY
Refills: 0 | Status: DISCONTINUED | OUTPATIENT
Start: 2025-06-28 | End: 2025-07-01

## 2025-06-28 RX ORDER — MEMANTINE HYDROCHLORIDE 21 MG/1
10 CAPSULE, EXTENDED RELEASE ORAL DAILY
Refills: 0 | Status: DISCONTINUED | OUTPATIENT
Start: 2025-06-28 | End: 2025-07-01

## 2025-06-28 RX ORDER — DEXTROSE 50 % IN WATER 50 %
15 SYRINGE (ML) INTRAVENOUS ONCE
Refills: 0 | Status: DISCONTINUED | OUTPATIENT
Start: 2025-06-28 | End: 2025-07-01

## 2025-06-28 RX ORDER — CEFEPIME 2 G/20ML
2000 INJECTION, POWDER, FOR SOLUTION INTRAVENOUS DAILY
Refills: 0 | Status: DISCONTINUED | OUTPATIENT
Start: 2025-06-29 | End: 2025-06-30

## 2025-06-28 RX ORDER — FOLIC ACID 1 MG/1
1 TABLET ORAL DAILY
Refills: 0 | Status: DISCONTINUED | OUTPATIENT
Start: 2025-06-28 | End: 2025-07-01

## 2025-06-28 RX ORDER — HEPARIN SODIUM 1000 [USP'U]/ML
5000 INJECTION INTRAVENOUS; SUBCUTANEOUS EVERY 8 HOURS
Refills: 0 | Status: DISCONTINUED | OUTPATIENT
Start: 2025-06-28 | End: 2025-06-28

## 2025-06-28 RX ORDER — ACETAMINOPHEN 500 MG/5ML
650 LIQUID (ML) ORAL EVERY 6 HOURS
Refills: 0 | Status: DISCONTINUED | OUTPATIENT
Start: 2025-06-28 | End: 2025-07-01

## 2025-06-28 RX ORDER — DEXTROSE 50 % IN WATER 50 %
12.5 SYRINGE (ML) INTRAVENOUS ONCE
Refills: 0 | Status: DISCONTINUED | OUTPATIENT
Start: 2025-06-28 | End: 2025-07-01

## 2025-06-28 RX ORDER — METOPROLOL SUCCINATE 50 MG/1
50 TABLET, EXTENDED RELEASE ORAL DAILY
Refills: 0 | Status: DISCONTINUED | OUTPATIENT
Start: 2025-06-28 | End: 2025-07-01

## 2025-06-28 RX ORDER — MELATONIN 5 MG
3 TABLET ORAL AT BEDTIME
Refills: 0 | Status: DISCONTINUED | OUTPATIENT
Start: 2025-06-28 | End: 2025-07-01

## 2025-06-28 RX ORDER — SODIUM CHLORIDE 9 G/1000ML
1000 INJECTION, SOLUTION INTRAVENOUS ONCE
Refills: 0 | Status: COMPLETED | OUTPATIENT
Start: 2025-06-28 | End: 2025-06-28

## 2025-06-28 RX ORDER — ACETAMINOPHEN 500 MG/5ML
975 LIQUID (ML) ORAL ONCE
Refills: 0 | Status: COMPLETED | OUTPATIENT
Start: 2025-06-28 | End: 2025-06-28

## 2025-06-28 RX ORDER — CEFEPIME 2 G/20ML
2000 INJECTION, POWDER, FOR SOLUTION INTRAVENOUS ONCE
Refills: 0 | Status: COMPLETED | OUTPATIENT
Start: 2025-06-28 | End: 2025-06-28

## 2025-06-28 RX ORDER — SENNA 187 MG
2 TABLET ORAL AT BEDTIME
Refills: 0 | Status: DISCONTINUED | OUTPATIENT
Start: 2025-06-28 | End: 2025-07-01

## 2025-06-28 RX ORDER — FUROSEMIDE 10 MG/ML
40 INJECTION INTRAMUSCULAR; INTRAVENOUS
Refills: 0 | DISCHARGE

## 2025-06-28 RX ORDER — SODIUM CHLORIDE 9 G/1000ML
250 INJECTION, SOLUTION INTRAVENOUS ONCE
Refills: 0 | Status: COMPLETED | OUTPATIENT
Start: 2025-06-28 | End: 2025-06-28

## 2025-06-28 RX ADMIN — Medication 81 MILLIGRAM(S): at 11:56

## 2025-06-28 RX ADMIN — FOLIC ACID 1 MILLIGRAM(S): 1 TABLET ORAL at 11:56

## 2025-06-28 RX ADMIN — Medication 975 MILLIGRAM(S): at 03:29

## 2025-06-28 RX ADMIN — Medication 1 APPLICATION(S): at 12:01

## 2025-06-28 RX ADMIN — MEMANTINE HYDROCHLORIDE 10 MILLIGRAM(S): 21 CAPSULE, EXTENDED RELEASE ORAL at 11:56

## 2025-06-28 RX ADMIN — SODIUM CHLORIDE 1000 MILLILITER(S): 9 INJECTION, SOLUTION INTRAVENOUS at 03:58

## 2025-06-28 RX ADMIN — DONEPEZIL HYDROCHLORIDE 10 MILLIGRAM(S): 5 TABLET ORAL at 21:12

## 2025-06-28 RX ADMIN — CEFEPIME 100 MILLIGRAM(S): 2 INJECTION, POWDER, FOR SOLUTION INTRAVENOUS at 02:20

## 2025-06-28 RX ADMIN — SODIUM CHLORIDE 1000 MILLILITER(S): 9 INJECTION, SOLUTION INTRAVENOUS at 04:10

## 2025-06-28 RX ADMIN — CEFEPIME 2000 MILLIGRAM(S): 2 INJECTION, POWDER, FOR SOLUTION INTRAVENOUS at 07:30

## 2025-06-28 RX ADMIN — Medication 2 TABLET(S): at 21:12

## 2025-06-28 RX ADMIN — SODIUM CHLORIDE 250 MILLILITER(S): 9 INJECTION, SOLUTION INTRAVENOUS at 03:25

## 2025-06-28 RX ADMIN — ATORVASTATIN CALCIUM 80 MILLIGRAM(S): 80 TABLET, FILM COATED ORAL at 21:13

## 2025-06-28 RX ADMIN — Medication 50 MILLILITER(S): at 14:19

## 2025-06-28 RX ADMIN — SODIUM CHLORIDE 250 MILLILITER(S): 9 INJECTION, SOLUTION INTRAVENOUS at 01:25

## 2025-06-28 RX ADMIN — Medication 975 MILLIGRAM(S): at 07:30

## 2025-06-28 NOTE — ED ADULT NURSE NOTE - NSICDXPASTMEDICALHX_GEN_ALL_CORE_FT
PAST MEDICAL HISTORY:  Chronic GERD     Coronary artery disease involving coronary bypass graft of native heart without angina pectoris     Dementia     Diabetes     HLD (hyperlipidemia)     HTN (hypertension)     Prediabetes

## 2025-06-28 NOTE — H&P ADULT - HISTORY OF PRESENT ILLNESS
84 y/o M PMHx DM, HTN, HLD,  GERD, dementia, CAD s/p CABG, CHF on furosemide,  admitted for 1-2 week generalized weakness and 2-3 day h/o change in behavior per pt's daughter, noting pt hasnt been his "usual jovial self"     pt daughter reports no reported n/v, known head trauma/ falls, fevers, abd pain, pain w/ urination, issues w/ chewing/eating, unable to urinate, unable to tolerate PO intake, corroborated by pt during exam      84 y/o M PMHx DM, HTN, HLD,  GERD, dementia, CAD s/p CABG, CHF on furosemide,  admitted for 1-2 week generalized weakness and 2-3 day h/o appearing very sleepy, per pt's daughter, noting pt hasnt been his "usual jovial self". Pt daughter also reporting pt's b/l LE have gradually gotten more swollen over past 3 weeks    pt daughter reports no reported n/v, known head trauma/ falls, fevers, abd pain, pain w/ urination, issues w/ chewing/eating, unable to urinate, unable to tolerate PO intake, corroborated by pt during exam      86 y/o M PMHx DM, HTN, HLD,  GERD, dementia, CAD s/p CABG, CHF on furosemide,  admitted for 1-2 week generalized weakness and 2-3 day h/o appearing very sleepy, per pt's daughter, noting pt hasnt been his "usual jovial self". Pt daughter also reporting pt's b/l LE have gradually gotten more swollen over past 3 weeks    pt daughter reports no reported n/v, known head trauma/ falls, fevers, abd pain, pain w/ urination, issues w/ chewing/eating, constipation,  unable to urinate, unable to tolerate PO intake, corroborated by pt during exam

## 2025-06-28 NOTE — H&P ADULT - NSHPLABSRESULTS_GEN_ALL_CORE
CTAP  PERITONEUM/MESENTERY/BOWEL: No abnormal bowel dilation or wall   thickening. No ascites. No intraperitoneal free air. Unremarkable   appendix.    BONES/SOFT TISSUES: Degenerative changes of the lumbar spine. Median   sternotomy wires.    OTHER: Atherosclerotic calcifications of the abdominal aorta and branch   vasculature.      IMPRESSION:    No CT evidence of acute abdominopelvic pathology.    --- End of Report ---

## 2025-06-28 NOTE — PATIENT PROFILE ADULT - NSPROGENSOURCEINFO_GEN_A_NUR
SUBJECTIVE:     Chief Complaint & History of Present Illness:  Diane Garcia is a New 66 y.o. year old female patient here for intermittent right foot/ankle pain which has been present for 1 week.  There is not a history of injury.  The pain is located in the dorsal aspect of the foot/ankle.  The pain is described as dull, 2-3/10.  It is aggravated by weight bearing activity.  There is not radiation, numbness or tingling into the toes.  Associated symptoms include discomfort. Previous treatments include ice which have provided minimal relief.  There is not a history of previous injury or surgery to the foot.   She is planning to have her left knee replaced by Dr. Greenwood next week.  She is not sure if she has been compensating her weight on her right side.  She states she normally takes ibuprofen, turmeric, Mobic but she has had to hold these medications in preparation for surgery.  The patient does not use an assistive device.    Review of patient's allergies indicates:   Allergen Reactions    Penicillins      nausea         Current Outpatient Medications   Medication Sig Dispense Refill    aspirin (ECOTRIN) 81 MG EC tablet Take 1 tablet (81 mg total) by mouth once daily. 42 tablet 0    biotin 1 mg Cap Take by mouth.      cartilage/collagen/bor/hyalur (JOINT HEALTH ORAL) Take by mouth.      celecoxib (CELEBREX) 200 MG capsule Take 1 capsule (200 mg total) by mouth 2 (two) times daily. 56 capsule 0    esomeprazole (NEXIUM) 20 MG capsule Take 20 mg by mouth before breakfast.      hydroCHLOROthiazide (HYDRODIURIL) 25 MG tablet TAKE 1 TABLET(25 MG) BY MOUTH EVERY DAY 90 tablet 3    ibuprofen (ADVIL,MOTRIN) 800 MG tablet TAKE 1 TABLET(800 MG) BY MOUTH EVERY 6 HOURS AS NEEDED FOR PAIN 60 tablet 1    lovastatin (MEVACOR) 40 MG tablet TAKE 1 TABLET(40 MG) BY MOUTH EVERY EVENING 90 tablet 3    meloxicam (MOBIC) 15 MG tablet Take 15 mg by mouth.      metFORMIN (GLUCOPHAGE-XR) 500 MG ER 24hr tablet Take 1 tablet (500 mg  total) by mouth daily with breakfast. 90 tablet 0    methocarbamoL (ROBAXIN) 500 MG Tab Take 1 tablet (500 mg total) by mouth 4 (four) times daily. for 14 days 56 tablet 0    nebivoloL (BYSTOLIC) 5 MG Tab Take 1 tablet (5 mg total) by mouth once daily. 90 tablet 1    olmesartan (BENICAR) 40 MG tablet TAKE 1 TABLET(40 MG) BY MOUTH EVERY DAY 90 tablet 3    omega-3 fatty acids/fish oil (FISH OIL-OMEGA-3 FATTY ACIDS) 300-1,000 mg capsule Take by mouth once daily.      pregabalin (LYRICA) 75 MG capsule Take 1 capsule (75 mg total) by mouth 2 (two) times daily. for 14 days 28 capsule 0    TURMERIC ORAL Take by mouth.       No current facility-administered medications for this visit.       Past Medical History:   Diagnosis Date    Arthritis     GERD (gastroesophageal reflux disease)     Hyperlipidemia     Hypertension        Past Surgical History:   Procedure Laterality Date    COLONOSCOPY N/A 02/02/2023    Procedure: COLONOSCOPY;  Surgeon: Bob Grayson MD;  Location: [facility];  Service: Endoscopy;  Laterality: N/A;    ESOPHAGOGASTRODUODENOSCOPY N/A 02/02/2023    Procedure: EGD (ESOPHAGOGASTRODUODENOSCOPY);  Surgeon: Bob Grayson MD;  Location: [facility];  Service: Endoscopy;  Laterality: N/A;    HYSTERECTOMY      OOPHORECTOMY      mary jo placed Right     femur right mary jo    mary jo removed Right        Family History   Problem Relation Age of Onset    Cancer Mother         Lung    COPD Father     No Known Problems Sister     Melanoma Neg Hx          Review of Systems:  ROS:  Constitutional: no fever or chills  Eyes: no visual changes  ENT: no nasal congestion or sore throat  Respiratory: no cough or shortness of breath  Cardiovascular: no chest pain or palpitations  Gastrointestinal: no nausea or vomiting, tolerating diet  Genitourinary: no hematuria or dysuria  Integument/Breast: no rash or pruritis  Hematologic/Lymphatic: no easy bruising or lymphadenopathy  Musculoskeletal: positive for  "arthralgias  Neurological: no seizures or tremors  Behavioral/Psych: no auditory or visual hallucinations  Endocrine: no heat or cold intolerance      OBJECTIVE:     PHYSICAL EXAM:  Vital Signs (Most Recent)  There were no vitals filed for this visit.     ,   Estimated body mass index is 38.89 kg/m² as calculated from the following:    Height as of 7/6/23: 5' 5" (1.651 m).    Weight as of 7/6/23: 106 kg (233 lb 11 oz).   General Appearance: Well nourished, well developed, in no acute distress.  HENT: Normal cephalic, oropharynx pink and moist  Eyes: PERRLA bilaterally and EOM x 4  Respiratory: Even and unlabored  Skin: Warm and Dry.   Psychiatric: AAO x 4, Mood & affect are normal.    left  Foot/Ankle    General appearance: no acute distress, alert/oriented x3, appropriate mood and affect, looks stated age, and well nourished  The examination was performed out of splint/cast  Skin: normal  Swelling: none  Warmth: no warmth  Tenderness: diffuse  ROM: normal  Strength: normal  Gait: antalgic  Stability: stable to testing and Cotton test: negative  Crepitus: no  Neurological Exam: normal  Vascular Exam: normal and pulse present    normal exam, no swelling, tenderness, instability; ligaments intact, full range of motion of all ankle/foot joints      RADIOGRAPHS:  X-ray of the right foot was obtained, findings show no acute fracture.  All radiographs were personally reviewed by me.    ASSESSMENT/PLAN:       ICD-10-CM ICD-9-CM   1. Right foot pain  M79.671 729.5       Plan:  -Diane Garcia presents to clinic today with c/c right foot/ankle pain for the past week, no trauma.  -X-ray as above.  -Recommend RICE therapy.    Patient is requesting a steroid injection into her foot.  I advised the patient that I can not provide an injection into her foot.  Also advised the patient that is steroid prior to any joint replacement surgery is contraindicated.  Additionally patient is on several NSAIDs which she currently has to " hold in preparation for surgery.    Recommend patient can try Salonpas patches.  These are over-the-counter.  May apply patch to the most painful site on the foot for 12 hours and remove for 12 hours.    If pain persists or worsen recommend the patient follow-up with foot and ankle team and/or Podiatry.       patient/family

## 2025-06-28 NOTE — ED PROVIDER NOTE - CADM POA PRESS ULCER
Courtesy post ED call was made to patient.  Spoke with patient who states that she still have on and off symptoms.  Patient had follow-up with general surgery and per patient scheduled to have her gallbladder removed on the 27th.  She has no questions or concerns for me at the time of phone call.   No

## 2025-06-28 NOTE — H&P ADULT - NSHPPHYSICALEXAM_GEN_ALL_CORE
T(C): 36.9 (06-28-25 @ 10:00), Max: 38.6 (06-28-25 @ 00:42)  HR: 64 (06-28-25 @ 10:00) (60 - 74)  BP: 136/77 (06-28-25 @ 10:00) (114/59 - 149/71)  RR: 20 (06-28-25 @ 09:32) (16 - 20)  SpO2: 97% (06-28-25 @ 10:00) (95% - 98%)    PHYSICAL EXAM:  GENERAL: laying in bed, appearing comfortable and in NAD  HEENT: Moist mucous membranes  NECK: Supple  CHEST/LUNG: even respirations b/l; no accessory muscle use  ABDOMEN: Soft (+) LLQ TTP no grimacing no rebound TTP(+) generalized distension   EXTREMITIES:   No calf TTP b/l  SKIN: warm  Neuro: A&Ox2 to name and place T(C): 36.9 (06-28-25 @ 10:00), Max: 38.6 (06-28-25 @ 00:42)  HR: 64 (06-28-25 @ 10:00) (60 - 74)  BP: 136/77 (06-28-25 @ 10:00) (114/59 - 149/71)  RR: 20 (06-28-25 @ 09:32) (16 - 20)  SpO2: 97% (06-28-25 @ 10:00) (95% - 98%)    PHYSICAL EXAM:  GENERAL: laying in bed, appearing comfortable and in NAD  HEENT: Moist mucous membranes  NECK: Supple  CHEST/LUNG: even respirations b/l; no accessory muscle use  ABDOMEN: Soft (+) LLQ TTP no grimacing no rebound TTP(+) generalized distension   EXTREMITIES:   No calf TTP b/l; (+) +2 pitting edema of b/l les. no erythema no wound dc appreciated on exam   SKIN: warm  Neuro: A&Ox2 to name and place T(C): 36.9 (06-28-25 @ 10:00), Max: 38.6 (06-28-25 @ 00:42)  HR: 64 (06-28-25 @ 10:00) (60 - 74)  BP: 136/77 (06-28-25 @ 10:00) (114/59 - 149/71)  RR: 20 (06-28-25 @ 09:32) (16 - 20)  SpO2: 97% (06-28-25 @ 10:00) (95% - 98%)    PHYSICAL EXAM:  GENERAL: laying in bed, appearing comfortable and in NAD  HEENT: Moist mucous membranes;   NECK: Supple  CHEST/LUNG: even respirations b/l; no accessory muscle use  ABDOMEN: Soft (+) LLQ TTP no grimacing no rebound TTP(+) generalized distension   EXTREMITIES:   No calf TTP b/l; (+) +2 pitting edema of b/l les. no erythema no wound dc appreciated on exam   SKIN: warm  Neuro: A&Ox2 to name and place

## 2025-06-28 NOTE — ED PROVIDER NOTE - ATTENDING CONTRIBUTION TO CARE
Patient presented today with generalized weakness, changes in mental status as per his daughter and fevers.  Patient also has been complaining of burning with urination and abdominal pain.  CT scans unremarkable.  CT head pending and final disposition pending.  Patient signed out.

## 2025-06-28 NOTE — PATIENT PROFILE ADULT - FUNCTIONAL ASSESSMENT - BASIC MOBILITY 6.
2-calculated by average/Not able to assess (calculate score using [facility] averaging method)

## 2025-06-28 NOTE — ED PROVIDER NOTE - CLINICAL SUMMARY MEDICAL DECISION MAKING FREE TEXT BOX
Presents with altered mental status. Fever. CT abd/pelvis neg. Labs good. CT head Presents with altered mental status. Fever. CT abd/pelvis neg. Labs good. CT head neg. Cause of fever not clear.

## 2025-06-28 NOTE — ED PROVIDER NOTE - PROGRESS NOTE DETAILS
VV: Signed out by night team patient is pending head CT radiation patient is awake alert oriented to self and place.  Patient has no nuchal rigidity.  Will admit once CT head resulted. Pt accepted from Dr. Veras. Presents with weakness and fever. Pt is awake alert oriented to place and self. Not sure of date. No headache, no neck pain, no pain anywhere. No shortness of breath. no abdominal pain. no nausea or vomiting. Here noted to have fever. No nuchal rigidity,  S1S2 rrr, lungs clear, abdomen is soft nontender, ext + edema bilaterally. Pt states he is hungry. CT scan abd/pelvis neg. Labs normal. CT head. hospitalist aware

## 2025-06-28 NOTE — ED PROVIDER NOTE - OBJECTIVE STATEMENT
85-year-old male, past medical history of DM, hypertension, hyperlipidemia, GERD, dementia, CAD status post CABG, CHF on furosemide, brought in by EMS for altered mental status.  Daughter at bedside states that patient has been more confused than his usual.  Was also endorsing abdominal pain and burning sensation with urination.  Denies vomiting, chest pain, shortness of breath, diarrhea.

## 2025-06-28 NOTE — PATIENT PROFILE ADULT - FALL HARM RISK - HARM RISK INTERVENTIONS

## 2025-06-28 NOTE — H&P ADULT - NS ATTEND AMEND GEN_ALL_CORE FT
86 y/o M PMHx DM, HTN, HLD,  GERD, dementia, CAD s/p CABG, CHF on furosemide,  admitted for 1-2 week generalized weakness and 2-3 day h/o appearing very sleepy, per pt's daughter, noting pt hasnt been his "usual jovial self". Pt daughter also reporting pt's b/l LE have gradually gotten more swollen over past 3 weeks    pt daughter reports no reported n/v, known head trauma/ falls, fevers, abd pain, pain w/ urination, issues w/ chewing/eating, constipation,  unable to urinate, unable to tolerate PO intake, corroborated by pt during exam       pt daughter  also reporting no noted change in mental status from baseline      temp 101.5 in ER noted    # FUO  #lethargy  #Multiple dental caries  lactate and WBC WNL  CTAP: No CT evidence of acute abdominopelvic pathology.  CTH: No acute intracranial pathology.Stable mild chronic microvascular ischemic changes and chronic lacunar infarcts.  CXR: no signs of cardiac pulm disease   s/p cefepime and IVF In ER   - trend WBC and monitor fever curve  - daily abdominal exams   - CT maxillofacial w/ IV contrast  - RVP expanded  - f/u blood cx and UCX  - fall risk  -PT consult   - full RVP     #hematuria in setting of straight cath  pt's daughter denies reported hematuria prior to straight cath, reporting it as " difficult"  while in ER  - trend urine color to assess if hematuria improving  - maintain active type and screen  - SCDs      #ROYAL  s/p 1.25 L IVF in ER  monitor SCr  strict Is and Os  Monitor electrolytes  refrain use of nephrotoxic meds   hold off IVF for now in setting of fluid overload  RBUS   c/w home furosemide for now; consider stopping if Cr doesnt improve or worsens     # HTN  #  h/o CHF w/ signs of fluid overload on exam  TTE (2023) showed ef of 54 %.  - TTE  - c/w metoprolol and  furosemide   - strict I&Os  - daily weights     # Dementia  - c/w home med    # GERD  - c/w renally dose famotidine     # DM  - hold tradjenta in setting of lethargy  - ISS w/ finger stick      Contact insolation pending full RVP results  #VTE: SCDs pending b/l LE results  DIET: soft & bite sized per pt's daughter

## 2025-06-28 NOTE — H&P ADULT - ASSESSMENT
86 y/o M PMHx DM, HTN, HLD,  GERD, dementia, CAD s/p CABG, CHF on furosemide,  admitted for 1-2 week generalized weakness and 2-3 day h/o change in behavior per pt's daughter, noting pt hasnt been his "usual jovial self"     pt daughter reports no reported n/v, known head trauma/ falls, fevers, abd pain, pain w/ urination, issues w/ chewing/eating, unable to urinate, unable to tolerate PO intake, corroborated by pt during exam   pt daughter  also reporting no noted change in mental status from baseline      temp 101.5 in ER noted    # + UA appearing c/w UTI & noted change in behavior & generalized weakness  # LLQ TTP   lactate and WBC WNL  CTAP: No CT evidence of acute abdominopelvic pathology.  CTH: No acute intracranial pathology.Stable mild chronic microvascular ischemic changes and chronic lacunar infarcts.  CXR: no signs of cardiac pulm disease   s/p cefepime and IVF In ER   - IV abx  - trend WBC and monitor fever curve  - daily abdominal exams   - f/u blood cx and UCX      # HTN  # CHF  - c/w home meds    # Dementia  - c/w home med    # GERD  - c/w home med    # DM  - hold home meds  - ISS w/ finger stick        med rec pending daughter arriva     86 y/o M PMHx DM, HTN, HLD,  GERD, dementia, CAD s/p CABG, CHF on furosemide,  admitted for 1-2 week generalized weakness and 2-3 day h/o change in behavior per pt's daughter, noting pt hasnt been his "usual jovial self"     pt daughter reports no reported n/v, known head trauma/ falls, fevers, abd pain, pain w/ urination, issues w/ chewing/eating, unable to urinate, unable to tolerate PO intake, corroborated by pt during exam   pt daughter  also reporting no noted change in mental status from baseline      temp 101.5 in ER noted    # + UA appearing c/w UTI & noted change in behavior & generalized weakness  # LLQ TTP   lactate and WBC WNL  CTAP: No CT evidence of acute abdominopelvic pathology.  CTH: No acute intracranial pathology.Stable mild chronic microvascular ischemic changes and chronic lacunar infarcts.  CXR: no signs of cardiac pulm disease   s/p cefepime and IVF In ER   - IV abx  - trend WBC and monitor fever curve  - daily abdominal exams   - f/u blood cx and UCX    #ROYAL  monitor SCr  strict Is and Os  Monitor electrolytes  IV hydration  refrain use of nephrotoxic meds     # HTN  #  h/o CHF  TTE (2023) showed ef of 54 %.  - TTE  - c/w metoprolol and renally dose furosemide   - strict I&Os  - daily weights     # Dementia  - c/w home med    # GERD  - c/w renally dose famotidine     # DM  - hold home meds  - ISS w/ finger stick      #VTE: heparin subq  DIET: soft & bite sized per pt's daughter    86 y/o M PMHx DM, HTN, HLD,  GERD, dementia, CAD s/p CABG, CHF on furosemide,  admitted for 1-2 week generalized weakness and 2-3 day h/o change in behavior per pt's daughter, noting pt hasnt been his "usual jovial self"     pt daughter reports no reported n/v, known head trauma/ falls, fevers, abd pain, pain w/ urination, issues w/ chewing/eating, unable to urinate, unable to tolerate PO intake, corroborated by pt during exam   pt daughter  also reporting no noted change in mental status from baseline      temp 101.5 in ER noted    # fever in setting of lethargy   lactate and WBC WNL  CTAP: No CT evidence of acute abdominopelvic pathology.  CTH: No acute intracranial pathology.Stable mild chronic microvascular ischemic changes and chronic lacunar infarcts.  CXR: no signs of cardiac pulm disease   s/p cefepime and IVF In ER   - trend WBC and monitor fever curve  - daily abdominal exams   - f/u blood cx and UCX        #hematuria in setting of straight cath  pt's daughter denies reported hematuria prior to straight cath, reporting it as " difficult"  while in ER          #ROYAL  monitor SCr  strict Is and Os  Monitor electrolytes  IV hydration  refrain use of nephrotoxic meds     # HTN  #  h/o CHF  TTE (2023) showed ef of 54 %.  - TTE  - c/w metoprolol and renally dose furosemide   - strict I&Os  - daily weights     # Dementia  - c/w home med    # GERD  - c/w renally dose famotidine     # DM  - hold home meds  - ISS w/ finger stick      #VTE: heparin subq  DIET: soft & bite sized per pt's daughter    84 y/o M PMHx DM, HTN, HLD,  GERD, dementia, CAD s/p CABG, CHF on furosemide,  admitted for 1-2 week generalized weakness and 2-3 day h/o appearing very sleepy, per pt's daughter, noting pt hasnt been his "usual jovial self". Pt daughter also reporting pt's b/l LE have gradually gotten more swollen over past 3 weeks    pt daughter reports no reported n/v, known head trauma/ falls, fevers, abd pain, pain w/ urination, issues w/ chewing/eating, constipation,  unable to urinate, unable to tolerate PO intake, corroborated by pt during exam       pt daughter  also reporting no noted change in mental status from baseline      temp 101.5 in ER noted    # FUO  #lethargy  # LLQ TTP   lactate and WBC WNL  CTAP: No CT evidence of acute abdominopelvic pathology.  CTH: No acute intracranial pathology.Stable mild chronic microvascular ischemic changes and chronic lacunar infarcts.  CXR: no signs of cardiac pulm disease   s/p cefepime and IVF In ER   - trend WBC and monitor fever curve  - daily abdominal exams   - f/u blood cx and UCX  - fall risk  -PT consult   - full RVP     #hematuria in setting of straight cath  pt's daughter denies reported hematuria prior to straight cath, reporting it as " difficult"  while in ER  - trend urine color to assess if hematuria improving  - maintain active type and screen  - SCDs      #ROYAL  s/p 1.25 L IVF in ER  monitor SCr  strict Is and Os  Monitor electrolytes  refrain use of nephrotoxic meds   hold off IVF for now in setting of fluid overload  RBUS   c/w home furosemide for now; consider stopping if Cr doesnt improve or worsens     # HTN  #  h/o CHF w/ signs of fluid overload on exam  TTE (2023) showed ef of 54 %.  - TTE  - c/w metoprolol and  furosemide   - strict I&Os  - daily weights     # Dementia  - c/w home med    # GERD  - c/w renally dose famotidine     # DM  - hold home meds  - ISS w/ finger stick      Contact ispolation pending full RVP results  #VTE: SCDs pending b/l LE results  DIET: soft & bite sized per pt's daughter  84 y/o M PMHx DM, HTN, HLD,  GERD, dementia, CAD s/p CABG, CHF on furosemide,  admitted for 1-2 week generalized weakness and 2-3 day h/o appearing very sleepy, per pt's daughter, noting pt hasnt been his "usual jovial self". Pt daughter also reporting pt's b/l LE have gradually gotten more swollen over past 3 weeks    pt daughter reports no reported n/v, known head trauma/ falls, fevers, abd pain, pain w/ urination, issues w/ chewing/eating, constipation,  unable to urinate, unable to tolerate PO intake, corroborated by pt during exam       pt daughter  also reporting no noted change in mental status from baseline      temp 101.5 in ER noted    # FUO  #lethargy  # LLQ TTP   lactate and WBC WNL  CTAP: No CT evidence of acute abdominopelvic pathology.  CTH: No acute intracranial pathology.Stable mild chronic microvascular ischemic changes and chronic lacunar infarcts.  CXR: no signs of cardiac pulm disease   s/p cefepime and IVF In ER   - trend WBC and monitor fever curve  - daily abdominal exams   - f/u blood cx and UCX  - fall risk  -PT consult   - full RVP     #hematuria in setting of straight cath  pt's daughter denies reported hematuria prior to straight cath, reporting it as " difficult"  while in ER  - trend urine color to assess if hematuria improving  - maintain active type and screen  - SCDs      #ROYAL  s/p 1.25 L IVF in ER  monitor SCr  strict Is and Os  Monitor electrolytes  refrain use of nephrotoxic meds   hold off IVF for now in setting of fluid overload  RBUS   c/w home furosemide for now; consider stopping if Cr doesnt improve or worsens     # HTN  #  h/o CHF w/ signs of fluid overload on exam  TTE (2023) showed ef of 54 %.  - TTE  - c/w metoprolol and  furosemide   - strict I&Os  - daily weights     # Dementia  - c/w home med    # GERD  - c/w renally dose famotidine     # DM  - hold tradjenta in setting of lethargy  - ISS w/ finger stick      Contact insolation pending full RVP results  #VTE: SCDs pending b/l LE results  DIET: soft & bite sized per pt's daughter       pt d/w Dr. Rodrigues 84 y/o M PMHx DM, HTN, HLD,  GERD, dementia, CAD s/p CABG, CHF on furosemide,  admitted for 1-2 week generalized weakness and 2-3 day h/o appearing very sleepy, per pt's daughter, noting pt hasnt been his "usual jovial self". Pt daughter also reporting pt's b/l LE have gradually gotten more swollen over past 3 weeks    pt daughter reports no reported n/v, known head trauma/ falls, fevers, abd pain, pain w/ urination, issues w/ chewing/eating, constipation,  unable to urinate, unable to tolerate PO intake, corroborated by pt during exam       pt daughter  also reporting no noted change in mental status from baseline      temp 101.5 in ER noted    # FUO  #lethargy  # LLQ TTP   lactate and WBC WNL  CTAP: No CT evidence of acute abdominopelvic pathology.  CTH: No acute intracranial pathology.Stable mild chronic microvascular ischemic changes and chronic lacunar infarcts.  CXR: no signs of cardiac pulm disease   s/p cefepime and IVF In ER   - trend WBC and monitor fever curve  - daily abdominal exams   - f/u blood cx and UCX  - fall risk  -PT consult   - full RVP     #hematuria in setting of straight cath  pt's daughter denies reported hematuria prior to straight cath, reporting it as " difficult"  while in ER  - trend urine color to assess if hematuria improving  - maintain active type and screen  - SCDs      #ROYAL  s/p 1.25 L IVF in ER  monitor SCr  strict Is and Os  Monitor electrolytes  refrain use of nephrotoxic meds   hold off IVF for now in setting of fluid overload  RBUS   c/w home furosemide for now; consider stopping if Cr doesnt improve or worsens     # HTN  #  h/o CHF w/ signs of fluid overload on exam  TTE (2023) showed ef of 54 %.  - TTE  - c/w metoprolol and  furosemide   - strict I&Os  - daily weights     # Dementia  - c/w home med    # GERD  - c/w renally dose famotidine     # DM  - hold tradjenta in setting of lethargy  - ISS w/ finger stick    Contact insolation pending full RVP results  #VTE: SCDs pending b/l LE results  DIET: soft & bite sized per pt's daughter       pt d/w Dr. Rodrigues      UPDATE: received call from RN relaying pt's daughter reports pt had been c/o right sided tooth pain for past few days  f/u  maxillofacial CT results 86 y/o M PMHx DM, HTN, HLD,  GERD, dementia, CAD s/p CABG, CHF on furosemide,  admitted for 1-2 week generalized weakness and 2-3 day h/o appearing very sleepy, per pt's daughter, noting pt hasnt been his "usual jovial self". Pt daughter also reporting pt's b/l LE have gradually gotten more swollen over past 3 weeks    pt daughter reports no reported n/v, known head trauma/ falls, fevers, abd pain, pain w/ urination, issues w/ chewing/eating, constipation,  unable to urinate, unable to tolerate PO intake, corroborated by pt during exam       pt daughter  also reporting no noted change in mental status from baseline      temp 101.5 in ER noted    # FUO  #lethargy  # LLQ TTP   lactate and WBC WNL  CTAP: No CT evidence of acute abdominopelvic pathology.  CTH: No acute intracranial pathology.Stable mild chronic microvascular ischemic changes and chronic lacunar infarcts.  CXR: no signs of cardiac pulm disease   s/p cefepime and IVF In ER   - trend WBC and monitor fever curve  - daily abdominal exams   - f/u blood cx and UCX  - fall risk  -PT consult   - full RVP     #hematuria in setting of straight cath  pt's daughter denies reported hematuria prior to straight cath, reporting it as " difficult"  while in ER  - trend urine color to assess if hematuria improving  - maintain active type and screen  - SCDs      #ROYAL  s/p 1.25 L IVF in ER  monitor SCr  strict Is and Os  Monitor electrolytes  refrain use of nephrotoxic meds   hold off IVF for now in setting of fluid overload  RBUS   c/w home furosemide for now; consider stopping if Cr doesnt improve or worsens     # HTN  #  h/o CHF w/ signs of fluid overload on exam  TTE (2023) showed ef of 54 %.  - TTE  - c/w metoprolol and  furosemide   - strict I&Os  - daily weights     # Dementia  - c/w home med    # GERD  - c/w renally dose famotidine     # DM  - hold tradjenta in setting of lethargy  - ISS w/ finger stick    Contact insolation pending full RVP results  #VTE: SCDs pending b/l LE results  DIET: soft & bite sized per pt's daughter       pt d/w Dr. Rodrigues      UPDATE: received call from RN relaying pt's daughter reports pt had been c/o left sided tooth pain for past few days  f/u  maxillofacial CT results

## 2025-06-28 NOTE — ED ADULT TRIAGE NOTE - CHIEF COMPLAINT QUOTE
BIBA daughter called on pt due to AMS and complaining of burning pain on urination. pt presenting with fever and abdominal pain

## 2025-06-28 NOTE — ED ADULT NURSE NOTE - NSFALLUNIVINTERV_ED_ALL_ED
Bed/Stretcher in lowest position, wheels locked, appropriate side rails in place/Call bell, personal items and telephone in reach/Instruct patient to call for assistance before getting out of bed/chair/stretcher/Non-slip footwear applied when patient is off stretcher/[facility] [place] [facility] [place] [facility] to call system/Physically safe environment - no spills, clutter or unnecessary equipment/Purposeful proactive rounding/Room/bathroom lighting operational, light cord in reach

## 2025-06-28 NOTE — ED PROVIDER NOTE - PHYSICAL EXAMINATION
GENERAL: Well-developed; well-nourished; in no acute distress. mildly altered  SKIN: warm, well perfused  HEAD: Normocephalic; atraumatic.  EYES: no conjunctival erythema, ocular motions intact and appropriate  ENT: airway clear.  CARD: Regular rate and rhythm.   RESP: LCTAB; No wheezes or crackles  ABD: distended, mild ttp  EXT: moving b/l extrems

## 2025-06-29 LAB
ALBUMIN SERPL ELPH-MCNC: 4.2 G/DL — SIGNIFICANT CHANGE UP (ref 3.5–5.2)
ALP SERPL-CCNC: 101 U/L — SIGNIFICANT CHANGE UP (ref 30–115)
ALT FLD-CCNC: 17 U/L — SIGNIFICANT CHANGE UP (ref 0–41)
ANION GAP SERPL CALC-SCNC: 12 MMOL/L — SIGNIFICANT CHANGE UP (ref 7–14)
AST SERPL-CCNC: 22 U/L — SIGNIFICANT CHANGE UP (ref 0–41)
BASOPHILS # BLD AUTO: 0.02 K/UL — SIGNIFICANT CHANGE UP (ref 0–0.2)
BASOPHILS NFR BLD AUTO: 0.3 % — SIGNIFICANT CHANGE UP (ref 0–2)
BILIRUB SERPL-MCNC: 1 MG/DL — SIGNIFICANT CHANGE UP (ref 0.2–1.2)
BLD GP AB SCN SERPL QL: SIGNIFICANT CHANGE UP
BUN SERPL-MCNC: 17 MG/DL — SIGNIFICANT CHANGE UP (ref 10–20)
CALCIUM SERPL-MCNC: 9.2 MG/DL — SIGNIFICANT CHANGE UP (ref 8.4–10.5)
CHLORIDE SERPL-SCNC: 101 MMOL/L — SIGNIFICANT CHANGE UP (ref 98–110)
CO2 SERPL-SCNC: 26 MMOL/L — SIGNIFICANT CHANGE UP (ref 17–32)
CREAT SERPL-MCNC: 1.6 MG/DL — HIGH (ref 0.7–1.5)
EGFR: 42 ML/MIN/1.73M2 — LOW
EGFR: 42 ML/MIN/1.73M2 — LOW
EOSINOPHIL # BLD AUTO: 0.09 K/UL — SIGNIFICANT CHANGE UP (ref 0–0.5)
EOSINOPHIL NFR BLD AUTO: 1.3 % — SIGNIFICANT CHANGE UP (ref 0–6)
GLUCOSE BLDC GLUCOMTR-MCNC: 110 MG/DL — HIGH (ref 70–99)
GLUCOSE SERPL-MCNC: 101 MG/DL — HIGH (ref 70–99)
HCT VFR BLD CALC: 40.7 % — SIGNIFICANT CHANGE UP (ref 39–50)
HGB BLD-MCNC: 13.2 G/DL — SIGNIFICANT CHANGE UP (ref 13–17)
IMM GRANULOCYTES # BLD AUTO: 0.02 K/UL — SIGNIFICANT CHANGE UP (ref 0–0.07)
IMM GRANULOCYTES NFR BLD AUTO: 0.3 % — SIGNIFICANT CHANGE UP (ref 0–0.9)
LYMPHOCYTES # BLD AUTO: 0.92 K/UL — LOW (ref 1–3.3)
LYMPHOCYTES NFR BLD AUTO: 12.9 % — LOW (ref 13–44)
MAGNESIUM SERPL-MCNC: 2.1 MG/DL — SIGNIFICANT CHANGE UP (ref 1.8–2.4)
MCHC RBC-ENTMCNC: 28.3 PG — SIGNIFICANT CHANGE UP (ref 27–34)
MCHC RBC-ENTMCNC: 32.4 G/DL — SIGNIFICANT CHANGE UP (ref 32–36)
MCV RBC AUTO: 87.3 FL — SIGNIFICANT CHANGE UP (ref 80–100)
MONOCYTES # BLD AUTO: 1.2 K/UL — HIGH (ref 0–0.9)
MONOCYTES NFR BLD AUTO: 16.8 % — HIGH (ref 2–14)
NEUTROPHILS # BLD AUTO: 4.89 K/UL — SIGNIFICANT CHANGE UP (ref 1.8–7.4)
NEUTROPHILS NFR BLD AUTO: 68.4 % — SIGNIFICANT CHANGE UP (ref 43–77)
NRBC # BLD AUTO: 0 K/UL — SIGNIFICANT CHANGE UP (ref 0–0)
NRBC # FLD: 0 K/UL — SIGNIFICANT CHANGE UP (ref 0–0)
NRBC BLD AUTO-RTO: 0 /100 WBCS — SIGNIFICANT CHANGE UP (ref 0–0)
PLATELET # BLD AUTO: 156 K/UL — SIGNIFICANT CHANGE UP (ref 150–400)
PMV BLD: 11.1 FL — SIGNIFICANT CHANGE UP (ref 7–13)
POTASSIUM SERPL-MCNC: 4 MMOL/L — SIGNIFICANT CHANGE UP (ref 3.5–5)
POTASSIUM SERPL-SCNC: 4 MMOL/L — SIGNIFICANT CHANGE UP (ref 3.5–5)
PROT SERPL-MCNC: 7.5 G/DL — SIGNIFICANT CHANGE UP (ref 6–8)
RBC # BLD: 4.66 M/UL — SIGNIFICANT CHANGE UP (ref 4.2–5.8)
RBC # FLD: 14 % — SIGNIFICANT CHANGE UP (ref 10.3–14.5)
SODIUM SERPL-SCNC: 139 MMOL/L — SIGNIFICANT CHANGE UP (ref 135–146)
WBC # BLD: 7.14 K/UL — SIGNIFICANT CHANGE UP (ref 3.8–10.5)
WBC # FLD AUTO: 7.14 K/UL — SIGNIFICANT CHANGE UP (ref 3.8–10.5)

## 2025-06-29 PROCEDURE — 99232 SBSQ HOSP IP/OBS MODERATE 35: CPT | Mod: FS

## 2025-06-29 RX ADMIN — DONEPEZIL HYDROCHLORIDE 10 MILLIGRAM(S): 5 TABLET ORAL at 21:39

## 2025-06-29 RX ADMIN — METOPROLOL SUCCINATE 50 MILLIGRAM(S): 50 TABLET, EXTENDED RELEASE ORAL at 05:07

## 2025-06-29 RX ADMIN — ATORVASTATIN CALCIUM 80 MILLIGRAM(S): 80 TABLET, FILM COATED ORAL at 21:39

## 2025-06-29 RX ADMIN — MEMANTINE HYDROCHLORIDE 10 MILLIGRAM(S): 21 CAPSULE, EXTENDED RELEASE ORAL at 12:34

## 2025-06-29 RX ADMIN — Medication 2 TABLET(S): at 21:39

## 2025-06-29 RX ADMIN — INSULIN LISPRO 1: 100 INJECTION, SOLUTION INTRAVENOUS; SUBCUTANEOUS at 12:28

## 2025-06-29 RX ADMIN — FUROSEMIDE 40 MILLIGRAM(S): 10 INJECTION INTRAMUSCULAR; INTRAVENOUS at 05:07

## 2025-06-29 RX ADMIN — CEFEPIME 100 MILLIGRAM(S): 2 INJECTION, POWDER, FOR SOLUTION INTRAVENOUS at 12:34

## 2025-06-29 RX ADMIN — Medication 81 MILLIGRAM(S): at 12:34

## 2025-06-29 RX ADMIN — Medication 20 MILLIGRAM(S): at 12:34

## 2025-06-29 RX ADMIN — FOLIC ACID 1 MILLIGRAM(S): 1 TABLET ORAL at 12:34

## 2025-06-29 RX ADMIN — Medication 1 APPLICATION(S): at 12:33

## 2025-06-29 NOTE — PHYSICAL THERAPY INITIAL EVALUATION ADULT - GENERAL OBSERVATIONS, REHAB EVAL
14:30-14:52 Chart reviewed. Patient available to be seen for physical therapy, denies pain, confirmed with RN.  Pt received in bed in NAD, +Yao.

## 2025-06-29 NOTE — PHYSICAL THERAPY INITIAL EVALUATION ADULT - PERTINENT HX OF CURRENT PROBLEM, REHAB EVAL
History of Present Illness:   84 y/o M PMHx DM, HTN, HLD,  GERD, dementia, CAD s/p CABG, CHF on furosemide,  admitted for 1-2 week generalized weakness and 2-3 day h/o appearing very sleepy, per pt's daughter, noting pt hasnt been his "usual jovial self". Pt daughter also reporting pt's b/l LE have gradually gotten more swollen over past 3 weeks    pt daughter reports no reported n/v, known head trauma/ falls, fevers, abd pain, pain w/ urination, issues w/ chewing/eating, constipation,  unable to urinate, unable to tolerate PO intake, corroborated by pt during exam

## 2025-06-29 NOTE — PHYSICAL THERAPY INITIAL EVALUATION ADULT - ADDITIONAL COMMENTS
Pt reports he lives with his daughter with no LA, and no stairs in the house. Pt reports he was independent with no AD for mobility PTA.

## 2025-06-30 ENCOUNTER — TRANSCRIPTION ENCOUNTER (OUTPATIENT)
Age: 85
End: 2025-06-30

## 2025-06-30 ENCOUNTER — RESULT REVIEW (OUTPATIENT)
Age: 85
End: 2025-06-30

## 2025-06-30 LAB
A1C WITH ESTIMATED AVERAGE GLUCOSE RESULT: 6.6 % — HIGH (ref 4–5.6)
ALBUMIN SERPL ELPH-MCNC: 3.9 G/DL — SIGNIFICANT CHANGE UP (ref 3.5–5.2)
ALP SERPL-CCNC: 84 U/L — SIGNIFICANT CHANGE UP (ref 30–115)
ALT FLD-CCNC: 20 U/L — SIGNIFICANT CHANGE UP (ref 0–41)
ANION GAP SERPL CALC-SCNC: 17 MMOL/L — HIGH (ref 7–14)
AST SERPL-CCNC: 29 U/L — SIGNIFICANT CHANGE UP (ref 0–41)
BASOPHILS # BLD AUTO: 0.02 K/UL — SIGNIFICANT CHANGE UP (ref 0–0.2)
BASOPHILS NFR BLD AUTO: 0.3 % — SIGNIFICANT CHANGE UP (ref 0–2)
BILIRUB SERPL-MCNC: 0.8 MG/DL — SIGNIFICANT CHANGE UP (ref 0.2–1.2)
BUN SERPL-MCNC: 19 MG/DL — SIGNIFICANT CHANGE UP (ref 10–20)
CALCIUM SERPL-MCNC: 8.5 MG/DL — SIGNIFICANT CHANGE UP (ref 8.4–10.5)
CHLORIDE SERPL-SCNC: 99 MMOL/L — SIGNIFICANT CHANGE UP (ref 98–110)
CO2 SERPL-SCNC: 21 MMOL/L — SIGNIFICANT CHANGE UP (ref 17–32)
CREAT SERPL-MCNC: 1.5 MG/DL — SIGNIFICANT CHANGE UP (ref 0.7–1.5)
EGFR: 46 ML/MIN/1.73M2 — LOW
EGFR: 46 ML/MIN/1.73M2 — LOW
EOSINOPHIL # BLD AUTO: 0.24 K/UL — SIGNIFICANT CHANGE UP (ref 0–0.5)
EOSINOPHIL NFR BLD AUTO: 4 % — SIGNIFICANT CHANGE UP (ref 0–6)
ESTIMATED AVERAGE GLUCOSE: 143 MG/DL — HIGH (ref 68–114)
GLUCOSE SERPL-MCNC: 130 MG/DL — HIGH (ref 70–99)
HCT VFR BLD CALC: 38.9 % — LOW (ref 39–50)
HGB BLD-MCNC: 12.7 G/DL — LOW (ref 13–17)
IMM GRANULOCYTES # BLD AUTO: 0.04 K/UL — SIGNIFICANT CHANGE UP (ref 0–0.07)
IMM GRANULOCYTES NFR BLD AUTO: 0.7 % — SIGNIFICANT CHANGE UP (ref 0–0.9)
LYMPHOCYTES # BLD AUTO: 0.87 K/UL — LOW (ref 1–3.3)
LYMPHOCYTES NFR BLD AUTO: 14.5 % — SIGNIFICANT CHANGE UP (ref 13–44)
MAGNESIUM SERPL-MCNC: 2 MG/DL — SIGNIFICANT CHANGE UP (ref 1.8–2.4)
MCHC RBC-ENTMCNC: 28.5 PG — SIGNIFICANT CHANGE UP (ref 27–34)
MCHC RBC-ENTMCNC: 32.6 G/DL — SIGNIFICANT CHANGE UP (ref 32–36)
MCV RBC AUTO: 87.2 FL — SIGNIFICANT CHANGE UP (ref 80–100)
MONOCYTES # BLD AUTO: 0.95 K/UL — HIGH (ref 0–0.9)
MONOCYTES NFR BLD AUTO: 15.9 % — HIGH (ref 2–14)
NEUTROPHILS # BLD AUTO: 3.87 K/UL — SIGNIFICANT CHANGE UP (ref 1.8–7.4)
NEUTROPHILS NFR BLD AUTO: 64.6 % — SIGNIFICANT CHANGE UP (ref 43–77)
NRBC # BLD AUTO: 0 K/UL — SIGNIFICANT CHANGE UP (ref 0–0)
NRBC # FLD: 0 K/UL — SIGNIFICANT CHANGE UP (ref 0–0)
NRBC BLD AUTO-RTO: 0 /100 WBCS — SIGNIFICANT CHANGE UP (ref 0–0)
PLATELET # BLD AUTO: 145 K/UL — LOW (ref 150–400)
PMV BLD: 10.9 FL — SIGNIFICANT CHANGE UP (ref 7–13)
POTASSIUM SERPL-MCNC: 3.7 MMOL/L — SIGNIFICANT CHANGE UP (ref 3.5–5)
POTASSIUM SERPL-SCNC: 3.7 MMOL/L — SIGNIFICANT CHANGE UP (ref 3.5–5)
PROT SERPL-MCNC: 7.1 G/DL — SIGNIFICANT CHANGE UP (ref 6–8)
RAPID RVP RESULT: SIGNIFICANT CHANGE UP
RBC # BLD: 4.46 M/UL — SIGNIFICANT CHANGE UP (ref 4.2–5.8)
RBC # FLD: 13.7 % — SIGNIFICANT CHANGE UP (ref 10.3–14.5)
SARS-COV-2 RNA SPEC QL NAA+PROBE: SIGNIFICANT CHANGE UP
SODIUM SERPL-SCNC: 137 MMOL/L — SIGNIFICANT CHANGE UP (ref 135–146)
WBC # BLD: 5.99 K/UL — SIGNIFICANT CHANGE UP (ref 3.8–10.5)
WBC # FLD AUTO: 5.99 K/UL — SIGNIFICANT CHANGE UP (ref 3.8–10.5)

## 2025-06-30 PROCEDURE — 99232 SBSQ HOSP IP/OBS MODERATE 35: CPT | Mod: FS

## 2025-06-30 PROCEDURE — 93306 TTE W/DOPPLER COMPLETE: CPT | Mod: 26

## 2025-06-30 RX ORDER — LINAGLIPTIN 5 MG/1
0 TABLET, FILM COATED ORAL
Qty: 0 | Refills: 0 | DISCHARGE

## 2025-06-30 RX ORDER — LINAGLIPTIN 5 MG/1
1 TABLET, FILM COATED ORAL
Qty: 0 | Refills: 1 | DISCHARGE

## 2025-06-30 RX ORDER — AMOXICILLIN AND CLAVULANATE POTASSIUM 500; 125 MG/1; MG/1
1 TABLET, FILM COATED ORAL
Qty: 20 | Refills: 0
Start: 2025-06-30 | End: 2025-07-09

## 2025-06-30 RX ORDER — AMOXICILLIN AND CLAVULANATE POTASSIUM 500; 125 MG/1; MG/1
1 TABLET, FILM COATED ORAL EVERY 12 HOURS
Refills: 0 | Status: DISCONTINUED | OUTPATIENT
Start: 2025-06-30 | End: 2025-07-01

## 2025-06-30 RX ADMIN — DONEPEZIL HYDROCHLORIDE 10 MILLIGRAM(S): 5 TABLET ORAL at 22:51

## 2025-06-30 RX ADMIN — FOLIC ACID 1 MILLIGRAM(S): 1 TABLET ORAL at 12:38

## 2025-06-30 RX ADMIN — MEMANTINE HYDROCHLORIDE 10 MILLIGRAM(S): 21 CAPSULE, EXTENDED RELEASE ORAL at 12:37

## 2025-06-30 RX ADMIN — Medication 20 MILLIGRAM(S): at 12:38

## 2025-06-30 RX ADMIN — METOPROLOL SUCCINATE 50 MILLIGRAM(S): 50 TABLET, EXTENDED RELEASE ORAL at 05:43

## 2025-06-30 RX ADMIN — INSULIN LISPRO 1: 100 INJECTION, SOLUTION INTRAVENOUS; SUBCUTANEOUS at 17:07

## 2025-06-30 RX ADMIN — Medication 2 TABLET(S): at 22:51

## 2025-06-30 RX ADMIN — CEFEPIME 100 MILLIGRAM(S): 2 INJECTION, POWDER, FOR SOLUTION INTRAVENOUS at 12:38

## 2025-06-30 RX ADMIN — FUROSEMIDE 40 MILLIGRAM(S): 10 INJECTION INTRAMUSCULAR; INTRAVENOUS at 05:43

## 2025-06-30 RX ADMIN — Medication 1 APPLICATION(S): at 12:38

## 2025-06-30 RX ADMIN — ATORVASTATIN CALCIUM 80 MILLIGRAM(S): 80 TABLET, FILM COATED ORAL at 22:51

## 2025-06-30 RX ADMIN — AMOXICILLIN AND CLAVULANATE POTASSIUM 1 TABLET(S): 500; 125 TABLET, FILM COATED ORAL at 17:40

## 2025-06-30 RX ADMIN — Medication 81 MILLIGRAM(S): at 12:37

## 2025-06-30 NOTE — DISCHARGE NOTE PROVIDER - CARE PROVIDER_API CALL
CURTIS, SUNDEE  [address] [place] [address]  Phone: [phone]  Fax: [phone]  Follow Up Time: 1 week    Gilberto Marcelo  Oral & Maxillofacial Surgery  [address] [place] [id number]  Phone: [phone]  Fax: [phone]  Follow Up Time: 1-3 days

## 2025-06-30 NOTE — CONSULT NOTE ADULT - NS ATTEST RISK PROBLEM GEN_ALL_CORE FT
On this date of service, level of risk to patient is considered: Moderate.   Reviewed consultant notes  Reviewed labs     I have personally seen and examined this patient.    I have reviewed all pertinent clinical information and reviewed all relevant imaging and diagnostic studies personally.   I discussed recommendations with the primary team. I discussed recommendations with the primary team.

## 2025-06-30 NOTE — DISCHARGE NOTE NURSING/CASE MANAGEMENT/SOCIAL WORK - NSDCVIVACCINE_GEN_ALL_CORE_FT
Tdap; 26-Oct-2020 14:23; Ngoc Naylor (RN); [employer]; [id number] (Exp. Date: 31-Jul-2022); IntraMuscular; Deltoid Left.; 0.5 milliLiter(s); VIS (VIS Published: 09-May-2013, VIS Presented: 26-Oct-2020);   Tdap; 02-Oct-2021 19:12; Herminia Robertson (RN); [employer]; [id number] (Exp. Date: 15-Jul-2023); IntraMuscular; Deltoid Left.; 0.5 milliLiter(s); VIS (VIS Published: 09-May-2013, VIS Presented: 02-Oct-2021);

## 2025-06-30 NOTE — DISCHARGE NOTE PROVIDER - NSDCCPCAREPLAN_GEN_ALL_CORE_FT
PRINCIPAL DISCHARGE DIAGNOSIS  Diagnosis: Periodontal disease  Assessment and Plan of Treatment: you have diffuse periodontal disease  please follow up with oral surgery Dr. Marcelo  please take Augmentin 875/125 twice daily for 10 days

## 2025-06-30 NOTE — DISCHARGE NOTE PROVIDER - NSDCFUSCHEDAPPT_GEN_ALL_CORE_FT
[facility]  CARDIOLOGY [address] [place] [address] Av  Scheduled Appointment: 09/11/2025    Tom Sears  [facility]  CARDIOLOGY [address] [place] [address] Av  Scheduled Appointment: 09/11/2025

## 2025-06-30 NOTE — DISCHARGE NOTE NURSING/CASE MANAGEMENT/SOCIAL WORK - PATIENT PORTAL LINK FT
You can access the FollowMyHealth Patient Portal offered by [facility] by registering at the following website: [email or web address]. By joining [employer]’s FollowMyHealth portal, you will also be able to view your health information using other applications (apps) compatible with our system.

## 2025-06-30 NOTE — CONSULT NOTE ADULT - ASSESSMENT
ASSESSMENT  This is a 85 year old male with PMH of DM, HTN, HLD,  GERD, dementia, CAD s/p CABG, CHF is admitted for generalized weakness.    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    RVP negative   Blood cultures NGTD    RECOMMENDATIONS  -Patient should be advised to follow up with oral surgeon for periodontal disease.  -D/C cefepime.  -Discharge on PO Augmentin 875-125 BID for total of 10 days.  -Recall ID as needed.     If any questions, please send a message or call on [employer] Teams  Please continue to update ID with any pertinent new laboratory or radiographic findings.    Demetrius Lynne M.D  Infectious Diseases Attending/   [employer] [facility]   ASSESSMENT  This is a 85 year old male with PMH of DM, HTN, HLD,  GERD, dementia, CAD s/p CABG, CHF is admitted for generalized weakness.    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    RVP negative   Blood cultures NGTD    RECOMMENDATIONS  -Patient should be advised to follow up with oral surgeon for periodontal disease.  -D/C cefepime.  -Discharge on PO Augmentin 875-125 BID for total of 10 days.  -Recall ID as needed.     If any questions, please send a message or call on [email or web address] Teams  Please continue to update ID with any pertinent new laboratory or radiographic findings.    Demetrius Lynne M.D  Infectious Diseases Attending/   [employer] at [facility]

## 2025-06-30 NOTE — DISCHARGE NOTE PROVIDER - ATTENDING ATTESTATION STATEMENT
CC:    Shortness of air    HPI:    This is a pleasant 77 y/o WF w/ h/o lifetime non-smoking, Afib, prior amiodarone use, CAD s/p CABG 2012, previous stents, HTN, HLD, prior CVA w/ no obvious residual deficit, h/o secondhand smoke exposure, owns pet bird x 15-20 years who presents for evaluation of FIGUEROA.  She recently had a right and left heart cath 4/30/18 at [facility].  This showed a PA pressure of 45/23 (mean 30), PCWP 16, LVEDP 15, and CI of 1.5.  Calculated PVR is 6 Wood units.  LHC showed severe 2vCAD w/ patent LIMA to LAD, patent stents in LAD, patent SVG to PDA, and medical management was recommended.  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  No obvious auto-immune disease.     HRCT re-demonstrated diffuse GGO / mosaic attenuation worse in upper lobes.  V/Q w/ no evidence of CTEPH but +air trapping.  Extensive auto-immune / infectious serologies negative.  Underwent bronchoscopy with BAL RUL on 4/12/19 and prednisone 20 mg was started then.  Cultures were negative.  BAL fluid had lymphocyte predominance w/ CD4:CD8 ratio of 0.5.       INTERVAL HISTORY:    Returns today for follow up.  She did get rid of the bird.  Was recently hospitalized for UGIB and found to have PUD.   Prednisone down to 10 mg.  Still gets FIGUEROA.      PMH:    Past Medical History:   Diagnosis Date   • Arthritis    • Atrial fibrillation (CMS/HCC)    • Coronary artery disease    • Disease of thyroid gland    • Elevated cholesterol    • Hypertension    • Stroke (CMS/HCC)      PSH:    Past Surgical History:   Procedure Laterality Date   • BRONCHOSCOPY Bilateral 4/12/2019    Procedure: BRONCHOSCOPY;  Surgeon: Jeff Laura MD;  Location: [facility] ENDOSCOPY;  Service: Pulmonary   • BYPASS GRAFT     • CORONARY ANGIOPLASTY WITH STENT PLACEMENT     • HYSTERECTOMY       FH:    Family History   Problem Relation Age of Onset   • Cancer Mother    • No Known Problems Father    • Liver disease Sister       SH:    Social History     Socioeconomic History   • Marital status:      Spouse name: Not on file   • Number of children: Not on file   • Years of education: Not on file   • Highest education level: Not on file   Tobacco Use   • Smoking status: Never Smoker   • Smokeless tobacco: Never Used   Substance and Sexual Activity   • Alcohol use: No   • Drug use: No   • Sexual activity: Defer     ALLERGIES:    Allergies   Allergen Reactions   • Tuberculin Tests Rash     MEDICATIONS:      Current Outpatient Medications:   •  apixaban (ELIQUIS) 2.5 MG tablet tablet, Take 2.5 mg by mouth 2 (Two) Times a Day., Disp: , Rfl:   •  atorvastatin (LIPITOR) 40 MG tablet, Take 40 mg by mouth Daily., Disp: , Rfl:   •  bisoprolol (ZEBETA) 5 MG tablet, Take 1.5 tablets by mouth 2 (Two) Times a Day., Disp: 270 tablet, Rfl: 3  •  cholecalciferol (VITAMIN D3) 1000 units tablet, Take 2,000 Units by mouth Daily., Disp: , Rfl:   •  furosemide (LASIX) 40 MG tablet, Take 40 mg by mouth Daily., Disp: , Rfl:   •  isosorbide mononitrate (IMDUR) 120 MG 24 hr tablet, Take 120 mg by mouth Daily., Disp: , Rfl:   •  levothyroxine (SYNTHROID, LEVOTHROID) 75 MCG tablet, , Disp: , Rfl:   •  nitroglycerin (NITROSTAT) 0.4 MG SL tablet, , Disp: , Rfl:   •  pantoprazole (PROTONIX) 40 MG EC tablet, Take 1 tablet by mouth Daily., Disp: 60 tablet, Rfl: 3  •  potassium chloride (K-DUR) 10 MEQ CR tablet, Take 10 mEq by mouth 2 (Two) Times a Day., Disp: , Rfl:   •  potassium chloride (K-DUR,KLOR-CON) 10 MEQ CR tablet, Take 10 mEq by mouth 2 (Two) Times a Day., Disp: , Rfl:   •  predniSONE (DELTASONE) 10 MG tablet, Take 2 tablets by mouth Daily., Disp: 60 tablet, Rfl: 3  •  amLODIPine (NORVASC) 5 MG tablet, Take 5 mg by mouth Daily., Disp: , Rfl:   •  clopidogrel (PLAVIX) 75 MG tablet, Take 75 mg by mouth Daily., Disp: , Rfl:   •  denosumab (PROLIA) 60 MG/ML solution prefilled syringe syringe, 60 mg 1 (One) Time., Disp: , Rfl:   ROS:  Per HPI, otherwise all  systems reviewed and negative.    DIAGNOSTIC DATA (Reviewed and interpreted by me unless otherwise specified):    PFT 11/19/18 - No obstruction, No restriction, air trapping, moderate reduction in DLCO corrects for alveolar volume    PFT 6/3/19 - no obstruction or restriction, FEV1 and FVC significantly improved from prior, air trapping, moderate reduction in DLCO corrects for alveolar volume    PFT 9/4/19 - no obstruction or restriction, +air trapping, moderate reduction in DLCO corrects for alveolar volume, stable from 6/2019    6MW 11/19/18 - RA resting sat 95%, minimum sat 94% on RA, walked 137.2 meters 36% predicted    6MW 6/3/19 - starting sat 99%, minimum sat 98% on RA, walked 137 meters, 36% predicted    CXR 11/19/18 - chronic appearing changes in bases, mediastinal / hilar LAD    HRCT 11/30/18 & 1/21/19 - extensive GGO predominant in the bilateral upper lobes, likely some component of mosaic attenuation    HRCT 9/4/19 - GGO still present but improved from prior    V/Q 1/21/19 - air trapping    Vitals:    09/04/19 1010   BP: 98/58   Pulse: 92   Temp: 97.7 °F (36.5 °C)   SpO2: 91%       Physical Exam   Constitutional: Oriented to person, place, and time. Appears well-developed and well-nourished.   Head: Normocephalic and atraumatic.   Nose: Nose normal.   Mouth/Throat: Oropharynx is clear and moist.   Eyes: Conjunctivae are normal.  Pupils normal.  Neck: No tracheal deviation present.   Cardiovascular: Normal rate, regular rhythm, normal heart sounds and intact distal pulses.  Exam reveals no gallop and no friction rub.  No thrill.  No JVD.  No edema.  No murmur heard.  Pulmonary/Chest: Effort normal and breath sounds normal.  No tenderness to palpation.  No clubbing.   Abdominal: Soft. Bowel sounds are normal. No distension. No tenderness. There is no guarding.   Musculoskeletal: Normal range of motion.  No tenderness.  Lymphadenopathy:  No cervical adenopathy.   Neurological:  No new focal neurological  deficits observed   Skin: Skin is warm and dry. No rash noted.   Psychiatric: Normal mood and affect.  Behavior is normal. Judgment normal.    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  Has been on prednisone 20 mg daily since mid April, is now gaining weight and had a recent GIB.  Prednisone recently reduced to 10 mg.  Has osteoporosis.  Will change prednisone to cellcept 500 mg bid.  Taper prednisone over 2 weeks.  Add Breo 200.  When she comes back in 3 months will get full pft, 6MW, and labs.  If cellcept is well tolerated will increase to 1000 bid next visit.  If her PFT's / imaging ever normalize we may be able to taper off cellcept eventually.    I think her pulmonary htn is secondary to underlying lung disease and there is currently no role for selective pulmonary vasodilators.    Refer to Pulmonary Rehab in [facility].  [facility] has a program.    RTC 3 months w/ PFT, 6MW, CBC, and CMP    Jeff Laura MD  Pulmonology and Critical Care Medicine  09/04/19 10:50 AM  Electronically Signed    C.C.:  Tien Archer MD, Fredi Ledesma MD         I have personally seen and examined the patient. I have collaborated with and supervised the

## 2025-06-30 NOTE — DISCHARGE NOTE PROVIDER - NSDCMRMEDTOKEN_GEN_ALL_CORE_FT
amoxicillin-clavulanate 875 mg-125 mg oral tablet: 1 tab(s) orally 2 times a day  aspirin 81 mg oral tablet, chewable: 1 tab(s) orally once a day  atorvastatin 40 mg oral tablet: 2 tab(s) orally once a day (at bedtime)  donepezil 10 mg oral tablet: 1 tab(s) orally once a day (at bedtime)  famotidine 40 mg oral tablet: 1 tab(s) orally once a day  folic acid 1 mg oral tablet: 1 tab(s) orally once a day  furosemide: 40 milligram(s) orally once a day  memantine 10 mg oral tablet: 1 tab(s) orally once a day  Metoprolol Succinate ER 25 mg oral tablet, extended release: 2 tab(s) orally once a day  Senna 8.6 mg oral tablet: 2 tab(s) orally once a day (at bedtime)  TRADJENTA 5MG TABLETS: 1 tab(s) orally once a day

## 2025-06-30 NOTE — DISCHARGE NOTE PROVIDER - HOSPITAL COURSE
86 y/o M PMHx DM, HTN, HLD,  GERD, dementia, CAD s/p CABG, CHF on furosemide,  admitted for 1-2 week generalized weakness and 2-3 day h/o appearing very sleepy, per pt's daughter, noting pt hasnt been his "usual jovial self". Pt daughter also reporting pt's b/l LE have gradually gotten more swollen over past 3 weeks  pt daughter reports no reported n/v, known head trauma/ falls, fevers, abd pain, pain w/ urination, issues w/ chewing/eating, constipation,  unable to urinate, unable to tolerate PO intake, corroborated by pt during exam   pt daughter  also reporting no noted change in mental status from baseline    temp 101.5 in ER noted    # fever  # periodontal disease  lactate and WBC WNL  CTAP: No CT evidence of acute abdominopelvic pathology  CTH: No acute intracranial pathology. Stable mild chronic microvascular ischemic changes and chronic lacunar infarcts  CT maxillofacial: Interval progression of diffuse dental caries with periapical/periodontal   disease since the prior maxillofacial CT from 10/28/2020. Inflammation of   the left buccal soft tissue likely in association with the periodontal   disease of the carious left first mandibular molar tooth. No evidence of   abnormal fluid collection.  CXR: no signs of cardiac pulm disease   s/p cefepime and IVF In ER   dc with Augmentin 875 BID x 10 days and f/u in dental clinic    #hematuria in setting of straight cath  pt's daughter denies reported hematuria prior to straight cath, reporting it as " difficult"  while in ER  - improving    #ROYAL  - resolving, continue home meds    # HTN  #  h/o CHF w/ signs of fluid overload on exam  TTE (2023) showed ef of 54 %.  - TTE  - c/w metoprolol and  furosemide     # Dementia  - c/w home med    # GERD  - c/w renally dose famotidine     # DM  - cont home meds    Medically stable for dc with dental f/u

## 2025-06-30 NOTE — DISCHARGE NOTE NURSING/CASE MANAGEMENT/SOCIAL WORK - FINANCIAL ASSISTANCE
[facility] provides services at a reduced cost to those who are determined to be eligible through [facility]’s financial assistance program. Information regarding [facility]’s financial assistance program can be found by going to [email or web address] or by calling [phone].

## 2025-06-30 NOTE — DISCHARGE NOTE PROVIDER - PROVIDER TOKENS
PROVIDER:[TOKEN:[[id number]],FOLLOWUP:[1 week]],PROVIDER:[TOKEN:[[id number]],FOLLOWUP:[1-3 days]]

## 2025-06-30 NOTE — CONSULT NOTE ADULT - SUBJECTIVE AND OBJECTIVE BOX
NOEMY GRUBBS  85y, Male  Allergies    azithromycin (Hives; Rash)    Intolerances    LOS  2d    HPI  HPI:  86 y/o M PMHx DM, HTN, HLD,  GERD, dementia, CAD s/p CABG, CHF on furosemide,  admitted for 1-2 week generalized weakness and 2-3 day h/o appearing very sleepy, per pt's daughter, noting pt hasnt been his "usual jovial self". Pt daughter also reporting pt's b/l LE have gradually gotten more swollen over past 3 weeks    pt daughter reports no reported n/v, known head trauma/ falls, fevers, abd pain, pain w/ urination, issues w/ chewing/eating, constipation,  unable to urinate, unable to tolerate PO intake, corroborated by pt during exam      (28 Jun 2025 10:43)      INFECTIOUS DISEASE HISTORY:  ID consulted for antimicrobial recommendations.     Prior hospital charts reviewed [Yes]  Primary team notes reviewed [Yes]  Other consultant notes reviewed [Yes]    REVIEW OF SYSTEMS:  CONSTITUTIONAL: No fever or chills  HEENT: No sore throat  RESPIRATORY: No cough, no shortness of breath  CARDIOVASCULAR: No chest pain or palpitations  GASTROINTESTINAL: No abdominal or epigastric pain  GENITOURINARY: No dysuria  NEUROLOGICAL: No headache/dizziness  MSK: No joint pain, erythema, or swelling; no back pain  SKIN: No itching, rashes  All other ROS negative except noted above    PAST MEDICAL & SURGICAL HISTORY:  HTN (hypertension)      Coronary artery disease involving coronary bypass graft of native heart without angina pectoris      Dementia      Chronic GERD      HLD (hyperlipidemia)      Prediabetes      Diabetes      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       ANTIMICROBIALS:  cefepime   IVPB 2000 daily      ANTIMICROBIALS (past 90 days):  MEDICATIONS  (STANDING):    cefepime   IVPB   100 mL/Hr IV Intermittent (06-30-25 @ 12:38)   100 mL/Hr IV Intermittent (06-29-25 @ 12:34)    cefepime   IVPB   100 mL/Hr IV Intermittent (06-28-25 @ 02:20)        OTHER MEDS:   MEDICATIONS  (STANDING):  acetaminophen     Tablet .. 650 every 6 hours PRN  aspirin  chewable 81 daily  atorvastatin 80 at bedtime  dextrose 50% Injectable 25 once  dextrose 50% Injectable 12.5 once  dextrose 50% Injectable 25 once  dextrose Oral Gel 15 once PRN  donepezil 10 at bedtime  famotidine    Tablet 20 daily  furosemide    Tablet 40 daily  glucagon  Injectable 1 once  insulin lispro (ADMELOG) corrective regimen sliding scale  three times a day before meals  insulin lispro (ADMELOG) corrective regimen sliding scale  at bedtime  melatonin 3 at bedtime PRN  memantine 10 daily  metoprolol succinate ER 50 daily  senna 2 at bedtime      VITALS:  Vital Signs Last 24 Hrs  T(F): 98.7 (06-30-25 @ 13:40), Max: 101.5 (06-28-25 @ 00:42)    Vital Signs Last 24 Hrs  HR: 57 (06-30-25 @ 13:40) (57 - 66)  BP: 132/74 (06-30-25 @ 13:40) (124/65 - 132/74)  RR: 19 (06-30-25 @ 13:40)  SpO2: 95% (06-30-25 @ 13:40) (95% - 95%)  Wt(kg): --    EXAM:  GENERAL: NAD, elderly male  HEAD: Very poor dentition.  NECK: No neck mass.  CHEST/LUNG: Shallow breath sounds.  HEART: S1 S2  ABDOMEN: Soft, nontender  EXTREMITIES: No clubbing  NERVOUS SYSTEM: Alert and oriented to person  MSK: No joint erythema, swelling or pain  SKIN: No rashes or lesions, no superficial thrombophlebitis    Labs:                        12.7   5.99  )-----------( 145      ( 30 Jun 2025 04:30 )             38.9     06-30    137  |  99  |  19  ----------------------------<  130[H]  3.7   |  21  |  1.5    Ca    8.5      30 Jun 2025 09:17  Mg     2.0     06-30    TPro  7.1  /  Alb  3.9  /  TBili  0.8  /  DBili  x   /  AST  29  /  ALT  20  /  AlkPhos  84  06-30      WBC Trend:  WBC Count: 5.99 (06-30-25 @ 04:30)  WBC Count: 7.14 (06-29-25 @ 08:50)  WBC Count: 9.61 (06-28-25 @ 01:37)      Auto Neutrophil #: 3.87 K/uL (06-30-25 @ 04:30)  Auto Neutrophil #: 4.89 K/uL (06-29-25 @ 08:50)  Auto Neutrophil #: 7.83 K/uL (06-28-25 @ 01:37)  Auto Neutrophil #: 6.71 K/uL (12-10-24 @ 05:39)  Auto Neutrophil #: 8.41 K/uL (12-09-24 @ 20:51)      Creatine Trend:  Creatinine: 1.5 (06-30)  Creatinine: 1.6 (06-29)  Creatinine: 1.6 (06-28)      Liver Biochemical Testing Trend:  Alanine Aminotransferase (ALT/SGPT): 20 (06-30)  Alanine Aminotransferase (ALT/SGPT): 17 (06-29)  Alanine Aminotransferase (ALT/SGPT): 17 (06-28)  Alanine Aminotransferase (ALT/SGPT): 18 (03-06)  Alanine Aminotransferase (ALT/SGPT): 19 (03-05)  Aspartate Aminotransferase (AST/SGOT): 29 (06-30-25 @ 09:17)  Aspartate Aminotransferase (AST/SGOT): 22 (06-29-25 @ 08:50)  Aspartate Aminotransferase (AST/SGOT): 20 (06-28-25 @ 01:37)  Aspartate Aminotransferase (AST/SGOT): 22 (03-06-25 @ 06:59)  Aspartate Aminotransferase (AST/SGOT): 26 (03-05-25 @ 08:00)  Bilirubin Total: 0.8 (06-30)  Bilirubin Total: 1.0 (06-29)  Bilirubin Total: 0.7 (06-28)  Bilirubin Total: 0.6 (03-06)  Bilirubin Total: 1.1 (03-05)      Trend LDH      Auto Eosinophil %: 4.0 % (06-30-25 @ 04:30)  Auto Eosinophil %: 1.3 % (06-29-25 @ 08:50)  Auto Eosinophil %: 0.6 % (06-28-25 @ 01:37)      Urinalysis Basic - ( 30 Jun 2025 09:17 )    Color: x / Appearance: x / SG: x / pH: x  Gluc: 130 mg/dL / Ketone: x  / Bili: x / Urobili: x   Blood: x / Protein: x / Nitrite: x   Leuk Esterase: x / RBC: x / WBC x   Sq Epi: x / Non Sq Epi: x / Bacteria: x        MICROBIOLOGY:    Male    Urinalysis with Rflx Culture (collected 28 Jun 2025 06:35)    Culture - Blood (collected 28 Jun 2025 01:37)  Source: Blood Blood-Peripheral  Preliminary Report (29 Jun 2025 22:01):    No growth at 24 hours    Culture - Blood (collected 28 Jun 2025 01:37)  Source: Blood Blood-Peripheral  Preliminary Report (29 Jun 2025 22:01):    No growth at 24 hours    Rapid RVP Result: NotDetec (06-29 @ 09:16)  Lactate, Blood: 1.9 (06-28 @ 01:37)  Blood Gas Venous - Lactate: 1.7 (06-28 @ 01:33)    A1C with Estimated Average Glucose Result: 6.6 % (06-29-25 @ 08:50)  A1C with Estimated Average Glucose Result: 6.2 % (03-05-25 @ 08:00)      INFLAMMATORY MARKERS      RADIOLOGY & ADDITIONAL TESTS:  I have personally reviewed the imagings.  CXR  Xray Chest 1 View- PORTABLE-Urgent:   ACC: [id number] EXAM:  XR CHEST PORTABLE URGENT 1V   ORDERED BY: GUANAKITO ARMENDARIZ     PROCEDURE DATE:  06/28/2025          INTERPRETATION:  CLINICAL HISTORY / REASON FOR EXAM: Sepsis.    COMPARISON: Chest radiograph from March 4, 2025. CT abdomen and pelvis   from March 4, 2025    TECHNIQUE/POSITIONING: Low lung volumes. Single image, AP chest   radiograph.    FINDINGS:    SUPPORT DEVICES: None.    CARDIAC/MEDIASTINUM/HILUM: Post sternotomy.    LUNG PARENCHYMA/PLEURA: No focal consolidation or pleural effusion.   Stable left basilar opacity, likely reflecting epicardial fat. No   pneumothorax.    SKELETON/SOFT TISSUES: Degenerative changes of the thoracic spine.      IMPRESSION:    No radiographic evidence of acute cardiopulmonary disease.    --- End of Report ---            ELODIA WEBER MD; Attending Radiologist  This document has been electronically signed. Jun 28 2025  6:28AM (06-28-25 @ 02:14)      CT  CT Abdomen and Pelvis w/ IV Cont:   ACC: [id number] EXAM:  CT ABDOMEN AND PELVIS IC   ORDERED BY: GUANAKITO ARMENDARIZ     PROCEDURE DATE:  06/28/2025          INTERPRETATION:  CLINICAL STATEMENT: Abdominal pain.    TECHNIQUE: Contiguous axial CT images were obtained from the lower chest   to the pubic symphysis following administration of intravenous contrast.   95 cc administered of Omnipaque 350 (5 cc discarded). Oral contrast was   not administered. Reformatted images in the coronal and sagittal planes   were acquired.    COMPARISON: CT abdomen and pelvis 3/4/2025.      FINDINGS:    LOWER CHEST: Lung bases are motion degraded and clear. Multivessel   coronary arterial calcifications. Previously seen nodule out of the   field-of-view.    HEPATOBILIARY: Previously described nodular layering densities not   definitively visualized. See prior report for recommendations.   Cholelithiasis. Stable subcentimeter hepatic hypodensities too small to   further characterize.    SPLEEN: Unremarkable.    PANCREAS: Unremarkable.    ADRENAL GLANDS: Unremarkable.    KIDNEYS: Symmetric enhancement. No hydronephrosis.    ABDOMINOPELVIC NODES: Unremarkable.    PELVIC ORGANS: Stable prostatomegaly.    PERITONEUM/MESENTERY/BOWEL: No abnormal bowel dilation or wall   thickening. No ascites. No intraperitoneal free air. Unremarkable   appendix.    BONES/SOFT TISSUES: Degenerative changes of the lumbar spine. Median   sternotomy wires.    OTHER: Atherosclerotic calcifications of the abdominal aorta and branch   vasculature.      IMPRESSION:    No CT evidence of acute abdominopelvic pathology.    --- End of Report ---          DAYRON FINN MD; Resident Radiologist  This document has been electronically signed.  CARLOS PUGH MD; Attending Radiologist  This document has been electronically signed. Jun 28 2025  5:43AM (06-28-25 @ 02:02)    < from: CT Maxillofacial w/ IV Cont (06.28.25 @ 16:26) >    COMPARISON: Maxillofacial CT dated 10/26/2020    Findings:  Skin and subcutaneous soft tissues: Inflammation of the left buccal soft   tissue likely in association with periodontal disease of the carious left   first mandibular molar tooth. No evidence of abnormal fluid collection.    Osseous structures: No fracture, dislocation or destructive lesion.   Extensive dental caries that have progressed since the prior   maxillofacial CT dated 10/26/2020 with multiple periodontal and   periapical disease. There is cortical dehiscence overlying the left first   mandibular molar tooth with overlying soft tissue inflammation. There are   sclerotic changes involving bilateral mandible, left worse than right,   likely reflecting sequela of chronic inflammation.    Orbits: The globes, retrobulbar fat, extraocular muscles, and optic nerve   sheath complexes are intact and normal in morphology.    Paranasal sinuses: Mild mucosal thickening bilateral ethmoid sinuses and   left maxillary sinus.    Mastoid air cells: Clear.    Other:  The partially visualized intracranial structures are normal.      Impression:    Interval progression of diffuse dental caries with periapical/periodontal   disease since the prior maxillofacial CT from 10/28/2020. Inflammation of   the left buccal soft tissue likely in association with the periodontal   disease of the carious left first mandibular molar tooth. No evidence of   abnormal fluid collection.    --- End of Report ---    < end of copied text >  < from: US Kidney and Bladder (06.28.25 @ 15:53) >      INTERPRETATION:  CLINICAL HISTORY / REASON FOR EXAM: Acute kidney injury    COMPARISON: CT abdomen and pelvis from June 28, 2025    PROCEDURE: Retroperitoneal ultrasound was performed.    FINDINGS:    RIGHT KIDNEY: 10.8 cm. No renal mass, hydronephrosis or calculi.    LEFT KIDNEY: 11.6 cm. No renal mass, hydronephrosis or calculi.    URINARY BLADDER: No debris or calculus. Bilateral ureteral jets are   visualized. Prevoid volume of approximately 172 mL. Postvoid volume not   performed due to patient preference.    PROSTATE: Not visualized      IMPRESSION:    No sonographic evidence of hydronephrosis.    < end of copied text >  < from: [facility] Duplex Lower Ext Vein Scan, Bilat (06.28.25 @ 15:53) >  IMPRESSION:  No evidence of deep venous thrombosis in either lower extremity.      < end of copied text >  < from: CT Head No Cont (06.28.25 @ 07:28) >  IMPRESSION:    No acute intracranial pathology.    Stable mild chronic microvascular ischemic changes and chronic lacunar   infarcts.    --- End of Report ---    < end of copied text >  < from: Xray Kidney Ureter Bladder (03.06.25 @ 18:48) >    IMPRESSION:    Nonspecific colonic gaseous distention.    Moderate to large rectal stool burden.    --- End of Report ---    < end of copied text >    CARDIOLOGY TESTING  12 Lead ECG:   Ventricular Rate 76 BPM    Atrial Rate 76 BPM    P-R Interval 162 ms    QRS Duration 86 ms    Q-T Interval 392 ms    QTC Calculation(Bazett) 441 ms    P Axis 33 degrees    R Axis 81 degrees    T Axis 5 degrees    Diagnosis Line Normal sinus rhythm  Possible Anterior infarct , age undetermined  Abnormal ECG (06-28-25 @ 00:49)            
NEPHROLOGY CONSULTATION NOTE    86 y/o M PMHx DM, HTN, HLD,  GERD, dementia, CAD s/p CABG, CHF on furosemide,  admitted for 1-2 week generalized weakness and 2-3 day h/o appearing very sleepy, per pt's daughter, noting pt hasnt been his "usual jovial self". 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    PHYSICAL EXAM:  Constitutional: NAD  HEENT: anicteric sclera, oropharynx clear, MMM  Neck: No JVD  Respiratory: CTAB, no wheezes, rales or rhonchi  Cardiovascular: S1, S2, RRR  Gastrointestinal: BS+, soft, NT/ND  Extremities: No cyanosis or clubbing. No peripheral edema  Neurological: A x o x 2  : No CVA tenderness. No doll.   Skin: No rashes    Hospital Medications:   MEDICATIONS  (STANDING):  aspirin  chewable 81 milliGRAM(s) Oral daily  atorvastatin 80 milliGRAM(s) Oral at bedtime  cefepime   IVPB 2000 milliGRAM(s) IV Intermittent daily  chlorhexidine 2% Cloths 1 Application(s) Topical daily  dextrose 5%. 1000 milliLiter(s) (100 mL/Hr) IV Continuous <Continuous>  dextrose 5%. 1000 milliLiter(s) (50 mL/Hr) IV Continuous <Continuous>  dextrose 50% Injectable 25 Gram(s) IV Push once  dextrose 50% Injectable 12.5 Gram(s) IV Push once  dextrose 50% Injectable 25 Gram(s) IV Push once  donepezil 10 milliGRAM(s) Oral at bedtime  famotidine    Tablet 20 milliGRAM(s) Oral daily  folic acid 1 milliGRAM(s) Oral daily  furosemide    Tablet 40 milliGRAM(s) Oral daily  glucagon  Injectable 1 milliGRAM(s) IntraMuscular once  insulin lispro (ADMELOG) corrective regimen sliding scale   SubCutaneous three times a day before meals  insulin lispro (ADMELOG) corrective regimen sliding scale   SubCutaneous at bedtime  memantine 10 milliGRAM(s) Oral daily  metoprolol succinate ER 50 milliGRAM(s) Oral daily  senna 2 Tablet(s) Oral at bedtime  sodium chloride 0.9%. 1000 milliLiter(s) (50 mL/Hr) IV Continuous <Continuous>        VITALS:  T(F): 98 (06-29-25 @ 20:35), Max: 98.8 (06-29-25 @ 14:30)  HR: 66 (06-29-25 @ 20:35)  BP: 124/65 (06-29-25 @ 20:35)  RR: 18 (06-29-25 @ 20:35)  SpO2: 95% (06-29-25 @ 20:35)  Wt(kg): --    06-29 @ 07:01  -  06-30 @ 07:00  --------------------------------------------------------  IN: 0 mL / OUT: 3 mL / NET: -3 mL          LABS:  06-29    139  |  101  |  17  ----------------------------<  101[H]  4.0   |  26  |  1.6[H]    Ca    9.2      29 Jun 2025 08:50  Mg     2.1     06-29    TPro  7.5  /  Alb  4.2  /  TBili  1.0  /  DBili      /  AST  22  /  ALT  17  /  AlkPhos  101  06-29                          13.2   7.14  )-----------( 156      ( 29 Jun 2025 08:50 )             40.7       Urine Studies:  Urinalysis Basic - ( 29 Jun 2025 08:50 )    Color:  / Appearance:  / SG:  / pH:   Gluc: 101 mg/dL / Ketone:   / Bili:  / Urobili:    Blood:  / Protein:  / Nitrite:    Leuk Esterase:  / RBC:  / WBC    Sq Epi:  / Non Sq Epi:  / Bacteria:           RADIOLOGY & ADDITIONAL STUDIES:

## 2025-06-30 NOTE — CONSULT NOTE ADULT - ASSESSMENT
chronic kidney disease stage 3 without proteinuria, pt known to me  baseline Cr "upper 1's"  no ROYAL, Cr at baseline  s/p IV contrast in ED 6/28  no mention of right renal lesion on current CT and renal sono that was seen on prior CT  resolved gross hematuria with straight cath  fever / lethargy / AMS, better  dental carries   dementia   CAD / HTN / DM    plan:    no further IVF  trend renal function s/p iv contrast  monitor void via condom cath  complete empiric abx regimen, monitor for cefepime neuro side effects with depressed GFR  f/u urinalysis for resolution of hematuria as outpt

## 2025-07-01 VITALS
RESPIRATION RATE: 18 BRPM | DIASTOLIC BLOOD PRESSURE: 65 MMHG | SYSTOLIC BLOOD PRESSURE: 120 MMHG | OXYGEN SATURATION: 97 % | TEMPERATURE: 98 F | HEART RATE: 57 BPM

## 2025-07-01 LAB
ALBUMIN SERPL ELPH-MCNC: 3.9 G/DL — SIGNIFICANT CHANGE UP (ref 3.5–5.2)
ALP SERPL-CCNC: 85 U/L — SIGNIFICANT CHANGE UP (ref 30–115)
ALT FLD-CCNC: 23 U/L — SIGNIFICANT CHANGE UP (ref 0–41)
ANION GAP SERPL CALC-SCNC: 15 MMOL/L — HIGH (ref 7–14)
AST SERPL-CCNC: 32 U/L — SIGNIFICANT CHANGE UP (ref 0–41)
BASOPHILS # BLD AUTO: 0.02 K/UL — SIGNIFICANT CHANGE UP (ref 0–0.2)
BASOPHILS NFR BLD AUTO: 0.3 % — SIGNIFICANT CHANGE UP (ref 0–2)
BILIRUB SERPL-MCNC: 0.9 MG/DL — SIGNIFICANT CHANGE UP (ref 0.2–1.2)
BUN SERPL-MCNC: 18 MG/DL — SIGNIFICANT CHANGE UP (ref 10–20)
CALCIUM SERPL-MCNC: 8.5 MG/DL — SIGNIFICANT CHANGE UP (ref 8.4–10.5)
CHLORIDE SERPL-SCNC: 101 MMOL/L — SIGNIFICANT CHANGE UP (ref 98–110)
CO2 SERPL-SCNC: 23 MMOL/L — SIGNIFICANT CHANGE UP (ref 17–32)
CREAT SERPL-MCNC: 1.4 MG/DL — SIGNIFICANT CHANGE UP (ref 0.7–1.5)
EGFR: 50 ML/MIN/1.73M2 — LOW
EGFR: 50 ML/MIN/1.73M2 — LOW
EOSINOPHIL # BLD AUTO: 0.33 K/UL — SIGNIFICANT CHANGE UP (ref 0–0.5)
EOSINOPHIL NFR BLD AUTO: 5.5 % — SIGNIFICANT CHANGE UP (ref 0–6)
GLUCOSE SERPL-MCNC: 112 MG/DL — HIGH (ref 70–99)
HCT VFR BLD CALC: 39 % — SIGNIFICANT CHANGE UP (ref 39–50)
HGB BLD-MCNC: 13.1 G/DL — SIGNIFICANT CHANGE UP (ref 13–17)
IMM GRANULOCYTES # BLD AUTO: 0.02 K/UL — SIGNIFICANT CHANGE UP (ref 0–0.07)
IMM GRANULOCYTES NFR BLD AUTO: 0.3 % — SIGNIFICANT CHANGE UP (ref 0–0.9)
LYMPHOCYTES # BLD AUTO: 0.86 K/UL — LOW (ref 1–3.3)
LYMPHOCYTES NFR BLD AUTO: 14.3 % — SIGNIFICANT CHANGE UP (ref 13–44)
MAGNESIUM SERPL-MCNC: 2.1 MG/DL — SIGNIFICANT CHANGE UP (ref 1.8–2.4)
MCHC RBC-ENTMCNC: 29 PG — SIGNIFICANT CHANGE UP (ref 27–34)
MCHC RBC-ENTMCNC: 33.6 G/DL — SIGNIFICANT CHANGE UP (ref 32–36)
MCV RBC AUTO: 86.3 FL — SIGNIFICANT CHANGE UP (ref 80–100)
MONOCYTES # BLD AUTO: 0.91 K/UL — HIGH (ref 0–0.9)
MONOCYTES NFR BLD AUTO: 15.2 % — HIGH (ref 2–14)
NEUTROPHILS # BLD AUTO: 3.86 K/UL — SIGNIFICANT CHANGE UP (ref 1.8–7.4)
NEUTROPHILS NFR BLD AUTO: 64.4 % — SIGNIFICANT CHANGE UP (ref 43–77)
NRBC # BLD AUTO: 0 K/UL — SIGNIFICANT CHANGE UP (ref 0–0)
NRBC # FLD: 0 K/UL — SIGNIFICANT CHANGE UP (ref 0–0)
NRBC BLD AUTO-RTO: 0 /100 WBCS — SIGNIFICANT CHANGE UP (ref 0–0)
PLATELET # BLD AUTO: 144 K/UL — LOW (ref 150–400)
PMV BLD: 11 FL — SIGNIFICANT CHANGE UP (ref 7–13)
POTASSIUM SERPL-MCNC: 4.1 MMOL/L — SIGNIFICANT CHANGE UP (ref 3.5–5)
POTASSIUM SERPL-SCNC: 4.1 MMOL/L — SIGNIFICANT CHANGE UP (ref 3.5–5)
PROT SERPL-MCNC: 7.1 G/DL — SIGNIFICANT CHANGE UP (ref 6–8)
RBC # BLD: 4.52 M/UL — SIGNIFICANT CHANGE UP (ref 4.2–5.8)
RBC # FLD: 13.3 % — SIGNIFICANT CHANGE UP (ref 10.3–14.5)
SODIUM SERPL-SCNC: 139 MMOL/L — SIGNIFICANT CHANGE UP (ref 135–146)
WBC # BLD: 6 K/UL — SIGNIFICANT CHANGE UP (ref 3.8–10.5)
WBC # FLD AUTO: 6 K/UL — SIGNIFICANT CHANGE UP (ref 3.8–10.5)

## 2025-07-01 PROCEDURE — 99239 HOSP IP/OBS DSCHRG MGMT >30: CPT

## 2025-07-01 RX ADMIN — Medication 81 MILLIGRAM(S): at 11:59

## 2025-07-01 RX ADMIN — AMOXICILLIN AND CLAVULANATE POTASSIUM 1 TABLET(S): 500; 125 TABLET, FILM COATED ORAL at 05:33

## 2025-07-01 RX ADMIN — FUROSEMIDE 40 MILLIGRAM(S): 10 INJECTION INTRAMUSCULAR; INTRAVENOUS at 05:33

## 2025-07-01 RX ADMIN — Medication 20 MILLIGRAM(S): at 11:59

## 2025-07-01 RX ADMIN — METOPROLOL SUCCINATE 50 MILLIGRAM(S): 50 TABLET, EXTENDED RELEASE ORAL at 05:32

## 2025-07-01 RX ADMIN — MEMANTINE HYDROCHLORIDE 10 MILLIGRAM(S): 21 CAPSULE, EXTENDED RELEASE ORAL at 11:59

## 2025-07-01 RX ADMIN — Medication 1 APPLICATION(S): at 11:59

## 2025-07-01 RX ADMIN — FOLIC ACID 1 MILLIGRAM(S): 1 TABLET ORAL at 11:58

## 2025-07-01 NOTE — PROGRESS NOTE ADULT - SUBJECTIVE AND OBJECTIVE BOX
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 1000 milliLiter(s) IV Continuous <Continuous>  dextrose 5%. 1000 milliLiter(s) IV Continuous <Continuous>  dextrose 50% Injectable 25 Gram(s) IV Push once  dextrose 50% Injectable 12.5 Gram(s) IV Push once  dextrose 50% Injectable 25 Gram(s) IV Push once  donepezil 10 milliGRAM(s) Oral at bedtime  famotidine    Tablet 20 milliGRAM(s) Oral daily  folic acid 1 milliGRAM(s) Oral daily  furosemide    Tablet 40 milliGRAM(s) Oral daily  glucagon  Injectable 1 milliGRAM(s) IntraMuscular once  insulin lispro (ADMELOG) corrective regimen sliding scale   SubCutaneous three times a day before meals  insulin lispro (ADMELOG) corrective regimen sliding scale   SubCutaneous at bedtime  memantine 10 milliGRAM(s) Oral daily  metoprolol succinate ER 50 milliGRAM(s) Oral daily  senna 2 Tablet(s) Oral at bedtime  sodium chloride 0.9%. 1000 milliLiter(s) IV Continuous <Continuous>    PRN MEDICATIONS  acetaminophen     Tablet .. 650 milliGRAM(s) Oral every 6 hours PRN  dextrose Oral Gel 15 Gram(s) Oral once PRN  melatonin 3 milliGRAM(s) Oral at bedtime PRN      VITAL SIGNS: Last 24 Hours  T(C): 37.4 (29 Jun 2025 04:59), Max: 37.8 (28 Jun 2025 20:40)  T(F): 99.3 (29 Jun 2025 04:59), Max: 100.1 (28 Jun 2025 20:40)  HR: 68 (29 Jun 2025 04:59) (68 - 75)  BP: 134/70 (29 Jun 2025 04:59) (134/70 - 155/83)  BP(mean): --  RR: 18 (29 Jun 2025 04:59) (18 - 20)  SpO2: 94% (29 Jun 2025 04:59) (94% - 97%)    LABS:                        13.2   7.14  )-----------( 156      ( 29 Jun 2025 08:50 )             40.7     06-29    139  |  101  |  17  ----------------------------<  101[H]  4.0   |  26  |  1.6[H]    Ca    9.2      29 Jun 2025 08:50  Mg     2.1     06-29    TPro  7.5  /  Alb  4.2  /  TBili  1.0  /  DBili  x   /  AST  22  /  ALT  17  /  AlkPhos  101  06-29    PT/INR - ( 28 Jun 2025 01:37 )   PT: 12.50 sec;   INR: 1.06 ratio         PTT - ( 28 Jun 2025 01:37 )  PTT:32.7 sec  Urinalysis Basic - ( 29 Jun 2025 08:50 )    Color: x / Appearance: x / SG: x / pH: x  Gluc: 101 mg/dL / Ketone: x  / Bili: x / Urobili: x   Blood: x / Protein: x / Nitrite: x   Leuk Esterase: x / RBC: x / WBC x   Sq Epi: x / Non Sq Epi: x / Bacteria: x            RADIOLOGY:      PHYSICAL EXAM:    GENERAL: NAD, well-developed  HEENT:  Atraumatic, Normocephalic. EOMI, LT sided maxillofacial swelling  PULMONARY: Clear to auscultation bilaterally  CARDIOVASCULAR: Regular rate and rhythm; No murmurs  GASTROINTESTINAL: Soft, Nontender, Nondistended  MUSCULOSKELETAL:  2+ Peripheral Pulses, No clubbing, cyanosis, or edema  NEUROLOGY: non-focal  SKIN: No rashes or lesions      
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    PHYSICAL EXAM:  Constitutional: NAD  HEENT: anicteric sclera, oropharynx clear, MMM  Neck: No JVD  Respiratory: CTAB, no wheezes, rales or rhonchi  Cardiovascular: S1, S2, RRR  Gastrointestinal: BS+, soft, NT/ND  Extremities: No cyanosis or clubbing. No peripheral edema  Neurological: A x o x 2  : No CVA tenderness. No doll.   Skin: No rashes    Hospital Medications:   MEDICATIONS  (STANDING):  amoxicillin  875 milliGRAM(s)/clavulanate 1 Tablet(s) Oral every 12 hours  aspirin  chewable 81 milliGRAM(s) Oral daily  atorvastatin 80 milliGRAM(s) Oral at bedtime  chlorhexidine 2% Cloths 1 Application(s) Topical daily  dextrose 5%. 1000 milliLiter(s) (100 mL/Hr) IV Continuous <Continuous>  dextrose 5%. 1000 milliLiter(s) (50 mL/Hr) IV Continuous <Continuous>  dextrose 50% Injectable 25 Gram(s) IV Push once  dextrose 50% Injectable 12.5 Gram(s) IV Push once  dextrose 50% Injectable 25 Gram(s) IV Push once  donepezil 10 milliGRAM(s) Oral at bedtime  famotidine    Tablet 20 milliGRAM(s) Oral daily  folic acid 1 milliGRAM(s) Oral daily  furosemide    Tablet 40 milliGRAM(s) Oral daily  glucagon  Injectable 1 milliGRAM(s) IntraMuscular once  insulin lispro (ADMELOG) corrective regimen sliding scale   SubCutaneous three times a day before meals  insulin lispro (ADMELOG) corrective regimen sliding scale   SubCutaneous at bedtime  memantine 10 milliGRAM(s) Oral daily  metoprolol succinate ER 50 milliGRAM(s) Oral daily  senna 2 Tablet(s) Oral at bedtime  sodium chloride 0.9%. 1000 milliLiter(s) (50 mL/Hr) IV Continuous <Continuous>        VITALS:  T(F): 98.1 (07-01-25 @ 04:40), Max: 98.7 (06-30-25 @ 13:40)  HR: 59 (07-01-25 @ 05:24)  BP: 128/64 (07-01-25 @ 04:40)  RR: 18 (07-01-25 @ 04:40)  SpO2: 94% (07-01-25 @ 05:24)  Wt(kg): --    06-29 @ 07:01  -  06-30 @ 07:00  --------------------------------------------------------  IN: 0 mL / OUT: 3 mL / NET: -3 mL    06-30 @ 07:01  -  07-01 @ 07:00  --------------------------------------------------------  IN: 770 mL / OUT: 1401 mL / NET: -631 mL          LABS:  06-30    137  |  99  |  19  ----------------------------<  130[H]  3.7   |  21  |  1.5    Ca    8.5      30 Jun 2025 09:17  Mg     2.0     06-30    TPro  7.1  /  Alb  3.9  /  TBili  0.8  /  DBili      /  AST  29  /  ALT  20  /  AlkPhos  84  06-30                          12.7   5.99  )-----------( 145      ( 30 Jun 2025 04:30 )             38.9       Urine Studies:  Urinalysis Basic - ( 30 Jun 2025 09:17 )    Color:  / Appearance:  / SG:  / pH:   Gluc: 130 mg/dL / Ketone:   / Bili:  / Urobili:    Blood:  / Protein:  / Nitrite:    Leuk Esterase:  / RBC:  / WBC    Sq Epi:  / Non Sq Epi:  / Bacteria:           RADIOLOGY & ADDITIONAL STUDIES:   
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 1000 milliLiter(s) IV Continuous <Continuous>  dextrose 5%. 1000 milliLiter(s) IV Continuous <Continuous>  dextrose 50% Injectable 25 Gram(s) IV Push once  dextrose 50% Injectable 12.5 Gram(s) IV Push once  dextrose 50% Injectable 25 Gram(s) IV Push once  donepezil 10 milliGRAM(s) Oral at bedtime  famotidine    Tablet 20 milliGRAM(s) Oral daily  folic acid 1 milliGRAM(s) Oral daily  furosemide    Tablet 40 milliGRAM(s) Oral daily  glucagon  Injectable 1 milliGRAM(s) IntraMuscular once  insulin lispro (ADMELOG) corrective regimen sliding scale   SubCutaneous three times a day before meals  insulin lispro (ADMELOG) corrective regimen sliding scale   SubCutaneous at bedtime  memantine 10 milliGRAM(s) Oral daily  metoprolol succinate ER 50 milliGRAM(s) Oral daily  senna 2 Tablet(s) Oral at bedtime  sodium chloride 0.9%. 1000 milliLiter(s) IV Continuous <Continuous>    PRN MEDICATIONS  acetaminophen     Tablet .. 650 milliGRAM(s) Oral every 6 hours PRN  dextrose Oral Gel 15 Gram(s) Oral once PRN  melatonin 3 milliGRAM(s) Oral at bedtime PRN      VITAL SIGNS: Last 24 Hours  T(C): 37.4 (29 Jun 2025 04:59), Max: 37.8 (28 Jun 2025 20:40)  T(F): 99.3 (29 Jun 2025 04:59), Max: 100.1 (28 Jun 2025 20:40)  HR: 68 (29 Jun 2025 04:59) (68 - 75)  BP: 134/70 (29 Jun 2025 04:59) (134/70 - 155/83)  BP(mean): --  RR: 18 (29 Jun 2025 04:59) (18 - 20)  SpO2: 94% (29 Jun 2025 04:59) (94% - 97%)    LABS:                        13.2   7.14  )-----------( 156      ( 29 Jun 2025 08:50 )             40.7     06-29    139  |  101  |  17  ----------------------------<  101[H]  4.0   |  26  |  1.6[H]    Ca    9.2      29 Jun 2025 08:50  Mg     2.1     06-29    TPro  7.5  /  Alb  4.2  /  TBili  1.0  /  DBili  x   /  AST  22  /  ALT  17  /  AlkPhos  101  06-29    PT/INR - ( 28 Jun 2025 01:37 )   PT: 12.50 sec;   INR: 1.06 ratio         PTT - ( 28 Jun 2025 01:37 )  PTT:32.7 sec  Urinalysis Basic - ( 29 Jun 2025 08:50 )    Color: x / Appearance: x / SG: x / pH: x  Gluc: 101 mg/dL / Ketone: x  / Bili: x / Urobili: x   Blood: x / Protein: x / Nitrite: x   Leuk Esterase: x / RBC: x / WBC x   Sq Epi: x / Non Sq Epi: x / Bacteria: x            Urinalysis with Rflx Culture (collected 28 Jun 2025 06:35)          RADIOLOGY:      PHYSICAL EXAM:    GENERAL: NAD, well-developed  HEENT:  Atraumatic, Normocephalic. EOMI, LT sided maxillofacial swelling  PULMONARY: Clear to auscultation bilaterally  CARDIOVASCULAR: Regular rate and rhythm; No murmurs  GASTROINTESTINAL: Soft, Nontender, Nondistended  MUSCULOSKELETAL:  2+ Peripheral Pulses, No clubbing, cyanosis, or edema  NEUROLOGY: non-focal  SKIN: No rashes or lesions      
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 1000 milliLiter(s) IV Continuous <Continuous>  dextrose 5%. 1000 milliLiter(s) IV Continuous <Continuous>  dextrose 50% Injectable 25 Gram(s) IV Push once  dextrose 50% Injectable 12.5 Gram(s) IV Push once  dextrose 50% Injectable 25 Gram(s) IV Push once  donepezil 10 milliGRAM(s) Oral at bedtime  famotidine    Tablet 20 milliGRAM(s) Oral daily  folic acid 1 milliGRAM(s) Oral daily  furosemide    Tablet 40 milliGRAM(s) Oral daily  glucagon  Injectable 1 milliGRAM(s) IntraMuscular once  insulin lispro (ADMELOG) corrective regimen sliding scale   SubCutaneous three times a day before meals  insulin lispro (ADMELOG) corrective regimen sliding scale   SubCutaneous at bedtime  memantine 10 milliGRAM(s) Oral daily  metoprolol succinate ER 50 milliGRAM(s) Oral daily  senna 2 Tablet(s) Oral at bedtime  sodium chloride 0.9%. 1000 milliLiter(s) IV Continuous <Continuous>    PRN MEDICATIONS  acetaminophen     Tablet .. 650 milliGRAM(s) Oral every 6 hours PRN  dextrose Oral Gel 15 Gram(s) Oral once PRN  melatonin 3 milliGRAM(s) Oral at bedtime PRN      VITAL SIGNS: Last 24 Hours  T(C): 37.4 (29 Jun 2025 04:59), Max: 37.8 (28 Jun 2025 20:40)  T(F): 99.3 (29 Jun 2025 04:59), Max: 100.1 (28 Jun 2025 20:40)  HR: 68 (29 Jun 2025 04:59) (68 - 75)  BP: 134/70 (29 Jun 2025 04:59) (134/70 - 155/83)  BP(mean): --  RR: 18 (29 Jun 2025 04:59) (18 - 20)  SpO2: 94% (29 Jun 2025 04:59) (94% - 97%)    LABS:                        13.2   7.14  )-----------( 156      ( 29 Jun 2025 08:50 )             40.7     06-29    139  |  101  |  17  ----------------------------<  101[H]  4.0   |  26  |  1.6[H]    Ca    9.2      29 Jun 2025 08:50  Mg     2.1     06-29    TPro  7.5  /  Alb  4.2  /  TBili  1.0  /  DBili  x   /  AST  22  /  ALT  17  /  AlkPhos  101  06-29    PT/INR - ( 28 Jun 2025 01:37 )   PT: 12.50 sec;   INR: 1.06 ratio         PTT - ( 28 Jun 2025 01:37 )  PTT:32.7 sec  Urinalysis Basic - ( 29 Jun 2025 08:50 )    Color: x / Appearance: x / SG: x / pH: x  Gluc: 101 mg/dL / Ketone: x  / Bili: x / Urobili: x   Blood: x / Protein: x / Nitrite: x   Leuk Esterase: x / RBC: x / WBC x   Sq Epi: x / Non Sq Epi: x / Bacteria: x            RADIOLOGY:      PHYSICAL EXAM:    GENERAL: NAD, well-developed  HEENT:  Atraumatic, Normocephalic. EOMI, LT sided maxillofacial swelling  PULMONARY: Clear to auscultation bilaterally  CARDIOVASCULAR: Regular rate and rhythm; No murmurs  GASTROINTESTINAL: Soft, Nontender, Nondistended  MUSCULOSKELETAL:  2+ Peripheral Pulses, No clubbing, cyanosis, or edema  NEUROLOGY: non-focal  SKIN: No rashes or lesions

## 2025-07-01 NOTE — PROGRESS NOTE ADULT - ASSESSMENT
84 y/o M PMHx DM, HTN, HLD,  GERD, dementia, CAD s/p CABG, CHF on furosemide,  admitted for 1-2 week generalized weakness and 2-3 day h/o appearing very sleepy, per pt's daughter, noting pt hasnt been his "usual jovial self". Pt daughter also reporting pt's b/l LE have gradually gotten more swollen over past 3 weeks  pt daughter reports no reported n/v, known head trauma/ falls, fevers, abd pain, pain w/ urination, issues w/ chewing/eating, constipation,  unable to urinate, unable to tolerate PO intake, corroborated by pt during exam   pt daughter  also reporting no noted change in mental status from baseline    temp 101.5 in ER noted    # FUO  #lethargy  #Multiple dental caries  lactate and WBC WNL  CTAP: No CT evidence of acute abdominopelvic pathology  CTH: No acute intracranial pathology. Stable mild chronic microvascular ischemic changes and chronic lacunar infarcts  CT maxillofacial multiple dental carries and periodontal disease  PO augmentin on dc for 10 days  CXR: no signs of cardiac pulm disease   s/p cefepime and IVF In ER   - trend WBC and monitor fever curve  - daily abdominal exams   - RVP expanded  - fall risk  -PT consult   - full RVP     #hematuria in setting of straight cath  pt's daughter denies reported hematuria prior to straight cath, reporting it as " difficult"  while in ER  - trend urine color to assess if hematuria improving  - maintain active type and screen  - SCDs      #ROYAL  s/p 1.25 L IVF in ER  monitor SCr  strict Is and Os  Monitor electrolytes  refrain use of nephrotoxic meds   hold off IVF for now in setting of fluid overload  RBUS   c/w home furosemide for now; consider stopping if Cr doesnt improve or worsens     # HTN  #  h/o CHF w/ signs of fluid overload on exam  TTE (2023) showed ef of 54 %.  - TTE  - c/w metoprolol and  furosemide   - strict I&Os  - daily weights     # Dementia  - c/w home med    # GERD  - c/w renally dose famotidine     # DM  - hold tradjenta in setting of lethargy  - ISS w/ finger stick      Plan to DC to [facility], medically ready for dc
86 y/o M PMHx DM, HTN, HLD,  GERD, dementia, CAD s/p CABG, CHF on furosemide,  admitted for 1-2 week generalized weakness and 2-3 day h/o appearing very sleepy, per pt's daughter, noting pt hasnt been his "usual jovial self". Pt daughter also reporting pt's b/l LE have gradually gotten more swollen over past 3 weeks  pt daughter reports no reported n/v, known head trauma/ falls, fevers, abd pain, pain w/ urination, issues w/ chewing/eating, constipation,  unable to urinate, unable to tolerate PO intake, corroborated by pt during exam   pt daughter  also reporting no noted change in mental status from baseline    temp 101.5 in ER noted    # FUO  #lethargy  #Multiple dental caries  lactate and WBC WNL  CTAP: No CT evidence of acute abdominopelvic pathology  CTH: No acute intracranial pathology. Stable mild chronic microvascular ischemic changes and chronic lacunar infarcts  F/u CT maxillofacial   CXR: no signs of cardiac pulm disease   s/p cefepime and IVF In ER   - trend WBC and monitor fever curve  - daily abdominal exams   - RVP expanded  - f/u blood cx and UCX  - fall risk  -PT consult   - full RVP     #hematuria in setting of straight cath  pt's daughter denies reported hematuria prior to straight cath, reporting it as " difficult"  while in ER  - trend urine color to assess if hematuria improving  - maintain active type and screen  - SCDs      #ROYAL  s/p 1.25 L IVF in ER  monitor SCr  strict Is and Os  Monitor electrolytes  refrain use of nephrotoxic meds   hold off IVF for now in setting of fluid overload  RBUS   c/w home furosemide for now; consider stopping if Cr doesnt improve or worsens     # HTN  #  h/o CHF w/ signs of fluid overload on exam  TTE (2023) showed ef of 54 %.  - TTE  - c/w metoprolol and  furosemide   - strict I&Os  - daily weights     # Dementia  - c/w home med    # GERD  - c/w renally dose famotidine     # DM  - hold tradjenta in setting of lethargy  - ISS w/ finger stick      Contact insolation pending full RVP results  #VTE: SCDs pending b/l LE results  DIET: soft & bite sized per pt's daughter.  
chronic kidney disease stage 3 without proteinuria   baseline Cr "upper 1's"  no ROYAL, Cr at baseline  s/p IV contrast in ED 6/28  no mention of right renal lesion on current CT and renal sono that was seen on prior CT  resolved gross hematuria with straight cath  fever / lethargy / AMS, better  dental carries   dementia   CAD / HTN / DM    plan:    augmentin per ID  encourage po hydration  repeat UA as outpatient for resolution of hematuria  cont lasix 40mg po qd  full code    outpt renal f/u my office, will sign off           
84 y/o M PMHx DM, HTN, HLD,  GERD, dementia, CAD s/p CABG, CHF on furosemide,  admitted for 1-2 week generalized weakness and 2-3 day h/o appearing very sleepy, per pt's daughter, noting pt hasnt been his "usual jovial self". Pt daughter also reporting pt's b/l LE have gradually gotten more swollen over past 3 weeks  pt daughter reports no reported n/v, known head trauma/ falls, fevers, abd pain, pain w/ urination, issues w/ chewing/eating, constipation,  unable to urinate, unable to tolerate PO intake, corroborated by pt during exam   pt daughter  also reporting no noted change in mental status from baseline    temp 101.5 in ER noted    # FUO  #lethargy  #Multiple dental caries  lactate and WBC WNL  CTAP: No CT evidence of acute abdominopelvic pathology  CTH: No acute intracranial pathology. Stable mild chronic microvascular ischemic changes and chronic lacunar infarcts  CT maxillofacial multiple dental carries and periodontal disease  PO augmentin on dc for 10 days  CXR: no signs of cardiac pulm disease   s/p cefepime and IVF In ER   - trend WBC and monitor fever curve  - daily abdominal exams   - RVP expanded  - fall risk  -PT consult   - full RVP     #hematuria in setting of straight cath  pt's daughter denies reported hematuria prior to straight cath, reporting it as " difficult"  while in ER  - trend urine color to assess if hematuria improving  - maintain active type and screen  - SCDs      #ROYAL  s/p 1.25 L IVF in ER  monitor SCr  strict Is and Os  Monitor electrolytes  refrain use of nephrotoxic meds   hold off IVF for now in setting of fluid overload  RBUS   c/w home furosemide for now; consider stopping if Cr doesnt improve or worsens     # HTN  #  h/o CHF w/ signs of fluid overload on exam  TTE (2023) showed ef of 54 %.  - TTE  - c/w metoprolol and  furosemide   - strict I&Os  - daily weights     # Dementia  - c/w home med    # GERD  - c/w renally dose famotidine     # DM  - hold tradjenta in setting of lethargy  - ISS w/ finger stick      DC to [facility], medically ready for dc

## 2025-07-05 DIAGNOSIS — I13.0 HYPERTENSIVE HEART AND CHRONIC KIDNEY DISEASE WITH HEART FAILURE AND STAGE 1 THROUGH STAGE 4 CHRONIC KIDNEY DISEASE, OR UNSPECIFIED CHRONIC KIDNEY DISEASE: ICD-10-CM

## 2025-07-05 DIAGNOSIS — F03.90 UNSPECIFIED DEMENTIA, UNSPECIFIED SEVERITY, WITHOUT BEHAVIORAL DISTURBANCE, PSYCHOTIC DISTURBANCE, MOOD DISTURBANCE, AND ANXIETY: ICD-10-CM

## 2025-07-05 DIAGNOSIS — K05.6 PERIODONTAL DISEASE, UNSPECIFIED: ICD-10-CM

## 2025-07-05 DIAGNOSIS — N18.30 CHRONIC KIDNEY DISEASE, STAGE 3 UNSPECIFIED: ICD-10-CM

## 2025-07-05 DIAGNOSIS — I25.10 ATHEROSCLEROTIC HEART DISEASE OF NATIVE CORONARY ARTERY WITHOUT ANGINA PECTORIS: ICD-10-CM

## 2025-07-05 DIAGNOSIS — K21.9 GASTRO-ESOPHAGEAL REFLUX DISEASE WITHOUT ESOPHAGITIS: ICD-10-CM

## 2025-07-05 DIAGNOSIS — E11.22 TYPE 2 DIABETES MELLITUS WITH DIABETIC CHRONIC KIDNEY DISEASE: ICD-10-CM

## 2025-07-05 DIAGNOSIS — E78.5 HYPERLIPIDEMIA, UNSPECIFIED: ICD-10-CM

## 2025-07-05 DIAGNOSIS — I50.9 HEART FAILURE, UNSPECIFIED: ICD-10-CM

## 2025-07-05 DIAGNOSIS — N17.9 ACUTE KIDNEY FAILURE, UNSPECIFIED: ICD-10-CM

## 2025-07-05 DIAGNOSIS — R31.9 HEMATURIA, UNSPECIFIED: ICD-10-CM

## 2025-09-11 ENCOUNTER — APPOINTMENT (OUTPATIENT)
Dept: CARDIOLOGY | Facility: CLINIC | Age: 85
End: 2025-09-11
Payer: MEDICARE

## 2025-09-11 VITALS
DIASTOLIC BLOOD PRESSURE: 80 MMHG | WEIGHT: 192 LBS | BODY MASS INDEX: 32.78 KG/M2 | HEIGHT: 64 IN | SYSTOLIC BLOOD PRESSURE: 118 MMHG

## 2025-09-11 DIAGNOSIS — I50.32 CHRONIC DIASTOLIC (CONGESTIVE) HEART FAILURE: ICD-10-CM

## 2025-09-11 DIAGNOSIS — Z95.1 PRESENCE OF AORTOCORONARY BYPASS GRAFT: ICD-10-CM

## 2025-09-11 DIAGNOSIS — I50.9 HEART FAILURE, UNSPECIFIED: ICD-10-CM

## 2025-09-11 DIAGNOSIS — E78.00 PURE HYPERCHOLESTEROLEMIA, UNSPECIFIED: ICD-10-CM

## 2025-09-11 DIAGNOSIS — I10 ESSENTIAL (PRIMARY) HYPERTENSION: ICD-10-CM

## 2025-09-11 DIAGNOSIS — I70.90 UNSPECIFIED ATHEROSCLEROSIS: ICD-10-CM

## 2025-09-11 PROCEDURE — 93306 TTE W/DOPPLER COMPLETE: CPT

## 2025-09-11 PROCEDURE — 99214 OFFICE O/P EST MOD 30 MIN: CPT

## 2025-09-11 PROCEDURE — 93000 ELECTROCARDIOGRAM COMPLETE: CPT

## 2025-09-11 PROCEDURE — G2211 COMPLEX E/M VISIT ADD ON: CPT
